# Patient Record
Sex: FEMALE | Race: WHITE | NOT HISPANIC OR LATINO | Employment: OTHER
[De-identification: names, ages, dates, MRNs, and addresses within clinical notes are randomized per-mention and may not be internally consistent; named-entity substitution may affect disease eponyms.]

---

## 2017-08-02 ENCOUNTER — IMAGING SERVICES (OUTPATIENT)
Dept: OTHER | Age: 72
End: 2017-08-02

## 2017-08-02 ENCOUNTER — HOSPITAL (OUTPATIENT)
Dept: OTHER | Age: 72
End: 2017-08-02

## 2017-08-16 ENCOUNTER — IMAGING SERVICES (OUTPATIENT)
Dept: OTHER | Age: 72
End: 2017-08-16

## 2017-08-16 ENCOUNTER — HOSPITAL (OUTPATIENT)
Dept: OTHER | Age: 72
End: 2017-08-16

## 2017-09-05 ENCOUNTER — HOSPITAL (OUTPATIENT)
Dept: OTHER | Age: 72
End: 2017-09-05
Attending: INTERNAL MEDICINE

## 2017-09-12 ENCOUNTER — HOSPITAL (OUTPATIENT)
Dept: OTHER | Age: 72
End: 2017-09-12
Attending: EMERGENCY MEDICINE

## 2017-09-12 ENCOUNTER — CHARTING TRANS (OUTPATIENT)
Dept: OTHER | Age: 72
End: 2017-09-12

## 2017-09-12 ENCOUNTER — LAB SERVICES (OUTPATIENT)
Dept: OTHER | Age: 72
End: 2017-09-12

## 2017-09-12 LAB
ALBUMIN SERPL-MCNC: 3.9 GM/DL (ref 3.6–5.1)
ALBUMIN/GLOB SERPL: 1.1 {RATIO} (ref 1–2.4)
ALP SERPL-CCNC: 56 UNIT/L (ref 45–117)
ALT SERPL-CCNC: 32 UNIT/L
ANALYZER ANC (IANC): ABNORMAL
ANION GAP SERPL CALC-SCNC: 13 MMOL/L (ref 10–20)
APPEARANCE UR: CLEAR
APPEARANCE UR: CLEAR
AST SERPL-CCNC: 47 UNIT/L
BACTERIA #/AREA URNS HPF: ABNORMAL /HPF
BACTERIA #/AREA URNS HPF: ABNORMAL /HPF
BASOPHILS # BLD: 0 THOUSAND/MCL (ref 0–0.3)
BASOPHILS NFR BLD: 0 %
BILIRUB SERPL-MCNC: 1.3 MG/DL (ref 0.2–1)
BILIRUB UR QL: NEGATIVE
BILIRUB UR QL: NEGATIVE
BUN SERPL-MCNC: 6 MG/DL (ref 6–20)
BUN/CREAT SERPL: 12 (ref 7–25)
CALCIUM SERPL-MCNC: 8.9 MG/DL (ref 8.4–10.2)
CHLORIDE: 89 MMOL/L (ref 98–107)
CO2 SERPL-SCNC: 26 MMOL/L (ref 21–32)
COLOR UR: YELLOW
COLOR UR: YELLOW
CREAT SERPL-MCNC: 0.49 MG/DL (ref 0.51–0.95)
DIFFERENTIAL METHOD BLD: ABNORMAL
EOSINOPHIL # BLD: 0.1 THOUSAND/MCL (ref 0.1–0.5)
EOSINOPHIL NFR BLD: 1 %
ERYTHROCYTE [DISTWIDTH] IN BLOOD: 12.9 % (ref 11–15)
GLOBULIN SER-MCNC: 3.7 GM/DL (ref 2–4)
GLUCOSE SERPL-MCNC: 89 MG/DL (ref 65–99)
GLUCOSE UR-MCNC: NEGATIVE MG/DL
GLUCOSE UR-MCNC: NEGATIVE MG/DL
HEMATOCRIT: 43.6 % (ref 36–46.5)
HGB BLD-MCNC: 15.3 GM/DL (ref 12–15.5)
HGB UR QL: NEGATIVE
HYALINE CASTS #/AREA URNS LPF: ABNORMAL /LPF (ref 0–5)
HYALINE CASTS #/AREA URNS LPF: ABNORMAL /LPF (ref 0–5)
KETONES UR-MCNC: 15 MG/DL
KETONES UR-MCNC: 15 MG/DL
LEUKOCYTE ESTERASE UR QL STRIP: NEGATIVE
LYMPHOCYTES # BLD: 1 THOUSAND/MCL (ref 1–4)
LYMPHOCYTES NFR BLD: 17 %
MCH RBC QN AUTO: 34.1 PG (ref 26–34)
MCHC RBC AUTO-ENTMCNC: 35.1 GM/DL (ref 32–36.5)
MCV RBC AUTO: 97.1 FL (ref 78–100)
MONOCYTES # BLD: 0.6 THOUSAND/MCL (ref 0.3–0.9)
MONOCYTES NFR BLD: 10 %
NEUTROPHILS # BLD: 4.1 THOUSAND/MCL (ref 1.8–7.7)
NEUTROPHILS NFR BLD: 72 %
NEUTS SEG NFR BLD: ABNORMAL %
NITRITE UR QL: NEGATIVE
NITRITE UR QL: NEGATIVE
PERCENT NRBC: ABNORMAL
PH UR: 6.5 UNIT (ref 5–7)
PH UR: 6.5 UNITS (ref 5–7)
PLATELET # BLD: 226 THOUSAND/MCL (ref 140–450)
POTASSIUM SERPL-SCNC: 4.2 MMOL/L (ref 3.4–5.1)
PROT SERPL-MCNC: 7.6 GM/DL (ref 6.4–8.2)
PROT UR QL: NEGATIVE MG/DL
PROT UR QL: NEGATIVE MG/DL
RBC # BLD: 4.49 MILLION/MCL (ref 4–5.2)
RBC #/AREA URNS HPF: ABNORMAL /HPF (ref 0–3)
RBC #/AREA URNS HPF: ABNORMAL /HPF (ref 0–3)
RBC-URINE: NEGATIVE
SODIUM SERPL-SCNC: 124 MMOL/L (ref 135–145)
SP GR UR: <1.005 (ref 1–1.03)
SP GR UR: <1.005 (ref 1–1.03)
SPECIMEN SOURCE: ABNORMAL
SPECIMEN SOURCE: ABNORMAL
SQUAMOUS #/AREA URNS HPF: ABNORMAL /HPF (ref 0–5)
SQUAMOUS #/AREA URNS HPF: ABNORMAL /HPF (ref 0–5)
URNS CMNT MICRO: ABNORMAL
URNS CMNT MICRO: ABNORMAL
UROBILINOGEN UR QL: 0.2 MG/DL (ref 0–1)
UROBILINOGEN UR QL: 0.2 MG/DL (ref 0–1)
WBC # BLD: 5.7 THOUSAND/MCL (ref 4.2–11)
WBC #/AREA URNS HPF: ABNORMAL /HPF (ref 0–5)
WBC #/AREA URNS HPF: ABNORMAL /HPF (ref 0–5)
WBC-URINE: NEGATIVE

## 2017-09-15 ENCOUNTER — IMAGING SERVICES (OUTPATIENT)
Dept: OTHER | Age: 72
End: 2017-09-15

## 2017-09-15 ENCOUNTER — HOSPITAL (OUTPATIENT)
Dept: OTHER | Age: 72
End: 2017-09-15

## 2017-09-19 ENCOUNTER — LAB SERVICES (OUTPATIENT)
Dept: OTHER | Age: 72
End: 2017-09-19

## 2017-09-19 ENCOUNTER — CHARTING TRANS (OUTPATIENT)
Dept: OTHER | Age: 72
End: 2017-09-19

## 2017-09-19 ASSESSMENT — PAIN SCALES - GENERAL: PAINLEVEL_OUTOF10: 0

## 2017-09-20 LAB
25(OH)D3+25(OH)D2 SERPL-MCNC: 45.3 NG/ML (ref 30–100)
BASOPHILS # BLD: 0 K/MCL (ref 0–0.3)
BASOPHILS NFR BLD: 0 %
DIFFERENTIAL METHOD BLD: ABNORMAL
EOSINOPHIL # BLD: 0.1 K/MCL (ref 0.1–0.5)
EOSINOPHIL NFR BLD: 1 %
ERYTHROCYTE [DISTWIDTH] IN BLOOD: 13.4 % (ref 11–15)
HEMATOCRIT: 47.2 % (ref 36–46.5)
HEMOGLOBIN: 16 G/DL (ref 12–15.5)
LYMPHOCYTES # BLD: 1 K/MCL (ref 1–4)
LYMPHOCYTES NFR BLD: 17 %
MEAN CORPUSCULAR HEMOGLOBIN: 34 PG (ref 26–34)
MEAN CORPUSCULAR HGB CONC: 33.9 G/DL (ref 32–36.5)
MEAN CORPUSCULAR VOLUME: 100.4 FL (ref 78–100)
MONOCYTES # BLD: 0.7 K/MCL (ref 0.3–0.9)
MONOCYTES NFR BLD: 13 %
NEUTROPHILS # BLD: 3.8 K/MCL (ref 1.8–7.7)
NEUTROPHILS NFR BLD: 69 %
PLATELET COUNT: 245 K/MCL (ref 140–450)
RED CELL COUNT: 4.7 MIL/MCL (ref 4–5.2)
WHITE BLOOD COUNT: 5.7 K/MCL (ref 4.2–11)

## 2017-09-26 ENCOUNTER — CHARTING TRANS (OUTPATIENT)
Dept: OTHER | Age: 72
End: 2017-09-26

## 2017-10-03 ENCOUNTER — HOSPITAL (OUTPATIENT)
Dept: OTHER | Age: 72
End: 2017-10-03
Attending: SURGERY

## 2017-10-03 ENCOUNTER — IMAGING SERVICES (OUTPATIENT)
Dept: OTHER | Age: 72
End: 2017-10-03

## 2017-10-04 ENCOUNTER — CHARTING TRANS (OUTPATIENT)
Dept: OTHER | Age: 72
End: 2017-10-04

## 2017-10-04 ENCOUNTER — DIAGNOSTIC TRANS (OUTPATIENT)
Dept: OTHER | Age: 72
End: 2017-10-04

## 2017-10-05 ENCOUNTER — CHARTING TRANS (OUTPATIENT)
Dept: OTHER | Age: 72
End: 2017-10-05

## 2017-10-12 ENCOUNTER — LAB SERVICES (OUTPATIENT)
Dept: OTHER | Age: 72
End: 2017-10-12

## 2017-10-13 LAB
ALBUMIN SERPL-MCNC: 3.8 G/DL (ref 3.6–5.1)
ALBUMIN/GLOB SERPL: 1.3 (ref 1–2.4)
ALP SERPL-CCNC: 49 UNITS/L (ref 45–117)
ALT SERPL-CCNC: 27 UNITS/L
ANION GAP SERPL CALC-SCNC: 13 MMOL/L (ref 10–20)
AST SERPL-CCNC: 21 UNITS/L
BILIRUB SERPL-MCNC: 0.7 MG/DL (ref 0.2–1)
BUN SERPL-MCNC: 10 MG/DL (ref 6–20)
BUN/CREAT SERPL: 20 (ref 7–25)
CALCIUM SERPL-MCNC: 9 MG/DL (ref 8.4–10.2)
CHLORIDE SERPL-SCNC: 96 MMOL/L (ref 98–107)
CO2 SERPL-SCNC: 28 MMOL/L (ref 21–32)
CREAT SERPL-MCNC: 0.5 MG/DL (ref 0.51–0.95)
GLOBULIN SER-MCNC: 2.9 G/DL (ref 2–4)
GLUCOSE SERPL-MCNC: 87 MG/DL (ref 65–99)
LENGTH OF FAST TIME PATIENT: NO HRS
POTASSIUM SERPL-SCNC: 3.6 MMOL/L (ref 3.4–5.1)
SODIUM SERPL-SCNC: 133 MMOL/L (ref 135–145)
TOTAL PROTEIN: 6.7 G/DL (ref 6.4–8.2)

## 2017-10-16 ENCOUNTER — PRIOR ORIGINAL RECORDS (OUTPATIENT)
Dept: OTHER | Age: 72
End: 2017-10-16

## 2017-10-23 ENCOUNTER — IMAGING SERVICES (OUTPATIENT)
Dept: OTHER | Age: 72
End: 2017-10-23

## 2017-10-23 ENCOUNTER — HOSPITAL (OUTPATIENT)
Dept: OTHER | Age: 72
End: 2017-10-23
Attending: SURGERY

## 2017-10-24 ENCOUNTER — CHARTING TRANS (OUTPATIENT)
Dept: OTHER | Age: 72
End: 2017-10-24

## 2017-10-24 ENCOUNTER — HOSPITAL (OUTPATIENT)
Dept: OTHER | Age: 72
End: 2017-10-24
Attending: RADIOLOGY

## 2017-10-25 ENCOUNTER — CHARTING TRANS (OUTPATIENT)
Dept: OTHER | Age: 72
End: 2017-10-25

## 2017-11-01 ENCOUNTER — HOSPITAL (OUTPATIENT)
Dept: OTHER | Age: 72
End: 2017-11-01
Attending: RADIOLOGY

## 2017-11-02 ENCOUNTER — CHARTING TRANS (OUTPATIENT)
Dept: OTHER | Age: 72
End: 2017-11-02

## 2017-11-22 ENCOUNTER — PRIOR ORIGINAL RECORDS (OUTPATIENT)
Dept: OTHER | Age: 72
End: 2017-11-22

## 2017-12-05 ENCOUNTER — CHARTING TRANS (OUTPATIENT)
Dept: OTHER | Age: 72
End: 2017-12-05

## 2017-12-05 ASSESSMENT — PAIN SCALES - GENERAL: PAINLEVEL_OUTOF10: 0

## 2018-02-28 ENCOUNTER — PRIOR ORIGINAL RECORDS (OUTPATIENT)
Dept: OTHER | Age: 73
End: 2018-02-28

## 2018-02-28 ENCOUNTER — HOSPITAL (OUTPATIENT)
Dept: OTHER | Age: 73
End: 2018-02-28
Attending: RADIOLOGY

## 2018-04-09 ENCOUNTER — HOSPITAL (OUTPATIENT)
Dept: OTHER | Age: 73
End: 2018-04-09
Attending: INTERNAL MEDICINE

## 2018-04-09 ENCOUNTER — IMAGING SERVICES (OUTPATIENT)
Dept: OTHER | Age: 73
End: 2018-04-09

## 2018-04-10 ENCOUNTER — HOSPITAL (OUTPATIENT)
Dept: OTHER | Age: 73
End: 2018-04-10
Attending: EMERGENCY MEDICINE

## 2018-04-10 ENCOUNTER — DIAGNOSTIC TRANS (OUTPATIENT)
Dept: OTHER | Age: 73
End: 2018-04-10

## 2018-04-10 LAB
ALBUMIN SERPL-MCNC: 3.3 GM/DL (ref 3.6–5.1)
ALBUMIN/GLOB SERPL: 1 {RATIO} (ref 1–2.4)
ALP SERPL-CCNC: 53 UNIT/L (ref 45–117)
ALT SERPL-CCNC: 26 UNIT/L
AMPHETAMINES UR QL SCN>500 NG/ML: NEGATIVE
ANALYZER ANC (IANC): ABNORMAL
ANION GAP SERPL CALC-SCNC: 11 MMOL/L (ref 10–20)
APAP SERPL-MCNC: <2 MCG/ML (ref 10–30)
AST SERPL-CCNC: 24 UNIT/L
BARBITURATES UR QL SCN>200 NG/ML: NEGATIVE
BASOPHILS # BLD: 0 THOUSAND/MCL (ref 0–0.3)
BASOPHILS NFR BLD: 0 %
BENZODIAZ UR QL SCN>200 NG/ML: NEGATIVE
BILIRUB SERPL-MCNC: 0.5 MG/DL (ref 0.2–1)
BUN SERPL-MCNC: 9 MG/DL (ref 6–20)
BUN/CREAT SERPL: 18 (ref 7–25)
BZE UR QL SCN>150 NG/ML: NEGATIVE
CALCIUM SERPL-MCNC: 8.7 MG/DL (ref 8.4–10.2)
CANNABINOIDS UR QL SCN>50 NG/ML: NEGATIVE
CHLORIDE: 93 MMOL/L (ref 98–107)
CO2 SERPL-SCNC: 28 MMOL/L (ref 21–32)
CREAT SERPL-MCNC: 0.51 MG/DL (ref 0.51–0.95)
DIFFERENTIAL METHOD BLD: ABNORMAL
EOSINOPHIL # BLD: 0 THOUSAND/MCL (ref 0.1–0.5)
EOSINOPHIL NFR BLD: 0 %
ERYTHROCYTE [DISTWIDTH] IN BLOOD: 12.5 % (ref 11–15)
ETHANOL SERPL-MCNC: NORMAL MG/DL
GLOBULIN SER-MCNC: 3.4 GM/DL (ref 2–4)
GLUCOSE SERPL-MCNC: 115 MG/DL (ref 65–99)
HEMATOCRIT: 43.5 % (ref 36–46.5)
HGB BLD-MCNC: 15 GM/DL (ref 12–15.5)
IMM GRANULOCYTES # BLD AUTO: 0 THOUSAND/MCL (ref 0–0.2)
IMM GRANULOCYTES NFR BLD: 0 %
LIPASE SERPL-CCNC: 73 UNIT/L (ref 73–393)
LYMPHOCYTES # BLD: 0.7 THOUSAND/MCL (ref 1–4)
LYMPHOCYTES NFR BLD: 9 %
MAGNESIUM SERPL-MCNC: 2.2 MG/DL (ref 1.7–2.4)
MCH RBC QN AUTO: 33.9 PG (ref 26–34)
MCHC RBC AUTO-ENTMCNC: 34.5 GM/DL (ref 32–36.5)
MCV RBC AUTO: 98.4 FL (ref 78–100)
MONOCYTES # BLD: 0.6 THOUSAND/MCL (ref 0.3–0.9)
MONOCYTES NFR BLD: 9 %
NEUTROPHILS # BLD: 6.2 THOUSAND/MCL (ref 1.8–7.7)
NEUTROPHILS NFR BLD: 82 %
NEUTS SEG NFR BLD: ABNORMAL %
OPIATES UR QL SCN>300 NG/ML: NEGATIVE
PCP UR QL SCN>25 NG/ML: NEGATIVE
PERCENT NRBC: 0 %
PLATELET # BLD: 251 THOUSAND/MCL (ref 140–450)
POTASSIUM SERPL-SCNC: 3.7 MMOL/L (ref 3.4–5.1)
PROT SERPL-MCNC: 6.7 GM/DL (ref 6.4–8.2)
RBC # BLD: 4.42 MILLION/MCL (ref 4–5.2)
SALICYLATES SERPL-MCNC: <2.8 MG/DL
SODIUM SERPL-SCNC: 128 MMOL/L (ref 135–145)
WBC # BLD: 7.6 THOUSAND/MCL (ref 4.2–11)

## 2018-04-12 ENCOUNTER — HOSPITAL (OUTPATIENT)
Dept: OTHER | Age: 73
End: 2018-04-12
Attending: PSYCHIATRY & NEUROLOGY

## 2018-04-12 LAB
ALBUMIN SERPL-MCNC: 3.3 GM/DL (ref 3.6–5.1)
ALBUMIN/GLOB SERPL: 1 {RATIO} (ref 1–2.4)
ALP SERPL-CCNC: 49 UNIT/L (ref 45–117)
ALT SERPL-CCNC: 27 UNIT/L
AMPHETAMINES UR QL SCN>500 NG/ML: NEGATIVE
ANALYZER ANC (IANC): ABNORMAL
ANION GAP SERPL CALC-SCNC: 10 MMOL/L (ref 10–20)
APAP SERPL-MCNC: <2 MCG/ML (ref 10–30)
APPEARANCE UR: CLEAR
AST SERPL-CCNC: 27 UNIT/L
BACTERIA #/AREA URNS HPF: ABNORMAL /HPF
BARBITURATES UR QL SCN>200 NG/ML: NEGATIVE
BASOPHILS # BLD: 0 THOUSAND/MCL (ref 0–0.3)
BASOPHILS NFR BLD: 1 %
BENZODIAZ UR QL SCN>200 NG/ML: NEGATIVE
BILIRUB SERPL-MCNC: 0.7 MG/DL (ref 0.2–1)
BILIRUB UR QL: NEGATIVE
BUN SERPL-MCNC: 11 MG/DL (ref 6–20)
BUN/CREAT SERPL: 21 (ref 7–25)
BZE UR QL SCN>150 NG/ML: NEGATIVE
CALCIUM SERPL-MCNC: 8.5 MG/DL (ref 8.4–10.2)
CANNABINOIDS UR QL SCN>50 NG/ML: NEGATIVE
CHLORIDE: 94 MMOL/L (ref 98–107)
CO2 SERPL-SCNC: 27 MMOL/L (ref 21–32)
COLOR UR: YELLOW
CREAT SERPL-MCNC: 0.52 MG/DL (ref 0.51–0.95)
DIFFERENTIAL METHOD BLD: ABNORMAL
EOSINOPHIL # BLD: 0.1 THOUSAND/MCL (ref 0.1–0.5)
EOSINOPHIL NFR BLD: 1 %
ERYTHROCYTE [DISTWIDTH] IN BLOOD: 12.6 % (ref 11–15)
ETHANOL SERPL-MCNC: NORMAL MG/DL
GLOBULIN SER-MCNC: 3.2 GM/DL (ref 2–4)
GLUCOSE SERPL-MCNC: 99 MG/DL (ref 65–99)
GLUCOSE UR-MCNC: NEGATIVE MG/DL
HEMATOCRIT: 41.6 % (ref 36–46.5)
HGB BLD-MCNC: 14.6 GM/DL (ref 12–15.5)
HGB UR QL: NEGATIVE
HYALINE CASTS #/AREA URNS LPF: ABNORMAL /LPF (ref 0–5)
IMM GRANULOCYTES # BLD AUTO: 0 THOUSAND/MCL (ref 0–0.2)
IMM GRANULOCYTES NFR BLD: 0 %
KETONES UR-MCNC: 15 MG/DL
LEUKOCYTE ESTERASE UR QL STRIP: ABNORMAL
LYMPHOCYTES # BLD: 0.9 THOUSAND/MCL (ref 1–4)
LYMPHOCYTES NFR BLD: 16 %
MCH RBC QN AUTO: 34.1 PG (ref 26–34)
MCHC RBC AUTO-ENTMCNC: 35.1 GM/DL (ref 32–36.5)
MCV RBC AUTO: 97.2 FL (ref 78–100)
MONOCYTES # BLD: 0.6 THOUSAND/MCL (ref 0.3–0.9)
MONOCYTES NFR BLD: 10 %
NEUTROPHILS # BLD: 4.1 THOUSAND/MCL (ref 1.8–7.7)
NEUTROPHILS NFR BLD: 72 %
NEUTS SEG NFR BLD: ABNORMAL %
NITRITE UR QL: NEGATIVE
OPIATES UR QL SCN>300 NG/ML: NEGATIVE
PCP UR QL SCN>25 NG/ML: NEGATIVE
PERCENT NRBC: 0 %
PH UR: 7 UNIT (ref 5–7)
PLATELET # BLD: 229 THOUSAND/MCL (ref 140–450)
POTASSIUM SERPL-SCNC: 3.8 MMOL/L (ref 3.4–5.1)
PROT SERPL-MCNC: 6.5 GM/DL (ref 6.4–8.2)
PROT UR QL: NEGATIVE MG/DL
RBC # BLD: 4.28 MILLION/MCL (ref 4–5.2)
RBC #/AREA URNS HPF: ABNORMAL /HPF (ref 0–3)
SALICYLATES SERPL-MCNC: <2.8 MG/DL
SODIUM SERPL-SCNC: 127 MMOL/L (ref 135–145)
SP GR UR: 1.01 (ref 1–1.03)
SPECIMEN SOURCE: ABNORMAL
SQUAMOUS #/AREA URNS HPF: ABNORMAL /HPF (ref 0–5)
TSH SERPL-ACNC: 4.71 MCUNIT/ML (ref 0.35–5)
URNS CMNT MICRO: ABNORMAL
UROBILINOGEN UR QL: 1 MG/DL (ref 0–1)
WBC # BLD: 5.8 THOUSAND/MCL (ref 4.2–11)
WBC #/AREA URNS HPF: ABNORMAL /HPF (ref 0–5)

## 2018-04-13 LAB
ANALYZER ANC (IANC): ABNORMAL
ANION GAP SERPL CALC-SCNC: 12 MMOL/L (ref 10–20)
BUN SERPL-MCNC: 9 MG/DL (ref 6–20)
BUN/CREAT SERPL: 17 (ref 7–25)
CALCIUM SERPL-MCNC: 8.2 MG/DL (ref 8.4–10.2)
CHLORIDE: 102 MMOL/L (ref 98–107)
CHOLEST SERPL-MCNC: 207 MG/DL
CHOLEST/HDLC SERPL: 2 {RATIO}
CO2 SERPL-SCNC: 26 MMOL/L (ref 21–32)
CREAT SERPL-MCNC: 0.52 MG/DL (ref 0.51–0.95)
ERYTHROCYTE [DISTWIDTH] IN BLOOD: 12.9 % (ref 11–15)
GLUCOSE SERPL-MCNC: 90 MG/DL (ref 65–99)
GLYCOHEMOGLOBIN: 5.4 % (ref 4.5–5.6)
HDLC SERPL-MCNC: 104 MG/DL
HEMATOCRIT: 41.6 % (ref 36–46.5)
HGB BLD-MCNC: 14.3 GM/DL (ref 12–15.5)
LDLC SERPL CALC-MCNC: 93 MG/DL
MCH RBC QN AUTO: 33.7 PG (ref 26–34)
MCHC RBC AUTO-ENTMCNC: 34.4 GM/DL (ref 32–36.5)
MCV RBC AUTO: 98.1 FL (ref 78–100)
NONHDLC SERPL-MCNC: 103 MG/DL
PERCENT NRBC: 0 %
PLATELET # BLD: 234 THOUSAND/MCL (ref 140–450)
POTASSIUM SERPL-SCNC: 3.7 MMOL/L (ref 3.4–5.1)
RBC # BLD: 4.24 MILLION/MCL (ref 4–5.2)
SODIUM SERPL-SCNC: 136 MMOL/L (ref 135–145)
TRIGLYCERIDE (TRIGP): 48 MG/DL
WBC # BLD: 4.1 THOUSAND/MCL (ref 4.2–11)

## 2018-04-14 LAB
ALBUMIN SERPL-MCNC: 2.9 GM/DL (ref 3.6–5.1)
ALBUMIN/GLOB SERPL: 0.9 {RATIO} (ref 1–2.4)
ALP SERPL-CCNC: 45 UNIT/L (ref 45–117)
ALT SERPL-CCNC: 23 UNIT/L
ANALYZER ANC (IANC): ABNORMAL
ANION GAP SERPL CALC-SCNC: 10 MMOL/L (ref 10–20)
AST SERPL-CCNC: 20 UNIT/L
BASOPHILS # BLD: 0 THOUSAND/MCL (ref 0–0.3)
BASOPHILS NFR BLD: 1 %
BILIRUB SERPL-MCNC: 0.3 MG/DL (ref 0.2–1)
BUN SERPL-MCNC: 11 MG/DL (ref 6–20)
BUN/CREAT SERPL: 17 (ref 7–25)
CALCIUM SERPL-MCNC: 8.6 MG/DL (ref 8.4–10.2)
CHLORIDE: 104 MMOL/L (ref 98–107)
CO2 SERPL-SCNC: 30 MMOL/L (ref 21–32)
CREAT SERPL-MCNC: 0.66 MG/DL (ref 0.51–0.95)
DIFFERENTIAL METHOD BLD: ABNORMAL
EOSINOPHIL # BLD: 0.1 THOUSAND/MCL (ref 0.1–0.5)
EOSINOPHIL NFR BLD: 3 %
ERYTHROCYTE [DISTWIDTH] IN BLOOD: 13 % (ref 11–15)
GLOBULIN SER-MCNC: 3.3 GM/DL (ref 2–4)
GLUCOSE SERPL-MCNC: 83 MG/DL (ref 65–99)
HEMATOCRIT: 42.2 % (ref 36–46.5)
HGB BLD-MCNC: 14.5 GM/DL (ref 12–15.5)
IMM GRANULOCYTES # BLD AUTO: 0 THOUSAND/MCL (ref 0–0.2)
IMM GRANULOCYTES NFR BLD: 1 %
LYMPHOCYTES # BLD: 1 THOUSAND/MCL (ref 1–4)
LYMPHOCYTES NFR BLD: 22 %
MAGNESIUM SERPL-MCNC: 2.3 MG/DL (ref 1.7–2.4)
MCH RBC QN AUTO: 34.2 PG (ref 26–34)
MCHC RBC AUTO-ENTMCNC: 34.4 GM/DL (ref 32–36.5)
MCV RBC AUTO: 99.5 FL (ref 78–100)
MONOCYTES # BLD: 0.7 THOUSAND/MCL (ref 0.3–0.9)
MONOCYTES NFR BLD: 14 %
NEUTROPHILS # BLD: 2.8 THOUSAND/MCL (ref 1.8–7.7)
NEUTROPHILS NFR BLD: 59 %
NEUTS SEG NFR BLD: ABNORMAL %
PERCENT NRBC: 0 %
PHOSPHATE SERPL-MCNC: 4.2 MG/DL (ref 2.4–4.7)
PLATELET # BLD: 222 THOUSAND/MCL (ref 140–450)
POTASSIUM SERPL-SCNC: 4 MMOL/L (ref 3.4–5.1)
PROT SERPL-MCNC: 6.2 GM/DL (ref 6.4–8.2)
RBC # BLD: 4.24 MILLION/MCL (ref 4–5.2)
SODIUM SERPL-SCNC: 140 MMOL/L (ref 135–145)
WBC # BLD: 4.7 THOUSAND/MCL (ref 4.2–11)

## 2018-04-23 ENCOUNTER — DIAGNOSTIC TRANS (OUTPATIENT)
Dept: OTHER | Age: 73
End: 2018-04-23

## 2018-04-23 ENCOUNTER — HOSPITAL (OUTPATIENT)
Dept: OTHER | Age: 73
End: 2018-04-23
Attending: INTERNAL MEDICINE

## 2018-04-23 LAB
ALBUMIN SERPL-MCNC: 2.9 GM/DL (ref 3.6–5.1)
ALBUMIN/GLOB SERPL: 0.8 {RATIO} (ref 1–2.4)
ALP SERPL-CCNC: 57 UNIT/L (ref 45–117)
ALT SERPL-CCNC: 23 UNIT/L
ANALYZER ANC (IANC): ABNORMAL
ANION GAP SERPL CALC-SCNC: 18 MMOL/L (ref 10–20)
AST SERPL-CCNC: 20 UNIT/L
BASE DEFICIT BLDA-SCNC: 12 MMOL/L (ref 0–2)
BASE DEFICIT BLDA-SCNC: ABNORMAL MMOL/L
BASE EXCESS BLDA CALC-SCNC: 0 MMOL/L (ref 0–3)
BASE EXCESS BLDA CALC-SCNC: ABNORMAL MMOL/L
BDY SITE: ABNORMAL
BDY SITE: ABNORMAL
BILIRUB SERPL-MCNC: 0.5 MG/DL (ref 0.2–1)
BODY TEMPERATURE: 37 DEGREES
BODY TEMPERATURE: 37 DEGREES
BUN SERPL-MCNC: 13 MG/DL (ref 6–20)
BUN/CREAT SERPL: 24 (ref 7–25)
CA-I BLD ISE-SCNC: 1.21 MMOL/L (ref 1.15–1.29)
CA-I BLD ISE-SCNC: 1.22 MMOL/L (ref 1.15–1.29)
CA-I BLDA-SCNC: 19 % (ref 15–23)
CA-I BLDA-SCNC: 22 % (ref 15–23)
CALCIUM SERPL-MCNC: 8 MG/DL (ref 8.4–10.2)
CHLORIDE: 101 MMOL/L (ref 98–107)
CO2 SERPL-SCNC: 22 MMOL/L (ref 21–32)
COHGB MFR BLD: 1.1 %
COHGB MFR BLD: 1.2 %
CONDITION: ABNORMAL
CREAT SERPL-MCNC: 0.55 MG/DL (ref 0.51–0.95)
ERYTHROCYTE [DISTWIDTH] IN BLOOD: 13 % (ref 11–15)
GLOBULIN SER-MCNC: 3.7 GM/DL (ref 2–4)
GLUCOSE BLDC GLUCOMTR-MCNC: 141 MG/DL (ref 65–99)
GLUCOSE BLDC GLUCOMTR-MCNC: 94 MG/DL (ref 65–99)
GLUCOSE SERPL-MCNC: 131 MG/DL (ref 65–99)
HCO3 BLDA-SCNC: 19 MMOL/L (ref 22–28)
HCO3 BLDA-SCNC: 26 MMOL/L (ref 22–28)
HEMATOCRIT: 42.1 % (ref 36–46.5)
HGB BLD-MCNC: 13.8 GM/DL (ref 12–15.5)
HGB BLD-MCNC: 14 GM/DL (ref 12–15.5)
HGB BLD-MCNC: 15.4 GM/DL (ref 12–15.5)
HOROWITZ INDEX BLD+IHG-RTO: ABNORMAL MM[HG]
HOROWITZ INDEX BLD+IHG-RTO: ABNORMAL MM[HG]
LACTATE BLDA-MCNC: 3.4 MMOL/L
LACTATE BLDA-MCNC: 9.7 MMOL/L
LACTATE BLDV-SCNC: 6.6 MMOL/L (ref 0–2)
MAGNESIUM SERPL-MCNC: 1.8 MG/DL (ref 1.7–2.4)
MCH RBC QN AUTO: 34.3 PG (ref 26–34)
MCHC RBC AUTO-ENTMCNC: 33.3 GM/DL (ref 32–36.5)
MCV RBC AUTO: 103.2 FL (ref 78–100)
METHGB MFR BLD: 0.7 %
METHGB MFR BLD: 0.8 %
OXYHGB MFR BLD: 96.5 % (ref 94–98)
OXYHGB MFR BLD: 96.6 % (ref 94–98)
PCO2 BLDA: 45 MM HG (ref 32–45)
PCO2 BLDA: 65 MM HG (ref 32–45)
PERCENT NRBC: 0 %
PH BLDA: 7.07 UNIT (ref 7.35–7.45)
PH BLDA: 7.37 UNIT (ref 7.35–7.45)
PHOSPHATE SERPL-MCNC: 4 MG/DL (ref 2.4–4.7)
PLATELET # BLD: 213 THOUSAND/MCL (ref 140–450)
PO2 BLDA: 205 MM HG (ref 83–108)
PO2 BLDA: 344 MM HG (ref 83–108)
POTASSIUM BLD-SCNC: 3.3 MMOL/L (ref 3.4–5.1)
POTASSIUM BLD-SCNC: 3.9 MMOL/L (ref 3.4–5.1)
POTASSIUM SERPL-SCNC: 3.9 MMOL/L (ref 3.4–5.1)
PROT SERPL-MCNC: 6.6 GM/DL (ref 6.4–8.2)
RBC # BLD: 4.08 MILLION/MCL (ref 4–5.2)
SAO2 % BLDA: 98 % (ref 95–99)
SAO2 % BLDA: 99 % (ref 95–99)
SODIUM BLD-SCNC: 134 MMOL/L (ref 135–145)
SODIUM BLD-SCNC: 136 MMOL/L (ref 135–145)
SODIUM SERPL-SCNC: 137 MMOL/L (ref 135–145)
TROPONIN I SERPL HS-MCNC: <0.02 NG/ML
WBC # BLD: 8 THOUSAND/MCL (ref 4.2–11)

## 2018-04-24 ENCOUNTER — DIAGNOSTIC TRANS (OUTPATIENT)
Dept: OTHER | Age: 73
End: 2018-04-24

## 2018-04-24 ENCOUNTER — CHARTING TRANS (OUTPATIENT)
Dept: OTHER | Age: 73
End: 2018-04-24

## 2018-04-24 LAB
ALBUMIN SERPL-MCNC: 2.6 GM/DL (ref 3.6–5.1)
ALBUMIN/GLOB SERPL: 0.7 {RATIO} (ref 1–2.4)
ALP SERPL-CCNC: 49 UNIT/L (ref 45–117)
ALT SERPL-CCNC: 20 UNIT/L
ANALYZER ANC (IANC): ABNORMAL
ANION GAP SERPL CALC-SCNC: 9 MMOL/L (ref 10–20)
AST SERPL-CCNC: 22 UNIT/L
BILIRUB SERPL-MCNC: 0.6 MG/DL (ref 0.2–1)
BUN SERPL-MCNC: 20 MG/DL (ref 6–20)
BUN/CREAT SERPL: 32 (ref 7–25)
CALCIUM SERPL-MCNC: 8.1 MG/DL (ref 8.4–10.2)
CHLORIDE: 105 MMOL/L (ref 98–107)
CO2 SERPL-SCNC: 28 MMOL/L (ref 21–32)
CREAT SERPL-MCNC: 0.63 MG/DL (ref 0.51–0.95)
ERYTHROCYTE [DISTWIDTH] IN BLOOD: 13.1 % (ref 11–15)
GLOBULIN SER-MCNC: 3.5 GM/DL (ref 2–4)
GLUCOSE SERPL-MCNC: 89 MG/DL (ref 65–99)
HEMATOCRIT: 37.2 % (ref 36–46.5)
HGB BLD-MCNC: 12.3 GM/DL (ref 12–15.5)
MCH RBC QN AUTO: 34.1 PG (ref 26–34)
MCHC RBC AUTO-ENTMCNC: 33.1 GM/DL (ref 32–36.5)
MCV RBC AUTO: 103 FL (ref 78–100)
PERCENT NRBC: 0 %
PLATELET # BLD: 205 THOUSAND/MCL (ref 140–450)
POTASSIUM SERPL-SCNC: 4.1 MMOL/L (ref 3.4–5.1)
PROT SERPL-MCNC: 6.1 GM/DL (ref 6.4–8.2)
RBC # BLD: 3.61 MILLION/MCL (ref 4–5.2)
SODIUM SERPL-SCNC: 138 MMOL/L (ref 135–145)
WBC # BLD: 5.5 THOUSAND/MCL (ref 4.2–11)

## 2018-04-26 LAB
ALBUMIN SERPL-MCNC: 2.8 GM/DL (ref 3.6–5.1)
ALBUMIN/GLOB SERPL: 0.8 {RATIO} (ref 1–2.4)
ALP SERPL-CCNC: 53 UNIT/L (ref 45–117)
ALT SERPL-CCNC: 23 UNIT/L
ANALYZER ANC (IANC): ABNORMAL
ANALYZER ANC (IANC): ABNORMAL
ANION GAP SERPL CALC-SCNC: 10 MMOL/L (ref 10–20)
AST SERPL-CCNC: 17 UNIT/L
BASOPHILS # BLD: 0 THOUSAND/MCL (ref 0–0.3)
BASOPHILS NFR BLD: 1 %
BILIRUB SERPL-MCNC: 0.3 MG/DL (ref 0.2–1)
BUN SERPL-MCNC: 24 MG/DL (ref 6–20)
BUN/CREAT SERPL: 41 (ref 7–25)
CALCIUM SERPL-MCNC: 8.8 MG/DL (ref 8.4–10.2)
CHLORIDE: 108 MMOL/L (ref 98–107)
CO2 SERPL-SCNC: 28 MMOL/L (ref 21–32)
CREAT SERPL-MCNC: 0.59 MG/DL (ref 0.51–0.95)
DIFFERENTIAL METHOD BLD: ABNORMAL
EOSINOPHIL # BLD: 0.2 THOUSAND/MCL (ref 0.1–0.5)
EOSINOPHIL NFR BLD: 3 %
ERYTHROCYTE [DISTWIDTH] IN BLOOD: 13 % (ref 11–15)
ERYTHROCYTE [DISTWIDTH] IN BLOOD: 13.1 % (ref 11–15)
GLOBULIN SER-MCNC: 3.6 GM/DL (ref 2–4)
GLUCOSE SERPL-MCNC: 100 MG/DL (ref 65–99)
HEMATOCRIT: 37 % (ref 36–46.5)
HEMATOCRIT: 39.2 % (ref 36–46.5)
HGB BLD-MCNC: 12.2 GM/DL (ref 12–15.5)
HGB BLD-MCNC: 13.1 GM/DL (ref 12–15.5)
IMM GRANULOCYTES # BLD AUTO: 0 THOUSAND/MCL (ref 0–0.2)
IMM GRANULOCYTES NFR BLD: 1 %
LYMPHOCYTES # BLD: 1.3 THOUSAND/MCL (ref 1–4)
LYMPHOCYTES NFR BLD: 24 %
MAGNESIUM SERPL-MCNC: 2 MG/DL (ref 1.7–2.4)
MCH RBC QN AUTO: 33.7 PG (ref 26–34)
MCH RBC QN AUTO: 33.9 PG (ref 26–34)
MCHC RBC AUTO-ENTMCNC: 33 GM/DL (ref 32–36.5)
MCHC RBC AUTO-ENTMCNC: 33.4 GM/DL (ref 32–36.5)
MCV RBC AUTO: 100.8 FL (ref 78–100)
MCV RBC AUTO: 102.8 FL (ref 78–100)
MONOCYTES # BLD: 0.6 THOUSAND/MCL (ref 0.3–0.9)
MONOCYTES NFR BLD: 11 %
NEUTROPHILS # BLD: 3.2 THOUSAND/MCL (ref 1.8–7.7)
NEUTROPHILS NFR BLD: 60 %
NEUTS SEG NFR BLD: ABNORMAL %
PERCENT NRBC: 0 %
PERCENT NRBC: 0 %
PHOSPHATE SERPL-MCNC: 3.9 MG/DL (ref 2.4–4.7)
PLATELET # BLD: 234 THOUSAND/MCL (ref 140–450)
PLATELET # BLD: 256 THOUSAND/MCL (ref 140–450)
POTASSIUM SERPL-SCNC: 3.9 MMOL/L (ref 3.4–5.1)
PROT SERPL-MCNC: 6.4 GM/DL (ref 6.4–8.2)
RBC # BLD: 3.6 MILLION/MCL (ref 4–5.2)
RBC # BLD: 3.89 MILLION/MCL (ref 4–5.2)
SODIUM SERPL-SCNC: 142 MMOL/L (ref 135–145)
WBC # BLD: 5.3 THOUSAND/MCL (ref 4.2–11)
WBC # BLD: 6.6 THOUSAND/MCL (ref 4.2–11)

## 2018-04-27 LAB
ANALYZER ANC (IANC): ABNORMAL
BASOPHILS # BLD: 0 THOUSAND/MCL (ref 0–0.3)
BASOPHILS NFR BLD: 1 %
DIFFERENTIAL METHOD BLD: ABNORMAL
EOSINOPHIL # BLD: 0.2 THOUSAND/MCL (ref 0.1–0.5)
EOSINOPHIL NFR BLD: 3 %
ERYTHROCYTE [DISTWIDTH] IN BLOOD: 12.9 % (ref 11–15)
HEMATOCRIT: 36.4 % (ref 36–46.5)
HGB BLD-MCNC: 12.1 GM/DL (ref 12–15.5)
IMM GRANULOCYTES # BLD AUTO: 0 THOUSAND/MCL (ref 0–0.2)
IMM GRANULOCYTES NFR BLD: 1 %
LYMPHOCYTES # BLD: 1.2 THOUSAND/MCL (ref 1–4)
LYMPHOCYTES NFR BLD: 24 %
MCH RBC QN AUTO: 33.8 PG (ref 26–34)
MCHC RBC AUTO-ENTMCNC: 33.2 GM/DL (ref 32–36.5)
MCV RBC AUTO: 101.7 FL (ref 78–100)
MONOCYTES # BLD: 0.5 THOUSAND/MCL (ref 0.3–0.9)
MONOCYTES NFR BLD: 11 %
NEUTROPHILS # BLD: 3 THOUSAND/MCL (ref 1.8–7.7)
NEUTROPHILS NFR BLD: 60 %
NEUTS SEG NFR BLD: ABNORMAL %
PERCENT NRBC: 0 %
PLATELET # BLD: 255 THOUSAND/MCL (ref 140–450)
RBC # BLD: 3.58 MILLION/MCL (ref 4–5.2)
WBC # BLD: 4.9 THOUSAND/MCL (ref 4.2–11)

## 2018-04-28 LAB
ANALYZER ANC (IANC): ABNORMAL
ANION GAP SERPL CALC-SCNC: 9 MMOL/L (ref 10–20)
BASOPHILS # BLD: 0 THOUSAND/MCL (ref 0–0.3)
BASOPHILS NFR BLD: 1 %
BUN SERPL-MCNC: 21 MG/DL (ref 6–20)
BUN/CREAT SERPL: 44 (ref 7–25)
CALCIUM SERPL-MCNC: 8.2 MG/DL (ref 8.4–10.2)
CHLORIDE: 109 MMOL/L (ref 98–107)
CO2 SERPL-SCNC: 25 MMOL/L (ref 21–32)
CREAT SERPL-MCNC: 0.48 MG/DL (ref 0.51–0.95)
DIFFERENTIAL METHOD BLD: ABNORMAL
EOSINOPHIL # BLD: 0.1 THOUSAND/MCL (ref 0.1–0.5)
EOSINOPHIL NFR BLD: 2 %
ERYTHROCYTE [DISTWIDTH] IN BLOOD: 13 % (ref 11–15)
GLUCOSE SERPL-MCNC: 139 MG/DL (ref 65–99)
HEMATOCRIT: 31.3 % (ref 36–46.5)
HGB BLD-MCNC: 10.7 GM/DL (ref 12–15.5)
IMM GRANULOCYTES # BLD AUTO: 0 THOUSAND/MCL (ref 0–0.2)
IMM GRANULOCYTES NFR BLD: 0 %
LYMPHOCYTES # BLD: 1 THOUSAND/MCL (ref 1–4)
LYMPHOCYTES NFR BLD: 13 %
MCH RBC QN AUTO: 34.7 PG (ref 26–34)
MCHC RBC AUTO-ENTMCNC: 34.2 GM/DL (ref 32–36.5)
MCV RBC AUTO: 101.6 FL (ref 78–100)
MONOCYTES # BLD: 0.7 THOUSAND/MCL (ref 0.3–0.9)
MONOCYTES NFR BLD: 10 %
NEUTROPHILS # BLD: 5.9 THOUSAND/MCL (ref 1.8–7.7)
NEUTROPHILS NFR BLD: 74 %
NEUTS SEG NFR BLD: ABNORMAL %
PERCENT NRBC: 0 %
PLATELET # BLD: 232 THOUSAND/MCL (ref 140–450)
POTASSIUM SERPL-SCNC: 3.4 MMOL/L (ref 3.4–5.1)
RBC # BLD: 3.08 MILLION/MCL (ref 4–5.2)
SODIUM SERPL-SCNC: 140 MMOL/L (ref 135–145)
WBC # BLD: 7.8 THOUSAND/MCL (ref 4.2–11)

## 2018-04-29 LAB
ANALYZER ANC (IANC): ABNORMAL
ANALYZER ANC (IANC): ABNORMAL
BASOPHILS # BLD: 0 THOUSAND/MCL (ref 0–0.3)
BASOPHILS NFR BLD: 1 %
DIFFERENTIAL METHOD BLD: ABNORMAL
EOSINOPHIL # BLD: 0.1 THOUSAND/MCL (ref 0.1–0.5)
EOSINOPHIL NFR BLD: 3 %
ERYTHROCYTE [DISTWIDTH] IN BLOOD: 12.9 % (ref 11–15)
ERYTHROCYTE [DISTWIDTH] IN BLOOD: 12.9 % (ref 11–15)
HEMATOCRIT: 27.5 % (ref 36–46.5)
HEMATOCRIT: 28 % (ref 36–46.5)
HEMATOCRIT: 32.9 % (ref 36–46.5)
HGB BLD-MCNC: 10.9 GM/DL (ref 12–15.5)
HGB BLD-MCNC: 9.2 GM/DL (ref 12–15.5)
HGB BLD-MCNC: 9.3 GM/DL (ref 12–15.5)
IMM GRANULOCYTES # BLD AUTO: 0 THOUSAND/MCL (ref 0–0.2)
IMM GRANULOCYTES NFR BLD: 0 %
LYMPHOCYTES # BLD: 1.1 THOUSAND/MCL (ref 1–4)
LYMPHOCYTES NFR BLD: 24 %
MCH RBC QN AUTO: 33.5 PG (ref 26–34)
MCH RBC QN AUTO: 34.2 PG (ref 26–34)
MCHC RBC AUTO-ENTMCNC: 33.1 GM/DL (ref 32–36.5)
MCHC RBC AUTO-ENTMCNC: 33.2 GM/DL (ref 32–36.5)
MCV RBC AUTO: 100.7 FL (ref 78–100)
MCV RBC AUTO: 103.1 FL (ref 78–100)
MONOCYTES # BLD: 0.5 THOUSAND/MCL (ref 0.3–0.9)
MONOCYTES NFR BLD: 10 %
NEUTROPHILS # BLD: 3 THOUSAND/MCL (ref 1.8–7.7)
NEUTROPHILS NFR BLD: 62 %
NEUTS SEG NFR BLD: ABNORMAL %
PERCENT NRBC: 0 %
PERCENT NRBC: 0 %
PLATELET # BLD: 239 THOUSAND/MCL (ref 140–450)
PLATELET # BLD: 244 THOUSAND/MCL (ref 140–450)
RBC # BLD: 2.78 MILLION/MCL (ref 4–5.2)
RBC # BLD: 3.19 MILLION/MCL (ref 4–5.2)
WBC # BLD: 4.8 THOUSAND/MCL (ref 4.2–11)
WBC # BLD: 7 THOUSAND/MCL (ref 4.2–11)

## 2018-04-30 LAB
ALBUMIN SERPL-MCNC: 2.4 GM/DL (ref 3.6–5.1)
ALBUMIN/GLOB SERPL: 0.8 {RATIO} (ref 1–2.4)
ALP SERPL-CCNC: 39 UNIT/L (ref 45–117)
ALT SERPL-CCNC: 21 UNIT/L
ANALYZER ANC (IANC): ABNORMAL
ANION GAP SERPL CALC-SCNC: 10 MMOL/L (ref 10–20)
AST SERPL-CCNC: 17 UNIT/L
BASOPHILS # BLD: 0 THOUSAND/MCL (ref 0–0.3)
BASOPHILS NFR BLD: 1 %
BILIRUB SERPL-MCNC: 0.3 MG/DL (ref 0.2–1)
BUN SERPL-MCNC: 16 MG/DL (ref 6–20)
BUN/CREAT SERPL: 26 (ref 7–25)
CALCIUM SERPL-MCNC: 8 MG/DL (ref 8.4–10.2)
CHLORIDE: 112 MMOL/L (ref 98–107)
CO2 SERPL-SCNC: 27 MMOL/L (ref 21–32)
CREAT SERPL-MCNC: 0.61 MG/DL (ref 0.51–0.95)
DIFFERENTIAL METHOD BLD: ABNORMAL
EOSINOPHIL # BLD: 0.1 THOUSAND/MCL (ref 0.1–0.5)
EOSINOPHIL NFR BLD: 3 %
ERYTHROCYTE [DISTWIDTH] IN BLOOD: 13.2 % (ref 11–15)
GLOBULIN SER-MCNC: 2.9 GM/DL (ref 2–4)
GLUCOSE SERPL-MCNC: 92 MG/DL (ref 65–99)
HEMATOCRIT: 25.7 % (ref 36–46.5)
HGB BLD-MCNC: 8.5 GM/DL (ref 12–15.5)
IMM GRANULOCYTES # BLD AUTO: 0 THOUSAND/MCL (ref 0–0.2)
IMM GRANULOCYTES NFR BLD: 1 %
LYMPHOCYTES # BLD: 1.1 THOUSAND/MCL (ref 1–4)
LYMPHOCYTES NFR BLD: 27 %
MAGNESIUM SERPL-MCNC: 2 MG/DL (ref 1.7–2.4)
MCH RBC QN AUTO: 34.1 PG (ref 26–34)
MCHC RBC AUTO-ENTMCNC: 33.1 GM/DL (ref 32–36.5)
MCV RBC AUTO: 103.2 FL (ref 78–100)
MONOCYTES # BLD: 0.4 THOUSAND/MCL (ref 0.3–0.9)
MONOCYTES NFR BLD: 10 %
NEUTROPHILS # BLD: 2.4 THOUSAND/MCL (ref 1.8–7.7)
NEUTROPHILS NFR BLD: 58 %
NEUTS SEG NFR BLD: ABNORMAL %
PERCENT NRBC: 0 %
PHOSPHATE SERPL-MCNC: 3.7 MG/DL (ref 2.4–4.7)
PLATELET # BLD: 239 THOUSAND/MCL (ref 140–450)
POTASSIUM SERPL-SCNC: 3.8 MMOL/L (ref 3.4–5.1)
PROT SERPL-MCNC: 5.3 GM/DL (ref 6.4–8.2)
RBC # BLD: 2.49 MILLION/MCL (ref 4–5.2)
SODIUM SERPL-SCNC: 145 MMOL/L (ref 135–145)
WBC # BLD: 4.1 THOUSAND/MCL (ref 4.2–11)

## 2018-05-01 ENCOUNTER — DIAGNOSTIC TRANS (OUTPATIENT)
Dept: OTHER | Age: 73
End: 2018-05-01

## 2018-05-01 LAB
ALBUMIN SERPL-MCNC: 2.9 GM/DL (ref 3.6–5.1)
ALBUMIN/GLOB SERPL: 0.9 {RATIO} (ref 1–2.4)
ALP SERPL-CCNC: 48 UNIT/L (ref 45–117)
ALT SERPL-CCNC: 29 UNIT/L
ANALYZER ANC (IANC): ABNORMAL
ANION GAP SERPL CALC-SCNC: 10 MMOL/L (ref 10–20)
AST SERPL-CCNC: 23 UNIT/L
BASOPHILS # BLD: 0 THOUSAND/MCL (ref 0–0.3)
BASOPHILS NFR BLD: 1 %
BILIRUB SERPL-MCNC: 0.3 MG/DL (ref 0.2–1)
BUN SERPL-MCNC: 16 MG/DL (ref 6–20)
BUN/CREAT SERPL: 31 (ref 7–25)
CALCIUM SERPL-MCNC: 8.2 MG/DL (ref 8.4–10.2)
CHLORIDE: 110 MMOL/L (ref 98–107)
CO2 SERPL-SCNC: 25 MMOL/L (ref 21–32)
CREAT SERPL-MCNC: 0.52 MG/DL (ref 0.51–0.95)
DIFFERENTIAL METHOD BLD: ABNORMAL
EOSINOPHIL # BLD: 0.1 THOUSAND/MCL (ref 0.1–0.5)
EOSINOPHIL NFR BLD: 2 %
ERYTHROCYTE [DISTWIDTH] IN BLOOD: 13.3 % (ref 11–15)
FERRITIN SERPL-MCNC: 33 NG/ML (ref 8–252)
FOLATE SERPL-MCNC: 16.2 NG/ML
GLOBULIN SER-MCNC: 3.2 GM/DL (ref 2–4)
GLUCOSE SERPL-MCNC: 103 MG/DL (ref 65–99)
HEMATOCRIT: 28.6 % (ref 36–46.5)
HGB BLD-MCNC: 9.6 GM/DL (ref 12–15.5)
IMM GRANULOCYTES # BLD AUTO: 0 THOUSAND/MCL (ref 0–0.2)
IMM GRANULOCYTES NFR BLD: 0 %
LYMPHOCYTES # BLD: 1.2 THOUSAND/MCL (ref 1–4)
LYMPHOCYTES NFR BLD: 24 %
MAGNESIUM SERPL-MCNC: 2.1 MG/DL (ref 1.7–2.4)
MCH RBC QN AUTO: 33.9 PG (ref 26–34)
MCHC RBC AUTO-ENTMCNC: 33.6 GM/DL (ref 32–36.5)
MCV RBC AUTO: 101.1 FL (ref 78–100)
MONOCYTES # BLD: 0.5 THOUSAND/MCL (ref 0.3–0.9)
MONOCYTES NFR BLD: 10 %
NEUTROPHILS # BLD: 3.1 THOUSAND/MCL (ref 1.8–7.7)
NEUTROPHILS NFR BLD: 63 %
NEUTS SEG NFR BLD: ABNORMAL %
PERCENT NRBC: 0 %
PHOSPHATE SERPL-MCNC: 3.5 MG/DL (ref 2.4–4.7)
PLATELET # BLD: 288 THOUSAND/MCL (ref 140–450)
POTASSIUM SERPL-SCNC: 3.2 MMOL/L (ref 3.4–5.1)
PROT SERPL-MCNC: 6.1 GM/DL (ref 6.4–8.2)
RBC # BLD: 2.83 MILLION/MCL (ref 4–5.2)
SODIUM SERPL-SCNC: 142 MMOL/L (ref 135–145)
VIT B12 SERPL-MCNC: 650 PG/ML (ref 211–911)
WBC # BLD: 5 THOUSAND/MCL (ref 4.2–11)

## 2018-05-02 LAB
ANALYZER ANC (IANC): ABNORMAL
ANION GAP SERPL CALC-SCNC: 8 MMOL/L (ref 10–20)
BASOPHILS # BLD: 0 THOUSAND/MCL (ref 0–0.3)
BASOPHILS NFR BLD: 1 %
BUN SERPL-MCNC: 16 MG/DL (ref 6–20)
BUN/CREAT SERPL: 26 (ref 7–25)
CALCIUM SERPL-MCNC: 8.2 MG/DL (ref 8.4–10.2)
CHLORIDE: 111 MMOL/L (ref 98–107)
CO2 SERPL-SCNC: 28 MMOL/L (ref 21–32)
CREAT SERPL-MCNC: 0.61 MG/DL (ref 0.51–0.95)
DIFFERENTIAL METHOD BLD: ABNORMAL
EOSINOPHIL # BLD: 0.1 THOUSAND/MCL (ref 0.1–0.5)
EOSINOPHIL NFR BLD: 3 %
ERYTHROCYTE [DISTWIDTH] IN BLOOD: 13.5 % (ref 11–15)
GLUCOSE SERPL-MCNC: 94 MG/DL (ref 65–99)
HEMATOCRIT: 25.6 % (ref 36–46.5)
HGB BLD-MCNC: 8.4 GM/DL (ref 12–15.5)
IMM GRANULOCYTES # BLD AUTO: 0 THOUSAND/MCL (ref 0–0.2)
IMM GRANULOCYTES NFR BLD: 1 %
LYMPHOCYTES # BLD: 1.1 THOUSAND/MCL (ref 1–4)
LYMPHOCYTES NFR BLD: 24 %
MCH RBC QN AUTO: 33.9 PG (ref 26–34)
MCHC RBC AUTO-ENTMCNC: 32.8 GM/DL (ref 32–36.5)
MCV RBC AUTO: 103.2 FL (ref 78–100)
MONOCYTES # BLD: 0.5 THOUSAND/MCL (ref 0.3–0.9)
MONOCYTES NFR BLD: 10 %
NEUTROPHILS # BLD: 2.7 THOUSAND/MCL (ref 1.8–7.7)
NEUTROPHILS NFR BLD: 61 %
NEUTS SEG NFR BLD: ABNORMAL %
PERCENT NRBC: 0 %
PLATELET # BLD: 276 THOUSAND/MCL (ref 140–450)
POTASSIUM SERPL-SCNC: 3.7 MMOL/L (ref 3.4–5.1)
RBC # BLD: 2.48 MILLION/MCL (ref 4–5.2)
SODIUM SERPL-SCNC: 143 MMOL/L (ref 135–145)
WBC # BLD: 4.4 THOUSAND/MCL (ref 4.2–11)

## 2018-09-04 ENCOUNTER — IMAGING SERVICES (OUTPATIENT)
Dept: OTHER | Age: 73
End: 2018-09-04

## 2018-09-04 ENCOUNTER — HOSPITAL (OUTPATIENT)
Dept: OTHER | Age: 73
End: 2018-09-04

## 2018-10-17 ENCOUNTER — HOSPITAL (OUTPATIENT)
Dept: OTHER | Age: 73
End: 2018-10-17
Attending: SPECIALIST

## 2018-10-18 ENCOUNTER — CHARTING TRANS (OUTPATIENT)
Dept: OTHER | Age: 73
End: 2018-10-18

## 2018-11-02 VITALS
HEART RATE: 73 BPM | HEIGHT: 62 IN | BODY MASS INDEX: 23.55 KG/M2 | DIASTOLIC BLOOD PRESSURE: 71 MMHG | SYSTOLIC BLOOD PRESSURE: 117 MMHG | WEIGHT: 128 LBS

## 2018-11-02 VITALS
SYSTOLIC BLOOD PRESSURE: 127 MMHG | DIASTOLIC BLOOD PRESSURE: 75 MMHG | HEART RATE: 71 BPM | WEIGHT: 128 LBS | HEIGHT: 63 IN | BODY MASS INDEX: 22.68 KG/M2

## 2018-11-02 VITALS
BODY MASS INDEX: 23.19 KG/M2 | WEIGHT: 126 LBS | HEART RATE: 73 BPM | SYSTOLIC BLOOD PRESSURE: 113 MMHG | DIASTOLIC BLOOD PRESSURE: 73 MMHG | HEIGHT: 62 IN

## 2018-11-02 VITALS
WEIGHT: 129 LBS | SYSTOLIC BLOOD PRESSURE: 150 MMHG | BODY MASS INDEX: 23.74 KG/M2 | DIASTOLIC BLOOD PRESSURE: 73 MMHG | HEIGHT: 62 IN | HEART RATE: 74 BPM

## 2018-11-03 VITALS
DIASTOLIC BLOOD PRESSURE: 74 MMHG | SYSTOLIC BLOOD PRESSURE: 106 MMHG | WEIGHT: 128 LBS | TEMPERATURE: 98.6 F | HEIGHT: 62 IN | BODY MASS INDEX: 23.55 KG/M2 | HEART RATE: 70 BPM

## 2018-11-05 ENCOUNTER — IMAGING SERVICES (OUTPATIENT)
Dept: OTHER | Age: 73
End: 2018-11-05

## 2018-11-05 ENCOUNTER — HOSPITAL (OUTPATIENT)
Dept: OTHER | Age: 73
End: 2018-11-05
Attending: SURGERY

## 2018-12-04 ENCOUNTER — HOSPITAL (OUTPATIENT)
Dept: OTHER | Age: 73
End: 2018-12-04
Attending: SPECIALIST

## 2018-12-04 LAB
BUN SERPL-MCNC: 11 MG/DL (ref 6–20)
BUN SERPL-MCNC: 11 MG/DL (ref 6–20)
BUN/CREAT SERPL: 16 (ref 7–25)
BUN/CREAT SERPL: 16 (ref 7–25)
CREAT SERPL-MCNC: 0.69 MG/DL (ref 0.51–0.95)
CREAT SERPL-MCNC: 0.69 MG/DL (ref 0.51–0.95)

## 2018-12-11 ENCOUNTER — HOSPITAL (OUTPATIENT)
Dept: OTHER | Age: 73
End: 2018-12-11
Attending: SPECIALIST

## 2019-04-29 ENCOUNTER — HOSPITAL (OUTPATIENT)
Dept: OTHER | Age: 74
End: 2019-04-29
Attending: INTERNAL MEDICINE

## 2019-04-30 DIAGNOSIS — Z85.3 PERSONAL HISTORY OF BREAST CANCER: Primary | ICD-10-CM

## 2019-07-12 PROBLEM — Z85.3 PERSONAL HISTORY OF BREAST CANCER: Status: ACTIVE | Noted: 2019-07-12

## 2019-07-23 ENCOUNTER — OFFICE VISIT (OUTPATIENT)
Dept: SURGERY | Age: 74
End: 2019-07-23

## 2019-07-23 VITALS
DIASTOLIC BLOOD PRESSURE: 65 MMHG | SYSTOLIC BLOOD PRESSURE: 124 MMHG | HEIGHT: 62 IN | WEIGHT: 128 LBS | BODY MASS INDEX: 23.55 KG/M2 | HEART RATE: 66 BPM

## 2019-07-23 DIAGNOSIS — Z85.3 PERSONAL HISTORY OF BREAST CANCER: Primary | ICD-10-CM

## 2019-07-23 PROCEDURE — 99213 OFFICE O/P EST LOW 20 MIN: CPT | Performed by: SURGERY

## 2019-07-23 RX ORDER — MULTIVITAMIN
1 TABLET ORAL DAILY
Status: ON HOLD | COMMUNITY
Start: 2022-11-01 | End: 2023-04-04 | Stop reason: HOSPADM

## 2019-07-23 RX ORDER — CHOLECALCIFEROL (VITAMIN D3) 50 MCG
TABLET ORAL
COMMUNITY
End: 2022-02-24 | Stop reason: CLARIF

## 2019-07-23 RX ORDER — ERGOCALCIFEROL (VITAMIN D2) 10 MCG
400 TABLET ORAL
COMMUNITY
End: 2022-02-24 | Stop reason: CLARIF

## 2019-07-23 RX ORDER — ALPRAZOLAM 0.25 MG/1
TABLET ORAL
Refills: 3 | Status: ON HOLD | COMMUNITY
Start: 2019-06-25 | End: 2020-11-18 | Stop reason: HOSPADM

## 2019-11-20 ENCOUNTER — HOSPITAL (OUTPATIENT)
Dept: OTHER | Age: 74
End: 2019-11-20

## 2019-11-20 ENCOUNTER — DIAGNOSTIC TRANS (OUTPATIENT)
Dept: OTHER | Age: 74
End: 2019-11-20

## 2019-11-20 LAB
ALBUMIN SERPL-MCNC: 3.1 G/DL (ref 3.6–5.1)
ALBUMIN/GLOB SERPL: 0.9 {RATIO} (ref 1–2.4)
ALP SERPL-CCNC: 58 UNITS/L (ref 45–117)
ALT SERPL-CCNC: 26 UNITS/L
AMORPH SED URNS QL MICRO: NORMAL
ANALYZER ANC (IANC): ABNORMAL
ANION GAP SERPL CALC-SCNC: 11 MMOL/L (ref 10–20)
APPEARANCE UR: CLEAR
AST SERPL-CCNC: 18 UNITS/L
BACTERIA #/AREA URNS HPF: NORMAL /HPF
BASOPHILS # BLD: 0.1 K/MCL (ref 0–0.3)
BASOPHILS NFR BLD: 1 %
BILIRUB SERPL-MCNC: 0.4 MG/DL (ref 0.2–1)
BILIRUB UR QL: NEGATIVE
BUN SERPL-MCNC: 19 MG/DL (ref 6–20)
BUN/CREAT SERPL: 33 (ref 7–25)
CALCIUM SERPL-MCNC: 8.4 MG/DL (ref 8.4–10.2)
CAOX CRY URNS QL MICRO: NORMAL
CHLORIDE SERPL-SCNC: 110 MMOL/L (ref 98–107)
CO2 SERPL-SCNC: 26 MMOL/L (ref 21–32)
COLOR UR: YELLOW
CREAT SERPL-MCNC: 0.58 MG/DL (ref 0.51–0.95)
DIFFERENTIAL METHOD BLD: ABNORMAL
EOSINOPHIL # BLD: 0.2 K/MCL (ref 0.1–0.5)
EOSINOPHIL NFR BLD: 4 %
EPITH CASTS #/AREA URNS LPF: NORMAL /[LPF]
ERYTHROCYTE [DISTWIDTH] IN BLOOD: 13.7 % (ref 11–15)
FATTY CASTS #/AREA URNS LPF: NORMAL /[LPF]
GLOBULIN SER-MCNC: 3.6 G/DL (ref 2–4)
GLUCOSE SERPL-MCNC: 92 MG/DL (ref 65–99)
GLUCOSE UR-MCNC: NEGATIVE MG/DL
GRAN CASTS #/AREA URNS LPF: NORMAL /[LPF]
HCT VFR BLD CALC: 47.1 % (ref 36–46.5)
HGB BLD-MCNC: 15.5 G/DL (ref 12–15.5)
HGB UR QL: NEGATIVE
HYALINE CASTS #/AREA URNS LPF: NORMAL /LPF (ref 0–5)
IMM GRANULOCYTES # BLD AUTO: 0 K/MCL (ref 0–0.2)
IMM GRANULOCYTES NFR BLD: 1 %
KETONES UR-MCNC: NEGATIVE MG/DL
LEUKOCYTE ESTERASE UR QL STRIP: NEGATIVE
LIPASE SERPL-CCNC: 84 UNITS/L (ref 73–393)
LYMPHOCYTES # BLD: 1.3 K/MCL (ref 1–4)
LYMPHOCYTES NFR BLD: 29 %
MAGNESIUM SERPL-MCNC: 2.2 MG/DL (ref 1.7–2.4)
MCH RBC QN AUTO: 33.1 PG (ref 26–34)
MCHC RBC AUTO-ENTMCNC: 32.9 G/DL (ref 32–36.5)
MCV RBC AUTO: 100.6 FL (ref 78–100)
MIXED CELL CASTS #/AREA URNS LPF: NORMAL /[LPF]
MONOCYTES # BLD: 0.5 K/MCL (ref 0.3–0.9)
MONOCYTES NFR BLD: 11 %
MUCOUS THREADS URNS QL MICRO: PRESENT
NEUTROPHILS # BLD: 2.5 K/MCL (ref 1.8–7.7)
NEUTROPHILS NFR BLD: 54 %
NEUTS SEG NFR BLD: ABNORMAL %
NITRITE UR QL: NEGATIVE
NRBC (NRBCRE): 0 /100 WBC
NT-PROBNP SERPL-MCNC: 120 PG/ML
PH UR: 7 UNITS (ref 5–7)
PLATELET # BLD: 226 K/MCL (ref 140–450)
POTASSIUM SERPL-SCNC: 3.7 MMOL/L (ref 3.4–5.1)
PROT SERPL-MCNC: 6.7 G/DL (ref 6.4–8.2)
PROT UR QL: NEGATIVE MG/DL
RBC # BLD: 4.68 MIL/MCL (ref 4–5.2)
RBC #/AREA URNS HPF: NORMAL /HPF (ref 0–2)
RBC CASTS #/AREA URNS LPF: NORMAL /[LPF]
RENAL EPI CELLS #/AREA URNS HPF: NORMAL /[HPF]
SODIUM SERPL-SCNC: 143 MMOL/L (ref 135–145)
SP GR UR: 1.01 (ref 1–1.03)
SPECIMEN SOURCE: NORMAL
SPERM URNS QL MICRO: NORMAL
SQUAMOUS #/AREA URNS HPF: NORMAL /HPF (ref 0–5)
T VAGINALIS URNS QL MICRO: NORMAL
TRI-PHOS CRY URNS QL MICRO: NORMAL
TROPONIN I SERPL HS-MCNC: <0.02 NG/ML
TSH SERPL-ACNC: 4.53 MCUNITS/ML (ref 0.35–5)
URATE CRY URNS QL MICRO: NORMAL
UROBILINOGEN UR QL: 0.2 MG/DL (ref 0–1)
WAXY CASTS #/AREA URNS LPF: NORMAL /[LPF]
WBC # BLD: 4.6 K/MCL (ref 4.2–11)
WBC #/AREA URNS HPF: NORMAL /HPF (ref 0–5)
WBC CASTS #/AREA URNS LPF: NORMAL /[LPF]
YEAST HYPHAE URNS QL MICRO: NORMAL
YEAST URNS QL MICRO: NORMAL

## 2019-11-20 PROCEDURE — 93010 ELECTROCARDIOGRAM REPORT: CPT | Performed by: INTERNAL MEDICINE

## 2019-11-20 PROCEDURE — 99285 EMERGENCY DEPT VISIT HI MDM: CPT | Performed by: EMERGENCY MEDICINE

## 2020-02-09 VITALS
WEIGHT: 125.22 LBS | BODY MASS INDEX: 23.04 KG/M2 | DIASTOLIC BLOOD PRESSURE: 72 MMHG | HEART RATE: 72 BPM | SYSTOLIC BLOOD PRESSURE: 122 MMHG | RESPIRATION RATE: 20 BRPM | HEIGHT: 62 IN

## 2020-02-09 VITALS
WEIGHT: 124.34 LBS | HEIGHT: 62 IN | HEART RATE: 63 BPM | BODY MASS INDEX: 22.88 KG/M2 | SYSTOLIC BLOOD PRESSURE: 122 MMHG | RESPIRATION RATE: 18 BRPM | DIASTOLIC BLOOD PRESSURE: 69 MMHG

## 2020-10-27 ENCOUNTER — HOSPITAL ENCOUNTER (EMERGENCY)
Age: 75
Discharge: ANOTHER HOSPITAL | End: 2020-10-28
Attending: EMERGENCY MEDICINE

## 2020-10-27 DIAGNOSIS — R44.0 AUDITORY HALLUCINATIONS: Primary | ICD-10-CM

## 2020-10-27 PROCEDURE — 99285 EMERGENCY DEPT VISIT HI MDM: CPT

## 2020-10-27 PROCEDURE — C9803 HOPD COVID-19 SPEC COLLECT: HCPCS

## 2020-10-27 PROCEDURE — 99285 EMERGENCY DEPT VISIT HI MDM: CPT | Performed by: EMERGENCY MEDICINE

## 2020-10-27 PROCEDURE — 93005 ELECTROCARDIOGRAM TRACING: CPT | Performed by: STUDENT IN AN ORGANIZED HEALTH CARE EDUCATION/TRAINING PROGRAM

## 2020-10-27 PROCEDURE — 36415 COLL VENOUS BLD VENIPUNCTURE: CPT

## 2020-10-27 ASSESSMENT — ENCOUNTER SYMPTOMS
ABDOMINAL PAIN: 0
SPEECH DIFFICULTY: 0
NAUSEA: 0
EYE DISCHARGE: 0
EYE PAIN: 0
APPETITE CHANGE: 0
VOMITING: 0
SEIZURES: 0
FATIGUE: 0
SHORTNESS OF BREATH: 0
ACTIVITY CHANGE: 0
DIZZINESS: 0
NUMBNESS: 0
BRUISES/BLEEDS EASILY: 0
SORE THROAT: 0
FEVER: 0
LIGHT-HEADEDNESS: 0
HALLUCINATIONS: 1
BACK PAIN: 0
COUGH: 0
WEAKNESS: 0
ALTERED MENTAL STATUS: 1
ABDOMINAL DISTENTION: 0
DIARRHEA: 0
DIAPHORESIS: 0
BLOOD IN STOOL: 0
RHINORRHEA: 0
WOUND: 0
WHEEZING: 0
CHEST TIGHTNESS: 0
CHILLS: 0
CONSTIPATION: 0
HEADACHES: 0

## 2020-10-27 ASSESSMENT — VISUAL ACUITY: OU: 1

## 2020-10-28 ENCOUNTER — APPOINTMENT (OUTPATIENT)
Dept: CT IMAGING | Age: 75
End: 2020-10-28
Attending: EMERGENCY MEDICINE

## 2020-10-28 ENCOUNTER — HOSPITAL ENCOUNTER (INPATIENT)
Age: 75
LOS: 22 days | Discharge: HOME | DRG: 885 | End: 2020-11-19
Attending: PSYCHIATRY & NEUROLOGY | Admitting: PSYCHIATRY & NEUROLOGY

## 2020-10-28 VITALS
DIASTOLIC BLOOD PRESSURE: 60 MMHG | HEART RATE: 81 BPM | OXYGEN SATURATION: 97 % | RESPIRATION RATE: 16 BRPM | SYSTOLIC BLOOD PRESSURE: 101 MMHG

## 2020-10-28 DIAGNOSIS — Z85.3 PERSONAL HISTORY OF BREAST CANCER: ICD-10-CM

## 2020-10-28 DIAGNOSIS — F32.1 CURRENT MODERATE EPISODE OF MAJOR DEPRESSIVE DISORDER, UNSPECIFIED WHETHER RECURRENT (CMD): ICD-10-CM

## 2020-10-28 DIAGNOSIS — F25.1 SCHIZOAFFECTIVE DISORDER, DEPRESSIVE TYPE (CMD): Primary | ICD-10-CM

## 2020-10-28 LAB
ALBUMIN SERPL-MCNC: 3.2 G/DL (ref 3.6–5.1)
ALBUMIN/GLOB SERPL: 0.8 {RATIO} (ref 1–2.4)
ALP SERPL-CCNC: 67 UNITS/L (ref 45–117)
ALT SERPL-CCNC: 21 UNITS/L
AMPHETAMINES UR QL SCN>500 NG/ML: NEGATIVE
ANION GAP SERPL CALC-SCNC: 10 MMOL/L (ref 10–20)
APPEARANCE UR: ABNORMAL
AST SERPL-CCNC: 24 UNITS/L
BACTERIA #/AREA URNS HPF: ABNORMAL /HPF
BARBITURATES UR QL SCN>200 NG/ML: NEGATIVE
BASOPHILS # BLD: 0 K/MCL (ref 0–0.3)
BASOPHILS NFR BLD: 1 %
BENZODIAZ UR QL SCN>200 NG/ML: NEGATIVE
BILIRUB SERPL-MCNC: 0.5 MG/DL (ref 0.2–1)
BILIRUB UR QL STRIP: NEGATIVE
BUN SERPL-MCNC: 28 MG/DL (ref 6–20)
BUN/CREAT SERPL: 37 (ref 7–25)
BZE UR QL SCN>150 NG/ML: NEGATIVE
CALCIUM SERPL-MCNC: 8.4 MG/DL (ref 8.4–10.2)
CANNABINOIDS UR QL SCN>50 NG/ML: NEGATIVE
CHLORIDE SERPL-SCNC: 105 MMOL/L (ref 98–107)
CO2 SERPL-SCNC: 28 MMOL/L (ref 21–32)
COLOR UR: YELLOW
CREAT SERPL-MCNC: 0.75 MG/DL (ref 0.51–0.95)
DIFFERENTIAL METHOD BLD: ABNORMAL
EOSINOPHIL # BLD: 0.1 K/MCL (ref 0.1–0.5)
EOSINOPHIL NFR BLD: 2 %
ERYTHROCYTE [DISTWIDTH] IN BLOOD: 20.2 % (ref 11–15)
ETHANOL SERPL-MCNC: NORMAL MG/DL
GLOBULIN SER-MCNC: 3.9 G/DL (ref 2–4)
GLUCOSE SERPL-MCNC: 117 MG/DL (ref 65–99)
GLUCOSE UR STRIP-MCNC: NEGATIVE MG/DL
HCT VFR BLD CALC: 40.8 % (ref 36–46.5)
HGB BLD-MCNC: 12.7 G/DL (ref 12–15.5)
HGB UR QL STRIP: NEGATIVE
HYALINE CASTS #/AREA URNS LPF: ABNORMAL /LPF (ref 0–5)
IMM GRANULOCYTES # BLD AUTO: 0 K/MCL (ref 0–0.2)
IMM GRANULOCYTES NFR BLD: 0 %
KETONES UR STRIP-MCNC: 20 MG/DL
LEUKOCYTE ESTERASE UR QL STRIP: ABNORMAL
LYMPHOCYTES # BLD: 1.1 K/MCL (ref 1–4)
LYMPHOCYTES NFR BLD: 16 %
MCH RBC QN AUTO: 27.2 PG (ref 26–34)
MCHC RBC AUTO-ENTMCNC: 31.1 G/DL (ref 32–36.5)
MCV RBC AUTO: 87.4 FL (ref 78–100)
MONOCYTES # BLD: 0.5 K/MCL (ref 0.3–0.9)
MONOCYTES NFR BLD: 7 %
MUCOUS THREADS URNS QL MICRO: PRESENT
NEUTROPHILS # BLD: 4.8 K/MCL (ref 1.8–7.7)
NEUTROPHILS NFR BLD: 74 %
NITRITE UR QL STRIP: NEGATIVE
NRBC BLD MANUAL-RTO: 0 /100 WBC
OPIATES UR QL SCN>300 NG/ML: NEGATIVE
PCP UR QL SCN>25 NG/ML: NEGATIVE
PH UR STRIP: 5 UNITS (ref 5–7)
PLATELET # BLD: 294 K/MCL (ref 140–450)
POTASSIUM SERPL-SCNC: 3.5 MMOL/L (ref 3.4–5.1)
PROT SERPL-MCNC: 7.1 G/DL (ref 6.4–8.2)
PROT UR STRIP-MCNC: 30 MG/DL
RBC # BLD: 4.67 MIL/MCL (ref 4–5.2)
RBC #/AREA URNS HPF: ABNORMAL /HPF (ref 0–2)
SARS-COV-2 RNA RESP QL NAA+PROBE: NOT DETECTED
SERVICE CMNT-IMP: NORMAL
SODIUM SERPL-SCNC: 139 MMOL/L (ref 135–145)
SP GR UR STRIP: 1.03 (ref 1–1.03)
SPECIMEN SOURCE: ABNORMAL
SPECIMEN SOURCE: NORMAL
SQUAMOUS #/AREA URNS HPF: ABNORMAL /HPF (ref 0–5)
TSH SERPL-ACNC: 4.13 MCUNITS/ML (ref 0.35–5)
UROBILINOGEN UR STRIP-MCNC: 2 MG/DL (ref 0–1)
WBC # BLD: 6.5 K/MCL (ref 4.2–11)
WBC #/AREA URNS HPF: ABNORMAL /HPF (ref 0–5)

## 2020-10-28 PROCEDURE — 99285 EMERGENCY DEPT VISIT HI MDM: CPT | Performed by: EMERGENCY MEDICINE

## 2020-10-28 PROCEDURE — U0003 INFECTIOUS AGENT DETECTION BY NUCLEIC ACID (DNA OR RNA); SEVERE ACUTE RESPIRATORY SYNDROME CORONAVIRUS 2 (SARS-COV-2) (CORONAVIRUS DISEASE [COVID-19]), AMPLIFIED PROBE TECHNIQUE, MAKING USE OF HIGH THROUGHPUT TECHNOLOGIES AS DESCRIBED BY CMS-2020-01-R: HCPCS

## 2020-10-28 PROCEDURE — 90839 PSYTX CRISIS INITIAL 60 MIN: CPT

## 2020-10-28 PROCEDURE — 10002803 HB RX 637: Performed by: PSYCHIATRY & NEUROLOGY

## 2020-10-28 PROCEDURE — 70450 CT HEAD/BRAIN W/O DYE: CPT

## 2020-10-28 PROCEDURE — 80320 DRUG SCREEN QUANTALCOHOLS: CPT

## 2020-10-28 PROCEDURE — 84443 ASSAY THYROID STIM HORMONE: CPT

## 2020-10-28 PROCEDURE — 80307 DRUG TEST PRSMV CHEM ANLYZR: CPT

## 2020-10-28 PROCEDURE — 87086 URINE CULTURE/COLONY COUNT: CPT

## 2020-10-28 PROCEDURE — 80053 COMPREHEN METABOLIC PANEL: CPT

## 2020-10-28 PROCEDURE — 85025 COMPLETE CBC W/AUTO DIFF WBC: CPT

## 2020-10-28 PROCEDURE — 13003244 HB ROOM CHARGE INTENSIVE PSYCH

## 2020-10-28 PROCEDURE — 81001 URINALYSIS AUTO W/SCOPE: CPT

## 2020-10-28 PROCEDURE — 99223 1ST HOSP IP/OBS HIGH 75: CPT | Performed by: INTERNAL MEDICINE

## 2020-10-28 RX ORDER — ACETAMINOPHEN 325 MG/1
650 TABLET ORAL EVERY 4 HOURS PRN
Status: DISCONTINUED | OUTPATIENT
Start: 2020-10-28 | End: 2020-11-19 | Stop reason: HOSPADM

## 2020-10-28 RX ORDER — MAGNESIUM HYDROXIDE/ALUMINUM HYDROXICE/SIMETHICONE 120; 1200; 1200 MG/30ML; MG/30ML; MG/30ML
20 SUSPENSION ORAL EVERY 8 HOURS PRN
Status: DISCONTINUED | OUTPATIENT
Start: 2020-10-28 | End: 2020-11-19 | Stop reason: HOSPADM

## 2020-10-28 RX ORDER — HYDROXYZINE HYDROCHLORIDE 10 MG/1
10 TABLET, FILM COATED ORAL 3 TIMES DAILY PRN
Status: DISCONTINUED | OUTPATIENT
Start: 2020-10-28 | End: 2020-11-19 | Stop reason: HOSPADM

## 2020-10-28 RX ORDER — LORAZEPAM 2 MG/ML
0.5 INJECTION INTRAMUSCULAR EVERY 6 HOURS PRN
Status: DISCONTINUED | OUTPATIENT
Start: 2020-10-28 | End: 2020-11-19 | Stop reason: HOSPADM

## 2020-10-28 RX ORDER — HALOPERIDOL 5 MG/1
5 TABLET ORAL EVERY 6 HOURS PRN
Status: DISCONTINUED | OUTPATIENT
Start: 2020-10-28 | End: 2020-11-05

## 2020-10-28 RX ORDER — AMOXICILLIN 250 MG
2 CAPSULE ORAL 2 TIMES DAILY PRN
Status: DISCONTINUED | OUTPATIENT
Start: 2020-10-28 | End: 2020-11-19 | Stop reason: HOSPADM

## 2020-10-28 RX ORDER — LORAZEPAM 0.5 MG/1
0.5 TABLET ORAL EVERY 6 HOURS PRN
Status: DISCONTINUED | OUTPATIENT
Start: 2020-10-28 | End: 2020-11-19 | Stop reason: HOSPADM

## 2020-10-28 RX ORDER — BENZTROPINE MESYLATE 0.5 MG/1
0.5 TABLET ORAL EVERY 8 HOURS PRN
Status: DISCONTINUED | OUTPATIENT
Start: 2020-10-28 | End: 2020-11-19 | Stop reason: HOSPADM

## 2020-10-28 RX ORDER — HALOPERIDOL 5 MG/ML
5 INJECTION INTRAMUSCULAR EVERY 6 HOURS PRN
Status: DISCONTINUED | OUTPATIENT
Start: 2020-10-28 | End: 2020-11-05

## 2020-10-28 RX ORDER — BENZTROPINE MESYLATE 1 MG/ML
0.5 INJECTION INTRAMUSCULAR; INTRAVENOUS EVERY 8 HOURS PRN
Status: DISCONTINUED | OUTPATIENT
Start: 2020-10-28 | End: 2020-11-19 | Stop reason: HOSPADM

## 2020-10-28 RX ORDER — TRAZODONE HYDROCHLORIDE 50 MG/1
25 TABLET ORAL NIGHTLY PRN
Status: DISCONTINUED | OUTPATIENT
Start: 2020-10-28 | End: 2020-11-06

## 2020-10-28 RX ADMIN — LORAZEPAM 0.5 MG: 0.5 TABLET ORAL at 23:05

## 2020-10-28 SDOH — HEALTH STABILITY: MENTAL HEALTH: HOW OFTEN DO YOU HAVE A DRINK CONTAINING ALCOHOL?: NEVER

## 2020-10-28 ASSESSMENT — LIFESTYLE VARIABLES
HOW OFTEN DO YOU HAVE A DRINK CONTAINING ALCOHOL: NEVER
ALCOHOL_USE: DENIES
AMPHETAMINES/STIMULANTS USE: DENIES
ADL NEEDS ASSIST: NO
VOLATILE SOLVENTS/INHALANTS USE: DENIES
HAVE YOU EVER BEEN VERBALLY, EMOTIONALLY, PHYSICALLY OR SEXUALLY ABUSED, OR ABUSED VIA SOCIAL MEDIA?: NO
E-CIGARETTE_USE: NO E-CIGARETTES USE IN THE LAST 30 DAYS
HOW OFTEN DO YOU HAVE 6 OR MORE DRINKS ON ONE OCCASION: NEVER
CAFFEINE: DENIES
COCAINE USE: DENIES
ALCOHOL_USE_STATUS: NO OR LOW RISK WITH VALIDATED TOOL
HOW OFTEN DO YOU HAVE A DRINK CONTAINING ALCOHOL: NEVER
SHORT OF BREATH OR FATIGUE WITH ADLS: NO
ADL BEFORE ADMISSION: INDEPENDENT
ALCOHOL_USE_STATUS: NO OR LOW RISK WITH VALIDATED TOOL
TOBACCO_USE_STATUS_IN_THE_LAST_30_DAYS: NO TOBACCO USED IN THE LAST 30 DAYS
E-CIGARETTE_USE: NO E-CIGARETTES USE IN THE LAST 30 DAYS
RECENT DECLINE IN ADLS: YES, DECLINE IN BATHING/DRESSING/FEEDING, COLLABORATE WITH PROVIDER (T)
HAVE YOU EVER BEEN EXPOSED TO OTHER VERY DISTURBING, TRAUMATIC OR ANXIETY PROVOKING EVENTS IN YOUR LIFETIME?: NO
HOW OFTEN DO YOU HAVE A DRINK CONTAINING ALCOHOL: NEVER
ALCOHOL_USE_STATUS: NO OR LOW RISK WITH VALIDATED TOOL
HOW MANY CIGARETTES HAVE YOU SMOKED PER DAY ON AVERAGE?: DENIES
OPIATES USE: DENIES

## 2020-10-28 ASSESSMENT — ACTIVITIES OF DAILY LIVING (ADL)
ADL_SCORE: 12
CHRONIC_PAIN_PRESENT: NO

## 2020-10-28 ASSESSMENT — COGNITIVE AND FUNCTIONAL STATUS - GENERAL
MOOD: DEPRESSED
MOTOR_BEHAVIOR-AGITATION_CALCULATED: AGITATION
BEHAVIOR: APPROPRIATE TO SITUATION
PERCEPTUAL_MISINTERPRETATIONS_HALLUCINATIONS: AUDITORY
ARE YOU DEAF OR DO YOU HAVE SERIOUS DIFFICULTY  HEARING: NO
ORIENTATION: ORIENTED (PERSON/PLACE/TIME)
SPEECH: CLEAR/UNDERSTANDABLE
ARE YOU BLIND OR DO YOU HAVE SERIOUS DIFFICULTY SEEING, EVEN WHEN WEARING GLASSES: NO
MEMORY: INTACT

## 2020-10-28 ASSESSMENT — PAIN SCALES - GENERAL: PAINLEVEL_OUTOF10: 0

## 2020-10-29 LAB
BACTERIA UR CULT: NORMAL
REPORT STATUS (RPT): NORMAL
SPECIMEN SOURCE: NORMAL

## 2020-10-29 PROCEDURE — 10002801 HB RX 250 W/O HCPCS: Performed by: INTERNAL MEDICINE

## 2020-10-29 PROCEDURE — 10002803 HB RX 637: Performed by: PSYCHIATRY & NEUROLOGY

## 2020-10-29 PROCEDURE — 99223 1ST HOSP IP/OBS HIGH 75: CPT | Performed by: PSYCHIATRY & NEUROLOGY

## 2020-10-29 PROCEDURE — 13003244 HB ROOM CHARGE INTENSIVE PSYCH

## 2020-10-29 RX ORDER — HALOPERIDOL 1 MG/1
2 TABLET ORAL 3 TIMES DAILY
Status: DISCONTINUED | OUTPATIENT
Start: 2020-10-29 | End: 2020-10-30

## 2020-10-29 RX ADMIN — LORAZEPAM 0.5 MG: 0.5 TABLET ORAL at 20:36

## 2020-10-29 RX ADMIN — TRAZODONE HYDROCHLORIDE 25 MG: 50 TABLET ORAL at 20:36

## 2020-10-29 RX ADMIN — HALOPERIDOL 2 MG: 1 TABLET ORAL at 15:43

## 2020-10-29 RX ADMIN — Medication 400 UNITS: at 08:30

## 2020-10-29 RX ADMIN — HALOPERIDOL 2 MG: 1 TABLET ORAL at 19:13

## 2020-10-29 ASSESSMENT — PAIN SCALES - GENERAL
PAINLEVEL_OUTOF10: 0
PAINLEVEL_OUTOF10: 0

## 2020-10-30 LAB
CHOLEST SERPL-MCNC: 207 MG/DL
CHOLEST/HDLC SERPL: 2.5 {RATIO}
HDLC SERPL-MCNC: 82 MG/DL
LDLC SERPL CALC-MCNC: 108 MG/DL
NONHDLC SERPL-MCNC: 125 MG/DL
TRIGL SERPL-MCNC: 85 MG/DL

## 2020-10-30 PROCEDURE — 80061 LIPID PANEL: CPT

## 2020-10-30 PROCEDURE — 13003244 HB ROOM CHARGE INTENSIVE PSYCH

## 2020-10-30 PROCEDURE — 36415 COLL VENOUS BLD VENIPUNCTURE: CPT

## 2020-10-30 PROCEDURE — 10002803 HB RX 637: Performed by: PSYCHIATRY & NEUROLOGY

## 2020-10-30 PROCEDURE — 10002800 HB RX 250 W HCPCS: Performed by: PSYCHIATRY & NEUROLOGY

## 2020-10-30 PROCEDURE — 99232 SBSQ HOSP IP/OBS MODERATE 35: CPT | Performed by: PSYCHIATRY & NEUROLOGY

## 2020-10-30 PROCEDURE — 83036 HEMOGLOBIN GLYCOSYLATED A1C: CPT

## 2020-10-30 PROCEDURE — 10002801 HB RX 250 W/O HCPCS: Performed by: INTERNAL MEDICINE

## 2020-10-30 RX ORDER — HALOPERIDOL 1 MG/1
2 TABLET ORAL 4 TIMES DAILY
Status: DISCONTINUED | OUTPATIENT
Start: 2020-10-30 | End: 2020-11-03

## 2020-10-30 RX ADMIN — HALOPERIDOL LACTATE 5 MG: 5 INJECTION, SOLUTION INTRAMUSCULAR at 15:35

## 2020-10-30 RX ADMIN — LORAZEPAM 0.5 MG: 2 INJECTION INTRAMUSCULAR; INTRAVENOUS at 03:20

## 2020-10-30 RX ADMIN — Medication 400 UNITS: at 20:57

## 2020-10-30 RX ADMIN — HALOPERIDOL 2 MG: 1 TABLET ORAL at 20:57

## 2020-10-30 RX ADMIN — Medication 400 UNITS: at 09:13

## 2020-10-30 RX ADMIN — HALOPERIDOL 2 MG: 1 TABLET ORAL at 09:13

## 2020-10-30 RX ADMIN — BENZTROPINE MESYLATE 0.5 MG: 1 INJECTION INTRAMUSCULAR; INTRAVENOUS at 03:20

## 2020-10-30 RX ADMIN — HALOPERIDOL LACTATE 5 MG: 5 INJECTION, SOLUTION INTRAMUSCULAR at 03:20

## 2020-10-30 RX ADMIN — LORAZEPAM 0.5 MG: 2 INJECTION INTRAMUSCULAR; INTRAVENOUS at 15:35

## 2020-10-30 ASSESSMENT — PAIN SCALES - GENERAL
PAINLEVEL_OUTOF10: 0

## 2020-10-31 LAB — HBA1C MFR BLD: 5.4 % (ref 4.5–5.6)

## 2020-10-31 PROCEDURE — 99233 SBSQ HOSP IP/OBS HIGH 50: CPT | Performed by: PSYCHIATRY & NEUROLOGY

## 2020-10-31 PROCEDURE — 13003244 HB ROOM CHARGE INTENSIVE PSYCH

## 2020-10-31 PROCEDURE — 10002801 HB RX 250 W/O HCPCS: Performed by: INTERNAL MEDICINE

## 2020-10-31 PROCEDURE — 10002803 HB RX 637: Performed by: PSYCHIATRY & NEUROLOGY

## 2020-10-31 RX ADMIN — HALOPERIDOL 2 MG: 1 TABLET ORAL at 08:45

## 2020-10-31 RX ADMIN — Medication 400 UNITS: at 21:16

## 2020-10-31 RX ADMIN — LORAZEPAM 0.5 MG: 0.5 TABLET ORAL at 16:10

## 2020-10-31 RX ADMIN — HALOPERIDOL 2 MG: 1 TABLET ORAL at 13:19

## 2020-10-31 RX ADMIN — Medication 400 UNITS: at 08:43

## 2020-10-31 RX ADMIN — HALOPERIDOL 2 MG: 1 TABLET ORAL at 16:11

## 2020-10-31 RX ADMIN — HALOPERIDOL 2 MG: 1 TABLET ORAL at 21:16

## 2020-10-31 RX ADMIN — TRAZODONE HYDROCHLORIDE 25 MG: 50 TABLET ORAL at 21:17

## 2020-10-31 ASSESSMENT — PAIN SCALES - GENERAL
PAINLEVEL_OUTOF10: 0

## 2020-11-01 PROCEDURE — 13003244 HB ROOM CHARGE INTENSIVE PSYCH

## 2020-11-01 PROCEDURE — 99233 SBSQ HOSP IP/OBS HIGH 50: CPT | Performed by: PSYCHIATRY & NEUROLOGY

## 2020-11-01 PROCEDURE — 10002803 HB RX 637: Performed by: PSYCHIATRY & NEUROLOGY

## 2020-11-01 PROCEDURE — 10002801 HB RX 250 W/O HCPCS: Performed by: INTERNAL MEDICINE

## 2020-11-01 RX ADMIN — HALOPERIDOL 2 MG: 1 TABLET ORAL at 17:42

## 2020-11-01 RX ADMIN — Medication 400 UNITS: at 20:40

## 2020-11-01 RX ADMIN — HALOPERIDOL 2 MG: 1 TABLET ORAL at 08:14

## 2020-11-01 RX ADMIN — Medication 400 UNITS: at 08:13

## 2020-11-01 RX ADMIN — HALOPERIDOL 2 MG: 1 TABLET ORAL at 20:40

## 2020-11-01 ASSESSMENT — PAIN SCALES - GENERAL
PAINLEVEL_OUTOF10: 0

## 2020-11-02 PROCEDURE — 10002801 HB RX 250 W/O HCPCS: Performed by: INTERNAL MEDICINE

## 2020-11-02 PROCEDURE — 13003244 HB ROOM CHARGE INTENSIVE PSYCH

## 2020-11-02 PROCEDURE — 99232 SBSQ HOSP IP/OBS MODERATE 35: CPT | Performed by: PSYCHIATRY & NEUROLOGY

## 2020-11-02 PROCEDURE — 10002803 HB RX 637: Performed by: PSYCHIATRY & NEUROLOGY

## 2020-11-02 RX ADMIN — HALOPERIDOL 2 MG: 1 TABLET ORAL at 17:24

## 2020-11-02 RX ADMIN — TRAZODONE HYDROCHLORIDE 25 MG: 50 TABLET ORAL at 20:48

## 2020-11-02 RX ADMIN — HALOPERIDOL 2 MG: 1 TABLET ORAL at 08:50

## 2020-11-02 RX ADMIN — HALOPERIDOL 2 MG: 1 TABLET ORAL at 20:48

## 2020-11-02 RX ADMIN — Medication 400 UNITS: at 08:49

## 2020-11-02 ASSESSMENT — PAIN SCALES - GENERAL: PAINLEVEL_OUTOF10: 0

## 2020-11-03 PROCEDURE — 13003244 HB ROOM CHARGE INTENSIVE PSYCH

## 2020-11-03 PROCEDURE — 10002803 HB RX 637: Performed by: PSYCHIATRY & NEUROLOGY

## 2020-11-03 PROCEDURE — 99233 SBSQ HOSP IP/OBS HIGH 50: CPT | Performed by: PSYCHIATRY & NEUROLOGY

## 2020-11-03 PROCEDURE — 10002801 HB RX 250 W/O HCPCS: Performed by: INTERNAL MEDICINE

## 2020-11-03 RX ORDER — HALOPERIDOL 5 MG/1
5 TABLET ORAL NIGHTLY
Status: DISCONTINUED | OUTPATIENT
Start: 2020-11-04 | End: 2020-11-07

## 2020-11-03 RX ORDER — FLUOXETINE 10 MG/1
10 CAPSULE ORAL DAILY
Status: DISCONTINUED | OUTPATIENT
Start: 2020-11-03 | End: 2020-11-05

## 2020-11-03 RX ADMIN — Medication 400 UNITS: at 21:18

## 2020-11-03 RX ADMIN — HALOPERIDOL 2 MG: 1 TABLET ORAL at 09:18

## 2020-11-03 RX ADMIN — FLUOXETINE 10 MG: 10 CAPSULE ORAL at 14:15

## 2020-11-03 RX ADMIN — TRAZODONE HYDROCHLORIDE 25 MG: 50 TABLET ORAL at 21:18

## 2020-11-03 ASSESSMENT — PAIN SCALES - GENERAL
PAINLEVEL_OUTOF10: 0
PAINLEVEL_OUTOF10: 0

## 2020-11-04 PROCEDURE — 99233 SBSQ HOSP IP/OBS HIGH 50: CPT | Performed by: PSYCHIATRY & NEUROLOGY

## 2020-11-04 PROCEDURE — 10002801 HB RX 250 W/O HCPCS: Performed by: INTERNAL MEDICINE

## 2020-11-04 PROCEDURE — 10002803 HB RX 637: Performed by: PSYCHIATRY & NEUROLOGY

## 2020-11-04 PROCEDURE — 10004577 HB ROOM CHARGE PSYCH

## 2020-11-04 RX ADMIN — Medication 400 UNITS: at 08:20

## 2020-11-04 RX ADMIN — TRAZODONE HYDROCHLORIDE 25 MG: 50 TABLET ORAL at 21:17

## 2020-11-04 RX ADMIN — FLUOXETINE 10 MG: 10 CAPSULE ORAL at 08:20

## 2020-11-04 RX ADMIN — Medication 400 UNITS: at 21:17

## 2020-11-04 ASSESSMENT — PAIN SCALES - GENERAL
PAINLEVEL_OUTOF10: 0
PAINLEVEL_OUTOF10: 0

## 2020-11-05 PROCEDURE — 10004577 HB ROOM CHARGE PSYCH

## 2020-11-05 PROCEDURE — 99232 SBSQ HOSP IP/OBS MODERATE 35: CPT | Performed by: PSYCHIATRY & NEUROLOGY

## 2020-11-05 PROCEDURE — 10002803 HB RX 637: Performed by: PSYCHIATRY & NEUROLOGY

## 2020-11-05 PROCEDURE — 10002801 HB RX 250 W/O HCPCS: Performed by: INTERNAL MEDICINE

## 2020-11-05 RX ORDER — HALOPERIDOL 5 MG/ML
5 INJECTION INTRAMUSCULAR EVERY 6 HOURS PRN
Status: DISCONTINUED | OUTPATIENT
Start: 2020-11-05 | End: 2020-11-19 | Stop reason: HOSPADM

## 2020-11-05 RX ORDER — HALOPERIDOL 1 MG/1
2 TABLET ORAL EVERY 6 HOURS PRN
Status: DISCONTINUED | OUTPATIENT
Start: 2020-11-05 | End: 2020-11-19 | Stop reason: HOSPADM

## 2020-11-05 RX ORDER — VENLAFAXINE HYDROCHLORIDE 37.5 MG/1
37.5 CAPSULE, EXTENDED RELEASE ORAL NIGHTLY
Status: DISCONTINUED | OUTPATIENT
Start: 2020-11-05 | End: 2020-11-08

## 2020-11-05 RX ORDER — VENLAFAXINE HYDROCHLORIDE 37.5 MG/1
37.5 CAPSULE, EXTENDED RELEASE ORAL
Status: DISCONTINUED | OUTPATIENT
Start: 2020-11-05 | End: 2020-11-05

## 2020-11-05 RX ADMIN — HALOPERIDOL 2 MG: 5 TABLET ORAL at 13:52

## 2020-11-05 RX ADMIN — HALOPERIDOL 5 MG: 5 TABLET ORAL at 21:41

## 2020-11-05 RX ADMIN — VENLAFAXINE HYDROCHLORIDE 37.5 MG: 37.5 CAPSULE, EXTENDED RELEASE ORAL at 21:41

## 2020-11-05 RX ADMIN — TRAZODONE HYDROCHLORIDE 25 MG: 50 TABLET ORAL at 21:41

## 2020-11-05 RX ADMIN — Medication 400 UNITS: at 09:40

## 2020-11-05 RX ADMIN — Medication 400 UNITS: at 21:41

## 2020-11-05 ASSESSMENT — PAIN SCALES - GENERAL
PAINLEVEL_OUTOF10: 0

## 2020-11-06 PROCEDURE — 99232 SBSQ HOSP IP/OBS MODERATE 35: CPT | Performed by: PSYCHIATRY & NEUROLOGY

## 2020-11-06 PROCEDURE — 10002803 HB RX 637: Performed by: PSYCHIATRY & NEUROLOGY

## 2020-11-06 PROCEDURE — 10004577 HB ROOM CHARGE PSYCH

## 2020-11-06 PROCEDURE — 10002801 HB RX 250 W/O HCPCS: Performed by: INTERNAL MEDICINE

## 2020-11-06 RX ORDER — TRAZODONE HYDROCHLORIDE 50 MG/1
50 TABLET ORAL NIGHTLY
Status: DISCONTINUED | OUTPATIENT
Start: 2020-11-06 | End: 2020-11-07

## 2020-11-06 RX ADMIN — Medication 400 UNITS: at 21:16

## 2020-11-06 RX ADMIN — HYDROXYZINE HYDROCHLORIDE 10 MG: 10 TABLET ORAL at 21:17

## 2020-11-06 RX ADMIN — Medication 400 UNITS: at 08:56

## 2020-11-06 RX ADMIN — HYDROXYZINE HYDROCHLORIDE 10 MG: 10 TABLET ORAL at 04:09

## 2020-11-06 RX ADMIN — TRAZODONE HYDROCHLORIDE 50 MG: 50 TABLET ORAL at 21:17

## 2020-11-06 RX ADMIN — HALOPERIDOL 5 MG: 5 TABLET ORAL at 21:18

## 2020-11-06 RX ADMIN — VENLAFAXINE HYDROCHLORIDE 37.5 MG: 37.5 CAPSULE, EXTENDED RELEASE ORAL at 21:17

## 2020-11-06 RX ADMIN — LORAZEPAM 0.5 MG: 0.5 TABLET ORAL at 21:18

## 2020-11-06 ASSESSMENT — PAIN SCALES - WONG BAKER: WONGBAKER_NUMERICALRESPONSE: 0

## 2020-11-06 ASSESSMENT — PAIN SCALES - GENERAL
PAINLEVEL_OUTOF10: 0
PAINLEVEL_OUTOF10: 0

## 2020-11-07 PROCEDURE — 99232 SBSQ HOSP IP/OBS MODERATE 35: CPT | Performed by: PSYCHIATRY & NEUROLOGY

## 2020-11-07 PROCEDURE — 10004577 HB ROOM CHARGE PSYCH

## 2020-11-07 PROCEDURE — 10002803 HB RX 637: Performed by: PSYCHIATRY & NEUROLOGY

## 2020-11-07 PROCEDURE — 10002801 HB RX 250 W/O HCPCS: Performed by: INTERNAL MEDICINE

## 2020-11-07 RX ORDER — ARIPIPRAZOLE 1 MG/ML
5 SOLUTION ORAL AT BEDTIME
Status: DISCONTINUED | OUTPATIENT
Start: 2020-11-07 | End: 2020-11-07

## 2020-11-07 RX ORDER — ARIPIPRAZOLE 5 MG/1
5 TABLET ORAL NIGHTLY
Status: DISCONTINUED | OUTPATIENT
Start: 2020-11-07 | End: 2020-11-10

## 2020-11-07 RX ORDER — TRAZODONE HYDROCHLORIDE 50 MG/1
25 TABLET ORAL NIGHTLY
Status: DISCONTINUED | OUTPATIENT
Start: 2020-11-07 | End: 2020-11-14

## 2020-11-07 RX ADMIN — TRAZODONE HYDROCHLORIDE 25 MG: 50 TABLET ORAL at 21:07

## 2020-11-07 RX ADMIN — VENLAFAXINE HYDROCHLORIDE 37.5 MG: 37.5 CAPSULE, EXTENDED RELEASE ORAL at 21:07

## 2020-11-07 RX ADMIN — Medication 400 UNITS: at 21:08

## 2020-11-07 RX ADMIN — ARIPIPRAZOLE 5 MG: 5 TABLET ORAL at 21:08

## 2020-11-07 ASSESSMENT — PAIN SCALES - GENERAL
PAINLEVEL_OUTOF10: 0

## 2020-11-07 ASSESSMENT — PAIN SCALES - WONG BAKER: WONGBAKER_NUMERICALRESPONSE: 0

## 2020-11-08 PROCEDURE — 10002803 HB RX 637: Performed by: PSYCHIATRY & NEUROLOGY

## 2020-11-08 PROCEDURE — 10002801 HB RX 250 W/O HCPCS: Performed by: PSYCHIATRY & NEUROLOGY

## 2020-11-08 PROCEDURE — 10004577 HB ROOM CHARGE PSYCH

## 2020-11-08 PROCEDURE — 99233 SBSQ HOSP IP/OBS HIGH 50: CPT | Performed by: PSYCHIATRY & NEUROLOGY

## 2020-11-08 PROCEDURE — 10002801 HB RX 250 W/O HCPCS: Performed by: INTERNAL MEDICINE

## 2020-11-08 RX ORDER — VENLAFAXINE HYDROCHLORIDE 75 MG/1
75 CAPSULE, EXTENDED RELEASE ORAL NIGHTLY
Status: DISCONTINUED | OUTPATIENT
Start: 2020-11-08 | End: 2020-11-11

## 2020-11-08 RX ADMIN — TRAZODONE HYDROCHLORIDE 25 MG: 50 TABLET ORAL at 21:23

## 2020-11-08 RX ADMIN — VENLAFAXINE HYDROCHLORIDE 75 MG: 75 CAPSULE, EXTENDED RELEASE ORAL at 21:22

## 2020-11-08 RX ADMIN — ARIPIPRAZOLE 5 MG: 5 TABLET ORAL at 21:22

## 2020-11-08 RX ADMIN — Medication 400 UNITS: at 21:24

## 2020-11-08 ASSESSMENT — PAIN SCALES - GENERAL
PAINLEVEL_OUTOF10: 0
PAINLEVEL_OUTOF10: 0

## 2020-11-09 PROCEDURE — 10002801 HB RX 250 W/O HCPCS: Performed by: INTERNAL MEDICINE

## 2020-11-09 PROCEDURE — 10004577 HB ROOM CHARGE PSYCH

## 2020-11-09 PROCEDURE — 99232 SBSQ HOSP IP/OBS MODERATE 35: CPT | Performed by: PSYCHIATRY & NEUROLOGY

## 2020-11-09 PROCEDURE — 10002803 HB RX 637: Performed by: PSYCHIATRY & NEUROLOGY

## 2020-11-09 PROCEDURE — 10002801 HB RX 250 W/O HCPCS: Performed by: PSYCHIATRY & NEUROLOGY

## 2020-11-09 RX ADMIN — VENLAFAXINE HYDROCHLORIDE 75 MG: 75 CAPSULE, EXTENDED RELEASE ORAL at 21:54

## 2020-11-09 RX ADMIN — Medication 400 UNITS: at 09:16

## 2020-11-09 RX ADMIN — ARIPIPRAZOLE 5 MG: 5 TABLET ORAL at 21:54

## 2020-11-09 RX ADMIN — Medication 400 UNITS: at 21:53

## 2020-11-09 RX ADMIN — TRAZODONE HYDROCHLORIDE 25 MG: 50 TABLET ORAL at 21:54

## 2020-11-09 ASSESSMENT — PAIN SCALES - GENERAL
PAINLEVEL_OUTOF10: 0

## 2020-11-09 ASSESSMENT — PAIN SCALES - WONG BAKER: WONGBAKER_NUMERICALRESPONSE: 0

## 2020-11-10 PROBLEM — I95.9 HYPOTENSION: Status: ACTIVE | Noted: 2020-11-10

## 2020-11-10 LAB
ALBUMIN SERPL-MCNC: 3.1 G/DL (ref 3.6–5.1)
ALBUMIN/GLOB SERPL: 0.8 {RATIO} (ref 1–2.4)
ALP SERPL-CCNC: 60 UNITS/L (ref 45–117)
ALT SERPL-CCNC: 24 UNITS/L
ANION GAP SERPL CALC-SCNC: 9 MMOL/L (ref 10–20)
AST SERPL-CCNC: 20 UNITS/L
BILIRUB SERPL-MCNC: 0.3 MG/DL (ref 0.2–1)
BUN SERPL-MCNC: 19 MG/DL (ref 6–20)
BUN/CREAT SERPL: 27 (ref 7–25)
CALCIUM SERPL-MCNC: 8.9 MG/DL (ref 8.4–10.2)
CHLORIDE SERPL-SCNC: 104 MMOL/L (ref 98–107)
CO2 SERPL-SCNC: 27 MMOL/L (ref 21–32)
CREAT SERPL-MCNC: 0.71 MG/DL (ref 0.51–0.95)
GLOBULIN SER-MCNC: 3.8 G/DL (ref 2–4)
GLUCOSE SERPL-MCNC: 73 MG/DL (ref 65–99)
POTASSIUM SERPL-SCNC: 3.9 MMOL/L (ref 3.4–5.1)
PROT SERPL-MCNC: 6.9 G/DL (ref 6.4–8.2)
SODIUM SERPL-SCNC: 136 MMOL/L (ref 135–145)

## 2020-11-10 PROCEDURE — 99232 SBSQ HOSP IP/OBS MODERATE 35: CPT | Performed by: PSYCHIATRY & NEUROLOGY

## 2020-11-10 PROCEDURE — 10002803 HB RX 637: Performed by: PSYCHIATRY & NEUROLOGY

## 2020-11-10 PROCEDURE — 36415 COLL VENOUS BLD VENIPUNCTURE: CPT

## 2020-11-10 PROCEDURE — 99232 SBSQ HOSP IP/OBS MODERATE 35: CPT | Performed by: INTERNAL MEDICINE

## 2020-11-10 PROCEDURE — 80053 COMPREHEN METABOLIC PANEL: CPT

## 2020-11-10 PROCEDURE — 10002801 HB RX 250 W/O HCPCS: Performed by: PSYCHIATRY & NEUROLOGY

## 2020-11-10 PROCEDURE — 10004577 HB ROOM CHARGE PSYCH

## 2020-11-10 PROCEDURE — 10002801 HB RX 250 W/O HCPCS: Performed by: INTERNAL MEDICINE

## 2020-11-10 RX ORDER — ARIPIPRAZOLE 10 MG/1
10 TABLET ORAL NIGHTLY
Status: DISCONTINUED | OUTPATIENT
Start: 2020-11-10 | End: 2020-11-16

## 2020-11-10 RX ORDER — MIDODRINE HYDROCHLORIDE 5 MG/1
5 TABLET ORAL
Status: DISCONTINUED | OUTPATIENT
Start: 2020-11-10 | End: 2020-11-19 | Stop reason: HOSPADM

## 2020-11-10 RX ADMIN — VENLAFAXINE HYDROCHLORIDE 75 MG: 75 CAPSULE, EXTENDED RELEASE ORAL at 22:54

## 2020-11-10 RX ADMIN — TRAZODONE HYDROCHLORIDE 25 MG: 50 TABLET ORAL at 22:53

## 2020-11-10 RX ADMIN — ARIPIPRAZOLE 10 MG: 10 TABLET ORAL at 22:54

## 2020-11-10 RX ADMIN — Medication 400 UNITS: at 22:53

## 2020-11-10 RX ADMIN — Medication 400 UNITS: at 08:49

## 2020-11-10 ASSESSMENT — ENCOUNTER SYMPTOMS: LIGHT-HEADEDNESS: 1

## 2020-11-10 ASSESSMENT — PAIN SCALES - WONG BAKER: WONGBAKER_NUMERICALRESPONSE: 0

## 2020-11-10 ASSESSMENT — PAIN SCALES - GENERAL
PAINLEVEL_OUTOF10: 0
PAINLEVEL_OUTOF10: 0

## 2020-11-11 ENCOUNTER — TELEPHONE (OUTPATIENT)
Dept: BEHAVIORAL HEALTH | Age: 75
End: 2020-11-11

## 2020-11-11 LAB — CORTIS SERPL-MCNC: 11.4 MCG/DL (ref 3.4–22.5)

## 2020-11-11 PROCEDURE — 10002801 HB RX 250 W/O HCPCS: Performed by: PSYCHIATRY & NEUROLOGY

## 2020-11-11 PROCEDURE — 99233 SBSQ HOSP IP/OBS HIGH 50: CPT | Performed by: PSYCHIATRY & NEUROLOGY

## 2020-11-11 PROCEDURE — 10002803 HB RX 637: Performed by: PSYCHIATRY & NEUROLOGY

## 2020-11-11 PROCEDURE — 82533 TOTAL CORTISOL: CPT

## 2020-11-11 PROCEDURE — 10004577 HB ROOM CHARGE PSYCH

## 2020-11-11 PROCEDURE — 10002801 HB RX 250 W/O HCPCS: Performed by: INTERNAL MEDICINE

## 2020-11-11 PROCEDURE — 36415 COLL VENOUS BLD VENIPUNCTURE: CPT

## 2020-11-11 PROCEDURE — 10002803 HB RX 637: Performed by: INTERNAL MEDICINE

## 2020-11-11 RX ADMIN — Medication 400 UNITS: at 09:46

## 2020-11-11 RX ADMIN — VENLAFAXINE HYDROCHLORIDE 112.5 MG: 75 CAPSULE, EXTENDED RELEASE ORAL at 22:21

## 2020-11-11 RX ADMIN — MIDODRINE HYDROCHLORIDE 5 MG: 5 TABLET ORAL at 17:50

## 2020-11-11 RX ADMIN — TRAZODONE HYDROCHLORIDE 25 MG: 50 TABLET ORAL at 22:23

## 2020-11-11 RX ADMIN — ARIPIPRAZOLE 10 MG: 10 TABLET ORAL at 22:22

## 2020-11-11 RX ADMIN — Medication 400 UNITS: at 22:21

## 2020-11-11 RX ADMIN — MIDODRINE HYDROCHLORIDE 5 MG: 5 TABLET ORAL at 09:48

## 2020-11-11 ASSESSMENT — PAIN SCALES - GENERAL
PAINLEVEL_OUTOF10: 0

## 2020-11-12 ENCOUNTER — APPOINTMENT (OUTPATIENT)
Dept: CARDIOLOGY | Age: 75
DRG: 885 | End: 2020-11-12
Attending: INTERNAL MEDICINE

## 2020-11-12 PROCEDURE — 99232 SBSQ HOSP IP/OBS MODERATE 35: CPT | Performed by: INTERNAL MEDICINE

## 2020-11-12 PROCEDURE — 10004577 HB ROOM CHARGE PSYCH

## 2020-11-12 PROCEDURE — 93306 TTE W/DOPPLER COMPLETE: CPT | Performed by: INTERNAL MEDICINE

## 2020-11-12 PROCEDURE — 10002803 HB RX 637: Performed by: PSYCHIATRY & NEUROLOGY

## 2020-11-12 PROCEDURE — 93306 TTE W/DOPPLER COMPLETE: CPT

## 2020-11-12 PROCEDURE — 10002803 HB RX 637: Performed by: INTERNAL MEDICINE

## 2020-11-12 PROCEDURE — 10002801 HB RX 250 W/O HCPCS: Performed by: PSYCHIATRY & NEUROLOGY

## 2020-11-12 PROCEDURE — 10002801 HB RX 250 W/O HCPCS: Performed by: INTERNAL MEDICINE

## 2020-11-12 RX ADMIN — VENLAFAXINE HYDROCHLORIDE 112.5 MG: 75 CAPSULE, EXTENDED RELEASE ORAL at 20:54

## 2020-11-12 RX ADMIN — MIDODRINE HYDROCHLORIDE 5 MG: 5 TABLET ORAL at 12:30

## 2020-11-12 RX ADMIN — TRAZODONE HYDROCHLORIDE 25 MG: 50 TABLET ORAL at 20:54

## 2020-11-12 RX ADMIN — MIDODRINE HYDROCHLORIDE 5 MG: 5 TABLET ORAL at 09:08

## 2020-11-12 RX ADMIN — ARIPIPRAZOLE 10 MG: 10 TABLET ORAL at 20:54

## 2020-11-12 RX ADMIN — Medication 400 UNITS: at 09:08

## 2020-11-12 RX ADMIN — Medication 400 UNITS: at 20:54

## 2020-11-12 RX ADMIN — MIDODRINE HYDROCHLORIDE 5 MG: 5 TABLET ORAL at 17:42

## 2020-11-12 ASSESSMENT — PAIN SCALES - GENERAL
PAINLEVEL_OUTOF10: 0

## 2020-11-12 ASSESSMENT — ENCOUNTER SYMPTOMS: LIGHT-HEADEDNESS: 1

## 2020-11-13 PROCEDURE — 10004577 HB ROOM CHARGE PSYCH

## 2020-11-13 PROCEDURE — 99233 SBSQ HOSP IP/OBS HIGH 50: CPT | Performed by: PSYCHIATRY & NEUROLOGY

## 2020-11-13 PROCEDURE — 10002803 HB RX 637: Performed by: PSYCHIATRY & NEUROLOGY

## 2020-11-13 PROCEDURE — 10002801 HB RX 250 W/O HCPCS: Performed by: INTERNAL MEDICINE

## 2020-11-13 PROCEDURE — 10002801 HB RX 250 W/O HCPCS: Performed by: PSYCHIATRY & NEUROLOGY

## 2020-11-13 PROCEDURE — 10002803 HB RX 637: Performed by: INTERNAL MEDICINE

## 2020-11-13 RX ADMIN — VENLAFAXINE HYDROCHLORIDE 112.5 MG: 75 CAPSULE, EXTENDED RELEASE ORAL at 21:27

## 2020-11-13 RX ADMIN — TRAZODONE HYDROCHLORIDE 25 MG: 50 TABLET ORAL at 21:28

## 2020-11-13 RX ADMIN — MIDODRINE HYDROCHLORIDE 5 MG: 5 TABLET ORAL at 07:08

## 2020-11-13 RX ADMIN — MIDODRINE HYDROCHLORIDE 5 MG: 5 TABLET ORAL at 18:52

## 2020-11-13 RX ADMIN — Medication 400 UNITS: at 21:27

## 2020-11-13 RX ADMIN — Medication 400 UNITS: at 09:46

## 2020-11-13 RX ADMIN — ARIPIPRAZOLE 10 MG: 10 TABLET ORAL at 21:27

## 2020-11-13 ASSESSMENT — PAIN SCALES - GENERAL
PAINLEVEL_OUTOF10: 0

## 2020-11-14 PROCEDURE — 10004577 HB ROOM CHARGE PSYCH

## 2020-11-14 PROCEDURE — 99232 SBSQ HOSP IP/OBS MODERATE 35: CPT | Performed by: INTERNAL MEDICINE

## 2020-11-14 PROCEDURE — 10002803 HB RX 637: Performed by: PSYCHIATRY & NEUROLOGY

## 2020-11-14 PROCEDURE — 10002801 HB RX 250 W/O HCPCS: Performed by: PSYCHIATRY & NEUROLOGY

## 2020-11-14 PROCEDURE — 10002803 HB RX 637: Performed by: INTERNAL MEDICINE

## 2020-11-14 PROCEDURE — 99232 SBSQ HOSP IP/OBS MODERATE 35: CPT | Performed by: PSYCHIATRY & NEUROLOGY

## 2020-11-14 PROCEDURE — 10002801 HB RX 250 W/O HCPCS: Performed by: INTERNAL MEDICINE

## 2020-11-14 RX ORDER — TRAZODONE HYDROCHLORIDE 50 MG/1
50 TABLET ORAL NIGHTLY
Status: DISCONTINUED | OUTPATIENT
Start: 2020-11-14 | End: 2020-11-19 | Stop reason: HOSPADM

## 2020-11-14 RX ORDER — FLUDROCORTISONE ACETATE 0.1 MG/1
0.1 TABLET ORAL DAILY
Status: DISCONTINUED | OUTPATIENT
Start: 2020-11-14 | End: 2020-11-19 | Stop reason: HOSPADM

## 2020-11-14 RX ADMIN — MIDODRINE HYDROCHLORIDE 5 MG: 5 TABLET ORAL at 08:52

## 2020-11-14 RX ADMIN — Medication 400 UNITS: at 08:52

## 2020-11-14 RX ADMIN — MIDODRINE HYDROCHLORIDE 5 MG: 5 TABLET ORAL at 17:20

## 2020-11-14 RX ADMIN — TRAZODONE HYDROCHLORIDE 50 MG: 50 TABLET ORAL at 21:05

## 2020-11-14 RX ADMIN — Medication 400 UNITS: at 21:05

## 2020-11-14 RX ADMIN — ARIPIPRAZOLE 10 MG: 10 TABLET ORAL at 21:04

## 2020-11-14 RX ADMIN — MIDODRINE HYDROCHLORIDE 5 MG: 5 TABLET ORAL at 11:34

## 2020-11-14 RX ADMIN — VENLAFAXINE HYDROCHLORIDE 112.5 MG: 75 CAPSULE, EXTENDED RELEASE ORAL at 21:04

## 2020-11-14 ASSESSMENT — PAIN SCALES - GENERAL
PAINLEVEL_OUTOF10: 0

## 2020-11-14 ASSESSMENT — ENCOUNTER SYMPTOMS: LIGHT-HEADEDNESS: 1

## 2020-11-15 PROCEDURE — 10002803 HB RX 637: Performed by: PSYCHIATRY & NEUROLOGY

## 2020-11-15 PROCEDURE — 10004577 HB ROOM CHARGE PSYCH

## 2020-11-15 PROCEDURE — 99232 SBSQ HOSP IP/OBS MODERATE 35: CPT | Performed by: PSYCHIATRY & NEUROLOGY

## 2020-11-15 PROCEDURE — 10002801 HB RX 250 W/O HCPCS: Performed by: INTERNAL MEDICINE

## 2020-11-15 PROCEDURE — 10002801 HB RX 250 W/O HCPCS: Performed by: PSYCHIATRY & NEUROLOGY

## 2020-11-15 PROCEDURE — 10002803 HB RX 637: Performed by: INTERNAL MEDICINE

## 2020-11-15 RX ADMIN — VENLAFAXINE HYDROCHLORIDE 112.5 MG: 75 CAPSULE, EXTENDED RELEASE ORAL at 21:04

## 2020-11-15 RX ADMIN — MIDODRINE HYDROCHLORIDE 5 MG: 5 TABLET ORAL at 07:34

## 2020-11-15 RX ADMIN — Medication 400 UNITS: at 21:04

## 2020-11-15 RX ADMIN — TRAZODONE HYDROCHLORIDE 50 MG: 50 TABLET ORAL at 21:04

## 2020-11-15 RX ADMIN — ARIPIPRAZOLE 10 MG: 10 TABLET ORAL at 21:04

## 2020-11-15 RX ADMIN — MIDODRINE HYDROCHLORIDE 5 MG: 5 TABLET ORAL at 12:15

## 2020-11-15 RX ADMIN — Medication 400 UNITS: at 09:52

## 2020-11-15 ASSESSMENT — PAIN SCALES - GENERAL
PAINLEVEL_OUTOF10: 0
PAINLEVEL_OUTOF10: 0

## 2020-11-16 PROCEDURE — 10002801 HB RX 250 W/O HCPCS: Performed by: INTERNAL MEDICINE

## 2020-11-16 PROCEDURE — 10002803 HB RX 637: Performed by: PSYCHIATRY & NEUROLOGY

## 2020-11-16 PROCEDURE — 99233 SBSQ HOSP IP/OBS HIGH 50: CPT | Performed by: PSYCHIATRY & NEUROLOGY

## 2020-11-16 PROCEDURE — 10004577 HB ROOM CHARGE PSYCH

## 2020-11-16 PROCEDURE — 10002803 HB RX 637: Performed by: INTERNAL MEDICINE

## 2020-11-16 RX ORDER — VENLAFAXINE HYDROCHLORIDE 150 MG/1
150 CAPSULE, EXTENDED RELEASE ORAL NIGHTLY
Status: DISCONTINUED | OUTPATIENT
Start: 2020-11-16 | End: 2020-11-19 | Stop reason: HOSPADM

## 2020-11-16 RX ADMIN — FLUDROCORTISONE ACETATE 0.1 MG: 0.1 TABLET ORAL at 16:33

## 2020-11-16 RX ADMIN — VENLAFAXINE HYDROCHLORIDE 150 MG: 150 CAPSULE, EXTENDED RELEASE ORAL at 21:06

## 2020-11-16 RX ADMIN — MIDODRINE HYDROCHLORIDE 5 MG: 5 TABLET ORAL at 12:26

## 2020-11-16 RX ADMIN — MIDODRINE HYDROCHLORIDE 5 MG: 5 TABLET ORAL at 16:30

## 2020-11-16 RX ADMIN — MIDODRINE HYDROCHLORIDE 5 MG: 5 TABLET ORAL at 07:27

## 2020-11-16 RX ADMIN — TRAZODONE HYDROCHLORIDE 50 MG: 50 TABLET ORAL at 21:06

## 2020-11-16 RX ADMIN — Medication 400 UNITS: at 09:54

## 2020-11-16 RX ADMIN — Medication 400 UNITS: at 21:06

## 2020-11-16 ASSESSMENT — PAIN SCALES - GENERAL: PAINLEVEL_OUTOF10: 0

## 2020-11-17 LAB
ALBUMIN SERPL-MCNC: 2.7 G/DL (ref 3.6–5.1)
ALBUMIN/GLOB SERPL: 0.8 {RATIO} (ref 1–2.4)
ALP SERPL-CCNC: 55 UNITS/L (ref 45–117)
ALT SERPL-CCNC: 24 UNITS/L
ANION GAP SERPL CALC-SCNC: 7 MMOL/L (ref 10–20)
AST SERPL-CCNC: 19 UNITS/L
BILIRUB SERPL-MCNC: 0.3 MG/DL (ref 0.2–1)
BUN SERPL-MCNC: 15 MG/DL (ref 6–20)
BUN/CREAT SERPL: 23 (ref 7–25)
CALCIUM SERPL-MCNC: 8.4 MG/DL (ref 8.4–10.2)
CHLORIDE SERPL-SCNC: 108 MMOL/L (ref 98–107)
CO2 SERPL-SCNC: 30 MMOL/L (ref 21–32)
CREAT SERPL-MCNC: 0.65 MG/DL (ref 0.51–0.95)
GLOBULIN SER-MCNC: 3.6 G/DL (ref 2–4)
GLUCOSE SERPL-MCNC: 74 MG/DL (ref 65–99)
POTASSIUM SERPL-SCNC: 4.2 MMOL/L (ref 3.4–5.1)
PROT SERPL-MCNC: 6.3 G/DL (ref 6.4–8.2)
SODIUM SERPL-SCNC: 141 MMOL/L (ref 135–145)

## 2020-11-17 PROCEDURE — 80053 COMPREHEN METABOLIC PANEL: CPT

## 2020-11-17 PROCEDURE — 10002803 HB RX 637: Performed by: INTERNAL MEDICINE

## 2020-11-17 PROCEDURE — 36415 COLL VENOUS BLD VENIPUNCTURE: CPT

## 2020-11-17 PROCEDURE — 10002803 HB RX 637: Performed by: PSYCHIATRY & NEUROLOGY

## 2020-11-17 PROCEDURE — 10002801 HB RX 250 W/O HCPCS: Performed by: INTERNAL MEDICINE

## 2020-11-17 PROCEDURE — 10004577 HB ROOM CHARGE PSYCH

## 2020-11-17 PROCEDURE — 99232 SBSQ HOSP IP/OBS MODERATE 35: CPT | Performed by: PSYCHIATRY & NEUROLOGY

## 2020-11-17 RX ORDER — FLUDROCORTISONE ACETATE 0.1 MG/1
0.1 TABLET ORAL DAILY
Qty: 30 TABLET | Refills: 0 | Status: SHIPPED | OUTPATIENT
Start: 2020-11-18 | End: 2020-11-19

## 2020-11-17 RX ORDER — MIDODRINE HYDROCHLORIDE 5 MG/1
5 TABLET ORAL
Qty: 90 TABLET | Refills: 0 | Status: SHIPPED | OUTPATIENT
Start: 2020-11-18 | End: 2020-11-21

## 2020-11-17 RX ADMIN — Medication 400 UNITS: at 08:56

## 2020-11-17 RX ADMIN — FLUDROCORTISONE ACETATE 0.1 MG: 0.1 TABLET ORAL at 08:56

## 2020-11-17 RX ADMIN — Medication 400 UNITS: at 20:15

## 2020-11-17 RX ADMIN — TRAZODONE HYDROCHLORIDE 50 MG: 50 TABLET ORAL at 20:15

## 2020-11-17 RX ADMIN — MIDODRINE HYDROCHLORIDE 5 MG: 5 TABLET ORAL at 12:10

## 2020-11-17 RX ADMIN — MIDODRINE HYDROCHLORIDE 5 MG: 5 TABLET ORAL at 06:28

## 2020-11-17 RX ADMIN — VENLAFAXINE HYDROCHLORIDE 150 MG: 150 CAPSULE, EXTENDED RELEASE ORAL at 20:15

## 2020-11-17 RX ADMIN — MIDODRINE HYDROCHLORIDE 5 MG: 5 TABLET ORAL at 16:22

## 2020-11-17 ASSESSMENT — PAIN SCALES - GENERAL
PAINLEVEL_OUTOF10: 0
PAINLEVEL_OUTOF10: 0

## 2020-11-17 ASSESSMENT — PAIN SCALES - WONG BAKER: WONGBAKER_NUMERICALRESPONSE: 0

## 2020-11-18 ENCOUNTER — TELEPHONE (OUTPATIENT)
Dept: BEHAVIORAL HEALTH | Age: 75
End: 2020-11-18

## 2020-11-18 PROCEDURE — 99233 SBSQ HOSP IP/OBS HIGH 50: CPT | Performed by: PSYCHIATRY & NEUROLOGY

## 2020-11-18 PROCEDURE — 10002801 HB RX 250 W/O HCPCS: Performed by: INTERNAL MEDICINE

## 2020-11-18 PROCEDURE — 10002803 HB RX 637: Performed by: INTERNAL MEDICINE

## 2020-11-18 PROCEDURE — 10004577 HB ROOM CHARGE PSYCH

## 2020-11-18 PROCEDURE — 10002803 HB RX 637: Performed by: PSYCHIATRY & NEUROLOGY

## 2020-11-18 RX ORDER — VENLAFAXINE HYDROCHLORIDE 150 MG/1
150 CAPSULE, EXTENDED RELEASE ORAL NIGHTLY
Qty: 30 CAPSULE | Refills: 1 | Status: SHIPPED | OUTPATIENT
Start: 2020-11-18 | End: 2021-01-18 | Stop reason: SDUPTHER

## 2020-11-18 RX ORDER — TRAZODONE HYDROCHLORIDE 50 MG/1
50 TABLET ORAL NIGHTLY
Qty: 30 TABLET | Refills: 1 | Status: SHIPPED | OUTPATIENT
Start: 2020-11-18 | End: 2021-06-15

## 2020-11-18 RX ADMIN — Medication 400 UNITS: at 20:50

## 2020-11-18 RX ADMIN — FLUDROCORTISONE ACETATE 0.1 MG: 0.1 TABLET ORAL at 08:09

## 2020-11-18 RX ADMIN — MIDODRINE HYDROCHLORIDE 5 MG: 5 TABLET ORAL at 12:39

## 2020-11-18 RX ADMIN — MIDODRINE HYDROCHLORIDE 5 MG: 5 TABLET ORAL at 16:46

## 2020-11-18 RX ADMIN — TRAZODONE HYDROCHLORIDE 50 MG: 50 TABLET ORAL at 20:50

## 2020-11-18 RX ADMIN — VENLAFAXINE HYDROCHLORIDE 150 MG: 150 CAPSULE, EXTENDED RELEASE ORAL at 20:50

## 2020-11-18 RX ADMIN — MIDODRINE HYDROCHLORIDE 5 MG: 5 TABLET ORAL at 08:08

## 2020-11-18 RX ADMIN — Medication 400 UNITS: at 08:07

## 2020-11-18 ASSESSMENT — PAIN SCALES - GENERAL
PAINLEVEL_OUTOF10: 0

## 2020-11-18 ASSESSMENT — PAIN SCALES - WONG BAKER: WONGBAKER_NUMERICALRESPONSE: 0

## 2020-11-19 VITALS
BODY MASS INDEX: 23.18 KG/M2 | WEIGHT: 122.8 LBS | RESPIRATION RATE: 16 BRPM | DIASTOLIC BLOOD PRESSURE: 64 MMHG | SYSTOLIC BLOOD PRESSURE: 94 MMHG | HEIGHT: 61 IN | HEART RATE: 70 BPM | OXYGEN SATURATION: 92 % | TEMPERATURE: 97.3 F

## 2020-11-19 PROCEDURE — 99238 HOSP IP/OBS DSCHRG MGMT 30/<: CPT | Performed by: PSYCHIATRY & NEUROLOGY

## 2020-11-19 PROCEDURE — 10002803 HB RX 637: Performed by: INTERNAL MEDICINE

## 2020-11-19 PROCEDURE — 10002801 HB RX 250 W/O HCPCS: Performed by: INTERNAL MEDICINE

## 2020-11-19 RX ORDER — FLUDROCORTISONE ACETATE 0.1 MG/1
0.1 TABLET ORAL DAILY
Qty: 30 TABLET | Refills: 1 | Status: SHIPPED | OUTPATIENT
Start: 2020-11-19 | End: 2020-11-21 | Stop reason: ALTCHOICE

## 2020-11-19 RX ADMIN — MIDODRINE HYDROCHLORIDE 5 MG: 5 TABLET ORAL at 11:13

## 2020-11-19 RX ADMIN — FLUDROCORTISONE ACETATE 0.1 MG: 0.1 TABLET ORAL at 09:23

## 2020-11-19 RX ADMIN — Medication 400 UNITS: at 09:23

## 2020-11-19 RX ADMIN — MIDODRINE HYDROCHLORIDE 5 MG: 5 TABLET ORAL at 07:00

## 2020-11-19 ASSESSMENT — PAIN SCALES - GENERAL
PAINLEVEL_OUTOF10: 0
PAINLEVEL_OUTOF10: 0

## 2020-11-21 RX ORDER — FLUDROCORTISONE ACETATE 0.1 MG/1
0.1 TABLET ORAL DAILY
Qty: 90 TABLET | Refills: 3 | Status: SHIPPED | OUTPATIENT
Start: 2020-11-21 | End: 2021-07-21 | Stop reason: DRUGHIGH

## 2020-11-21 RX ORDER — MIDODRINE HYDROCHLORIDE 5 MG/1
5 TABLET ORAL 3 TIMES DAILY
Qty: 270 TABLET | Refills: 3 | Status: SHIPPED | OUTPATIENT
Start: 2020-11-21 | End: 2023-01-19

## 2020-12-08 ENCOUNTER — HOSPITAL ENCOUNTER (OUTPATIENT)
Dept: GENERAL RADIOLOGY | Age: 75
Discharge: HOME | End: 2020-12-08
Attending: INTERNAL MEDICINE

## 2020-12-08 DIAGNOSIS — M54.41 LUMBAGO WITH SCIATICA, RIGHT SIDE: ICD-10-CM

## 2020-12-08 DIAGNOSIS — M75.91 SHOULDER LESION, UNSPECIFIED, RIGHT SHOULDER: ICD-10-CM

## 2020-12-08 DIAGNOSIS — M54.42 LUMBAGO WITH SCIATICA, LEFT SIDE: ICD-10-CM

## 2020-12-08 PROCEDURE — 72100 X-RAY EXAM L-S SPINE 2/3 VWS: CPT

## 2020-12-08 PROCEDURE — 73030 X-RAY EXAM OF SHOULDER: CPT

## 2020-12-08 PROCEDURE — 73010 X-RAY EXAM OF SHOULDER BLADE: CPT

## 2020-12-15 ENCOUNTER — TELEPHONE (OUTPATIENT)
Dept: BEHAVIORAL HEALTH | Age: 75
End: 2020-12-15

## 2020-12-15 DIAGNOSIS — F25.1 SCHIZOAFFECTIVE DISORDER, DEPRESSIVE TYPE (CMD): ICD-10-CM

## 2020-12-16 ENCOUNTER — TELEPHONE (OUTPATIENT)
Dept: SCHEDULING | Age: 75
End: 2020-12-16

## 2020-12-17 ENCOUNTER — BEHAVIORAL HEALTH (OUTPATIENT)
Dept: BEHAVIORAL HEALTH | Age: 75
End: 2020-12-17

## 2020-12-17 DIAGNOSIS — F25.1 SCHIZOAFFECTIVE DISORDER, DEPRESSIVE TYPE (CMD): Primary | ICD-10-CM

## 2020-12-17 PROCEDURE — 99213 OFFICE O/P EST LOW 20 MIN: CPT | Performed by: NURSE PRACTITIONER

## 2020-12-17 PROCEDURE — 96372 THER/PROPH/DIAG INJ SC/IM: CPT | Performed by: NURSE PRACTITIONER

## 2020-12-19 ENCOUNTER — HOSPITAL ENCOUNTER (OUTPATIENT)
Dept: LAB | Age: 75
Discharge: HOME | End: 2020-12-19
Attending: INTERNAL MEDICINE

## 2020-12-19 DIAGNOSIS — E78.5 HYPERLIPEMIA: ICD-10-CM

## 2020-12-19 DIAGNOSIS — I10 HYPERTENSION, ESSENTIAL: ICD-10-CM

## 2020-12-19 DIAGNOSIS — M75.92 LESION OF LEFT SHOULDER: ICD-10-CM

## 2020-12-19 DIAGNOSIS — F41.9 ANXIETY HYPERVENTILATION: ICD-10-CM

## 2020-12-19 DIAGNOSIS — F45.8 ANXIETY HYPERVENTILATION: ICD-10-CM

## 2020-12-19 DIAGNOSIS — Z00.00 ROUTINE GENERAL MEDICAL EXAMINATION AT A HEALTH CARE FACILITY: ICD-10-CM

## 2020-12-19 DIAGNOSIS — L30.9 ACUTE DERMATITIS: ICD-10-CM

## 2020-12-19 DIAGNOSIS — L30.9 ACUTE DERMATITIS: Primary | ICD-10-CM

## 2020-12-19 LAB
ALBUMIN SERPL-MCNC: 3.8 G/DL (ref 3.6–5.1)
ALBUMIN/GLOB SERPL: 1.2 {RATIO} (ref 1–2.4)
ALP SERPL-CCNC: 113 UNITS/L (ref 45–117)
ALT SERPL-CCNC: 22 UNITS/L
ANION GAP SERPL CALC-SCNC: 8 MMOL/L (ref 10–20)
APPEARANCE UR: CLEAR
AST SERPL-CCNC: 17 UNITS/L
BACTERIA #/AREA URNS HPF: ABNORMAL /HPF
BASOPHILS # BLD: 0 K/MCL (ref 0–0.3)
BASOPHILS NFR BLD: 1 %
BILIRUB SERPL-MCNC: 0.5 MG/DL (ref 0.2–1)
BILIRUB UR QL STRIP: NEGATIVE
BUN SERPL-MCNC: 12 MG/DL (ref 6–20)
BUN/CREAT SERPL: 20 (ref 7–25)
CALCIUM SERPL-MCNC: 9 MG/DL (ref 8.4–10.2)
CHLORIDE SERPL-SCNC: 108 MMOL/L (ref 98–107)
CHOLEST SERPL-MCNC: 286 MG/DL
CHOLEST/HDLC SERPL: 2.4 {RATIO}
CO2 SERPL-SCNC: 29 MMOL/L (ref 21–32)
COLOR UR: YELLOW
CREAT SERPL-MCNC: 0.6 MG/DL (ref 0.51–0.95)
DEPRECATED RDW RBC: 53 FL (ref 39–50)
EOSINOPHIL # BLD: 0.1 K/MCL (ref 0–0.5)
EOSINOPHIL NFR BLD: 1 %
ERYTHROCYTE [DISTWIDTH] IN BLOOD: 15.9 % (ref 11–15)
FASTING DURATION TIME PATIENT: 12 HOURS
FASTING DURATION TIME PATIENT: 12 HOURS
GFR SERPLBLD BASED ON 1.73 SQ M-ARVRAT: >90 ML/MIN/1.73M2
GLOBULIN SER-MCNC: 3.3 G/DL (ref 2–4)
GLUCOSE SERPL-MCNC: 91 MG/DL (ref 65–99)
GLUCOSE UR STRIP-MCNC: NEGATIVE MG/DL
HCT VFR BLD CALC: 41.2 % (ref 36–46.5)
HDLC SERPL-MCNC: 119 MG/DL
HGB BLD-MCNC: 12.8 G/DL (ref 12–15.5)
HGB UR QL STRIP: NEGATIVE
HYALINE CASTS #/AREA URNS LPF: ABNORMAL /LPF
IMM GRANULOCYTES # BLD AUTO: 0 K/MCL (ref 0–0.2)
IMM GRANULOCYTES # BLD: 0 %
IRON SATN MFR SERPL: 9 % (ref 15–45)
IRON SERPL-MCNC: 38 MCG/DL (ref 50–170)
KETONES UR STRIP-MCNC: ABNORMAL MG/DL
LDLC SERPL CALC-MCNC: 151 MG/DL
LEUKOCYTE ESTERASE UR QL STRIP: NEGATIVE
LYMPHOCYTES # BLD: 1.3 K/MCL (ref 1–4)
LYMPHOCYTES NFR BLD: 24 %
MCH RBC QN AUTO: 28 PG (ref 26–34)
MCHC RBC AUTO-ENTMCNC: 31.1 G/DL (ref 32–36.5)
MCV RBC AUTO: 90.2 FL (ref 78–100)
MONOCYTES # BLD: 0.6 K/MCL (ref 0.3–0.9)
MONOCYTES NFR BLD: 11 %
MUCOUS THREADS URNS QL MICRO: PRESENT
NEUTROPHILS # BLD: 3.3 K/MCL (ref 1.8–7.7)
NEUTROPHILS NFR BLD: 63 %
NITRITE UR QL STRIP: NEGATIVE
NONHDLC SERPL-MCNC: 167 MG/DL
NRBC BLD MANUAL-RTO: 0 /100 WBC
PH UR STRIP: 7 [PH] (ref 5–7)
PLATELET # BLD AUTO: 325 K/MCL (ref 140–450)
POTASSIUM SERPL-SCNC: 4.2 MMOL/L (ref 3.4–5.1)
PROT SERPL-MCNC: 7.1 G/DL (ref 6.4–8.2)
PROT UR STRIP-MCNC: NEGATIVE MG/DL
RBC # BLD: 4.57 MIL/MCL (ref 4–5.2)
RBC #/AREA URNS HPF: ABNORMAL /HPF
SODIUM SERPL-SCNC: 141 MMOL/L (ref 135–145)
SP GR UR STRIP: 1.01 (ref 1–1.03)
SQUAMOUS #/AREA URNS HPF: ABNORMAL /HPF
TIBC SERPL-MCNC: 434 MCG/DL (ref 250–450)
TRIGL SERPL-MCNC: 82 MG/DL
TSH SERPL-ACNC: 4.81 MCUNITS/ML (ref 0.35–5)
UROBILINOGEN UR STRIP-MCNC: 2 MG/DL
WBC # BLD: 5.3 K/MCL (ref 4.2–11)
WBC #/AREA URNS HPF: ABNORMAL /HPF

## 2020-12-19 PROCEDURE — 85025 COMPLETE CBC W/AUTO DIFF WBC: CPT | Performed by: INTERNAL MEDICINE

## 2020-12-19 PROCEDURE — 83550 IRON BINDING TEST: CPT | Performed by: INTERNAL MEDICINE

## 2020-12-19 PROCEDURE — 84443 ASSAY THYROID STIM HORMONE: CPT | Performed by: INTERNAL MEDICINE

## 2020-12-19 PROCEDURE — 80061 LIPID PANEL: CPT | Performed by: INTERNAL MEDICINE

## 2020-12-19 PROCEDURE — 81001 URINALYSIS AUTO W/SCOPE: CPT | Performed by: INTERNAL MEDICINE

## 2020-12-19 PROCEDURE — 80053 COMPREHEN METABOLIC PANEL: CPT | Performed by: INTERNAL MEDICINE

## 2020-12-19 PROCEDURE — 36415 COLL VENOUS BLD VENIPUNCTURE: CPT | Performed by: INTERNAL MEDICINE

## 2021-01-07 ENCOUNTER — TELEPHONE (OUTPATIENT)
Dept: SCHEDULING | Age: 76
End: 2021-01-07

## 2021-01-11 ENCOUNTER — APPOINTMENT (OUTPATIENT)
Dept: BEHAVIORAL HEALTH | Age: 76
End: 2021-01-11

## 2021-01-11 ENCOUNTER — TELEPHONE (OUTPATIENT)
Dept: BEHAVIORAL HEALTH | Age: 76
End: 2021-01-11

## 2021-01-12 RX ORDER — RISPERIDONE 2 MG/1
2 TABLET ORAL NIGHTLY
Qty: 30 TABLET | Refills: 1 | Status: SHIPPED | OUTPATIENT
Start: 2021-01-12 | End: 2021-06-29 | Stop reason: SDUPTHER

## 2021-01-12 RX ORDER — RISPERIDONE 2 MG/1
2 TABLET ORAL 2 TIMES DAILY
Qty: 30 TABLET | Refills: 1 | Status: SHIPPED | OUTPATIENT
Start: 2021-01-12 | End: 2021-01-12 | Stop reason: SDUPTHER

## 2021-01-13 ENCOUNTER — TELEPHONE (OUTPATIENT)
Dept: BEHAVIORAL HEALTH | Age: 76
End: 2021-01-13

## 2021-01-14 ENCOUNTER — APPOINTMENT (OUTPATIENT)
Dept: BEHAVIORAL HEALTH | Age: 76
End: 2021-01-14

## 2021-01-18 ENCOUNTER — TELEPHONE (OUTPATIENT)
Dept: BEHAVIORAL HEALTH | Age: 76
End: 2021-01-18

## 2021-01-18 DIAGNOSIS — F25.1 SCHIZOAFFECTIVE DISORDER, DEPRESSIVE TYPE (CMD): ICD-10-CM

## 2021-01-18 RX ORDER — VENLAFAXINE HYDROCHLORIDE 150 MG/1
150 CAPSULE, EXTENDED RELEASE ORAL NIGHTLY
Qty: 30 CAPSULE | Refills: 1 | Status: SHIPPED | OUTPATIENT
Start: 2021-01-18 | End: 2021-04-06

## 2021-01-28 ENCOUNTER — OFFICE VISIT (OUTPATIENT)
Dept: BEHAVIORAL HEALTH | Age: 76
End: 2021-01-28

## 2021-01-28 ENCOUNTER — LAB SERVICES (OUTPATIENT)
Dept: LAB | Age: 76
End: 2021-01-28

## 2021-01-28 DIAGNOSIS — Z01.812 ENCOUNTER FOR PREPROCEDURE SCREENING LABORATORY TESTING FOR COVID-19: Primary | ICD-10-CM

## 2021-01-28 DIAGNOSIS — F32.1 CURRENT MODERATE EPISODE OF MAJOR DEPRESSIVE DISORDER, UNSPECIFIED WHETHER RECURRENT (CMD): ICD-10-CM

## 2021-01-28 DIAGNOSIS — Z11.52 ENCOUNTER FOR PREPROCEDURE SCREENING LABORATORY TESTING FOR COVID-19: Primary | ICD-10-CM

## 2021-01-28 DIAGNOSIS — F25.1 SCHIZOAFFECTIVE DISORDER, DEPRESSIVE TYPE (CMD): Primary | ICD-10-CM

## 2021-01-28 DIAGNOSIS — Z85.3 PERSONAL HISTORY OF BREAST CANCER: ICD-10-CM

## 2021-01-28 LAB
SARS-COV-2 RNA RESP QL NAA+PROBE: NOT DETECTED
SERVICE CMNT-IMP: NORMAL
SERVICE CMNT-IMP: NORMAL

## 2021-01-28 PROCEDURE — U0003 INFECTIOUS AGENT DETECTION BY NUCLEIC ACID (DNA OR RNA); SEVERE ACUTE RESPIRATORY SYNDROME CORONAVIRUS 2 (SARS-COV-2) (CORONAVIRUS DISEASE [COVID-19]), AMPLIFIED PROBE TECHNIQUE, MAKING USE OF HIGH THROUGHPUT TECHNOLOGIES AS DESCRIBED BY CMS-2020-01-R: HCPCS | Performed by: CLINICAL MEDICAL LABORATORY

## 2021-01-28 PROCEDURE — U0005 INFEC AGEN DETEC AMPLI PROBE: HCPCS | Performed by: CLINICAL MEDICAL LABORATORY

## 2021-01-28 PROCEDURE — 96372 THER/PROPH/DIAG INJ SC/IM: CPT | Performed by: PSYCHIATRY & NEUROLOGY

## 2021-01-28 PROCEDURE — 99213 OFFICE O/P EST LOW 20 MIN: CPT | Performed by: PSYCHIATRY & NEUROLOGY

## 2021-01-28 RX ORDER — PALIPERIDONE PALMITATE 78 MG/.5ML
78 INJECTION INTRAMUSCULAR
COMMUNITY
End: 2022-02-24 | Stop reason: SDUPTHER

## 2021-01-28 SDOH — HEALTH STABILITY: MENTAL HEALTH: HOW OFTEN DO YOU HAVE A DRINK CONTAINING ALCOHOL?: NEVER

## 2021-01-29 ENCOUNTER — ANESTHESIA EVENT (OUTPATIENT)
Dept: ADMINISTRATIVE | Age: 76
End: 2021-01-29

## 2021-01-30 ENCOUNTER — ANESTHESIA (OUTPATIENT)
Dept: ADMINISTRATIVE | Age: 76
End: 2021-01-30

## 2021-01-30 ENCOUNTER — HOSPITAL ENCOUNTER (OUTPATIENT)
Dept: ADMINISTRATIVE | Age: 76
Discharge: HOME | End: 2021-01-30
Attending: SPECIALIST

## 2021-01-30 VITALS
OXYGEN SATURATION: 99 % | WEIGHT: 135 LBS | SYSTOLIC BLOOD PRESSURE: 123 MMHG | HEIGHT: 62 IN | HEART RATE: 75 BPM | BODY MASS INDEX: 24.84 KG/M2 | DIASTOLIC BLOOD PRESSURE: 80 MMHG | RESPIRATION RATE: 16 BRPM | TEMPERATURE: 98.1 F

## 2021-01-30 DIAGNOSIS — D50.9 IRON DEFICIENCY ANEMIA, UNSPECIFIED IRON DEFICIENCY ANEMIA TYPE: ICD-10-CM

## 2021-01-30 DIAGNOSIS — K92.1 MELENA: ICD-10-CM

## 2021-01-30 DIAGNOSIS — K59.00 CONSTIPATION, UNSPECIFIED CONSTIPATION TYPE: ICD-10-CM

## 2021-01-30 PROCEDURE — 13000028 HB GI MAJOR COMPLEX CASE S/U + 1ST 15 MIN

## 2021-01-30 PROCEDURE — 13000008 HB ANESTHESIA MAC OUTSIDE OR

## 2021-01-30 PROCEDURE — 10002800 HB RX 250 W HCPCS: Performed by: STUDENT IN AN ORGANIZED HEALTH CARE EDUCATION/TRAINING PROGRAM

## 2021-01-30 PROCEDURE — 13000029 HB GI MAJOR COMPLEX CASE EA ADD MINUTE

## 2021-01-30 PROCEDURE — 13000001 HB PHASE II RECOVERY EA 30 MINUTES

## 2021-01-30 PROCEDURE — 87077 CULTURE AEROBIC IDENTIFY: CPT | Performed by: SPECIALIST

## 2021-01-30 PROCEDURE — 88305 TISSUE EXAM BY PATHOLOGIST: CPT | Performed by: SPECIALIST

## 2021-01-30 RX ORDER — PROPOFOL 10 MG/ML
INJECTION, EMULSION INTRAVENOUS PRN
Status: DISCONTINUED | OUTPATIENT
Start: 2021-01-30 | End: 2021-01-30

## 2021-01-30 RX ADMIN — PROPOFOL 75 MCG/KG/MIN: 10 INJECTION, EMULSION INTRAVENOUS at 09:39

## 2021-01-30 RX ADMIN — PROPOFOL 50 MG: 10 INJECTION, EMULSION INTRAVENOUS at 09:39

## 2021-01-30 SDOH — HEALTH STABILITY: MENTAL HEALTH: HOW OFTEN DO YOU HAVE A DRINK CONTAINING ALCOHOL?: NEVER

## 2021-01-30 ASSESSMENT — PAIN SCALES - GENERAL
PAINLEVEL_OUTOF10: 0

## 2021-02-01 LAB — UREASE TISS QL: NEGATIVE

## 2021-02-02 LAB
ASR DISCLAIMER: NORMAL
CASE RPRT: NORMAL
CLINICAL INFO: NORMAL
PATH REPORT.FINAL DX SPEC: NORMAL
PATH REPORT.GROSS SPEC: NORMAL

## 2021-02-05 ENCOUNTER — IMMUNIZATION (OUTPATIENT)
Dept: LAB | Age: 76
End: 2021-02-05

## 2021-02-05 DIAGNOSIS — Z23 NEED FOR VACCINATION: Primary | ICD-10-CM

## 2021-02-05 PROCEDURE — 0001A COVID 19 PFIZER-BIONTECH: CPT

## 2021-02-05 PROCEDURE — 91300 COVID 19 PFIZER-BIONTECH: CPT

## 2021-02-08 ENCOUNTER — TELEPHONE (OUTPATIENT)
Dept: SURGERY | Age: 76
End: 2021-02-08

## 2021-02-09 DIAGNOSIS — Z85.3 PERSONAL HISTORY OF BREAST CANCER: Primary | ICD-10-CM

## 2021-02-22 ENCOUNTER — TELEPHONE (OUTPATIENT)
Dept: SCHEDULING | Age: 76
End: 2021-02-22

## 2021-02-25 ENCOUNTER — IMAGING SERVICES (OUTPATIENT)
Dept: GENERAL RADIOLOGY | Age: 76
End: 2021-02-25
Attending: NURSE PRACTITIONER

## 2021-02-25 DIAGNOSIS — Z18.10 RETAINED METAL FRAGMENTS, UNSPECIFIED: ICD-10-CM

## 2021-02-25 PROCEDURE — 74018 RADEX ABDOMEN 1 VIEW: CPT | Performed by: STUDENT IN AN ORGANIZED HEALTH CARE EDUCATION/TRAINING PROGRAM

## 2021-02-26 ENCOUNTER — IMMUNIZATION (OUTPATIENT)
Dept: LAB | Age: 76
End: 2021-02-26

## 2021-02-26 DIAGNOSIS — Z23 NEED FOR VACCINATION: Primary | ICD-10-CM

## 2021-02-26 PROCEDURE — 0002A COVID 19 PFIZER-BIONTECH: CPT

## 2021-02-26 PROCEDURE — 91300 COVID 19 PFIZER-BIONTECH: CPT

## 2021-03-04 ENCOUNTER — OFFICE VISIT (OUTPATIENT)
Dept: BEHAVIORAL HEALTH | Age: 76
End: 2021-03-04

## 2021-03-04 DIAGNOSIS — F32.1 CURRENT MODERATE EPISODE OF MAJOR DEPRESSIVE DISORDER, UNSPECIFIED WHETHER RECURRENT (CMD): ICD-10-CM

## 2021-03-04 DIAGNOSIS — F25.1 SCHIZOAFFECTIVE DISORDER, DEPRESSIVE TYPE (CMD): Primary | ICD-10-CM

## 2021-03-04 PROCEDURE — 96372 THER/PROPH/DIAG INJ SC/IM: CPT | Performed by: PSYCHIATRY & NEUROLOGY

## 2021-03-04 PROCEDURE — 99212 OFFICE O/P EST SF 10 MIN: CPT | Performed by: PSYCHIATRY & NEUROLOGY

## 2021-03-09 ENCOUNTER — HOSPITAL ENCOUNTER (OUTPATIENT)
Dept: MAMMOGRAPHY | Age: 76
Discharge: HOME | End: 2021-03-09
Attending: INTERNAL MEDICINE

## 2021-03-09 ENCOUNTER — HOSPITAL ENCOUNTER (OUTPATIENT)
Dept: MAMMOGRAPHY | Age: 76
Discharge: HOME | End: 2021-03-09
Attending: SURGERY

## 2021-03-09 DIAGNOSIS — Z85.3 PERSONAL HISTORY OF BREAST CANCER: ICD-10-CM

## 2021-03-09 PROCEDURE — G0279 TOMOSYNTHESIS, MAMMO: HCPCS

## 2021-03-27 ENCOUNTER — HOSPITAL ENCOUNTER (OUTPATIENT)
Dept: MAMMOGRAPHY | Age: 76
Discharge: HOME | End: 2021-03-27
Attending: INTERNAL MEDICINE

## 2021-03-27 DIAGNOSIS — M81.0 OSTEOPOROSIS: ICD-10-CM

## 2021-03-27 PROCEDURE — 77080 DXA BONE DENSITY AXIAL: CPT

## 2021-04-06 DIAGNOSIS — F25.1 SCHIZOAFFECTIVE DISORDER, DEPRESSIVE TYPE (CMD): ICD-10-CM

## 2021-04-06 RX ORDER — VENLAFAXINE HYDROCHLORIDE 150 MG/1
CAPSULE, EXTENDED RELEASE ORAL
Qty: 30 CAPSULE | Refills: 1 | Status: SHIPPED | OUTPATIENT
Start: 2021-04-06 | End: 2021-05-20 | Stop reason: SDUPTHER

## 2021-04-15 ENCOUNTER — OFFICE VISIT (OUTPATIENT)
Dept: BEHAVIORAL HEALTH | Age: 76
End: 2021-04-15

## 2021-04-15 DIAGNOSIS — F32.1 CURRENT MODERATE EPISODE OF MAJOR DEPRESSIVE DISORDER, UNSPECIFIED WHETHER RECURRENT (CMD): ICD-10-CM

## 2021-04-15 DIAGNOSIS — Z85.3 PERSONAL HISTORY OF BREAST CANCER: ICD-10-CM

## 2021-04-15 DIAGNOSIS — F25.1 SCHIZOAFFECTIVE DISORDER, DEPRESSIVE TYPE (CMD): Primary | ICD-10-CM

## 2021-04-15 PROCEDURE — 96372 THER/PROPH/DIAG INJ SC/IM: CPT | Performed by: PSYCHIATRY & NEUROLOGY

## 2021-04-15 PROCEDURE — 99213 OFFICE O/P EST LOW 20 MIN: CPT | Performed by: PSYCHIATRY & NEUROLOGY

## 2021-05-11 ENCOUNTER — TELEPHONE (OUTPATIENT)
Dept: SCHEDULING | Age: 76
End: 2021-05-11

## 2021-05-20 ENCOUNTER — BEHAVIORAL HEALTH (OUTPATIENT)
Dept: BEHAVIORAL HEALTH | Age: 76
End: 2021-05-20

## 2021-05-20 DIAGNOSIS — F32.1 CURRENT MODERATE EPISODE OF MAJOR DEPRESSIVE DISORDER, UNSPECIFIED WHETHER RECURRENT (CMD): Primary | ICD-10-CM

## 2021-05-20 DIAGNOSIS — Z85.3 PERSONAL HISTORY OF BREAST CANCER: ICD-10-CM

## 2021-05-20 DIAGNOSIS — F25.1 SCHIZOAFFECTIVE DISORDER, DEPRESSIVE TYPE (CMD): ICD-10-CM

## 2021-05-20 PROCEDURE — 99212 OFFICE O/P EST SF 10 MIN: CPT | Performed by: PSYCHIATRY & NEUROLOGY

## 2021-05-20 PROCEDURE — 96372 THER/PROPH/DIAG INJ SC/IM: CPT | Performed by: PSYCHIATRY & NEUROLOGY

## 2021-05-20 RX ORDER — VENLAFAXINE HYDROCHLORIDE 75 MG/1
150 CAPSULE, EXTENDED RELEASE ORAL NIGHTLY
Qty: 30 CAPSULE | Refills: 0 | Status: SHIPPED | OUTPATIENT
Start: 2021-05-20 | End: 2021-05-27 | Stop reason: SDUPTHER

## 2021-05-27 ENCOUNTER — TELEPHONE (OUTPATIENT)
Dept: BEHAVIORAL HEALTH | Age: 76
End: 2021-05-27

## 2021-05-27 DIAGNOSIS — F25.1 SCHIZOAFFECTIVE DISORDER, DEPRESSIVE TYPE (CMD): ICD-10-CM

## 2021-05-27 RX ORDER — VENLAFAXINE HYDROCHLORIDE 75 MG/1
150 CAPSULE, EXTENDED RELEASE ORAL NIGHTLY
Qty: 30 CAPSULE | Refills: 3 | Status: SHIPPED | OUTPATIENT
Start: 2021-05-27 | End: 2021-05-28 | Stop reason: SDUPTHER

## 2021-05-28 RX ORDER — VENLAFAXINE HYDROCHLORIDE 75 MG/1
75 CAPSULE, EXTENDED RELEASE ORAL NIGHTLY
Qty: 30 CAPSULE | Refills: 3 | Status: SHIPPED | OUTPATIENT
Start: 2021-05-28 | End: 2021-10-08

## 2021-06-15 DIAGNOSIS — F25.1 SCHIZOAFFECTIVE DISORDER, DEPRESSIVE TYPE (CMD): ICD-10-CM

## 2021-06-15 RX ORDER — TRAZODONE HYDROCHLORIDE 50 MG/1
50 TABLET ORAL NIGHTLY
Qty: 30 TABLET | Refills: 1 | Status: SHIPPED | OUTPATIENT
Start: 2021-06-15 | End: 2021-08-03

## 2021-06-22 ENCOUNTER — WALK IN (OUTPATIENT)
Dept: URGENT CARE | Age: 76
End: 2021-06-22

## 2021-06-22 VITALS
DIASTOLIC BLOOD PRESSURE: 62 MMHG | SYSTOLIC BLOOD PRESSURE: 94 MMHG | TEMPERATURE: 98.2 F | WEIGHT: 130 LBS | RESPIRATION RATE: 16 BRPM | HEART RATE: 95 BPM | OXYGEN SATURATION: 96 % | BODY MASS INDEX: 23.78 KG/M2

## 2021-06-22 DIAGNOSIS — H01.002 BLEPHARITIS OF RIGHT LOWER EYELID, UNSPECIFIED TYPE: Primary | ICD-10-CM

## 2021-06-22 PROCEDURE — 99213 OFFICE O/P EST LOW 20 MIN: CPT | Performed by: NURSE PRACTITIONER

## 2021-06-22 RX ORDER — ERYTHROMYCIN 5 MG/G
0.5 OINTMENT OPHTHALMIC 3 TIMES DAILY
Qty: 3.5 G | Refills: 0 | Status: SHIPPED | OUTPATIENT
Start: 2021-06-22 | End: 2021-06-29

## 2021-06-24 ENCOUNTER — APPOINTMENT (OUTPATIENT)
Dept: BEHAVIORAL HEALTH | Age: 76
End: 2021-06-24

## 2021-06-24 ASSESSMENT — ENCOUNTER SYMPTOMS
NEUROLOGICAL NEGATIVE: 1
PSYCHIATRIC NEGATIVE: 1
EYE ITCHING: 1
GASTROINTESTINAL NEGATIVE: 1
RESPIRATORY NEGATIVE: 1
PHOTOPHOBIA: 0
EYE REDNESS: 0
CONSTITUTIONAL NEGATIVE: 1
EYE PAIN: 0
EYE DISCHARGE: 0

## 2021-06-29 ENCOUNTER — TELEPHONE (OUTPATIENT)
Dept: BEHAVIORAL HEALTH | Age: 76
End: 2021-06-29

## 2021-06-29 RX ORDER — RISPERIDONE 2 MG/1
2 TABLET ORAL NIGHTLY
Qty: 30 TABLET | Refills: 1 | Status: SHIPPED | OUTPATIENT
Start: 2021-06-29 | End: 2021-07-21 | Stop reason: DRUGHIGH

## 2021-07-19 ENCOUNTER — TELEPHONE (OUTPATIENT)
Dept: SCHEDULING | Age: 76
End: 2021-07-19

## 2021-07-21 ENCOUNTER — OFFICE VISIT (OUTPATIENT)
Dept: ENDOCRINOLOGY | Age: 76
End: 2021-07-21

## 2021-07-21 ENCOUNTER — LAB SERVICES (OUTPATIENT)
Dept: LAB | Age: 76
End: 2021-07-21

## 2021-07-21 VITALS — SYSTOLIC BLOOD PRESSURE: 98 MMHG | DIASTOLIC BLOOD PRESSURE: 58 MMHG | HEART RATE: 92 BPM

## 2021-07-21 DIAGNOSIS — M80.00XD AGE-RELATED OSTEOPOROSIS WITH CURRENT PATHOLOGICAL FRACTURE WITH ROUTINE HEALING: ICD-10-CM

## 2021-07-21 DIAGNOSIS — M80.00XD AGE-RELATED OSTEOPOROSIS WITH CURRENT PATHOLOGICAL FRACTURE WITH ROUTINE HEALING: Primary | ICD-10-CM

## 2021-07-21 PROBLEM — M81.0 AGE-RELATED OSTEOPOROSIS WITHOUT CURRENT PATHOLOGICAL FRACTURE: Status: ACTIVE | Noted: 2021-07-21

## 2021-07-21 LAB
ALBUMIN SERPL-MCNC: 3.4 G/DL (ref 3.6–5.1)
ANION GAP SERPL CALC-SCNC: 13 MMOL/L (ref 10–20)
BUN SERPL-MCNC: 19 MG/DL (ref 6–20)
BUN/CREAT SERPL: 17 (ref 7–25)
CALCIUM SERPL-MCNC: 8.5 MG/DL (ref 8.4–10.2)
CHLORIDE SERPL-SCNC: 104 MMOL/L (ref 98–107)
CO2 SERPL-SCNC: 25 MMOL/L (ref 21–32)
CREAT SERPL-MCNC: 1.12 MG/DL (ref 0.51–0.95)
FASTING DURATION TIME PATIENT: 2 HOURS
GFR SERPLBLD BASED ON 1.73 SQ M-ARVRAT: 49 ML/MIN/1.73M2
GLUCOSE SERPL-MCNC: 105 MG/DL (ref 65–99)
PHOSPHATE SERPL-MCNC: 4.5 MG/DL (ref 2.4–4.7)
POTASSIUM SERPL-SCNC: 4.1 MMOL/L (ref 3.4–5.1)
SODIUM SERPL-SCNC: 138 MMOL/L (ref 135–145)

## 2021-07-21 PROCEDURE — 99204 OFFICE O/P NEW MOD 45 MIN: CPT | Performed by: INTERNAL MEDICINE

## 2021-07-21 PROCEDURE — 80069 RENAL FUNCTION PANEL: CPT | Performed by: CLINICAL MEDICAL LABORATORY

## 2021-07-21 PROCEDURE — 36415 COLL VENOUS BLD VENIPUNCTURE: CPT | Performed by: CLINICAL MEDICAL LABORATORY

## 2021-07-21 PROCEDURE — 84155 ASSAY OF PROTEIN SERUM: CPT | Performed by: CLINICAL MEDICAL LABORATORY

## 2021-07-21 PROCEDURE — 83516 IMMUNOASSAY NONANTIBODY: CPT | Performed by: CLINICAL MEDICAL LABORATORY

## 2021-07-21 PROCEDURE — 84165 PROTEIN E-PHORESIS SERUM: CPT | Performed by: CLINICAL MEDICAL LABORATORY

## 2021-07-21 PROCEDURE — 82784 ASSAY IGA/IGD/IGG/IGM EACH: CPT | Performed by: CLINICAL MEDICAL LABORATORY

## 2021-07-21 PROCEDURE — 84166 PROTEIN E-PHORESIS/URINE/CSF: CPT | Performed by: CLINICAL MEDICAL LABORATORY

## 2021-07-21 PROCEDURE — 84156 ASSAY OF PROTEIN URINE: CPT | Performed by: CLINICAL MEDICAL LABORATORY

## 2021-07-21 RX ORDER — ALENDRONATE SODIUM 35 MG/1
TABLET ORAL
COMMUNITY
Start: 2021-07-03 | End: 2021-07-21 | Stop reason: SDUPTHER

## 2021-07-21 RX ORDER — ALENDRONATE SODIUM 70 MG/1
70 TABLET ORAL
Qty: 4 TABLET | Refills: 3 | Status: SHIPPED | OUTPATIENT
Start: 2021-07-21 | End: 2022-11-07

## 2021-07-21 ASSESSMENT — PATIENT HEALTH QUESTIONNAIRE - PHQ9
SUM OF ALL RESPONSES TO PHQ9 QUESTIONS 1 AND 2: 0
CLINICAL INTERPRETATION OF PHQ9 SCORE: NO FURTHER SCREENING NEEDED
1. LITTLE INTEREST OR PLEASURE IN DOING THINGS: NOT AT ALL
SUM OF ALL RESPONSES TO PHQ9 QUESTIONS 1 AND 2: 0
2. FEELING DOWN, DEPRESSED OR HOPELESS: NOT AT ALL
CLINICAL INTERPRETATION OF PHQ2 SCORE: NO FURTHER SCREENING NEEDED

## 2021-07-22 ENCOUNTER — TELEPHONE (OUTPATIENT)
Dept: SCHEDULING | Age: 76
End: 2021-07-22

## 2021-07-22 DIAGNOSIS — M80.00XD AGE-RELATED OSTEOPOROSIS WITH CURRENT PATHOLOGICAL FRACTURE WITH ROUTINE HEALING: Primary | ICD-10-CM

## 2021-07-22 LAB
ALBUMIN SERPL ELPH-MCNC: 3.7 G/DL (ref 3.5–4.9)
ALPHA 1: 0.4 G/DL (ref 0.2–0.4)
ALPHA2 GLOB SERPL ELPH-MCNC: 0.9 G/DL (ref 0.5–0.9)
B-GLOBULIN SERPL ELPH-MCNC: 1.1 G/DL (ref 0.7–1.2)
GAMMA GLOB SERPL ELPH-MCNC: 1 G/DL (ref 0.7–1.7)
GLOBULIN SER-MCNC: 3.4 G/DL (ref 2.1–4.2)
PATH INTERP SPEC-IMP: NORMAL
PROT SERPL-MCNC: 7.1 G/DL (ref 6.4–8.2)

## 2021-07-23 LAB
IGA SERPL-MCNC: 386 MG/DL (ref 82–453)
PATH INTERP SPEC-IMP: ABNORMAL
PROT UR-MCNC: 66 MG/DL
TTG IGA SER IA-ACNC: 9 UNITS

## 2021-07-26 ENCOUNTER — LAB SERVICES (OUTPATIENT)
Dept: LAB | Age: 76
End: 2021-07-26

## 2021-07-26 ENCOUNTER — TELEPHONE (OUTPATIENT)
Dept: SCHEDULING | Age: 76
End: 2021-07-26

## 2021-07-26 DIAGNOSIS — M80.00XD AGE-RELATED OSTEOPOROSIS WITH CURRENT PATHOLOGICAL FRACTURE WITH ROUTINE HEALING: ICD-10-CM

## 2021-07-26 PROCEDURE — 80048 BASIC METABOLIC PNL TOTAL CA: CPT | Performed by: CLINICAL MEDICAL LABORATORY

## 2021-07-26 PROCEDURE — 36415 COLL VENOUS BLD VENIPUNCTURE: CPT | Performed by: CLINICAL MEDICAL LABORATORY

## 2021-07-27 LAB
ANION GAP SERPL CALC-SCNC: 14 MMOL/L (ref 10–20)
BUN SERPL-MCNC: 18 MG/DL (ref 6–20)
BUN/CREAT SERPL: 17 (ref 7–25)
CALCIUM SERPL-MCNC: 9 MG/DL (ref 8.4–10.2)
CHLORIDE SERPL-SCNC: 104 MMOL/L (ref 98–107)
CO2 SERPL-SCNC: 23 MMOL/L (ref 21–32)
CREAT SERPL-MCNC: 1.05 MG/DL (ref 0.51–0.95)
FASTING DURATION TIME PATIENT: ABNORMAL H
GFR SERPLBLD BASED ON 1.73 SQ M-ARVRAT: 52 ML/MIN/1.73M2
GLUCOSE SERPL-MCNC: 133 MG/DL (ref 65–99)
POTASSIUM SERPL-SCNC: 4 MMOL/L (ref 3.4–5.1)
SODIUM SERPL-SCNC: 137 MMOL/L (ref 135–145)

## 2021-07-28 DIAGNOSIS — I95.0 IDIOPATHIC HYPOTENSION: Primary | ICD-10-CM

## 2021-07-30 DIAGNOSIS — F25.1 SCHIZOAFFECTIVE DISORDER, DEPRESSIVE TYPE (CMD): ICD-10-CM

## 2021-08-03 RX ORDER — TRAZODONE HYDROCHLORIDE 50 MG/1
TABLET ORAL
Qty: 30 TABLET | Refills: 1 | Status: SHIPPED | OUTPATIENT
Start: 2021-08-03 | End: 2021-10-08

## 2021-09-07 ENCOUNTER — LAB REQUISITION (OUTPATIENT)
Dept: LAB | Age: 76
End: 2021-09-07

## 2021-09-07 ENCOUNTER — LAB SERVICES (OUTPATIENT)
Dept: LAB | Age: 76
End: 2021-09-07

## 2021-09-07 DIAGNOSIS — M81.0 AGE-RELATED OSTEOPOROSIS WITHOUT CURRENT PATHOLOGICAL FRACTURE: ICD-10-CM

## 2021-09-07 DIAGNOSIS — M75.92 SHOULDER LESION, UNSPECIFIED, LEFT SHOULDER: ICD-10-CM

## 2021-09-07 DIAGNOSIS — E56.9 VITAMIN DEFICIENCY, UNSPECIFIED: ICD-10-CM

## 2021-09-07 DIAGNOSIS — Z00.00 ENCOUNTER FOR GENERAL ADULT MEDICAL EXAMINATION WITHOUT ABNORMAL FINDINGS: ICD-10-CM

## 2021-09-07 DIAGNOSIS — I10 ESSENTIAL (PRIMARY) HYPERTENSION: ICD-10-CM

## 2021-09-07 DIAGNOSIS — R56.9 UNSPECIFIED CONVULSIONS (CMD): ICD-10-CM

## 2021-09-07 DIAGNOSIS — K57.81 DIVERTICULITIS OF INTESTINE, PART UNSPECIFIED, WITH PERFORATION AND ABSCESS WITH BLEEDING: ICD-10-CM

## 2021-09-07 DIAGNOSIS — M54.42 LUMBAGO WITH SCIATICA, LEFT SIDE: ICD-10-CM

## 2021-09-07 DIAGNOSIS — M54.40 LUMBAGO WITH SCIATICA, UNSPECIFIED SIDE: ICD-10-CM

## 2021-09-07 DIAGNOSIS — E78.5 HYPERLIPIDEMIA, UNSPECIFIED: ICD-10-CM

## 2021-09-07 DIAGNOSIS — E61.1 IRON DEFICIENCY: ICD-10-CM

## 2021-09-07 DIAGNOSIS — M75.91 SHOULDER LESION, UNSPECIFIED, RIGHT SHOULDER: ICD-10-CM

## 2021-09-07 DIAGNOSIS — D50.9 IRON DEFICIENCY ANEMIA, UNSPECIFIED: ICD-10-CM

## 2021-09-07 DIAGNOSIS — M54.41 LUMBAGO WITH SCIATICA, RIGHT SIDE: ICD-10-CM

## 2021-09-07 DIAGNOSIS — E55.9 VITAMIN D DEFICIENCY, UNSPECIFIED: ICD-10-CM

## 2021-09-07 DIAGNOSIS — F41.9 ANXIETY DISORDER, UNSPECIFIED: ICD-10-CM

## 2021-09-07 DIAGNOSIS — D50.0 IRON DEFICIENCY ANEMIA SECONDARY TO BLOOD LOSS (CHRONIC): ICD-10-CM

## 2021-09-07 DIAGNOSIS — F33.0 MAJOR DEPRESSIVE DISORDER, RECURRENT, MILD (CMD): ICD-10-CM

## 2021-09-07 DIAGNOSIS — D64.9 ANEMIA, UNSPECIFIED: ICD-10-CM

## 2021-09-07 DIAGNOSIS — L30.9 DERMATITIS, UNSPECIFIED: ICD-10-CM

## 2021-09-07 PROCEDURE — 86480 TB TEST CELL IMMUN MEASURE: CPT | Performed by: CLINICAL MEDICAL LABORATORY

## 2021-09-09 LAB
GAMMA INTERFERON BACKGROUND BLD IA-ACNC: 0.02 IU/ML
M TB IFN-G BLD-IMP: NEGATIVE
M TB IFN-G CD4+ BCKGRND COR BLD-ACNC: 0 IU/ML
M TB IFN-G CD4+CD8+ BCKGRND COR BLD-ACNC: 0 IU/ML
MITOGEN IGNF BCKGRD COR BLD-ACNC: 6.38 IU/ML

## 2021-10-08 DIAGNOSIS — F25.1 SCHIZOAFFECTIVE DISORDER, DEPRESSIVE TYPE (CMD): ICD-10-CM

## 2021-10-08 RX ORDER — TRAZODONE HYDROCHLORIDE 50 MG/1
TABLET ORAL
Qty: 30 TABLET | Refills: 1 | Status: SHIPPED | OUTPATIENT
Start: 2021-10-08 | End: 2022-11-07

## 2021-10-08 RX ORDER — VENLAFAXINE HYDROCHLORIDE 75 MG/1
CAPSULE, EXTENDED RELEASE ORAL
Qty: 30 CAPSULE | Refills: 3 | Status: SHIPPED | OUTPATIENT
Start: 2021-10-08 | End: 2022-02-24 | Stop reason: SDUPTHER

## 2022-02-17 ENCOUNTER — TELEPHONE (OUTPATIENT)
Dept: BEHAVIORAL HEALTH | Age: 77
End: 2022-02-17

## 2022-02-24 ENCOUNTER — BEHAVIORAL HEALTH (OUTPATIENT)
Dept: BEHAVIORAL HEALTH | Age: 77
End: 2022-02-24

## 2022-02-24 DIAGNOSIS — F32.1 CURRENT MODERATE EPISODE OF MAJOR DEPRESSIVE DISORDER, UNSPECIFIED WHETHER RECURRENT (CMD): Primary | ICD-10-CM

## 2022-02-24 DIAGNOSIS — F25.1 SCHIZOAFFECTIVE DISORDER, DEPRESSIVE TYPE (CMD): ICD-10-CM

## 2022-02-24 DIAGNOSIS — Z85.3 PERSONAL HISTORY OF BREAST CANCER: ICD-10-CM

## 2022-02-24 PROCEDURE — 99213 OFFICE O/P EST LOW 20 MIN: CPT | Performed by: PSYCHIATRY & NEUROLOGY

## 2022-02-24 RX ORDER — VENLAFAXINE HYDROCHLORIDE 75 MG/1
75 CAPSULE, EXTENDED RELEASE ORAL NIGHTLY
Qty: 90 CAPSULE | Refills: 1 | Status: SHIPPED | OUTPATIENT
Start: 2022-02-24 | End: 2022-03-17 | Stop reason: SDUPTHER

## 2022-02-27 PROBLEM — K57.31 HEMORRHAGE OF LARGE INTESTINE DUE TO DIVERTICULAR DISEASE: Status: RESOLVED | Noted: 2022-02-27 | Resolved: 2022-02-27

## 2022-02-27 PROBLEM — G31.9 BRAIN ATROPHY: Status: ACTIVE | Noted: 2022-02-27

## 2022-02-27 PROBLEM — K57.31 HEMORRHAGE OF LARGE INTESTINE DUE TO DIVERTICULAR DISEASE: Status: ACTIVE | Noted: 2022-02-27

## 2022-02-27 PROBLEM — I95.9 HYPOTENSION: Status: ACTIVE | Noted: 2020-11-10

## 2022-02-27 PROBLEM — I73.9 PVD (PERIPHERAL VASCULAR DISEASE): Status: ACTIVE | Noted: 2022-02-27

## 2022-02-27 PROBLEM — I73.9 PVD (PERIPHERAL VASCULAR DISEASE) (HCC): Status: ACTIVE | Noted: 2022-02-27

## 2022-02-27 PROBLEM — G31.9 BRAIN ATROPHY (HCC): Status: ACTIVE | Noted: 2022-02-27

## 2022-02-27 PROBLEM — M80.00XD AGE-RELATED OSTEOPOROSIS WITH CURRENT PATHOLOGICAL FRACTURE WITH ROUTINE HEALING: Status: ACTIVE | Noted: 2021-07-21

## 2022-03-13 ENCOUNTER — HOSPITAL ENCOUNTER (EMERGENCY)
Age: 77
Discharge: HOME | End: 2022-03-13
Attending: EMERGENCY MEDICINE

## 2022-03-13 VITALS
DIASTOLIC BLOOD PRESSURE: 91 MMHG | HEART RATE: 89 BPM | BODY MASS INDEX: 25.04 KG/M2 | RESPIRATION RATE: 16 BRPM | OXYGEN SATURATION: 98 % | WEIGHT: 136.91 LBS | SYSTOLIC BLOOD PRESSURE: 133 MMHG | TEMPERATURE: 96.4 F

## 2022-03-13 DIAGNOSIS — I95.1 ORTHOSTATIC HYPOTENSION: ICD-10-CM

## 2022-03-13 DIAGNOSIS — L50.9 HIVES: Primary | ICD-10-CM

## 2022-03-13 LAB
ALBUMIN SERPL-MCNC: 3 G/DL (ref 3.6–5.1)
ALBUMIN/GLOB SERPL: 0.9 {RATIO} (ref 1–2.4)
ALP SERPL-CCNC: 43 UNITS/L (ref 45–117)
ALT SERPL-CCNC: 20 UNITS/L
ANION GAP SERPL CALC-SCNC: 9 MMOL/L (ref 10–20)
AST SERPL-CCNC: 24 UNITS/L
BASOPHILS # BLD: 0 K/MCL (ref 0–0.3)
BASOPHILS NFR BLD: 1 %
BILIRUB SERPL-MCNC: 0.4 MG/DL (ref 0.2–1)
BUN SERPL-MCNC: 29 MG/DL (ref 6–20)
BUN/CREAT SERPL: 41 (ref 7–25)
CALCIUM SERPL-MCNC: 8.1 MG/DL (ref 8.4–10.2)
CHLORIDE SERPL-SCNC: 111 MMOL/L (ref 98–107)
CO2 SERPL-SCNC: 25 MMOL/L (ref 21–32)
CREAT SERPL-MCNC: 0.7 MG/DL (ref 0.51–0.95)
DEPRECATED RDW RBC: 60.5 FL (ref 39–50)
EOSINOPHIL # BLD: 0.2 K/MCL (ref 0–0.5)
EOSINOPHIL NFR BLD: 4 %
ERYTHROCYTE [DISTWIDTH] IN BLOOD: 17.4 % (ref 11–15)
FASTING DURATION TIME PATIENT: ABNORMAL H
GFR SERPLBLD BASED ON 1.73 SQ M-ARVRAT: 85 ML/MIN
GLOBULIN SER-MCNC: 3.5 G/DL (ref 2–4)
GLUCOSE SERPL-MCNC: 113 MG/DL (ref 70–99)
HCT VFR BLD CALC: 34.6 % (ref 36–46.5)
HGB BLD-MCNC: 11.2 G/DL (ref 12–15.5)
IMM GRANULOCYTES # BLD AUTO: 0 K/MCL (ref 0–0.2)
IMM GRANULOCYTES # BLD: 0 %
LYMPHOCYTES # BLD: 1.6 K/MCL (ref 1–4)
LYMPHOCYTES NFR BLD: 26 %
MAGNESIUM SERPL-MCNC: 2.3 MG/DL (ref 1.7–2.4)
MCH RBC QN AUTO: 30.5 PG (ref 26–34)
MCHC RBC AUTO-ENTMCNC: 32.4 G/DL (ref 32–36.5)
MCV RBC AUTO: 94.3 FL (ref 78–100)
MONOCYTES # BLD: 0.6 K/MCL (ref 0.3–0.9)
MONOCYTES NFR BLD: 10 %
NEUTROPHILS # BLD: 3.6 K/MCL (ref 1.8–7.7)
NEUTROPHILS NFR BLD: 59 %
NRBC BLD MANUAL-RTO: 0 /100 WBC
PLATELET # BLD AUTO: 307 K/MCL (ref 140–450)
POTASSIUM SERPL-SCNC: 3.7 MMOL/L (ref 3.4–5.1)
PROT SERPL-MCNC: 6.5 G/DL (ref 6.4–8.2)
RAINBOW EXTRA TUBES HOLD SPECIMEN: NORMAL
RBC # BLD: 3.67 MIL/MCL (ref 4–5.2)
SODIUM SERPL-SCNC: 141 MMOL/L (ref 135–145)
WBC # BLD: 6 K/MCL (ref 4.2–11)

## 2022-03-13 PROCEDURE — 99284 EMERGENCY DEPT VISIT MOD MDM: CPT

## 2022-03-13 PROCEDURE — 10002800 HB RX 250 W HCPCS: Performed by: EMERGENCY MEDICINE

## 2022-03-13 PROCEDURE — 10002807 HB RX 258: Performed by: EMERGENCY MEDICINE

## 2022-03-13 PROCEDURE — 96374 THER/PROPH/DIAG INJ IV PUSH: CPT

## 2022-03-13 PROCEDURE — 80053 COMPREHEN METABOLIC PANEL: CPT | Performed by: EMERGENCY MEDICINE

## 2022-03-13 PROCEDURE — 85025 COMPLETE CBC W/AUTO DIFF WBC: CPT | Performed by: EMERGENCY MEDICINE

## 2022-03-13 PROCEDURE — 83735 ASSAY OF MAGNESIUM: CPT | Performed by: EMERGENCY MEDICINE

## 2022-03-13 PROCEDURE — 96361 HYDRATE IV INFUSION ADD-ON: CPT

## 2022-03-13 PROCEDURE — 10002803 HB RX 637: Performed by: EMERGENCY MEDICINE

## 2022-03-13 RX ORDER — MIDODRINE HYDROCHLORIDE 5 MG/1
5 TABLET ORAL ONCE
Status: COMPLETED | OUTPATIENT
Start: 2022-03-13 | End: 2022-03-13

## 2022-03-13 RX ORDER — DIPHENHYDRAMINE HYDROCHLORIDE 50 MG/ML
25 INJECTION INTRAMUSCULAR; INTRAVENOUS ONCE
Status: COMPLETED | OUTPATIENT
Start: 2022-03-13 | End: 2022-03-13

## 2022-03-13 RX ORDER — METHYLPREDNISOLONE 4 MG/1
4 TABLET ORAL SEE ADMIN INSTRUCTIONS
Qty: 21 TABLET | Refills: 0 | Status: SHIPPED | OUTPATIENT
Start: 2022-03-13 | End: 2022-11-07

## 2022-03-13 RX ADMIN — SODIUM CHLORIDE 1000 ML: 9 INJECTION, SOLUTION INTRAVENOUS at 11:00

## 2022-03-13 RX ADMIN — DIPHENHYDRAMINE HYDROCHLORIDE 25 MG: 50 INJECTION, SOLUTION INTRAMUSCULAR; INTRAVENOUS at 10:38

## 2022-03-13 RX ADMIN — MIDODRINE HYDROCHLORIDE 5 MG: 5 TABLET ORAL at 12:43

## 2022-03-17 ENCOUNTER — BEHAVIORAL HEALTH (OUTPATIENT)
Dept: BEHAVIORAL HEALTH | Age: 77
End: 2022-03-17

## 2022-03-17 DIAGNOSIS — F25.1 SCHIZOAFFECTIVE DISORDER, DEPRESSIVE TYPE (CMD): ICD-10-CM

## 2022-03-17 PROCEDURE — 99213 OFFICE O/P EST LOW 20 MIN: CPT | Performed by: PSYCHIATRY & NEUROLOGY

## 2022-03-17 RX ORDER — VENLAFAXINE HYDROCHLORIDE 150 MG/1
150 CAPSULE, EXTENDED RELEASE ORAL NIGHTLY
Qty: 90 CAPSULE | Refills: 0 | Status: ON HOLD | OUTPATIENT
Start: 2022-03-17 | End: 2023-04-04 | Stop reason: HOSPADM

## 2022-03-29 NOTE — PAT NURSING NOTE
Contacted pt for PAT for procedure on 4/5. Per pt, she wanted to reschedule to May. Advised pt I do not have the ability to reschedule procedure, but to contact the office when ready. Pt is left on schedule for the time being for [facility] to update. Anamaria Padilla in scheduling and left VM with pt's desire to reschedule.

## 2022-03-30 RX ORDER — MIDAZOLAM HYDROCHLORIDE 1 MG/ML
1 INJECTION INTRAMUSCULAR; INTRAVENOUS EVERY 5 MIN PRN
OUTPATIENT
Start: 2022-03-30

## 2022-04-04 ENCOUNTER — LAB ENCOUNTER (OUTPATIENT)
Dept: LAB | Facility: HOSPITAL | Age: 77
End: 2022-04-04
Attending: INTERNAL MEDICINE
Payer: MEDICARE

## 2022-04-04 DIAGNOSIS — Z01.818 PRE-OP TESTING: ICD-10-CM

## 2022-04-04 LAB — SARS-COV-2 RNA RESP QL NAA+PROBE: NOT DETECTED

## 2022-04-04 NOTE — OPERATIVE REPORT
PATIENT NAME: Rebeka Cisneros  MRN: [id number]  DATE OF OPERATION: 4/5/2022  REFERRING PHYSICIAN: Dr. Ernestina Lee  Medications:  Versed 3 mg IVP              Fentanyl 75 mcg IVP  PREOPERATIVE DIAGNOSIS    Melena   anemia  POSTOPERATIVE DIAGNOSIS:  1. 2 - 3 cm hiatal hernia. 2. Multiple 2 - 4 mm fundic gland polyps throughout the stomach, biopsied on the last EGD 1/2021.  3. Normal duodenum. Biopsies were done to assess for celiac disease. PROCEDURE PERFORMED:                Esophagogastroduodenoscopy with biopsies   Endoscopist:  Leif Troy MD     PROCEDURE AND FINDINGS: The patient was placed into the left lateral decubitus after informed consent was obtained. All questions were answered. An updated history and physical were performed and an ASA score of 3 was assigned. Informed consent was obtained prior to the procedure, with review of possible complications including bleeding, infection, and missed cancer. IV sedation was administered. The Sepior video gastroscope was introduced into the mouth and passed into the esophogus after administration of topical oral anesthesia and IV sedation. The entire examined esophagus was remarkable for normal other than a 2 - 3 cm hiatal hernia. The entire examined stomach,  including retroflexion view was remarkable for multiple 2 - 4 mm fundic gland polyps throughout the stomach, biopsied on the last EGD 1/2021. The examined duodenum to the third portion was normal.  Biopsies were done to assess for celiac disease. The gastroscope was removed from the patient. The procedure was completed. The patient tolerated the procedure well. Total moderate sedation time was 13 minutes. A trained sedation nurse was present to assist in monitoring the patient during the entire length of moderate sedation time. RECOMMENDATIONS   1. The patient will be provided with a written summary of today's endoscopic findings  2.  The patient will be notified with biopsy results in 2 - 4 working days. 3. Ok to stop omeprazole today. 4. Ferrous sulfate 325 mg tabs, 1 - 2 per day. 5. Avoid aspirin and aspirin like medications (nsaids). Use only acetaminophen medications for pain control. 6. Repeat complete blood count in 1 - 2 months. 7. Folow up with Dr. Edita Zazueta in 4 - 6 weeks.    Jazzy Serrato MD, Gastroenterologist  Cc: Dr. Chula Trimble

## 2022-04-05 ENCOUNTER — HOSPITAL ENCOUNTER (OUTPATIENT)
Facility: HOSPITAL | Age: 77
Setting detail: HOSPITAL OUTPATIENT SURGERY
Discharge: HOME | End: 2022-04-05
Attending: INTERNAL MEDICINE | Admitting: INTERNAL MEDICINE
Payer: MEDICARE

## 2022-04-05 VITALS
SYSTOLIC BLOOD PRESSURE: 119 MMHG | WEIGHT: 135 LBS | DIASTOLIC BLOOD PRESSURE: 43 MMHG | HEART RATE: 83 BPM | BODY MASS INDEX: 23.92 KG/M2 | RESPIRATION RATE: 11 BRPM | OXYGEN SATURATION: 95 % | HEIGHT: 63 IN

## 2022-04-05 DIAGNOSIS — K92.2 ACUTE GASTROINTESTINAL BLEEDING: ICD-10-CM

## 2022-04-05 DIAGNOSIS — D62 ACUTE POSTHEMORRHAGIC ANEMIA: ICD-10-CM

## 2022-04-05 DIAGNOSIS — Z01.818 PRE-OP TESTING: Primary | ICD-10-CM

## 2022-04-05 DIAGNOSIS — K92.1 MELENA: ICD-10-CM

## 2022-04-05 PROCEDURE — 88312 SPECIAL STAINS GROUP 1: CPT | Performed by: INTERNAL MEDICINE

## 2022-04-05 PROCEDURE — 99152 MOD SED SAME PHYS/QHP 5/>YRS: CPT | Performed by: INTERNAL MEDICINE

## 2022-04-05 PROCEDURE — 88305 TISSUE EXAM BY PATHOLOGIST: CPT | Performed by: INTERNAL MEDICINE

## 2022-04-05 PROCEDURE — 36415 COLL VENOUS BLD VENIPUNCTURE: CPT | Performed by: INTERNAL MEDICINE

## 2022-04-05 PROCEDURE — 0DB98ZX EXCISION OF DUODENUM, VIA NATURAL OR ARTIFICIAL OPENING ENDOSCOPIC, DIAGNOSTIC: ICD-10-PCS | Performed by: INTERNAL MEDICINE

## 2022-04-05 RX ORDER — MIDAZOLAM HYDROCHLORIDE 1 MG/ML
INJECTION INTRAMUSCULAR; INTRAVENOUS
Status: DISCONTINUED | OUTPATIENT
Start: 2022-04-05 | End: 2022-04-05

## 2022-04-05 RX ORDER — SODIUM CHLORIDE, SODIUM LACTATE, POTASSIUM CHLORIDE, CALCIUM CHLORIDE 600; 310; 30; 20 MG/100ML; MG/100ML; MG/100ML; MG/100ML
INJECTION, SOLUTION INTRAVENOUS CONTINUOUS
Status: DISCONTINUED | OUTPATIENT
Start: 2022-04-05 | End: 2022-04-05

## 2022-04-05 RX ORDER — SODIUM CHLORIDE 0.9 % (FLUSH) 0.9 %
10 SYRINGE (ML) INJECTION AS NEEDED
Status: DISCONTINUED | OUTPATIENT
Start: 2022-04-05 | End: 2022-04-05

## 2022-04-05 NOTE — BRIEF OP NOTE
Pre-Operative Diagnosis: Acute posthemorrhagic anemia, Melena, Acute gastrointestinal bleeding     Post-Operative Diagnosis: fundic gland polyp; hiatal hernia      Procedure Performed:   ESOPHAGOGASTRODUODENOSCOPY (EGD)    Surgeon(s) and Role:     * Jeanne Camargo MD - Primary    Assistant(s):        Surgical Findings: see above     Specimen: duodenum     Estimated Blood Loss: No data recorded    Dictation Number:  none    Dc Matthews MD  4/5/2022  8:40 AM

## 2022-04-05 NOTE — PROGRESS NOTES
The nasal swab test for the the COVID-19 virus was negative. You may proceed with your planned procedure.     Sunil Maldonado MD MD, 04/05/22, 7:23 AM

## 2022-04-06 NOTE — PROGRESS NOTES
Here are the biopsy/pathology findings from your recent EGD (Upper  Endoscopy): The duodenum biopsies were normal.    If you need any further assistance, please feel free to call [phone]. Thank you for letting us care for you .     Sincerely ,     Kapil Berger, [facility]  [phone]

## 2022-04-14 ENCOUNTER — APPOINTMENT (OUTPATIENT)
Dept: BEHAVIORAL HEALTH | Age: 77
End: 2022-04-14

## 2022-05-05 ENCOUNTER — HOSPITAL ENCOUNTER (EMERGENCY)
Facility: HOSPITAL | Age: 77
Discharge: HOME | End: 2022-05-05
Attending: EMERGENCY MEDICINE
Payer: MEDICARE

## 2022-05-05 ENCOUNTER — APPOINTMENT (OUTPATIENT)
Dept: GENERAL RADIOLOGY | Facility: HOSPITAL | Age: 77
End: 2022-05-05
Payer: MEDICARE

## 2022-05-05 VITALS
RESPIRATION RATE: 25 BRPM | SYSTOLIC BLOOD PRESSURE: 148 MMHG | DIASTOLIC BLOOD PRESSURE: 81 MMHG | HEART RATE: 88 BPM | OXYGEN SATURATION: 95 %

## 2022-05-05 DIAGNOSIS — E86.0 DEHYDRATION: Primary | ICD-10-CM

## 2022-05-05 LAB
ALBUMIN SERPL-MCNC: 3 G/DL (ref 3.4–5)
ALBUMIN/GLOB SERPL: 0.9 {RATIO} (ref 1–2)
ALP LIVER SERPL-CCNC: 41 U/L
ALT SERPL-CCNC: 22 U/L
ANION GAP SERPL CALC-SCNC: 6 MMOL/L (ref 0–18)
AST SERPL-CCNC: 27 U/L (ref 15–37)
ATRIAL RATE: 93 BPM
BASOPHILS # BLD AUTO: 0.05 X10(3) UL (ref 0–0.2)
BASOPHILS NFR BLD AUTO: 0.6 %
BILIRUB SERPL-MCNC: 0.3 MG/DL (ref 0.1–2)
BUN BLD-MCNC: 31 MG/DL (ref 7–18)
CALCIUM BLD-MCNC: 8.3 MG/DL (ref 8.5–10.1)
CHLORIDE SERPL-SCNC: 107 MMOL/L (ref 98–112)
CO2 SERPL-SCNC: 24 MMOL/L (ref 21–32)
CREAT BLD-MCNC: 0.66 MG/DL
EOSINOPHIL # BLD AUTO: 0.06 X10(3) UL (ref 0–0.7)
EOSINOPHIL NFR BLD AUTO: 0.7 %
ERYTHROCYTE [DISTWIDTH] IN BLOOD BY AUTOMATED COUNT: 17.9 %
GLOBULIN PLAS-MCNC: 3.5 G/DL (ref 2.8–4.4)
GLUCOSE BLD-MCNC: 128 MG/DL (ref 70–99)
HCT VFR BLD AUTO: 38.3 %
HGB BLD-MCNC: 12.3 G/DL
IMM GRANULOCYTES # BLD AUTO: 0.04 X10(3) UL (ref 0–1)
IMM GRANULOCYTES NFR BLD: 0.5 %
LYMPHOCYTES # BLD AUTO: 1.03 X10(3) UL (ref 1–4)
LYMPHOCYTES NFR BLD AUTO: 11.8 %
MCH RBC QN AUTO: 31.5 PG (ref 26–34)
MCHC RBC AUTO-ENTMCNC: 32.1 G/DL (ref 31–37)
MCV RBC AUTO: 98.2 FL
MONOCYTES # BLD AUTO: 0.72 X10(3) UL (ref 0.1–1)
MONOCYTES NFR BLD AUTO: 8.3 %
NEUTROPHILS # BLD AUTO: 6.82 X10 (3) UL (ref 1.5–7.7)
NEUTROPHILS # BLD AUTO: 6.82 X10(3) UL (ref 1.5–7.7)
NEUTROPHILS NFR BLD AUTO: 78.1 %
OSMOLALITY SERPL CALC.SUM OF ELEC: 292 MOSM/KG (ref 275–295)
P AXIS: 56 DEGREES
P-R INTERVAL: 176 MS
PLATELET # BLD AUTO: 229 10(3)UL (ref 150–450)
POTASSIUM SERPL-SCNC: 4.1 MMOL/L (ref 3.5–5.1)
PROT SERPL-MCNC: 6.5 G/DL (ref 6.4–8.2)
Q-T INTERVAL: 390 MS
QRS DURATION: 76 MS
QTC CALCULATION (BEZET): 484 MS
R AXIS: 18 DEGREES
RBC # BLD AUTO: 3.9 X10(6)UL
SODIUM SERPL-SCNC: 137 MMOL/L (ref 136–145)
T AXIS: 55 DEGREES
TROPONIN I HIGH SENSITIVITY: 6 NG/L
VENTRICULAR RATE: 93 BPM
WBC # BLD AUTO: 8.7 X10(3) UL (ref 4–11)

## 2022-05-05 PROCEDURE — 71045 X-RAY EXAM CHEST 1 VIEW: CPT | Performed by: EMERGENCY MEDICINE

## 2022-05-05 PROCEDURE — 84484 ASSAY OF TROPONIN QUANT: CPT | Performed by: EMERGENCY MEDICINE

## 2022-05-05 PROCEDURE — 80053 COMPREHEN METABOLIC PANEL: CPT

## 2022-05-05 PROCEDURE — 80053 COMPREHEN METABOLIC PANEL: CPT | Performed by: EMERGENCY MEDICINE

## 2022-05-05 PROCEDURE — 93005 ELECTROCARDIOGRAM TRACING: CPT

## 2022-05-05 PROCEDURE — 99284 EMERGENCY DEPT VISIT MOD MDM: CPT

## 2022-05-05 PROCEDURE — 99285 EMERGENCY DEPT VISIT HI MDM: CPT

## 2022-05-05 PROCEDURE — 96374 THER/PROPH/DIAG INJ IV PUSH: CPT

## 2022-05-05 PROCEDURE — 85025 COMPLETE CBC W/AUTO DIFF WBC: CPT | Performed by: EMERGENCY MEDICINE

## 2022-05-05 PROCEDURE — 84484 ASSAY OF TROPONIN QUANT: CPT

## 2022-05-05 PROCEDURE — 93010 ELECTROCARDIOGRAM REPORT: CPT

## 2022-05-05 PROCEDURE — 85025 COMPLETE CBC W/AUTO DIFF WBC: CPT

## 2022-05-05 PROCEDURE — 96360 HYDRATION IV INFUSION INIT: CPT

## 2022-05-05 PROCEDURE — 96361 HYDRATE IV INFUSION ADD-ON: CPT

## 2022-05-05 NOTE — ED INITIAL ASSESSMENT (HPI)
Pt arrives to ED via ambulance with c/o dizziness. Nurses at facility noticed hypotension. Pt states she has also been having diarrhea for a couple days. Pt states she feels a little nauseous. Denies vomiting.

## 2022-05-06 ENCOUNTER — APPOINTMENT (OUTPATIENT)
Dept: CT IMAGING | Facility: HOSPITAL | Age: 77
End: 2022-05-06
Attending: EMERGENCY MEDICINE
Payer: MEDICARE

## 2022-05-06 ENCOUNTER — APPOINTMENT (OUTPATIENT)
Dept: GENERAL RADIOLOGY | Facility: HOSPITAL | Age: 77
End: 2022-05-06
Attending: EMERGENCY MEDICINE
Payer: MEDICARE

## 2022-05-06 ENCOUNTER — HOSPITAL ENCOUNTER (INPATIENT)
Facility: HOSPITAL | Age: 77
LOS: 11 days | Discharge: NURSING, REHAB OR LONG-TERM CARE | End: 2022-05-17
Attending: EMERGENCY MEDICINE | Admitting: HOSPITALIST
Payer: MEDICARE

## 2022-05-06 ENCOUNTER — HOSPITAL ENCOUNTER (INPATIENT)
Facility: HOSPITAL | Age: 77
LOS: 11 days | Discharge: NURSING, REHAB OR LONG-TERM CARE | End: 2022-05-17
Attending: EMERGENCY MEDICINE
Payer: MEDICARE

## 2022-05-06 DIAGNOSIS — K92.2 UPPER GI BLEED: Primary | ICD-10-CM

## 2022-05-06 DIAGNOSIS — R41.82 ALTERED MENTAL STATUS, UNSPECIFIED ALTERED MENTAL STATUS TYPE: ICD-10-CM

## 2022-05-06 LAB
ALBUMIN SERPL-MCNC: 3.5 G/DL (ref 3.4–5)
ALBUMIN/GLOB SERPL: 0.9 {RATIO} (ref 1–2)
ALP LIVER SERPL-CCNC: 51 U/L
ALT SERPL-CCNC: 26 U/L
AMMONIA PLAS-MCNC: 42 UMOL/L (ref 11–32)
ANION GAP SERPL CALC-SCNC: 10 MMOL/L (ref 0–18)
ANTIBODY SCREEN: NEGATIVE
APTT PPP: 25.1 SECONDS (ref 23.3–35.6)
AST SERPL-CCNC: 48 U/L (ref 15–37)
ATRIAL RATE: 99 BPM
BASOPHILS # BLD AUTO: 0.03 X10(3) UL (ref 0–0.2)
BASOPHILS NFR BLD AUTO: 0.2 %
BILIRUB SERPL-MCNC: 0.4 MG/DL (ref 0.1–2)
BILIRUB UR QL STRIP.AUTO: NEGATIVE
BUN BLD-MCNC: 38 MG/DL (ref 7–18)
CALCIUM BLD-MCNC: 8.4 MG/DL (ref 8.5–10.1)
CHLORIDE SERPL-SCNC: 106 MMOL/L (ref 98–112)
CLARITY UR REFRACT.AUTO: CLEAR
CO2 SERPL-SCNC: 22 MMOL/L (ref 21–32)
COLOR UR AUTO: YELLOW
CREAT BLD-MCNC: 0.94 MG/DL
EOSINOPHIL # BLD AUTO: 0.01 X10(3) UL (ref 0–0.7)
EOSINOPHIL NFR BLD AUTO: 0.1 %
ERYTHROCYTE [DISTWIDTH] IN BLOOD BY AUTOMATED COUNT: 17.7 %
GLOBULIN PLAS-MCNC: 3.8 G/DL (ref 2.8–4.4)
GLUCOSE BLD-MCNC: 141 MG/DL (ref 70–99)
GLUCOSE BLD-MCNC: 155 MG/DL (ref 70–99)
GLUCOSE UR STRIP.AUTO-MCNC: NEGATIVE MG/DL
HCT VFR BLD AUTO: 38.7 %
HGB BLD-MCNC: 12.6 G/DL
IMM GRANULOCYTES # BLD AUTO: 0.08 X10(3) UL (ref 0–1)
IMM GRANULOCYTES NFR BLD: 0.6 %
INR BLD: 1.09 (ref 0.8–1.2)
LEUKOCYTE ESTERASE UR QL STRIP.AUTO: NEGATIVE
LYMPHOCYTES # BLD AUTO: 0.56 X10(3) UL (ref 1–4)
LYMPHOCYTES NFR BLD AUTO: 4.2 %
MCH RBC QN AUTO: 31.7 PG (ref 26–34)
MCHC RBC AUTO-ENTMCNC: 32.6 G/DL (ref 31–37)
MCV RBC AUTO: 97.5 FL
MONOCYTES # BLD AUTO: 0.95 X10(3) UL (ref 0.1–1)
MONOCYTES NFR BLD AUTO: 7.2 %
NEUTROPHILS # BLD AUTO: 11.58 X10 (3) UL (ref 1.5–7.7)
NEUTROPHILS # BLD AUTO: 11.58 X10(3) UL (ref 1.5–7.7)
NEUTROPHILS NFR BLD AUTO: 87.7 %
NITRITE UR QL STRIP.AUTO: NEGATIVE
OSMOLALITY SERPL CALC.SUM OF ELEC: 298 MOSM/KG (ref 275–295)
P AXIS: 41 DEGREES
P-R INTERVAL: 176 MS
PH UR STRIP.AUTO: 5 [PH] (ref 5–8)
PLATELET # BLD AUTO: 260 10(3)UL (ref 150–450)
POTASSIUM SERPL-SCNC: 3.7 MMOL/L (ref 3.5–5.1)
PROT SERPL-MCNC: 7.3 G/DL (ref 6.4–8.2)
PROT UR STRIP.AUTO-MCNC: NEGATIVE MG/DL
PROTHROMBIN TIME: 14.2 SECONDS (ref 11.6–14.8)
Q-T INTERVAL: 362 MS
QRS DURATION: 76 MS
QTC CALCULATION (BEZET): 464 MS
R AXIS: -14 DEGREES
RBC # BLD AUTO: 3.97 X10(6)UL
RBC UR QL AUTO: NEGATIVE
RH BLOOD TYPE: POSITIVE
SARS-COV-2 RNA RESP QL NAA+PROBE: NOT DETECTED
SODIUM SERPL-SCNC: 138 MMOL/L (ref 136–145)
SP GR UR STRIP.AUTO: 1.02 (ref 1–1.03)
T AXIS: 79 DEGREES
UROBILINOGEN UR STRIP.AUTO-MCNC: <2 MG/DL
VENTRICULAR RATE: 99 BPM
VIT B12 SERPL-MCNC: 494 PG/ML (ref 193–986)
WBC # BLD AUTO: 13.2 X10(3) UL (ref 4–11)

## 2022-05-06 PROCEDURE — 70450 CT HEAD/BRAIN W/O DYE: CPT | Performed by: EMERGENCY MEDICINE

## 2022-05-06 PROCEDURE — 72125 CT NECK SPINE W/O DYE: CPT | Performed by: EMERGENCY MEDICINE

## 2022-05-06 PROCEDURE — 71045 X-RAY EXAM CHEST 1 VIEW: CPT | Performed by: EMERGENCY MEDICINE

## 2022-05-06 PROCEDURE — 99223 1ST HOSP IP/OBS HIGH 75: CPT | Performed by: OTHER

## 2022-05-06 PROCEDURE — 0DJ08ZZ INSPECTION OF UPPER INTESTINAL TRACT, VIA NATURAL OR ARTIFICIAL OPENING ENDOSCOPIC: ICD-10-PCS | Performed by: INTERNAL MEDICINE

## 2022-05-06 RX ORDER — PANTOPRAZOLE SODIUM 40 MG/1
40 TABLET, DELAYED RELEASE ORAL
Status: DISCONTINUED | OUTPATIENT
Start: 2022-05-07 | End: 2022-05-17

## 2022-05-06 RX ORDER — HEPARIN SODIUM 5000 [USP'U]/ML
5000 INJECTION, SOLUTION INTRAVENOUS; SUBCUTANEOUS EVERY 8 HOURS SCHEDULED
Status: DISCONTINUED | OUTPATIENT
Start: 2022-05-06 | End: 2022-05-06

## 2022-05-06 RX ORDER — ACETAMINOPHEN 325 MG/1
650 TABLET ORAL EVERY 6 HOURS PRN
Status: DISCONTINUED | OUTPATIENT
Start: 2022-05-06 | End: 2022-05-17

## 2022-05-06 RX ORDER — MIDAZOLAM HYDROCHLORIDE 1 MG/ML
1 INJECTION INTRAMUSCULAR; INTRAVENOUS EVERY 5 MIN PRN
Status: DISCONTINUED | OUTPATIENT
Start: 2022-05-06 | End: 2022-05-06 | Stop reason: HOSPADM

## 2022-05-06 RX ORDER — SODIUM CHLORIDE 9 MG/ML
INJECTION, SOLUTION INTRAVENOUS CONTINUOUS
Status: DISCONTINUED | OUTPATIENT
Start: 2022-05-06 | End: 2022-05-10

## 2022-05-06 RX ORDER — MIDODRINE HYDROCHLORIDE 5 MG/1
5 TABLET ORAL 3 TIMES DAILY
Status: DISCONTINUED | OUTPATIENT
Start: 2022-05-06 | End: 2022-05-09

## 2022-05-06 RX ORDER — VENLAFAXINE HYDROCHLORIDE 75 MG/1
150 CAPSULE, EXTENDED RELEASE ORAL
Status: DISCONTINUED | OUTPATIENT
Start: 2022-05-07 | End: 2022-05-17

## 2022-05-06 RX ORDER — MIDAZOLAM HYDROCHLORIDE 1 MG/ML
INJECTION INTRAMUSCULAR; INTRAVENOUS
Status: DISCONTINUED | OUTPATIENT
Start: 2022-05-06 | End: 2022-05-06 | Stop reason: HOSPADM

## 2022-05-06 NOTE — ED QUICK NOTES
Normal patient mental status A&Ox3 ambulatory. Last seen yesterday evening 730 pm at her baseline. Found this AM 0730    Independent living. No blood thinners.

## 2022-05-06 NOTE — ED QUICK NOTES
Spoke with a family member over the phone. Asked on patient condition at this time, gave update on patient and pending admission. Aware other family members currently at bedside.

## 2022-05-06 NOTE — OPERATIVE REPORT
[facility] 201 Patient Status:  Inpatient    3/3/1945 MRN [id number]   Location [address] [address] Attending Velvet Dave DO   Hosp Day # 0 PCP Lou Jesus MD         PATIENT NAME: Jessica Gaston  DATE OF OPERATION: 2022    PREOPERATIVE DIAGNOSIS:  1. Coffee ground emesis  2. Melena reported  POSTOPERATIVE DIAGNOSIS:  1. Gastritis no active bleeding. Likely cause for coffee ground emesis  2. Benign gastric polyps. PROCEDURE PERFORMED: Upper endoscopy with conscious sedation  SURGEON: Edmond Camargo MD   MEDICATIONS: Versed 1 mg IV in divided doses under the supervision of Dr. Edita Camargo. PROCEDURE AND FINDINGS: The patient was placed into the left lateral decubitus position after informed consent was obtained from sister POA. All questions were answered. An ASA score was assigned, Mallampati score 2. IV sedation was administered. The Olympus video gastroscope was introduced into the mouth and passed to the third portion of the duodenum. The entire examined esophagus was normal.  There were several gastric polyps in the body. There was antral gastritis. The remainder of the entire examined stomach,  including retroflexion view, was normal.  The entire examined duodenum was normal.   The gastroscope was removed from the patient. The procedure was completed. There were no implants placed nor significant blood loss. The patient tolerated the procedure well. There were no immediate post procedure complications. Total moderate sedation time was 12 minutes. A trained sedation nurse was present to assist in monitoring the patient during the entire length of the moderate sedation time. Regular diet  Continue PPI therapy. No further eval    Lj Camargo MD

## 2022-05-06 NOTE — PROGRESS NOTES
Pt stuporous, disoriented x4. Only answering yes to respond to her name. Does not follow commands. On tele, ST. Room air. Afebrile. Incontinent x2, had smear BM upon arrival from ED. Wearing a brief. Did not attempt to feed pt due to mental status. Pt does not seem to be in pain, appears comfortable. IV sites saline locked. Skin intact, red scrape noted on left hip/left side of back from fall. Updated multiple family members on plan of care-   Donnell Murphy, sister, ([phone])  Lucrecia Daigle, Brother, ([phone])  Meron Camarillo, sister, ([phone]),   Gisel Saldivar, sister, ([phone]). -Made family aware we will only have one person of contact for future reference. According to Chace Hero is the POA of the patient. According to Sherri Hagan is the POA of the patient. At this time, patient does not have advanced directives on file. Social work consult placed. Neurology saw patient after getting back from EGD, recommended MRI and EEG tomorrow, awaiting orders. Pt not medicated with midodrine due to high BP. Sister Stephen Grove made this RN aware that the patient has been sleeping a lot for the last month. Has not noticed any change in orientation however patient is always lethargic. In addition to this, about a month ago patient began to have consistently low BP, saw cardiologist and has been taking midodrine. Used to be on Fludrocortisone. Savanna Shelton (sister) will be back tomorrow around 10-11am to assist in completing MRI paperwork/safety checklist. Safety precautions in place.

## 2022-05-06 NOTE — ED INITIAL ASSESSMENT (HPI)
Patient in from assisted living home. Discharged this morning from here for hypotension. Unwitnessed fall this AM found in between bed and wall. Patient per EMS AMS unknown baseline mental status. Patient only responding minimally. Coffee ground emesis found in room by EMS.

## 2022-05-06 NOTE — ED QUICK NOTES
Orders for admission, patient is aware of plan and ready to go upstairs.  Any questions[facility]    Patient Covid vaccination status: Fully vaccinated     COVID Test Ordered in ED: Rapid SARS-CoV-2 by PCR    COVID Suspicion at Admission: Low clinical suspicion for COVID Negative     Running Infusions:  None    Mental Status/LOC at time of transport: Disoriented x3 responds to name     Other pertinent information: on RA  CIWA score: N/A   NIH score:  N/A

## 2022-05-06 NOTE — ED QUICK NOTES
Brother and Sister at bedside. Provided them with update now on patient and care thusfar. Questions answered.

## 2022-05-07 ENCOUNTER — APPOINTMENT (OUTPATIENT)
Dept: MRI IMAGING | Facility: HOSPITAL | Age: 77
End: 2022-05-07
Attending: Other
Payer: MEDICARE

## 2022-05-07 ENCOUNTER — NURSE ONLY (OUTPATIENT)
Dept: ELECTROPHYSIOLOGY | Facility: HOSPITAL | Age: 77
End: 2022-05-07
Attending: Other
Payer: MEDICARE

## 2022-05-07 ENCOUNTER — NURSE ONLY (OUTPATIENT)
Dept: ELECTROPHYSIOLOGY | Facility: HOSPITAL | Age: 77
DRG: 377 | End: 2022-05-07
Attending: Other
Payer: MEDICARE

## 2022-05-07 LAB
ALBUMIN SERPL-MCNC: 2.8 G/DL (ref 3.4–5)
ALBUMIN/GLOB SERPL: 0.9 {RATIO} (ref 1–2)
ALP LIVER SERPL-CCNC: 47 U/L
ALT SERPL-CCNC: 39 U/L
ANION GAP SERPL CALC-SCNC: 6 MMOL/L (ref 0–18)
AST SERPL-CCNC: 110 U/L (ref 15–37)
BASOPHILS # BLD AUTO: 0.04 X10(3) UL (ref 0–0.2)
BASOPHILS NFR BLD AUTO: 0.6 %
BILIRUB SERPL-MCNC: 0.7 MG/DL (ref 0.1–2)
BUN BLD-MCNC: 17 MG/DL (ref 7–18)
CALCIUM BLD-MCNC: 7.5 MG/DL (ref 8.5–10.1)
CHLORIDE SERPL-SCNC: 112 MMOL/L (ref 98–112)
CO2 SERPL-SCNC: 24 MMOL/L (ref 21–32)
CREAT BLD-MCNC: 0.45 MG/DL
EOSINOPHIL # BLD AUTO: 0.06 X10(3) UL (ref 0–0.7)
EOSINOPHIL NFR BLD AUTO: 0.8 %
ERYTHROCYTE [DISTWIDTH] IN BLOOD BY AUTOMATED COUNT: 17.7 %
GLOBULIN PLAS-MCNC: 3.1 G/DL (ref 2.8–4.4)
GLUCOSE BLD-MCNC: 105 MG/DL (ref 70–99)
HCT VFR BLD AUTO: 33.3 %
HGB BLD-MCNC: 10.5 G/DL
IMM GRANULOCYTES # BLD AUTO: 0.03 X10(3) UL (ref 0–1)
IMM GRANULOCYTES NFR BLD: 0.4 %
LYMPHOCYTES # BLD AUTO: 1.05 X10(3) UL (ref 1–4)
LYMPHOCYTES NFR BLD AUTO: 14.8 %
MAGNESIUM SERPL-MCNC: 2 MG/DL (ref 1.6–2.6)
MCH RBC QN AUTO: 31 PG (ref 26–34)
MCHC RBC AUTO-ENTMCNC: 31.5 G/DL (ref 31–37)
MCV RBC AUTO: 98.2 FL
MONOCYTES # BLD AUTO: 0.82 X10(3) UL (ref 0.1–1)
MONOCYTES NFR BLD AUTO: 11.6 %
NEUTROPHILS # BLD AUTO: 5.08 X10 (3) UL (ref 1.5–7.7)
NEUTROPHILS # BLD AUTO: 5.08 X10(3) UL (ref 1.5–7.7)
NEUTROPHILS NFR BLD AUTO: 71.8 %
OSMOLALITY SERPL CALC.SUM OF ELEC: 296 MOSM/KG (ref 275–295)
PLATELET # BLD AUTO: 218 10(3)UL (ref 150–450)
POTASSIUM SERPL-SCNC: 3.1 MMOL/L (ref 3.5–5.1)
POTASSIUM SERPL-SCNC: 3.7 MMOL/L (ref 3.5–5.1)
PROT SERPL-MCNC: 5.9 G/DL (ref 6.4–8.2)
RBC # BLD AUTO: 3.39 X10(6)UL
SODIUM SERPL-SCNC: 142 MMOL/L (ref 136–145)
WBC # BLD AUTO: 7.1 X10(3) UL (ref 4–11)

## 2022-05-07 PROCEDURE — 95816 EEG AWAKE AND DROWSY: CPT | Performed by: OTHER

## 2022-05-07 PROCEDURE — 99232 SBSQ HOSP IP/OBS MODERATE 35: CPT | Performed by: OTHER

## 2022-05-07 PROCEDURE — 70551 MRI BRAIN STEM W/O DYE: CPT | Performed by: OTHER

## 2022-05-07 RX ORDER — SODIUM CHLORIDE 9 MG/ML
INJECTION, SOLUTION INTRAVENOUS CONTINUOUS
Status: DISCONTINUED | OUTPATIENT
Start: 2022-05-07 | End: 2022-05-14

## 2022-05-07 RX ORDER — POTASSIUM CHLORIDE 1.5 G/1.77G
40 POWDER, FOR SOLUTION ORAL EVERY 4 HOURS
Status: COMPLETED | OUTPATIENT
Start: 2022-05-07 | End: 2022-05-07

## 2022-05-07 NOTE — PLAN OF CARE
Pt received alert to name, does not follow commands. Vitals stable, afebrile, pt HR in upper 120-150's when attempting to get out of bed, once settled down to 90s -100's, bladder scanned pt >900, straight cathed pt seems comfortable, no signs of distress. Safety precautions maintained, will continue to monitor.

## 2022-05-07 NOTE — CM/SW NOTE
SW received order for advance directives. SW reviewed chart, and POA/advance directives found in Epic. SW updated RN and provided form to her. SW to follow for assessment, and discharge planning.

## 2022-05-08 ENCOUNTER — APPOINTMENT (OUTPATIENT)
Dept: CT IMAGING | Facility: HOSPITAL | Age: 77
End: 2022-05-08
Attending: Other
Payer: MEDICARE

## 2022-05-08 LAB
CK SERPL-CCNC: 1880 U/L
T PALLIDUM AB SER QL IA: NONREACTIVE
T4 FREE SERPL-MCNC: 0.8 NG/DL (ref 0.8–1.7)
TSI SER-ACNC: 3.76 MIU/ML (ref 0.36–3.74)

## 2022-05-08 PROCEDURE — 70496 CT ANGIOGRAPHY HEAD: CPT | Performed by: OTHER

## 2022-05-08 PROCEDURE — 70498 CT ANGIOGRAPHY NECK: CPT | Performed by: OTHER

## 2022-05-08 PROCEDURE — 99232 SBSQ HOSP IP/OBS MODERATE 35: CPT | Performed by: OTHER

## 2022-05-08 RX ORDER — GABAPENTIN 300 MG/1
300 CAPSULE ORAL 2 TIMES DAILY
Status: DISCONTINUED | OUTPATIENT
Start: 2022-05-08 | End: 2022-05-15

## 2022-05-08 RX ORDER — IOHEXOL 350 MG/ML
65 INJECTION, SOLUTION INTRAVENOUS
Status: COMPLETED | OUTPATIENT
Start: 2022-05-08 | End: 2022-05-08

## 2022-05-08 NOTE — PROGRESS NOTES
Patient is more alert and talkative today, although still drowsy. Slept most of the day. Swallow eval done, did well. Diet changed to regular. Gabapentin added for pain. Worked with PT, recommend [facility]. Psych consult for depression. CTA brain ordered by neuro, done this evening. Sister at bedside updated on POC. All needs met. Fall precautions in place. Call light within reach. Family is interested in switching patient to the assisted living side of [facility] at discharge. Her 2 sisters and brother will be back tomorrow to get updates and discuss dc planning.

## 2022-05-08 NOTE — PROGRESS NOTES
A&O times 2. Drowsy and confused. Complaints generalized pain d/t a recent fall, tylenol given with some relief. Patient positive for orthostatic hypotension, MD notified. 0.9 NS started. Orthostatic BP to be done qshift. K+ replaced per protocol. EEG and MRI completed  Vitals stable. Fall precautions in place. Family at bedside, updated on POC.

## 2022-05-08 NOTE — CM/SW NOTE
SW reviewed chart for discharge needs. Per chart review, pt resides at [facility]. PT is now recommending [facility]. SW sent [facility] referrals in [facility]. PASRR completed/uploaded also. Unit SW/CM to follow up with pt/family regarding discharge plans and [facility] choice.  &  to remain available and supportive for discharge planning needs.     TEOFILO Villalba, [facility]   / Discharge Planner  [id number]

## 2022-05-09 ENCOUNTER — APPOINTMENT (OUTPATIENT)
Dept: ULTRASOUND IMAGING | Facility: HOSPITAL | Age: 77
End: 2022-05-09
Attending: INTERNAL MEDICINE
Payer: MEDICARE

## 2022-05-09 LAB
ALBUMIN SERPL-MCNC: 2.3 G/DL (ref 3.4–5)
ALP LIVER SERPL-CCNC: 48 U/L
ALT SERPL-CCNC: 33 U/L
AMMONIA PLAS-MCNC: 26 UMOL/L (ref 11–32)
ANION GAP SERPL CALC-SCNC: 4 MMOL/L (ref 0–18)
AST SERPL-CCNC: 39 U/L (ref 15–37)
BASOPHILS # BLD AUTO: 0.05 X10(3) UL (ref 0–0.2)
BASOPHILS NFR BLD AUTO: 0.5 %
BILIRUB DIRECT SERPL-MCNC: <0.1 MG/DL (ref 0–0.2)
BILIRUB SERPL-MCNC: 0.2 MG/DL (ref 0.1–2)
BUN BLD-MCNC: 12 MG/DL (ref 7–18)
CALCIUM BLD-MCNC: 7.8 MG/DL (ref 8.5–10.1)
CHLORIDE SERPL-SCNC: 113 MMOL/L (ref 98–112)
CK SERPL-CCNC: 404 U/L
CK SERPL-CCNC: 515 U/L
CO2 SERPL-SCNC: 23 MMOL/L (ref 21–32)
CREAT BLD-MCNC: 0.42 MG/DL
EOSINOPHIL # BLD AUTO: 0.24 X10(3) UL (ref 0–0.7)
EOSINOPHIL NFR BLD AUTO: 2.4 %
ERYTHROCYTE [DISTWIDTH] IN BLOOD BY AUTOMATED COUNT: 17.3 %
ETHANOL SERPL-MCNC: <3 MG/DL (ref ?–3)
GLUCOSE BLD-MCNC: 89 MG/DL (ref 70–99)
HCT VFR BLD AUTO: 30.7 %
HGB BLD-MCNC: 9.7 G/DL
IMM GRANULOCYTES # BLD AUTO: 0.05 X10(3) UL (ref 0–1)
IMM GRANULOCYTES NFR BLD: 0.5 %
LYMPHOCYTES # BLD AUTO: 1.55 X10(3) UL (ref 1–4)
LYMPHOCYTES NFR BLD AUTO: 15.3 %
MAGNESIUM SERPL-MCNC: 2.1 MG/DL (ref 1.6–2.6)
MCH RBC QN AUTO: 31.7 PG (ref 26–34)
MCHC RBC AUTO-ENTMCNC: 31.6 G/DL (ref 31–37)
MCV RBC AUTO: 100.3 FL
MONOCYTES # BLD AUTO: 0.78 X10(3) UL (ref 0.1–1)
MONOCYTES NFR BLD AUTO: 7.7 %
NEUTROPHILS # BLD AUTO: 7.48 X10 (3) UL (ref 1.5–7.7)
NEUTROPHILS # BLD AUTO: 7.48 X10(3) UL (ref 1.5–7.7)
NEUTROPHILS NFR BLD AUTO: 73.6 %
OSMOLALITY SERPL CALC.SUM OF ELEC: 289 MOSM/KG (ref 275–295)
PLATELET # BLD AUTO: 214 10(3)UL (ref 150–450)
POTASSIUM SERPL-SCNC: 3.3 MMOL/L (ref 3.5–5.1)
POTASSIUM SERPL-SCNC: 4.3 MMOL/L (ref 3.5–5.1)
PROT SERPL-MCNC: 5.5 G/DL (ref 6.4–8.2)
RBC # BLD AUTO: 3.06 X10(6)UL
SODIUM SERPL-SCNC: 140 MMOL/L (ref 136–145)
WBC # BLD AUTO: 10.2 X10(3) UL (ref 4–11)

## 2022-05-09 PROCEDURE — 90792 PSYCH DIAG EVAL W/MED SRVCS: CPT | Performed by: OTHER

## 2022-05-09 PROCEDURE — 93971 EXTREMITY STUDY: CPT | Performed by: INTERNAL MEDICINE

## 2022-05-09 PROCEDURE — 99231 SBSQ HOSP IP/OBS SF/LOW 25: CPT | Performed by: NURSE PRACTITIONER

## 2022-05-09 RX ORDER — POTASSIUM CHLORIDE 1.5 G/1.77G
40 POWDER, FOR SOLUTION ORAL EVERY 4 HOURS
Status: COMPLETED | OUTPATIENT
Start: 2022-05-09 | End: 2022-05-09

## 2022-05-09 RX ORDER — MULTIPLE VITAMINS W/ MINERALS TAB 9MG-400MCG
1 TAB ORAL DAILY
Status: DISCONTINUED | OUTPATIENT
Start: 2022-05-09 | End: 2022-05-17

## 2022-05-09 NOTE — PLAN OF CARE
Results for LUE ultrasound discussed with Dr. Sarah Kumar- continue current management, warm compress PRN for pain.

## 2022-05-09 NOTE — CM/SW NOTE
SW spoke with patient and family at bedside to discuss [facility] placement for patient. SW provided them with the choice list of accepting facilities. Patient and family reported that they would like patient to go to [facility].. SW reserved bed on [place] and instructed Johnminh Mixonpeg to reach out to patient's family. Patient will not need insurance authorization due to having Medicare and PASRR is completed. SW will continue to remain available for any additional DC needs or concerns.      Ann Aranda MSW, LSW  Discharge Planner   [phone]

## 2022-05-09 NOTE — PROGRESS NOTES
PSYCH CONSULT    Date of Admission: 5/6/22  Date of Service: 5/9/22  Reason for Consultation: Eval altered mental status, eval for depression     Impression:  Primary Psychiatric Diagnosis:  Major depressive disorder, recurrent, moderate. The lightheadedness and physicial limitations from orthostatic hypotension (ie can't go out anymore) have worsened her depressive symptoms including low energy and mood and motivation. Hx of paranoid ideation (people trying to hurt her), controlled with antipsychotics for over 1 yr. The hypersomnolence x 2 months is not from the [place] because she has been on it for over 1 yr. Recent delirium/encephalopathy due to unclear etiology. Only mild rhabdo and mildly elevated ammonia at 42 on 5/6 and mild hypokalemia 3.1 on 5/7. Brain MRI negative for acute stroke and EEG neg for seizures. Delirium improving. Rule Out Diagnoses:  Mild cognitive impairment. Personality Traits:  Deferred     Pertinent Medical Diagnoses:  GI bleed/gastritis per EGD. Mildly elevated ammonia- resolved. B12 normal. Free T4 low end of normal at 0.8. Recommendations:  1) Continue Effexor XL 150mg daily to help with her depression. If her motivation remains decreased, can consider increasing Effexor to 225mg daily. 2) Continue monthly Invega injections to control her paranoid ideation. Discussed option of using Abilify instead in the future since it can help with depression as well. 3) Will ask psych liaison to place referrals for individual psychotherapists in the DC instructions. She has a psychotherapist through [employer] only and wants to see someone in person. 4) She will follow up with her outpatient psychiatrist regarding potential psych med adjustments. She does not want to change her meds at this time. Discussed plan above with patient and 2 of her sisters    Full note to follow. Dr. Manny Caputo.

## 2022-05-10 ENCOUNTER — APPOINTMENT (OUTPATIENT)
Dept: CV DIAGNOSTICS | Facility: HOSPITAL | Age: 77
End: 2022-05-10
Attending: INTERNAL MEDICINE
Payer: MEDICARE

## 2022-05-10 LAB
BASOPHILS # BLD AUTO: 0.04 X10(3) UL (ref 0–0.2)
BASOPHILS NFR BLD AUTO: 0.6 %
EOSINOPHIL # BLD AUTO: 0.22 X10(3) UL (ref 0–0.7)
EOSINOPHIL NFR BLD AUTO: 3 %
ERYTHROCYTE [DISTWIDTH] IN BLOOD BY AUTOMATED COUNT: 17.6 %
HCT VFR BLD AUTO: 30.6 %
HGB BLD-MCNC: 9.7 G/DL
IMM GRANULOCYTES # BLD AUTO: 0.05 X10(3) UL (ref 0–1)
IMM GRANULOCYTES NFR BLD: 0.7 %
IRON SATN MFR SERPL: 17 %
IRON SERPL-MCNC: 42 UG/DL
LYMPHOCYTES # BLD AUTO: 1.16 X10(3) UL (ref 1–4)
LYMPHOCYTES NFR BLD AUTO: 16 %
MCH RBC QN AUTO: 31.6 PG (ref 26–34)
MCHC RBC AUTO-ENTMCNC: 31.7 G/DL (ref 31–37)
MCV RBC AUTO: 99.7 FL
MONOCYTES # BLD AUTO: 0.56 X10(3) UL (ref 0.1–1)
MONOCYTES NFR BLD AUTO: 7.7 %
NEUTROPHILS # BLD AUTO: 5.22 X10 (3) UL (ref 1.5–7.7)
NEUTROPHILS # BLD AUTO: 5.22 X10(3) UL (ref 1.5–7.7)
NEUTROPHILS NFR BLD AUTO: 72 %
PLATELET # BLD AUTO: 231 10(3)UL (ref 150–450)
RBC # BLD AUTO: 3.07 X10(6)UL
TIBC SERPL-MCNC: 249 UG/DL (ref 240–450)
TRANSFERRIN SERPL-MCNC: 167 MG/DL (ref 200–360)
WBC # BLD AUTO: 7.3 X10(3) UL (ref 4–11)

## 2022-05-10 PROCEDURE — 99232 SBSQ HOSP IP/OBS MODERATE 35: CPT | Performed by: OTHER

## 2022-05-10 PROCEDURE — 93306 TTE W/DOPPLER COMPLETE: CPT | Performed by: INTERNAL MEDICINE

## 2022-05-10 PROCEDURE — 99232 SBSQ HOSP IP/OBS MODERATE 35: CPT | Performed by: NURSE PRACTITIONER

## 2022-05-10 PROCEDURE — 99233 SBSQ HOSP IP/OBS HIGH 50: CPT | Performed by: OTHER

## 2022-05-10 RX ORDER — MIDODRINE HYDROCHLORIDE 5 MG/1
5 TABLET ORAL 3 TIMES DAILY
Status: DISCONTINUED | OUTPATIENT
Start: 2022-05-10 | End: 2022-05-12

## 2022-05-10 NOTE — PHYSICAL THERAPY NOTE
PHYSICAL THERAPY TREATMENT NOTE - INPATIENT    Room Number: 246/763-T     Session: 1     Number of Visits to Meet Established Goals: 5    Presenting Problem: s/p fall, anemia, AMS  Co-Morbidities : melanoma, breast CA, depression  ASSESSMENT     Pt continues to present with decreased strength and impaired balance, however is progressing well toward functional goals. Pt progressed gait training distance since previous session and demos increased ayana, shuffling pattern, and poor integration of RW. Pt participated in standing LE thera-ex and demos fair+ static standing balance and fair- dynamic standing balance. Pt participated in sit>stand transfer training and demos increased level of independence and good eccentric control without use of UEs. Pt would benefit from continued skilled PT services to increased pt's functional independence. DISCHARGE RECOMMENDATIONS  PT Discharge Recommendations: Sub-acute rehabilitation     PLAN  PT Treatment Plan: Bed mobility; Endurance; Energy conservation;Patient education;Gait training;Strengthening;Transfer training;Balance training  Rehab Potential : Good  Frequency (Obs): 3-5x/week    CURRENT GOALS     Goal #1 Patient is able to demonstrate supine - sit EOB @ level: supervision      Goal #2 Patient is able to demonstrate transfers EOB to/from [facility] at assistance level: supervision      Goal #3 Patient is able to ambulate 100 feet with assist device: walker - rolling at assistance level: supervision      Goal #4     Goal #5     Goal #6     Goal Comments: Goals established on 5/8/2022      5/10/2022 all goals ongoing      SUBJECTIVE  Pt reported generalized pain and supposed to be receiving tylenol soon. OBJECTIVE  Precautions: Bed/chair alarm; Other (Comment) (orthostasis)    WEIGHT BEARING RESTRICTION  Weight Bearing Restriction: None                PAIN ASSESSMENT   Rating: Unable to rate  Location: generalized  Management Techniques: Activity promotion; Body mechanics;Repositioning    BALANCE                                                                                                                       Static Sitting: Fair +  Dynamic Sitting: Fair +           Static Standing: Fair +  Dynamic Standing: Fair -    ACTIVITY TOLERANCE                         O2 WALK         AM-PAC '6-Clicks' INPATIENT SHORT FORM - BASIC MOBILITY  How much difficulty does the patient currently have. .. Patient Difficulty: Turning over in bed (including adjusting bedclothes, sheets and blankets)?: A Little   Patient Difficulty: Sitting down on and standing up from a chair with arms (e.g., wheelchair, bedside commode, etc.): A Little   Patient Difficulty: Moving from lying on back to sitting on the side of the bed?: A Little   How much help from another person does the patient currently need. .. Help from Another: Moving to and from a bed to a chair (including a wheelchair)?: A Little   Help from Another: Need to walk in hospital room?: A Little   Help from Another: Climbing 3-5 steps with a railing?: A Little       AM-PAC Score:  Raw Score: 18   Approx Degree of Impairment: 46.58%   Standardized Score (AM-PAC Scale): 43.63   CMS Modifier (G-Code): CK    FUNCTIONAL ABILITY STATUS  Gait Assessment   Functional Mobility/Gait Assessment  Gait Assistance: Contact guard assist;Supervision  Distance (ft): 175  Assistive Device: Rolling walker  Pattern: Shuffle    Skilled Therapy Provided  Pt received in bathroom with RN, agreeable to session. Pt participated in gait training within room [facility] requiring closed supervision, no LOB noted.  -Pt participated in gait training with RW requiring supervision, pt demo increased ayana after 100ft requiring CGA secondary to decreased balance and safety awareness with RW.  Pt required vc for integration of RW and assistance with direction of RW to avoid obstacles throughout gait training.  -Pt participated in standing LE thera-ex requiring close supervision, verbal, and visual cues and support of bed rail secondary to decreased balance. Bed Mobility:  Rolling right: nt   Rolling left: nt    Supine<>Sit: nt   Sit<>Supine: nt     Transfer Mobility:  Sit<>Stand: supervision   Stand<>Sit: supervision   Gait: CGA-supervision    Therapist's Comments:   /74 at end of session in bedside chair. THERAPEUTIC EXERCISES  Lower Extremity Hip AB/AD  Knee bends  Hip Ext. Upper Extremity N/A     Position Standing     Repetitions   10   Sets   1     Patient End of Session: Up in chair;Needs met;Call light within reach;RN aware of session/findings; All patient questions and concerns addressed; Restraints

## 2022-05-10 NOTE — BH PROGRESS NOTE
Referrals placed in the discharge summary for the patient to follow up with a psychotherapist per Dr. Valery Barillas.

## 2022-05-10 NOTE — CM/SW NOTE
SW sent updated clinical information to [address] [place] [address] via [email or web address]. Barriers for DC:  -Medical Clearance    SW will continue to remain available for any additional DC needs or concerns.      Maya Mathews MSW, LSW  Discharge Planner   [phone]

## 2022-05-11 RX ORDER — SENNOSIDES 8.6 MG
8.6 TABLET ORAL 2 TIMES DAILY PRN
Status: DISCONTINUED | OUTPATIENT
Start: 2022-05-11 | End: 2022-05-17

## 2022-05-11 NOTE — PROGRESS NOTES
Pt A&Ox4, forgetful. Reports generalized aches, tylenol administered. Walked around halls three times with standby assist. IVF maintained. All scheduled meds administered per [facility]. Fall precautions in place. Rounded on hourly, WCTM.

## 2022-05-11 NOTE — CM/SW NOTE
MAURICE spoke with Dr. Marko Covington regarding patient DC. MAURICE reached out to [address] [place] [address] to verify they would be able to accept patient today. [address] [place] [address] confirmed that they do have a bed today. MAURICE paged Dr. Marko Covington to notify. MAURICE will continue to follow for medical clearance. MAURICE will continue to remain available for any additional DC needs or concerns.      Kassi Coleman MSW, LSW  Discharge Planner   [phone]

## 2022-05-11 NOTE — PHYSICAL THERAPY NOTE
PHYSICAL THERAPY TREATMENT NOTE - INPATIENT    Room Number: 643/558-L     Session: 2     Number of Visits to Meet Established Goals: 5    Presenting Problem: s/p fall, anemia, AMS  Co-Morbidities : melanoma, breast CA, depression  ASSESSMENT     Pt continues to present with decreased strength and impaired balance. Pt mobility limited during session secondary to orthostatic hypotension upon supine>seated at EOB transition. Pt participated in bed mobility training and demos increased strength since previous session requiring decreased level of assistance. Pt participated in thera-ex and required continuous vc for control and speed. Pt participated in seated balance training and demos fair+ seated static balance and fair dynamic seated balance. Pt would benefit from continued skilled PT services toward PLOF when medically appropriate. DISCHARGE RECOMMENDATIONS  PT Discharge Recommendations: Sub-acute rehabilitation     PLAN  PT Treatment Plan: Bed mobility; Endurance; Energy conservation;Patient education;Gait training;Strengthening;Transfer training;Balance training  Rehab Potential : Good  Frequency (Obs): 3-5x/week    CURRENT GOALS     Goal #1 Patient is able to demonstrate supine - sit EOB @ level: supervision      Goal #2 Patient is able to demonstrate transfers EOB to/from [facility] at assistance level: supervision      Goal #3 Patient is able to ambulate 100 feet with assist device: walker - rolling at assistance level: supervision      Goal #4     Goal #5     Goal #6     Goal Comments: Goals established on 5/8/2022 5/11/2022 all goals ongoing      SUBJECTIVE  Pt reported desire to walk. OBJECTIVE  Precautions: Bed/chair alarm; Other (Comment) (orthostasis)    WEIGHT BEARING RESTRICTION  Weight Bearing Restriction: None                PAIN ASSESSMENT   Rating: Unable to rate  Location: knees  Management Techniques: Activity promotion; Body mechanics;Repositioning    BALANCE Static Sitting: Fair +  Dynamic Sitting: Fair           Static Standing: Not tested  Dynamic Standing: Not tested    ACTIVITY TOLERANCE                         O2 WALK         AM-PAC '6-Clicks' INPATIENT SHORT FORM - BASIC MOBILITY  How much difficulty does the patient currently have. .. Patient Difficulty: Turning over in bed (including adjusting bedclothes, sheets and blankets)?: A Little   Patient Difficulty: Sitting down on and standing up from a chair with arms (e.g., wheelchair, bedside commode, etc.): A Little   Patient Difficulty: Moving from lying on back to sitting on the side of the bed?: A Little   How much help from another person does the patient currently need. .. Help from Another: Moving to and from a bed to a chair (including a wheelchair)?: A Little   Help from Another: Need to walk in hospital room?: A Little   Help from Another: Climbing 3-5 steps with a railing?: A Lot       AM-PAC Score:  Raw Score: 17   Approx Degree of Impairment: 50.57%   Standardized Score (AM-PAC Scale): 42.13   CMS Modifier (G-Code): CK    FUNCTIONAL ABILITY STATUS  Gait Assessment   Functional Mobility/Gait Assessment  Gait Assistance: Not tested  Distance (ft): 0  Assistive Device: Rolling walker  Pattern: Shuffle    Skilled Therapy Provided  Pt received supine in bed with HOB elevated and sister present.  -Pt participated in bed mobility training supine>seated at EOB and seated at EOB > supine with HOB slightly elevated. Pt required verbal and tactile cues for body mechanics and sequencing. Pt independent with scooting while seated at EOB requiring close supervision.  -Pt BP monitored 77/54 upon sitting at EOB, pt denies symptoms.  -Pt participated in seated thera-ex requiring intermittent verbal, visual and tactile cues for body mechanics and control with good return demo, however pt reverts required follow up cueing.  S/p BP monitored 50/33, Pt reported no symptoms. Determined unsafe to progress session, RN made aware, pt returned supine with CGA. Bed Mobility:  Rolling right: CGA   Rolling left: nt    Supine<>Sit: CGA HOB slightly elevated   Sit<>Supine: CGA HOB slightly elevated    Transfer Mobility:  Sit<>Stand: nt   Stand<>Sit: nt   Gait: nt    Therapist's Comments:   BP:  146/94 at beginning of session  77/54 s/p supine>seated at EOB transfer  50/33 s/p seated thera-ex, RN notified  98/56 end of session supine in bed  RN present and aware of findings. THERAPEUTIC EXERCISES  Lower Extremity Alternating marching  Ankle pumps  LAQ     Upper Extremity Elbow flex/ext     Position Sitting     Repetitions   10   Sets   1     Patient End of Session: In bed;Needs met;Call light within reach;RN aware of session/findings; All patient questions and concerns addressed; Alarm set; Family present

## 2022-05-12 RX ORDER — FLUDROCORTISONE ACETATE 0.1 MG/1
0.1 TABLET ORAL DAILY
Status: DISCONTINUED | OUTPATIENT
Start: 2022-05-12 | End: 2022-05-17

## 2022-05-12 NOTE — PROGRESS NOTES
At 8555, pt working with PT and lying flat /80. Sat at edge of bed, began to dangle legs and do exercises while sitting. BP checked and was 50/33. Pt instructed to lie down, rechecked and was 77/54. Rechecked and was 98/56, then 114/58. Pt closely monitored. Asymptomatic throughout entire event. MD notified. Pt maintained bedrest for rest of morning. During afternoon, pt requesting to walk around. Orthostats rechecked and positive. Stated she feels slightly lightheaded. Pt remained bedbound for remaining duration of shift. Endorsed to night shift RN.

## 2022-05-12 NOTE — PHYSICAL THERAPY NOTE
PHYSICAL THERAPY TREATMENT NOTE - INPATIENT    Room Number: 373/634-H     Session: 3    Number of Visits to Meet Established Goals: 5    Presenting Problem: s/p fall, anemia, AMS  Co-Morbidities : melanoma, breast CA, depression  ASSESSMENT     Pt continues to present with decreased strength and impaired balance. Pt mobility limited during session secondary to orthostatic hypotension upon sit>stand transfer from EOB. Pt participated in sit>stand transfer training and demos increased strength. Pt participated in standing balance training and demos fair- static standing balance and poor+ dynamic standing balance. Unsafe to progress to gait training this session secondary to be monitored 68/43 upon standing. Pt would benefit from continued skilled PT services to progress functional mobility when medically appropriate. DISCHARGE RECOMMENDATIONS  PT Discharge Recommendations: Sub-acute rehabilitation     PLAN  PT Treatment Plan: Bed mobility; Endurance; Energy conservation;Patient education;Gait training;Strengthening;Transfer training;Balance training  Rehab Potential : Good  Frequency (Obs): 3-5x/week    CURRENT GOALS     Goal #1 Patient is able to demonstrate supine - sit EOB @ level: supervision      Goal #2 Patient is able to demonstrate transfers EOB to/from [facility] at assistance level: supervision      Goal #3 Patient is able to ambulate 100 feet with assist device: walker - rolling at assistance level: supervision      Goal #4     Goal #5     Goal #6     Goal Comments: Goals established on 2022 all goals ongoing      SUBJECTIVE  Pt reported feeling constipated. OBJECTIVE  Precautions: Bed/chair alarm; Other (Comment) (orthostasis)    WEIGHT BEARING RESTRICTION  Weight Bearing Restriction: None                PAIN ASSESSMENT   Ratin  Location: knees  Management Techniques: Activity promotion; Body mechanics;Repositioning    BALANCE Static Sitting: Fair +  Dynamic Sitting: Fair           Static Standing: Fair -  Dynamic Standing: Poor +    ACTIVITY TOLERANCE                         O2 WALK         AM-PAC '6-Clicks' INPATIENT SHORT FORM - BASIC MOBILITY  How much difficulty does the patient currently have. .. Patient Difficulty: Turning over in bed (including adjusting bedclothes, sheets and blankets)?: None   Patient Difficulty: Sitting down on and standing up from a chair with arms (e.g., wheelchair, bedside commode, etc.): A Little   Patient Difficulty: Moving from lying on back to sitting on the side of the bed?: A Little   How much help from another person does the patient currently need. .. Help from Another: Moving to and from a bed to a chair (including a wheelchair)?: A Little   Help from Another: Need to walk in hospital room?: A Little   Help from Another: Climbing 3-5 steps with a railing?: A Little       AM-PAC Score:  Raw Score: 19   Approx Degree of Impairment: 41.77%   Standardized Score (AM-PAC Scale): 45.44   CMS Modifier (G-Code): CK    FUNCTIONAL ABILITY STATUS  Gait Assessment   Functional Mobility/Gait Assessment  Gait Assistance: Not tested  Distance (ft): 0  Assistive Device: Rolling walker  Pattern: Shuffle    Skilled Therapy Provided  Pt received supine in bed with HOB elevated and sister present.  -Pt participated in supine LE thera-ex requiring verbal and visual cues and intermittent cues for proper body mechanics.  -Pt participated in bed mobility training supine>seated at EOB and seated at EOB > supine with HOB slightly elevated. Pt required verbal and tactile cues for body mechanics and sequencing.  Pt independent with scooting while seated at EOB requiring close supervision.  -Pt reported feeling lightheaded upon sitting at EOB BP monitored 100/56 upon sitting at EOB, pt educated on pursed lip breathing and gaze stabilization, then pt reported symptoms resolving.  -Pt participated in seated thera-ex requiring intermittent verbal, visual and tactile cues. -Pt participated in sit>satnd transfer training from bed at lowest position requiring supervision and vc for hand placement.  -Pt reported feeling lightheaded upon standing, BP monitored 68/43, symptoms not resolving, pt return to seated at EOB with CGA and vc for body mechanics. Pt took seated break symptoms resolving slowly, BP monitored 86/55. Determined unsafe to progress session, RN made aware, pt returned supine with CGA. Pt reported symptoms resolving by end of session /59  Bed Mobility:  Rolling right: CGA   Rolling left: nt    Supine<>Sit: CGA HOB slightly elevated   Sit<>Supine: CGA HOB slightly elevated    Transfer Mobility:  Sit<>Stand: supervision   Stand<>Sit: CGA  Gait: nt    Therapist's Comments:   BP:  109/51 at beginning of session  100/56 s/p supine>seated at EOB transfer  68/43 standing  86/55 s/p sit>stand transfer and seated break  109/59 end of session supine in bed  RN aware above of findings. THERAPEUTIC EXERCISES  Lower Extremity Alternating marching  Ankle pumps  LAQ   SLR  Hip AB/AD  Heel slides         Position Sitting and supine     Repetitions   10   Sets   1     Patient End of Session: In bed;Needs met;Call light within reach;RN aware of session/findings; Alarm set; All patient questions and concerns addressed

## 2022-05-12 NOTE — PLAN OF CARE
Pt received A&Ox4, forgetful at times. Afebrile. VSS. Orthostatics (+). Pt c/o dizziness when standing. Requesting to work w/ PT. BP dropping to 68/43 when standing. Back to 109/59 when supine. Cards aware. Meds modified per MD orders. Pt c/o abd pain, declining pain meds. PRN senna administered per MAR. Passing gas, bowel sounds active. IVF infusing @ 50ml/hr. Fall precautions in place. Call light in reach.

## 2022-05-13 NOTE — PHYSICAL THERAPY NOTE
PHYSICAL THERAPY TREATMENT NOTE - INPATIENT    Room Number: 617/406-P     Session: 3    Number of Visits to Meet Established Goals: 5    Presenting Problem: s/p fall, anemia, AMS  Co-Morbidities : melanoma, breast CA, depression  ASSESSMENT     Pt continues to present with decreased endurance, impaired balance,and decreased strength. Pt mobility limited during session secondary to orthostatic hypotension, however BP did not decrease as much as last session when standing. Pt participated in gait training with chair follow secondary to decreased BP. Pt demos flexed posture and decreased heel-toe pattern with gait training. Pt participated in seated LE thera-ex and demos increased strength since previous session with vc for proper body mechanics and muscle activation. Gait training distance limited secondary to fatigue. Pt would benefit from continued skilled PT services to progress pt's endurance toward PLOF. DISCHARGE RECOMMENDATIONS  PT Discharge Recommendations: Sub-acute rehabilitation     PLAN  PT Treatment Plan: Bed mobility; Endurance; Energy conservation;Patient education;Gait training;Strengthening;Transfer training;Balance training  Rehab Potential : Good  Frequency (Obs): 3-5x/week    CURRENT GOALS     Goal #1 Patient is able to demonstrate supine - sit EOB @ level: supervision      Goal #2 Patient is able to demonstrate transfers EOB to/from [facility] at assistance level: supervision      Goal #3 Patient is able to ambulate 100 feet with assist device: walker - rolling at assistance level: supervision      Goal #4     Goal #5     Goal #6     Goal Comments: Goals established on 2022 all goals ongoing      SUBJECTIVE  Pt reported no pain and no dizziness this morning with transfer to bedside chair. OBJECTIVE  Precautions: Bed/chair alarm; Other (Comment) (orthostasis)    WEIGHT BEARING RESTRICTION  Weight Bearing Restriction: None                PAIN ASSESSMENT   Ratin  Location: knees  Management Techniques: Activity promotion; Body mechanics;Repositioning    BALANCE                                                                                                                       Static Sitting: Fair +  Dynamic Sitting: Fair           Static Standing: Fair -  Dynamic Standing: Poor +    ACTIVITY TOLERANCE                         O2 WALK         AM-PAC '6-Clicks' INPATIENT SHORT FORM - BASIC MOBILITY  How much difficulty does the patient currently have. .. Patient Difficulty: Turning over in bed (including adjusting bedclothes, sheets and blankets)?: A Little   Patient Difficulty: Sitting down on and standing up from a chair with arms (e.g., wheelchair, bedside commode, etc.): A Little   Patient Difficulty: Moving from lying on back to sitting on the side of the bed?: A Little   How much help from another person does the patient currently need. .. Help from Another: Moving to and from a bed to a chair (including a wheelchair)?: A Little   Help from Another: Need to walk in hospital room?: A Little   Help from Another: Climbing 3-5 steps with a railing?: A Lot       AM-PAC Score:  Raw Score: 17   Approx Degree of Impairment: 50.57%   Standardized Score (AM-PAC Scale): 42.13   CMS Modifier (G-Code): CK    FUNCTIONAL ABILITY STATUS  Gait Assessment   Functional Mobility/Gait Assessment  Gait Assistance: Contact guard assist  Distance (ft): 40x2 (with chair follow and seated break)  Assistive Device: Rolling walker  Pattern: Shuffle    Skilled Therapy Provided  Pt received supine in bedside chair and sister present.  -Pt participated in sit>stand transfer training from bedside chair with RW 2x required supervision and continuous vc for hand placement.  -Pt participated in gait training with chair follow and RW, pt required CGA, vc for integration of RW, and a seated rest break after 40 ft, pt denied symptoms of dizziness or nausea.   -Pt participated in seated thera-ex requiring intermittent verbal, visual and tactile cues for body mechanics emphasis on performing full ROM. Bed Mobility:  Rolling right: nt   Rolling left: nt    Supine<>Sit: nt  Sit<>Supine: nt    Transfer Mobility:  Sit<>Stand: supervision  Stand<>Sit: supervision  Gait: CGA    Therapist's Comments:   BP:  117/54 at beginning of session, in bedside chair  79/47 standing  110/61 end of session supine in bedside chair  RN aware of findings. THERAPEUTIC EXERCISES  Lower Extremity Hip AB/AD  SLR  Heel slides     Upper Extremity N/A     Position Sitting     Repetitions   10   Sets   1     Patient End of Session: Up in chair;Needs met;Call light within reach;RN aware of session/findings; All patient questions and concerns addressed; Alarm set; Family present

## 2022-05-14 NOTE — PLAN OF CARE
Pt received alert and oriented x 4, no complaints of pain. Vitals stable, no dizziness per pt. Up to the BR with stby voiding without difficulty,no bm tonight. IVF infusing, safety precautions maintained, will monitor.

## 2022-05-14 NOTE — PROGRESS NOTES
Received pt alert and orientated x4. VSS. No sob on RA. Afebrile. No c/o pain. Up and walking in the halls. C/o of lightheadedness. Up and voiding in the bathroom. Tolerating diet. All meds given per [facility]. Fall precautions in place. Call light within reach.

## 2022-05-15 LAB — CORTIS SERPL-MCNC: 10.5 UG/DL

## 2022-05-15 RX ORDER — MIDODRINE HYDROCHLORIDE 5 MG/1
7.5 TABLET ORAL 3 TIMES DAILY
Qty: 270 TABLET | Refills: 3 | Status: ON HOLD | OUTPATIENT
Start: 2022-05-15

## 2022-05-15 RX ORDER — MELATONIN
325
Status: DISCONTINUED | OUTPATIENT
Start: 2022-05-15 | End: 2022-05-15

## 2022-05-15 RX ORDER — FLUDROCORTISONE ACETATE 0.1 MG/1
0.1 TABLET ORAL DAILY
Qty: 30 TABLET | Refills: 0 | Status: ON HOLD | OUTPATIENT
Start: 2022-05-16

## 2022-05-15 NOTE — CM/SW NOTE
Discharge cancelled by MD d/t + orthostatic BP. [facility] notified.      Thao Stevens RN, BSN   [phone]

## 2022-05-15 NOTE — PLAN OF CARE
Pt received alert and oriented x4, vitals stable , afebrile, denies pain and sob. C/o mild dizziness when up to the BR. Voiding no bm tonight. meds given per [facility]. Safety precautions maintained, will monitor.

## 2022-05-15 NOTE — CM/SW NOTE
Patient will dc to [facility] today at 1600 via [facility]. RN, Please call report to # [phone]. Kasey Nguyen and Benedict Ford notified of transfer today via phone.      Thao Stevens RN, BSN   [phone]

## 2022-05-15 NOTE — PROGRESS NOTES
Received pt alert and orientated x4. VSS. No sob on RA. No c/o pain. Afebrile. Positive orthostatics. MD aware. Pt up and sitting in the chair. Eleuterio hose on. Midodrine given per MAR. All other meds given per [facility]. Plan to dc to \[facility]"" tomorrow. Fall precautions in place. Call light within reach.

## 2022-05-16 LAB
ANION GAP SERPL CALC-SCNC: 5 MMOL/L (ref 0–18)
BUN BLD-MCNC: 12 MG/DL (ref 7–18)
CALCIUM BLD-MCNC: 8.3 MG/DL (ref 8.5–10.1)
CHLORIDE SERPL-SCNC: 108 MMOL/L (ref 98–112)
CO2 SERPL-SCNC: 27 MMOL/L (ref 21–32)
CORTIS SERPL-MCNC: 24.8 UG/DL
CORTIS SERPL-MCNC: 31.4 UG/DL
CREAT BLD-MCNC: 0.56 MG/DL
GLUCOSE BLD-MCNC: 109 MG/DL (ref 70–99)
OSMOLALITY SERPL CALC.SUM OF ELEC: 290 MOSM/KG (ref 275–295)
POTASSIUM SERPL-SCNC: 3.2 MMOL/L (ref 3.5–5.1)
SODIUM SERPL-SCNC: 140 MMOL/L (ref 136–145)

## 2022-05-16 PROCEDURE — 99232 SBSQ HOSP IP/OBS MODERATE 35: CPT | Performed by: OTHER

## 2022-05-16 RX ORDER — COSYNTROPIN 0.25 MG/ML
0.25 INJECTION, POWDER, FOR SOLUTION INTRAMUSCULAR; INTRAVENOUS ONCE
Status: COMPLETED | OUTPATIENT
Start: 2022-05-16 | End: 2022-05-16

## 2022-05-16 RX ORDER — ARIPIPRAZOLE 2 MG/1
2 TABLET ORAL NIGHTLY
Status: DISCONTINUED | OUTPATIENT
Start: 2022-05-16 | End: 2022-05-17

## 2022-05-16 RX ORDER — POTASSIUM CHLORIDE 20 MEQ/1
40 TABLET, EXTENDED RELEASE ORAL EVERY 4 HOURS
Status: COMPLETED | OUTPATIENT
Start: 2022-05-16 | End: 2022-05-16

## 2022-05-16 RX ORDER — MECLIZINE HCL 12.5 MG/1
12.5 TABLET ORAL 2 TIMES DAILY PRN
Status: DISCONTINUED | OUTPATIENT
Start: 2022-05-16 | End: 2022-05-17

## 2022-05-16 NOTE — PHYSICAL THERAPY NOTE
PHYSICAL THERAPY TREATMENT NOTE - INPATIENT    Room Number: 405/610-J     Session: 4    Number of Visits to Meet Established Goals: 5    Presenting Problem: s/p fall, anemia, AMS  Co-Morbidities : melanoma, breast CA, depression  ASSESSMENT     Pt continues to present with decreased strength , decreased endurance, and impaired balance. Pt participated in bed mobility training requiring increased level of assistance since previous session secondary to c/o dizziness and decreased strength. Pt participated in sit>stand transfer training and demos decreased tolerance for static standing, unable to maintain to monitor BP secondary to c/o increased dizziness. Pt mobility limited during session secondary to orthostatic hypotension and dizziness upon standing. Pt would benefit from continued skilled PT services to progress pt's strength for increased level of functional independence. DISCHARGE RECOMMENDATIONS  PT Discharge Recommendations: Sub-acute rehabilitation     PLAN  PT Treatment Plan: Bed mobility; Endurance; Energy conservation;Patient education;Gait training;Strengthening;Transfer training;Balance training  Rehab Potential : Good  Frequency (Obs): 3-5x/week    CURRENT GOALS     Goal #1 Patient is able to demonstrate supine - sit EOB @ level: supervision      Goal #2 Patient is able to demonstrate transfers EOB to/from [facility] at assistance level: supervision      Goal #3 Patient is able to ambulate 100 feet with assist device: walker - rolling at assistance level: supervision      Goal #4     Goal #5     Goal #6     Goal Comments: Goals established on 5/8/2022 5/11/2022 all goals ongoing      SUBJECTIVE  Pt reported feeling dizzy, however agreed to session. OBJECTIVE  Precautions: Bed/chair alarm; Other (Comment) (orthostasis)    WEIGHT BEARING RESTRICTION  Weight Bearing Restriction: None                PAIN ASSESSMENT   Rating: Unable to rate  Location: generilized  Management Techniques:  Activity promotion; Body mechanics;Repositioning    BALANCE                                                                                                                       Static Sitting: Fair +  Dynamic Sitting: Fair           Static Standing: Fair -  Dynamic Standing: Poor +    ACTIVITY TOLERANCE                         O2 WALK         AM-PAC '6-Clicks' INPATIENT SHORT FORM - BASIC MOBILITY  How much difficulty does the patient currently have. .. Patient Difficulty: Turning over in bed (including adjusting bedclothes, sheets and blankets)?: A Little   Patient Difficulty: Sitting down on and standing up from a chair with arms (e.g., wheelchair, bedside commode, etc.): A Little   Patient Difficulty: Moving from lying on back to sitting on the side of the bed?: A Little   How much help from another person does the patient currently need. .. Help from Another: Moving to and from a bed to a chair (including a wheelchair)?: A Little   Help from Another: Need to walk in hospital room?: A Little   Help from Another: Climbing 3-5 steps with a railing?: A Lot       AM-PAC Score:  Raw Score: 17   Approx Degree of Impairment: 50.57%   Standardized Score (AM-PAC Scale): 42.13   CMS Modifier (G-Code): CK    FUNCTIONAL ABILITY STATUS  Gait Assessment   Functional Mobility/Gait Assessment  Gait Assistance: Not tested  Distance (ft): 0  Assistive Device: Rolling walker  Pattern: Shuffle    Skilled Therapy Provided  -Pt participated in bed mobility training supine>seated at EOB with HOB slightly elevated. Pt required verbal and tactile cues for body mechanics and sequencing to begin movement then required min assist for trunk support.  Pt independent with scooting while seated at EOB requiring close supervision and increased time secondary to fatigue and c/o dizziness.  -Pt reported c/o dizziness upon sitting at EOB BP monitored 89/52, vc for pursed lip breathing and gaze stabilization, then pt reported symptoms resolving with increased time.  -Pt participated in sit>satnd transfer training from bed at lowest position requiring CGA and vc for hand placement. Pt reported increased dizziness upon standing and unable to maintain static standing to monitor BP, pt requesting to return to bed secondary to increased c/o dizziness and fatigue.  -Pt independent with lateral scooting toward HOB, returned supine with CGA. Pt required max assist for repositioning, bed in chair position. Pt reported symptoms resolving by end of session /64. Bed Mobility:  Rolling right: CGA   Rolling left: nt    Supine<>Sit: min assist HOB slightly elevated   Sit<>Supine: CGA HOB slightly elevated    Transfer Mobility:  Sit<>Stand: CGA   Stand<>Sit: CGA  Gait: nt    Therapist's Comments:   BP:  112/73 at beginning of session  89/52 s/p supine>seated at EOB transfer  124/64 end of session supine in bed  RN aware of session findings. THERAPEUTIC EXERCISES  Lower Extremity Ankle pumps  SLR  Hip AB/AD           Position Supine     Repetitions   10   Sets   1     Patient End of Session: In bed;Needs met;Call light within reach;RN aware of session/findings; All patient questions and concerns addressed; Alarm set      Joe Byrd, co-signed by [employer] PTA

## 2022-05-16 NOTE — CM/SW NOTE
SW sent updated clinical information to [address] [place] [address] for possible patient DC. MAURICE will continue to follow for medical clearance. SW will continue to remain available for any additional DC needs or concerns.      Kassi RED, LSW  Discharge Planner   [phone]

## 2022-05-17 VITALS
DIASTOLIC BLOOD PRESSURE: 50 MMHG | WEIGHT: 141.13 LBS | BODY MASS INDEX: 26.65 KG/M2 | TEMPERATURE: 98 F | HEART RATE: 68 BPM | RESPIRATION RATE: 20 BRPM | OXYGEN SATURATION: 96 % | SYSTOLIC BLOOD PRESSURE: 129 MMHG | HEIGHT: 61 IN

## 2022-05-17 LAB
BASOPHILS # BLD AUTO: 0.08 X10(3) UL (ref 0–0.2)
BASOPHILS NFR BLD AUTO: 1.1 %
EOSINOPHIL # BLD AUTO: 0.2 X10(3) UL (ref 0–0.7)
EOSINOPHIL NFR BLD AUTO: 2.7 %
ERYTHROCYTE [DISTWIDTH] IN BLOOD BY AUTOMATED COUNT: 16.6 %
HCT VFR BLD AUTO: 30.4 %
HGB BLD-MCNC: 9.6 G/DL
IMM GRANULOCYTES # BLD AUTO: 0.06 X10(3) UL (ref 0–1)
IMM GRANULOCYTES NFR BLD: 0.8 %
LYMPHOCYTES # BLD AUTO: 1.24 X10(3) UL (ref 1–4)
LYMPHOCYTES NFR BLD AUTO: 16.6 %
MCH RBC QN AUTO: 30.9 PG (ref 26–34)
MCHC RBC AUTO-ENTMCNC: 31.6 G/DL (ref 31–37)
MCV RBC AUTO: 97.7 FL
MONOCYTES # BLD AUTO: 0.73 X10(3) UL (ref 0.1–1)
MONOCYTES NFR BLD AUTO: 9.8 %
NEUTROPHILS # BLD AUTO: 5.17 X10 (3) UL (ref 1.5–7.7)
NEUTROPHILS # BLD AUTO: 5.17 X10(3) UL (ref 1.5–7.7)
NEUTROPHILS NFR BLD AUTO: 69 %
PLATELET # BLD AUTO: 360 10(3)UL (ref 150–450)
POTASSIUM SERPL-SCNC: 4.1 MMOL/L (ref 3.5–5.1)
RBC # BLD AUTO: 3.11 X10(6)UL
WBC # BLD AUTO: 7.5 X10(3) UL (ref 4–11)

## 2022-05-17 PROCEDURE — 99232 SBSQ HOSP IP/OBS MODERATE 35: CPT | Performed by: OTHER

## 2022-05-17 RX ORDER — ARIPIPRAZOLE 2 MG/1
2 TABLET ORAL DAILY
Status: DISCONTINUED | OUTPATIENT
Start: 2022-05-17 | End: 2022-05-17

## 2022-05-17 RX ORDER — ARIPIPRAZOLE 2 MG/1
2 TABLET ORAL DAILY
Qty: 30 TABLET | Refills: 0 | Status: ON HOLD | OUTPATIENT
Start: 2022-05-17

## 2022-05-17 NOTE — PLAN OF CARE
A/o x4. Room air. Orthostatic blood pressure positive but no hypotension. Denies pain. Denies vertigo. Denies dizziness. Smear of black stool in the diaper. No bright red blood per rectum noted. Abilify given at bedtime. Ambulates to the bathroom with a walker, standby assist. Fall precaution maintained. Problem: NEUROLOGICAL - ADULT  Goal: Achieves stable or improved neurological status  Description: INTERVENTIONS  - Assess for and report changes in neurological status  - Initiate measures to prevent increased intracranial pressure  - Maintain blood pressure and fluid volume within ordered parameters to optimize cerebral perfusion and minimize risk of hemorrhage  - Monitor temperature, glucose, and sodium.  Initiate appropriate interventions as ordered  Outcome: Progressing

## 2022-05-17 NOTE — CM/SW NOTE
05/17/22 1348   Discharge disposition   Expected discharge disposition [address] Provider [facility]   Discharge transportation [address] notified by patient's nurse that patient is cleared for DC to [facility]. SW notified [facility] via [facility]. Pt to transport via [id number] patient/family notified transport is not covered by insurance. Pt/family are agreeable to the charges. [facility] transportation arranged for 4pm. PCS form completed. Nurse notified. *Update   Patient's sister will be transporting according to RN. [facility] transport cancelled. [email or web address]  Phone: [phone]    SW will continue to remain available for any additional DC needs or concerns.      Kassi Coleman MSW, LSW  Discharge Planner   [phone]

## 2022-05-17 NOTE — PROGRESS NOTES
NURSING DISCHARGE NOTE    Discharged Rehab facility via [facility]. Accompanied by Family member  Belongings Taken by patient/family. Patient A&Ox4, report given to Nacho Livingston RN at [place]. Pat's. AVS, home medications, and follow up appts gone over w/patient & patient's sister. PIV taken out, tolerated well.

## 2022-05-27 ENCOUNTER — APPOINTMENT (OUTPATIENT)
Dept: CT IMAGING | Facility: HOSPITAL | Age: 77
End: 2022-05-27
Attending: EMERGENCY MEDICINE
Payer: MEDICARE

## 2022-05-27 ENCOUNTER — HOSPITAL ENCOUNTER (INPATIENT)
Facility: HOSPITAL | Age: 77
LOS: 10 days | Discharge: NURSING, REHAB OR LONG-TERM CARE | End: 2022-06-06
Attending: EMERGENCY MEDICINE | Admitting: INTERNAL MEDICINE
Payer: MEDICARE

## 2022-05-27 DIAGNOSIS — K92.2 GASTROINTESTINAL HEMORRHAGE, UNSPECIFIED GASTROINTESTINAL HEMORRHAGE TYPE: Primary | ICD-10-CM

## 2022-05-27 DIAGNOSIS — R10.9 ABDOMINAL PAIN: ICD-10-CM

## 2022-05-27 PROBLEM — R79.89 AZOTEMIA: Status: ACTIVE | Noted: 2022-05-27

## 2022-05-27 PROBLEM — D64.9 ANEMIA: Status: ACTIVE | Noted: 2022-05-27

## 2022-05-27 PROBLEM — R73.9 HYPERGLYCEMIA: Status: ACTIVE | Noted: 2022-05-27

## 2022-05-27 LAB
ALBUMIN SERPL-MCNC: 3 G/DL (ref 3.4–5)
ALBUMIN/GLOB SERPL: 0.7 {RATIO} (ref 1–2)
ALP LIVER SERPL-CCNC: 56 U/L
ALT SERPL-CCNC: 32 U/L
ANION GAP SERPL CALC-SCNC: 7 MMOL/L (ref 0–18)
ANTIBODY SCREEN: NEGATIVE
AST SERPL-CCNC: 29 U/L (ref 15–37)
ATRIAL RATE: 136 BPM
BASOPHILS # BLD AUTO: 0.07 X10(3) UL (ref 0–0.2)
BASOPHILS NFR BLD AUTO: 0.8 %
BILIRUB SERPL-MCNC: 0.3 MG/DL (ref 0.1–2)
BUN BLD-MCNC: 17 MG/DL (ref 7–18)
CALCIUM BLD-MCNC: 8 MG/DL (ref 8.5–10.1)
CHLORIDE SERPL-SCNC: 111 MMOL/L (ref 98–112)
CO2 SERPL-SCNC: 22 MMOL/L (ref 21–32)
CREAT BLD-MCNC: 0.54 MG/DL
EOSINOPHIL # BLD AUTO: 0.15 X10(3) UL (ref 0–0.7)
EOSINOPHIL NFR BLD AUTO: 1.7 %
ERYTHROCYTE [DISTWIDTH] IN BLOOD BY AUTOMATED COUNT: 16.2 %
GLOBULIN PLAS-MCNC: 4.2 G/DL (ref 2.8–4.4)
GLUCOSE BLD-MCNC: 141 MG/DL (ref 70–99)
HCT VFR BLD AUTO: 33.3 %
HGB BLD-MCNC: 10.4 G/DL
IMM GRANULOCYTES # BLD AUTO: 0.06 X10(3) UL (ref 0–1)
IMM GRANULOCYTES NFR BLD: 0.7 %
LYMPHOCYTES # BLD AUTO: 1.87 X10(3) UL (ref 1–4)
LYMPHOCYTES NFR BLD AUTO: 21.2 %
MCH RBC QN AUTO: 30.7 PG (ref 26–34)
MCHC RBC AUTO-ENTMCNC: 31.2 G/DL (ref 31–37)
MCV RBC AUTO: 98.2 FL
MONOCYTES # BLD AUTO: 0.85 X10(3) UL (ref 0.1–1)
MONOCYTES NFR BLD AUTO: 9.6 %
NEUTROPHILS # BLD AUTO: 5.82 X10 (3) UL (ref 1.5–7.7)
NEUTROPHILS # BLD AUTO: 5.82 X10(3) UL (ref 1.5–7.7)
NEUTROPHILS NFR BLD AUTO: 66 %
OSMOLALITY SERPL CALC.SUM OF ELEC: 294 MOSM/KG (ref 275–295)
P-R INTERVAL: 120 MS
PLATELET # BLD AUTO: 543 10(3)UL (ref 150–450)
POTASSIUM SERPL-SCNC: 3.7 MMOL/L (ref 3.5–5.1)
PROT SERPL-MCNC: 7.2 G/DL (ref 6.4–8.2)
Q-T INTERVAL: 312 MS
QRS DURATION: 72 MS
QTC CALCULATION (BEZET): 469 MS
R AXIS: 20 DEGREES
RBC # BLD AUTO: 3.39 X10(6)UL
RH BLOOD TYPE: POSITIVE
SARS-COV-2 RNA RESP QL NAA+PROBE: NOT DETECTED
SODIUM SERPL-SCNC: 140 MMOL/L (ref 136–145)
T AXIS: 68 DEGREES
VENTRICULAR RATE: 136 BPM
WBC # BLD AUTO: 8.8 X10(3) UL (ref 4–11)

## 2022-05-27 PROCEDURE — 70450 CT HEAD/BRAIN W/O DYE: CPT | Performed by: EMERGENCY MEDICINE

## 2022-05-27 RX ORDER — SODIUM CHLORIDE 9 MG/ML
INJECTION, SOLUTION INTRAVENOUS CONTINUOUS
Status: DISCONTINUED | OUTPATIENT
Start: 2022-05-27 | End: 2022-06-04

## 2022-05-27 RX ORDER — ACETAMINOPHEN 325 MG/1
650 TABLET ORAL EVERY 6 HOURS PRN
Status: DISCONTINUED | OUTPATIENT
Start: 2022-05-27 | End: 2022-06-06

## 2022-05-27 RX ORDER — METOCLOPRAMIDE HYDROCHLORIDE 5 MG/ML
10 INJECTION INTRAMUSCULAR; INTRAVENOUS EVERY 8 HOURS PRN
Status: DISCONTINUED | OUTPATIENT
Start: 2022-05-27 | End: 2022-06-06

## 2022-05-27 RX ORDER — FAMOTIDINE 20 MG/1
20 TABLET, FILM COATED ORAL DAILY
COMMUNITY

## 2022-05-27 RX ORDER — POLYETHYLENE GLYCOL 3350 17 G/17G
17 POWDER, FOR SOLUTION ORAL DAILY PRN
COMMUNITY

## 2022-05-27 RX ORDER — ONDANSETRON 2 MG/ML
4 INJECTION INTRAMUSCULAR; INTRAVENOUS EVERY 6 HOURS PRN
Status: DISCONTINUED | OUTPATIENT
Start: 2022-05-27 | End: 2022-06-06

## 2022-05-27 NOTE — ED QUICK NOTES
Orders for admission, patient is aware of plan and ready to go upstairs. Any questions, please call ED RN Flores Calhoun at extension #[phone].      Patient Covid vaccination status: Fully vaccinated     COVID Test Ordered in ED: Rapid SARS-CoV-2 by PCR    COVID Suspicion at Admission: Low clinical suspicion for COVID    Running Infusions:  None    Mental Status/LOC at time of transport: A&Ox1    Other pertinent information:   CIWA score: N/A   NIH score:  N/A      Report given to [facility]

## 2022-05-27 NOTE — ED INITIAL ASSESSMENT (HPI)
Referral from local NH for GI bleed & aletered mental status. Per report, pt was found with dark & fressh blood in diaper, confused more than her norm. Hx of GI bleed.

## 2022-05-27 NOTE — PROGRESS NOTES
Received from ER. Unresponsive to any stimuli.  aware. O2 sat 96% room air. Tele NSR with hr-90s. Bp-142/82. Notified Sister Dax Loya for admission information. Routine admission care rendered. Fall precaution . NPO for now. Will initiate care plan.

## 2022-05-28 LAB
AMMONIA PLAS-MCNC: 107 UMOL/L (ref 11–32)
AMMONIA PLAS-MCNC: 90 UMOL/L (ref 11–32)
ANION GAP SERPL CALC-SCNC: 9 MMOL/L (ref 0–18)
BASOPHILS # BLD AUTO: 0.05 X10(3) UL (ref 0–0.2)
BASOPHILS NFR BLD AUTO: 0.5 %
BILIRUB UR QL STRIP.AUTO: NEGATIVE
BUN BLD-MCNC: 20 MG/DL (ref 7–18)
CALCIUM BLD-MCNC: 7.3 MG/DL (ref 8.5–10.1)
CHLORIDE SERPL-SCNC: 115 MMOL/L (ref 98–112)
CO2 SERPL-SCNC: 19 MMOL/L (ref 21–32)
COLOR UR AUTO: YELLOW
CREAT BLD-MCNC: 0.43 MG/DL
EOSINOPHIL # BLD AUTO: 0.06 X10(3) UL (ref 0–0.7)
EOSINOPHIL NFR BLD AUTO: 0.7 %
ERYTHROCYTE [DISTWIDTH] IN BLOOD BY AUTOMATED COUNT: 16.3 %
GLUCOSE BLD-MCNC: 117 MG/DL (ref 70–99)
GLUCOSE UR STRIP.AUTO-MCNC: NEGATIVE MG/DL
HCT VFR BLD AUTO: 27.9 %
HGB BLD-MCNC: 7.9 G/DL
HGB BLD-MCNC: 8.6 G/DL
IMM GRANULOCYTES # BLD AUTO: 0.04 X10(3) UL (ref 0–1)
IMM GRANULOCYTES NFR BLD: 0.4 %
KETONES UR STRIP.AUTO-MCNC: 80 MG/DL
LACTATE SERPL-SCNC: 1 MMOL/L (ref 0.4–2)
LYMPHOCYTES # BLD AUTO: 0.96 X10(3) UL (ref 1–4)
LYMPHOCYTES NFR BLD AUTO: 10.5 %
MAGNESIUM SERPL-MCNC: 2 MG/DL (ref 1.6–2.6)
MCH RBC QN AUTO: 29.7 PG (ref 26–34)
MCHC RBC AUTO-ENTMCNC: 30.8 G/DL (ref 31–37)
MCV RBC AUTO: 96.2 FL
MONOCYTES # BLD AUTO: 0.78 X10(3) UL (ref 0.1–1)
MONOCYTES NFR BLD AUTO: 8.6 %
NEUTROPHILS # BLD AUTO: 7.23 X10 (3) UL (ref 1.5–7.7)
NEUTROPHILS # BLD AUTO: 7.23 X10(3) UL (ref 1.5–7.7)
NEUTROPHILS NFR BLD AUTO: 79.3 %
NITRITE UR QL STRIP.AUTO: POSITIVE
OSMOLALITY SERPL CALC.SUM OF ELEC: 300 MOSM/KG (ref 275–295)
PH UR STRIP.AUTO: 6 [PH] (ref 5–8)
PLATELET # BLD AUTO: 462 10(3)UL (ref 150–450)
POTASSIUM SERPL-SCNC: 3.5 MMOL/L (ref 3.5–5.1)
PROCALCITONIN SERPL-MCNC: <0.05 NG/ML (ref ?–0.16)
PROT UR STRIP.AUTO-MCNC: NEGATIVE MG/DL
RBC # BLD AUTO: 2.9 X10(6)UL
RBC UR QL AUTO: NEGATIVE
SODIUM SERPL-SCNC: 143 MMOL/L (ref 136–145)
SP GR UR STRIP.AUTO: 1.02 (ref 1–1.03)
UROBILINOGEN UR STRIP.AUTO-MCNC: 2 MG/DL
WBC # BLD AUTO: 9.1 X10(3) UL (ref 4–11)

## 2022-05-28 RX ORDER — ACETAMINOPHEN 650 MG/1
650 SUPPOSITORY RECTAL EVERY 6 HOURS PRN
Status: DISCONTINUED | OUTPATIENT
Start: 2022-05-28 | End: 2022-06-06

## 2022-05-28 RX ORDER — HYDRALAZINE HYDROCHLORIDE 20 MG/ML
10 INJECTION INTRAMUSCULAR; INTRAVENOUS EVERY 6 HOURS PRN
Status: DISCONTINUED | OUTPATIENT
Start: 2022-05-28 | End: 2022-06-06

## 2022-05-28 NOTE — PLAN OF CARE
Patient is in bed. She moaned and grimaced during pericare and repositioning. Problem: CARDIOVASCULAR - ADULT  Goal: Maintains optimal cardiac output and hemodynamic stability  Description: INTERVENTIONS:  - Monitor vital signs, rhythm, and trends  - Monitor for bleeding, hypotension and signs of decreased cardiac output  - Evaluate effectiveness of vasoactive medications to optimize hemodynamic stability  - Monitor arterial and/or venous puncture sites for bleeding and/or hematoma  - Assess quality of pulses, skin color and temperature  - Assess for signs of decreased coronary artery perfusion - ex. Angina  - Evaluate fluid balance, assess for edema, trend weights  Outcome: Progressing  Goal: Absence of cardiac arrhythmias or at baseline  Description: INTERVENTIONS:  - Continuous cardiac monitoring, monitor vital signs, obtain 12 lead EKG if indicated  - Evaluate effectiveness of antiarrhythmic and heart rate control medications as ordered  - Initiate emergency measures for life threatening arrhythmias  - Monitor electrolytes and administer replacement therapy as ordered  Outcome: Progressing     Problem: NEUROLOGICAL - ADULT  Goal: Achieves stable or improved neurological status  Description: INTERVENTIONS  - Assess for and report changes in neurological status  - Initiate measures to prevent increased intracranial pressure  - Maintain blood pressure and fluid volume within ordered parameters to optimize cerebral perfusion and minimize risk of hemorrhage  - Monitor temperature, glucose, and sodium.  Initiate appropriate interventions as ordered  Outcome: Progressing     Problem: Impaired Functional Mobility  Goal: Achieve highest/safest level of mobility/gait  Description: Interventions:  - Assess patient's functional ability and stability  - Promote increasing activity/tolerance for mobility and gait  - Educate and engage patient/family in tolerated activity level and precautions    Outcome: Progressing Problem: Impaired Cognition  Goal: Patient will exhibit improved attention, thought processing and/or memory  Description: Interventions:  Outcome: Progressing

## 2022-05-29 LAB
AMMONIA PLAS-MCNC: 16 UMOL/L (ref 11–32)
ANION GAP SERPL CALC-SCNC: 7 MMOL/L (ref 0–18)
BASOPHILS # BLD AUTO: 0.05 X10(3) UL (ref 0–0.2)
BASOPHILS NFR BLD AUTO: 0.7 %
BUN BLD-MCNC: 12 MG/DL (ref 7–18)
CALCIUM BLD-MCNC: 7 MG/DL (ref 8.5–10.1)
CHLORIDE SERPL-SCNC: 118 MMOL/L (ref 98–112)
CO2 SERPL-SCNC: 19 MMOL/L (ref 21–32)
CREAT BLD-MCNC: 0.32 MG/DL
EOSINOPHIL # BLD AUTO: 0.21 X10(3) UL (ref 0–0.7)
EOSINOPHIL NFR BLD AUTO: 2.9 %
ERYTHROCYTE [DISTWIDTH] IN BLOOD BY AUTOMATED COUNT: 16.1 %
GLUCOSE BLD-MCNC: 101 MG/DL (ref 70–99)
HCT VFR BLD AUTO: 25.1 %
HGB BLD-MCNC: 7.8 G/DL
IMM GRANULOCYTES # BLD AUTO: 0.04 X10(3) UL (ref 0–1)
IMM GRANULOCYTES NFR BLD: 0.6 %
LYMPHOCYTES # BLD AUTO: 1.13 X10(3) UL (ref 1–4)
LYMPHOCYTES NFR BLD AUTO: 15.6 %
MAGNESIUM SERPL-MCNC: 2 MG/DL (ref 1.6–2.6)
MCH RBC QN AUTO: 30.1 PG (ref 26–34)
MCHC RBC AUTO-ENTMCNC: 31.1 G/DL (ref 31–37)
MCV RBC AUTO: 96.9 FL
MONOCYTES # BLD AUTO: 0.74 X10(3) UL (ref 0.1–1)
MONOCYTES NFR BLD AUTO: 10.2 %
NEUTROPHILS # BLD AUTO: 5.08 X10 (3) UL (ref 1.5–7.7)
NEUTROPHILS # BLD AUTO: 5.08 X10(3) UL (ref 1.5–7.7)
NEUTROPHILS NFR BLD AUTO: 70 %
OSMOLALITY SERPL CALC.SUM OF ELEC: 298 MOSM/KG (ref 275–295)
PLATELET # BLD AUTO: 354 10(3)UL (ref 150–450)
POTASSIUM SERPL-SCNC: 2.9 MMOL/L (ref 3.5–5.1)
RBC # BLD AUTO: 2.59 X10(6)UL
SODIUM SERPL-SCNC: 144 MMOL/L (ref 136–145)
WBC # BLD AUTO: 7.3 X10(3) UL (ref 4–11)

## 2022-05-29 PROCEDURE — 99221 1ST HOSP IP/OBS SF/LOW 40: CPT | Performed by: HOSPITALIST

## 2022-05-29 RX ORDER — POTASSIUM CHLORIDE 14.9 MG/ML
20 INJECTION INTRAVENOUS ONCE
Status: COMPLETED | OUTPATIENT
Start: 2022-05-29 | End: 2022-05-29

## 2022-05-29 RX ORDER — ACETAMINOPHEN 10 MG/ML
1000 INJECTION, SOLUTION INTRAVENOUS EVERY 6 HOURS PRN
Status: DISCONTINUED | OUTPATIENT
Start: 2022-05-29 | End: 2022-06-06

## 2022-05-29 RX ORDER — KETOROLAC TROMETHAMINE 15 MG/ML
15 INJECTION, SOLUTION INTRAMUSCULAR; INTRAVENOUS ONCE
Status: COMPLETED | OUTPATIENT
Start: 2022-05-29 | End: 2022-05-29

## 2022-05-29 NOTE — PLAN OF CARE
Assumed care at [address] [place] [address]. Pt only responsive to painful stimuli at this time. RA - SPO2 above 92%. Sinus/ ST on tele. Afebrile overnight. 0.9% gtts @ 100mL/hr per order. IV abx q 24hrs. Ammonia levels elevated - hospitalist and GI notified, new orders placed for lactulose retention enema. Will recheck ammonia level after administering 2 doses - per GI. Safety precautions in place. Will continue to monitor. ** received a critical value notification for pt's K(2.9). Endorsed to [employer], she will notify MD.         Problem: CARDIOVASCULAR - ADULT  Goal: Maintains optimal cardiac output and hemodynamic stability  Description: INTERVENTIONS:  - Monitor vital signs, rhythm, and trends  - Monitor for bleeding, hypotension and signs of decreased cardiac output  - Evaluate effectiveness of vasoactive medications to optimize hemodynamic stability  - Monitor arterial and/or venous puncture sites for bleeding and/or hematoma  - Assess quality of pulses, skin color and temperature  - Assess for signs of decreased coronary artery perfusion - ex.  Angina  - Evaluate fluid balance, assess for edema, trend weights  Outcome: Progressing  Goal: Absence of cardiac arrhythmias or at baseline  Description: INTERVENTIONS:  - Continuous cardiac monitoring, monitor vital signs, obtain 12 lead EKG if indicated  - Evaluate effectiveness of antiarrhythmic and heart rate control medications as ordered  - Initiate emergency measures for life threatening arrhythmias  - Monitor electrolytes and administer replacement therapy as ordered  Outcome: Progressing     Problem: NEUROLOGICAL - ADULT  Goal: Achieves stable or improved neurological status  Description: INTERVENTIONS  - Assess for and report changes in neurological status  - Initiate measures to prevent increased intracranial pressure  - Maintain blood pressure and fluid volume within ordered parameters to optimize cerebral perfusion and minimize risk of hemorrhage  - Monitor temperature, glucose, and sodium.  Initiate appropriate interventions as ordered  Outcome: Progressing     Problem: Impaired Functional Mobility  Goal: Achieve highest/safest level of mobility/gait  Description: Interventions:  - Assess patient's functional ability and stability  - Promote increasing activity/tolerance for mobility and gait  - Educate and engage patient/family in tolerated activity level and precautions    Outcome: Progressing     Problem: Impaired Cognition  Goal: Patient will exhibit improved attention, thought processing and/or memory  Description: Interventions:    Outcome: Progressing

## 2022-05-30 LAB
BASOPHILS # BLD AUTO: 0.07 X10(3) UL (ref 0–0.2)
BASOPHILS NFR BLD AUTO: 1 %
EOSINOPHIL # BLD AUTO: 0.38 X10(3) UL (ref 0–0.7)
EOSINOPHIL NFR BLD AUTO: 5.3 %
ERYTHROCYTE [DISTWIDTH] IN BLOOD BY AUTOMATED COUNT: 16.2 %
HCT VFR BLD AUTO: 26 %
HGB BLD-MCNC: 7.5 G/DL
IMM GRANULOCYTES # BLD AUTO: 0.05 X10(3) UL (ref 0–1)
IMM GRANULOCYTES NFR BLD: 0.7 %
LYMPHOCYTES # BLD AUTO: 1.25 X10(3) UL (ref 1–4)
LYMPHOCYTES NFR BLD AUTO: 17.5 %
MCH RBC QN AUTO: 30.4 PG (ref 26–34)
MCHC RBC AUTO-ENTMCNC: 28.8 G/DL (ref 31–37)
MCV RBC AUTO: 105.3 FL
MONOCYTES # BLD AUTO: 0.74 X10(3) UL (ref 0.1–1)
MONOCYTES NFR BLD AUTO: 10.3 %
NEUTROPHILS # BLD AUTO: 4.66 X10 (3) UL (ref 1.5–7.7)
NEUTROPHILS # BLD AUTO: 4.66 X10(3) UL (ref 1.5–7.7)
NEUTROPHILS NFR BLD AUTO: 65.2 %
PLATELET # BLD AUTO: 359 10(3)UL (ref 150–450)
POTASSIUM SERPL-SCNC: 3.1 MMOL/L (ref 3.5–5.1)
RBC # BLD AUTO: 2.47 X10(6)UL
TSI SER-ACNC: 2.26 MIU/ML (ref 0.36–3.74)
VIT B12 SERPL-MCNC: 426 PG/ML (ref 193–986)
WBC # BLD AUTO: 7.2 X10(3) UL (ref 4–11)

## 2022-05-30 NOTE — PROGRESS NOTES
Received pt at [place] [address]. Alert, oriented to person, place, time. Disoriented to situation and forgetful. Asking frequently for food (NPO at this time) but starting to remember more with repetition and reminders. NSR on tele. Adequately saturated on room air. Lung sounds clear bilat. Pt able to ambulate with standby and a walker today with physical therapy. Sitting up in chair at this time, although she says she feels weak and does not feel she can stay in the chair long. Fall precautions in place. Chair alarm on. Family bedside, updated on plan of care. 1730 - plan for egd/colonoscopy in AM.  Consent signed, placed in chart. Pt drinking Golytely.

## 2022-05-30 NOTE — CONSULTS
I evaluated the patient this afternoon. Formal documentation to follow. I suspect hepatic encephalopathy given elevated ammonia and non-focal examination. Will add a couple of labs.

## 2022-05-30 NOTE — PLAN OF CARE
Assumed care of patient t 0730. Patient not alert. Very lethargic, would not respond to her name or open eyes. IVF maintained per order. NPO. Lactulose enema X2 per order followed by large red/black stools. Labs completed per order. Neurology consult to bedside. Patient became more awake and alert as shift progressed. By shifts end patient speaking/confused with eyes open. Plan of care updated with multiple family members. All questions answered. Problem: CARDIOVASCULAR - ADULT  Goal: Maintains optimal cardiac output and hemodynamic stability  Description: INTERVENTIONS:  - Monitor vital signs, rhythm, and trends  - Monitor for bleeding, hypotension and signs of decreased cardiac output  - Evaluate effectiveness of vasoactive medications to optimize hemodynamic stability  - Monitor arterial and/or venous puncture sites for bleeding and/or hematoma  - Assess quality of pulses, skin color and temperature  - Assess for signs of decreased coronary artery perfusion - ex. Angina  - Evaluate fluid balance, assess for edema, trend weights  Outcome: Progressing  Goal: Absence of cardiac arrhythmias or at baseline  Description: INTERVENTIONS:  - Continuous cardiac monitoring, monitor vital signs, obtain 12 lead EKG if indicated  - Evaluate effectiveness of antiarrhythmic and heart rate control medications as ordered  - Initiate emergency measures for life threatening arrhythmias  - Monitor electrolytes and administer replacement therapy as ordered  Outcome: Progressing     Problem: NEUROLOGICAL - ADULT  Goal: Achieves stable or improved neurological status  Description: INTERVENTIONS  - Assess for and report changes in neurological status  - Initiate measures to prevent increased intracranial pressure  - Maintain blood pressure and fluid volume within ordered parameters to optimize cerebral perfusion and minimize risk of hemorrhage  - Monitor temperature, glucose, and sodium.  Initiate appropriate interventions as ordered  Outcome: Progressing     Problem: Impaired Functional Mobility  Goal: Achieve highest/safest level of mobility/gait  Description: Interventions:  - Assess patient's functional ability and stability  - Promote increasing activity/tolerance for mobility and gait  - Educate and engage patient/family in tolerated activity level and precautions    Outcome: Progressing     Problem: Impaired Cognition  Goal: Patient will exhibit improved attention, thought processing and/or memory  Description: Interventions:    Outcome: Progressing

## 2022-05-30 NOTE — PLAN OF CARE
Problem: Patient/Family Goals  Goal: Patient/Family Long Term Goal  Description: Patient's Long Term Goal:   Interventions:    - See additional Care Plan goals for specific interventions  Outcome: Progressing  Goal: Patient/Family Short Term Goal  Description: Patient's Short Term Goal:     Interventions:     - See additional Care Plan goals for specific interventions  Outcome: Progressing     Problem: CARDIOVASCULAR - ADULT  Goal: Maintains optimal cardiac output and hemodynamic stability  Description: INTERVENTIONS:  - Monitor vital signs, rhythm, and trends  - Monitor for bleeding, hypotension and signs of decreased cardiac output  - Evaluate effectiveness of vasoactive medications to optimize hemodynamic stability  - Monitor arterial and/or venous puncture sites for bleeding and/or hematoma  - Assess quality of pulses, skin color and temperature  - Assess for signs of decreased coronary artery perfusion - ex. Angina  - Evaluate fluid balance, assess for edema, trend weights  Outcome: Progressing  Goal: Absence of cardiac arrhythmias or at baseline  Description: INTERVENTIONS:  - Continuous cardiac monitoring, monitor vital signs, obtain 12 lead EKG if indicated  - Evaluate effectiveness of antiarrhythmic and heart rate control medications as ordered  - Initiate emergency measures for life threatening arrhythmias  - Monitor electrolytes and administer replacement therapy as ordered  Outcome: Progressing     Problem: NEUROLOGICAL - ADULT  Goal: Achieves stable or improved neurological status  Description: INTERVENTIONS  - Assess for and report changes in neurological status  - Initiate measures to prevent increased intracranial pressure  - Maintain blood pressure and fluid volume within ordered parameters to optimize cerebral perfusion and minimize risk of hemorrhage  - Monitor temperature, glucose, and sodium.  Initiate appropriate interventions as ordered  Outcome: Progressing     Problem: Impaired Functional Mobility  Goal: Achieve highest/safest level of mobility/gait  Description: Interventions:  - Assess patient's functional ability and stability  - Promote increasing activity/tolerance for mobility and gait  - Educate and engage patient/family in tolerated activity level and precautions    Outcome: Progressing     Problem: Impaired Cognition  Goal: Patient will exhibit improved attention, thought processing and/or memory  Description: Interventions:    Outcome: Progressing     Problem: COPING  Goal: Pt/Family able to verbalize concerns and demonstrate effective coping strategies  Description: INTERVENTIONS:  - Assist patient/family to identify coping skills, available support systems and cultural and spiritual values  - Provide emotional support, including active listening and acknowledgement of concerns of patient and caregivers  - Reduce environmental stimuli, as able  - Instruct patient/family in relaxation techniques, as appropriate  - Assess for spiritual and psychosocial needs and initiate Spiritual Care or Behavioral Health consult as needed  Outcome: Progressing     Problem: CONFUSION  Goal: Confusion, delirium, dementia or psychosis is improved or at baseline  Description: INTERVENTIONS:  - Assess for possible contributors to thought disturbance, including medications, impaired vision or hearing, underlying metabolic abnormalities, dehydration, psychiatric diagnoses, and notify attending MD/LIP  - [place] high risk fall precautions, as indicated  - Provide frequent short contacts to provide reality reorientation, refocusing and direction  - Decrease environmental stimuli, including noise as appropriate  - Monitor and intervene to maintain adequate nutrition, hydration, elimination, sleep and activity  - Consider the need for a sitter if unable to leave patient unattended  - [address] consult as indicated  - Consult Pharmacy as needed  Outcome: Progressing

## 2022-05-31 ENCOUNTER — APPOINTMENT (OUTPATIENT)
Dept: ULTRASOUND IMAGING | Facility: HOSPITAL | Age: 77
End: 2022-05-31
Attending: INTERNAL MEDICINE
Payer: MEDICARE

## 2022-05-31 ENCOUNTER — ANESTHESIA (OUTPATIENT)
Dept: ENDOSCOPY | Facility: HOSPITAL | Age: 77
End: 2022-05-31
Payer: MEDICARE

## 2022-05-31 ENCOUNTER — ANESTHESIA EVENT (OUTPATIENT)
Dept: ENDOSCOPY | Facility: HOSPITAL | Age: 77
End: 2022-05-31
Payer: MEDICARE

## 2022-05-31 LAB
ALBUMIN SERPL-MCNC: 2.3 G/DL (ref 3.4–5)
ALBUMIN/GLOB SERPL: 0.8 {RATIO} (ref 1–2)
ALP LIVER SERPL-CCNC: 43 U/L
ALT SERPL-CCNC: 25 U/L
ANION GAP SERPL CALC-SCNC: 6 MMOL/L (ref 0–18)
ANTIBODY SCREEN: NEGATIVE
AST SERPL-CCNC: 28 U/L (ref 15–37)
BILIRUB SERPL-MCNC: 0.2 MG/DL (ref 0.1–2)
BUN BLD-MCNC: 7 MG/DL (ref 7–18)
CALCIUM BLD-MCNC: 7.5 MG/DL (ref 8.5–10.1)
CHLORIDE SERPL-SCNC: 116 MMOL/L (ref 98–112)
CO2 SERPL-SCNC: 21 MMOL/L (ref 21–32)
CREAT BLD-MCNC: 0.34 MG/DL
ERYTHROCYTE [DISTWIDTH] IN BLOOD BY AUTOMATED COUNT: 16.1 %
GLOBULIN PLAS-MCNC: 2.9 G/DL (ref 2.8–4.4)
GLUCOSE BLD-MCNC: 72 MG/DL (ref 70–99)
HCT VFR BLD AUTO: 21.5 %
HGB BLD-MCNC: 6.7 G/DL
HGB BLD-MCNC: 9.4 G/DL
MAGNESIUM SERPL-MCNC: 2.3 MG/DL (ref 1.6–2.6)
MCH RBC QN AUTO: 30 PG (ref 26–34)
MCHC RBC AUTO-ENTMCNC: 31.2 G/DL (ref 31–37)
MCV RBC AUTO: 96.4 FL
OSMOLALITY SERPL CALC.SUM OF ELEC: 293 MOSM/KG (ref 275–295)
PLATELET # BLD AUTO: 343 10(3)UL (ref 150–450)
POTASSIUM SERPL-SCNC: 3.5 MMOL/L (ref 3.5–5.1)
PROT SERPL-MCNC: 5.2 G/DL (ref 6.4–8.2)
RBC # BLD AUTO: 2.23 X10(6)UL
RH BLOOD TYPE: POSITIVE
SARS-COV-2 RNA RESP QL NAA+PROBE: NOT DETECTED
SODIUM SERPL-SCNC: 143 MMOL/L (ref 136–145)
WBC # BLD AUTO: 6.3 X10(3) UL (ref 4–11)

## 2022-05-31 PROCEDURE — 0DJD8ZZ INSPECTION OF LOWER INTESTINAL TRACT, VIA NATURAL OR ARTIFICIAL OPENING ENDOSCOPIC: ICD-10-PCS | Performed by: INTERNAL MEDICINE

## 2022-05-31 PROCEDURE — 0DB98ZX EXCISION OF DUODENUM, VIA NATURAL OR ARTIFICIAL OPENING ENDOSCOPIC, DIAGNOSTIC: ICD-10-PCS | Performed by: INTERNAL MEDICINE

## 2022-05-31 PROCEDURE — 76700 US EXAM ABDOM COMPLETE: CPT | Performed by: INTERNAL MEDICINE

## 2022-05-31 PROCEDURE — 0DB78ZX EXCISION OF STOMACH, PYLORUS, VIA NATURAL OR ARTIFICIAL OPENING ENDOSCOPIC, DIAGNOSTIC: ICD-10-PCS | Performed by: INTERNAL MEDICINE

## 2022-05-31 PROCEDURE — 30233N1 TRANSFUSION OF NONAUTOLOGOUS RED BLOOD CELLS INTO PERIPHERAL VEIN, PERCUTANEOUS APPROACH: ICD-10-PCS | Performed by: INTERNAL MEDICINE

## 2022-05-31 RX ORDER — SODIUM CHLORIDE 9 MG/ML
INJECTION, SOLUTION INTRAVENOUS ONCE
Status: COMPLETED | OUTPATIENT
Start: 2022-05-31 | End: 2022-05-31

## 2022-05-31 RX ORDER — DIPHENHYDRAMINE HYDROCHLORIDE 50 MG/ML
25 INJECTION INTRAMUSCULAR; INTRAVENOUS EVERY 4 HOURS PRN
Status: DISCONTINUED | OUTPATIENT
Start: 2022-05-31 | End: 2022-06-06

## 2022-05-31 RX ORDER — CEPHALEXIN 500 MG/1
500 CAPSULE ORAL EVERY 6 HOURS SCHEDULED
Status: DISCONTINUED | OUTPATIENT
Start: 2022-05-31 | End: 2022-06-03

## 2022-05-31 RX ORDER — DIPHENHYDRAMINE HYDROCHLORIDE 50 MG/ML
25 INJECTION INTRAMUSCULAR; INTRAVENOUS ONCE
Status: COMPLETED | OUTPATIENT
Start: 2022-05-31 | End: 2022-05-31

## 2022-05-31 NOTE — PLAN OF CARE
Assumed are at [address] [place] [address]. Pt is alert and oriented to person, place and time, forgetful at times, poor short term memory. Maintaining adequate saturations on RA, no cough at this time. Sinus rhythm on tele, no edema noted. VS stable. Pt denies any shortness of breath, chest pain, or n/v at this time. PT recommendation at discharge--  AZIZA. C/O of  \"weakness\" -- requiring a rolling walker to safely  Ambulate - pt did not require any assist device prior to admission while at [address] -- will need to speak with SW regarding pt's new need for RW at discharge. UA (+) for E. Coli -- IV Rocephin q24hrs to treat UTI. Discontinued lactulose d/t ammonia levels WNL and pt returning to baseline mental status. According to GI's note, they would like to preform an EGD/colonoscopy, in attempts to identify a cause for the recent drop in Hbg over the past 3 days (10.4)- (7.8). However, there does not appear to be any orders for an  EGD/colonscopy placed at this time. -- An order to obtain consent for procedure and to start bowel prep \"Golytely\" are placed and have been  completed. Will contact GI to clarify order. Pt is NPO at midnight - pending abdominal ultrasound that is scheduled tomorrow late morning/early afternoon . Pt is tolerating prep fairly well -- VS remain stable, but pt c/o of increased weakness, and intermittent dizziness. Pt has been updated on plan of care,  several times, and all questions and concerns have been address at this time. Safety precautions in place, including video monitoring. Bed remains in the lowest position with alarm on. Call light and personal items within reach, pt verbalizes and demonstrates uses of call light. Pt's door remains open for frequent rounding. Will continue to monitor. PLAN:   - Neuro s/o-- will trend ammonia levels, if no improvement in next 24-48 hours reach back out to them. - Abdominal ultrasound ordered to assess Liver  - IV Rocephin for E.  Coli UTI  - Clear liquid diet/NPO at [address]  - EGD/colonscpoy in the AM - need to confirm order/plan -- no active order placed at this time.   - PT Recommendation-- AZIZA  - IVF @ 100mL/hr    **5:15: Paged Dr. Chey Nava via phone services page to give update on patients progress with prep. **6:20: Received callback from  Dr. Chey Nava,  New orders for Tap Water Enemas placed, d/w with MD that RN's change shifts at 7am and confirmed that new order for enema could be carried out later this morning. **6:30am: Called for Critical Hgb level of (6.7). Dr. Radha Levin notified, new order for 1 unit RBC transfusion. Also notified Dr. Chey Nava via phone service page, waiting for response from Dr. Chey Nava. Endorsed to [employer] -- Blood consent needs to be obtained-- TEO Patino agreed to obtain consent and start process for blood transfusion. Problem: CARDIOVASCULAR - ADULT  Goal: Maintains optimal cardiac output and hemodynamic stability  Description: INTERVENTIONS:  - Monitor vital signs, rhythm, and trends  - Monitor for bleeding, hypotension and signs of decreased cardiac output  - Evaluate effectiveness of vasoactive medications to optimize hemodynamic stability  - Monitor arterial and/or venous puncture sites for bleeding and/or hematoma  - Assess quality of pulses, skin color and temperature  - Assess for signs of decreased coronary artery perfusion - ex.  Angina  - Evaluate fluid balance, assess for edema, trend weights  Outcome: Progressing  Goal: Absence of cardiac arrhythmias or at baseline  Description: INTERVENTIONS:  - Continuous cardiac monitoring, monitor vital signs, obtain 12 lead EKG if indicated  - Evaluate effectiveness of antiarrhythmic and heart rate control medications as ordered  - Initiate emergency measures for life threatening arrhythmias  - Monitor electrolytes and administer replacement therapy as ordered  Outcome: Progressing     Problem: NEUROLOGICAL - ADULT  Goal: Achieves stable or improved neurological status  Description: INTERVENTIONS  - Assess for and report changes in neurological status  - Initiate measures to prevent increased intracranial pressure  - Maintain blood pressure and fluid volume within ordered parameters to optimize cerebral perfusion and minimize risk of hemorrhage  - Monitor temperature, glucose, and sodium.  Initiate appropriate interventions as ordered  Outcome: Progressing     Problem: Impaired Functional Mobility  Goal: Achieve highest/safest level of mobility/gait  Description: Interventions:  - Assess patient's functional ability and stability  - Promote increasing activity/tolerance for mobility and gait  - Educate and engage patient/family in tolerated activity level and precautions    Outcome: Progressing     Problem: Impaired Cognition  Goal: Patient will exhibit improved attention, thought processing and/or memory  Description: Interventions:    Outcome: Progressing     Problem: COPING  Goal: Pt/Family able to verbalize concerns and demonstrate effective coping strategies  Description: INTERVENTIONS:  - Assist patient/family to identify coping skills, available support systems and cultural and spiritual values  - Provide emotional support, including active listening and acknowledgement of concerns of patient and caregivers  - Reduce environmental stimuli, as able  - Instruct patient/family in relaxation techniques, as appropriate  - Assess for spiritual and psychosocial needs and initiate Spiritual Care or Behavioral Health consult as needed  Outcome: Progressing     Problem: CONFUSION  Goal: Confusion, delirium, dementia or psychosis is improved or at baseline  Description: INTERVENTIONS:  - Assess for possible contributors to thought disturbance, including medications, impaired vision or hearing, underlying metabolic abnormalities, dehydration, psychiatric diagnoses, and notify attending MD/LIP  - [place] high risk fall precautions, as indicated  - Provide frequent short contacts to provide reality reorientation, refocusing and direction  - Decrease environmental stimuli, including noise as appropriate  - Monitor and intervene to maintain adequate nutrition, hydration, elimination, sleep and activity  - Consider the need for a sitter if unable to leave patient unattended  - [address] consult as indicated  - Consult Pharmacy as needed  Outcome: Progressing

## 2022-05-31 NOTE — ANESTHESIA POSTPROCEDURE EVALUATION
[facility]:  [facility]   Age/Gender 78year old female MRN [id number]   Location [address] [place] [address] Attending Donnie Hardy DO   Hosp Day # 4 PCP Charles Wallace MD       Anesthesia Post-op Note    ESOPHAGOGASTRODUODENOSCOPY (EGD) with biopsies AND COLONOSCOPY    Procedure Summary     Date: 05/31/22 Room / Location: [facility] ENDOSCOPY 02 / [facility] ENDOSCOPY    Anesthesia Start: 3924 Anesthesia Stop: 0849    Procedures:       ESOPHAGOGASTRODUODENOSCOPY (EGD) with biopsies AND COLONOSCOPY (N/A )      COLONOSCOPY (N/A ) Diagnosis:       Abdominal pain      (EGD: gastric polyp  COLON: hemorrhoids, diverticulosis)    Surgeons: Corinne Dallas, MD Anesthesiologist: Abhinav Fortune MD    Anesthesia Type: MAC ASA Status: 3          Anesthesia Type: MAC    Vitals Value Taken Time   BP 95/41 05/31/22 1626   Temp na 05/31/22 1626   Pulse 85 05/31/22 1626   Resp 12 05/31/22 1626   SpO2 100 05/31/22 1626       Patient Location: Endoscopy    Anesthesia Type: MAC    Airway Patency: patent    Postop Pain Control: adequate    Mental Status: preanesthetic baseline    Nausea/Vomiting: none    Cardiopulmonary/Hydration status: stable euvolemic    Complications: no apparent anesthesia related complications    Postop vital signs: stable    Dental Exam: Unchanged from Preop    Patient to be discharged home when criteria met.

## 2022-05-31 NOTE — CM/SW NOTE
Patient from [facility]. Patient agreeable to return. Updates sent via [employer]. Plan for endoscopy today.

## 2022-05-31 NOTE — PLAN OF CARE
Assume care of patient at 0730. Alert and oriented. Forgetful and a bit impulsive (trying to get out of bed despite being asked not to). Fall precautions. Vital signs stable. Sinus rhythm /tach on telemetry. Denies pain. NPO at beginning of shift for US ABD. Transfused 1 unit PRBCs. Patient began itching and developed generalized splotchy redness. Hospital ist notified. Orders rec'd. Transfusion stopped. Medication administered. Itchiness resolved. Redness remains. Transfusion resumed. Diet resumed per order. Hematology consulted. Will see tomorrow. Family updated via phone. Up X1 with walker to bedside commode. Will cont to monitor. Problem: CARDIOVASCULAR - ADULT  Goal: Maintains optimal cardiac output and hemodynamic stability  Description: INTERVENTIONS:  - Monitor vital signs, rhythm, and trends  - Monitor for bleeding, hypotension and signs of decreased cardiac output  - Evaluate effectiveness of vasoactive medications to optimize hemodynamic stability  - Monitor arterial and/or venous puncture sites for bleeding and/or hematoma  - Assess quality of pulses, skin color and temperature  - Assess for signs of decreased coronary artery perfusion - ex.  Angina  - Evaluate fluid balance, assess for edema, trend weights  Outcome: Progressing  Goal: Absence of cardiac arrhythmias or at baseline  Description: INTERVENTIONS:  - Continuous cardiac monitoring, monitor vital signs, obtain 12 lead EKG if indicated  - Evaluate effectiveness of antiarrhythmic and heart rate control medications as ordered  - Initiate emergency measures for life threatening arrhythmias  - Monitor electrolytes and administer replacement therapy as ordered  Outcome: Progressing     Problem: NEUROLOGICAL - ADULT  Goal: Achieves stable or improved neurological status  Description: INTERVENTIONS  - Assess for and report changes in neurological status  - Initiate measures to prevent increased intracranial pressure  - Maintain blood pressure and fluid volume within ordered parameters to optimize cerebral perfusion and minimize risk of hemorrhage  - Monitor temperature, glucose, and sodium.  Initiate appropriate interventions as ordered  Outcome: Progressing     Problem: Impaired Functional Mobility  Goal: Achieve highest/safest level of mobility/gait  Description: Interventions:  - Assess patient's functional ability and stability  - Promote increasing activity/tolerance for mobility and gait  - Educate and engage patient/family in tolerated activity level and precautions    Outcome: Progressing     Problem: Impaired Cognition  Goal: Patient will exhibit improved attention, thought processing and/or memory  Description: Interventions:    Outcome: Progressing     Problem: COPING  Goal: Pt/Family able to verbalize concerns and demonstrate effective coping strategies  Description: INTERVENTIONS:  - Assist patient/family to identify coping skills, available support systems and cultural and spiritual values  - Provide emotional support, including active listening and acknowledgement of concerns of patient and caregivers  - Reduce environmental stimuli, as able  - Instruct patient/family in relaxation techniques, as appropriate  - Assess for spiritual and psychosocial needs and initiate Spiritual Care or Behavioral Health consult as needed  Outcome: Progressing     Problem: CONFUSION  Goal: Confusion, delirium, dementia or psychosis is improved or at baseline  Description: INTERVENTIONS:  - Assess for possible contributors to thought disturbance, including medications, impaired vision or hearing, underlying metabolic abnormalities, dehydration, psychiatric diagnoses, and notify attending MD/LIP  - [place] high risk fall precautions, as indicated  - Provide frequent short contacts to provide reality reorientation, refocusing and direction  - Decrease environmental stimuli, including noise as appropriate  - Monitor and intervene to maintain adequate nutrition, hydration, elimination, sleep and activity  - Consider the need for a sitter if unable to leave patient unattended  - [address] consult as indicated  - Consult Pharmacy as needed  Outcome: Progressing

## 2022-06-01 LAB
ALBUMIN SERPL-MCNC: 2.2 G/DL (ref 3.4–5)
ALBUMIN/GLOB SERPL: 0.8 {RATIO} (ref 1–2)
ALP LIVER SERPL-CCNC: 51 U/L
ALT SERPL-CCNC: 23 U/L
ANION GAP SERPL CALC-SCNC: 7 MMOL/L (ref 0–18)
AST SERPL-CCNC: 30 U/L (ref 15–37)
BASOPHILS # BLD AUTO: 0.04 X10(3) UL (ref 0–0.2)
BASOPHILS NFR BLD AUTO: 0.7 %
BILIRUB SERPL-MCNC: 0.3 MG/DL (ref 0.1–2)
BLOOD TYPE BARCODE: 6200
BLOOD TYPE BARCODE: 6200
BUN BLD-MCNC: 4 MG/DL (ref 7–18)
CALCIUM BLD-MCNC: 7.6 MG/DL (ref 8.5–10.1)
CHLORIDE SERPL-SCNC: 113 MMOL/L (ref 98–112)
CO2 SERPL-SCNC: 23 MMOL/L (ref 21–32)
CREAT BLD-MCNC: 0.37 MG/DL
EOSINOPHIL # BLD AUTO: 0.22 X10(3) UL (ref 0–0.7)
EOSINOPHIL NFR BLD AUTO: 3.7 %
ERYTHROCYTE [DISTWIDTH] IN BLOOD BY AUTOMATED COUNT: 16.8 %
GLOBULIN PLAS-MCNC: 2.9 G/DL (ref 2.8–4.4)
GLUCOSE BLD-MCNC: 108 MG/DL (ref 70–99)
HCT VFR BLD AUTO: 26.6 %
HGB BLD-MCNC: 8.5 G/DL
IMM GRANULOCYTES # BLD AUTO: 0.03 X10(3) UL (ref 0–1)
IMM GRANULOCYTES NFR BLD: 0.5 %
LYMPHOCYTES # BLD AUTO: 1.15 X10(3) UL (ref 1–4)
LYMPHOCYTES NFR BLD AUTO: 19.3 %
MCH RBC QN AUTO: 29.9 PG (ref 26–34)
MCHC RBC AUTO-ENTMCNC: 32 G/DL (ref 31–37)
MCV RBC AUTO: 93.7 FL
MONOCYTES # BLD AUTO: 0.53 X10(3) UL (ref 0.1–1)
MONOCYTES NFR BLD AUTO: 8.9 %
NEUTROPHILS # BLD AUTO: 3.98 X10 (3) UL (ref 1.5–7.7)
NEUTROPHILS # BLD AUTO: 3.98 X10(3) UL (ref 1.5–7.7)
NEUTROPHILS NFR BLD AUTO: 66.9 %
OSMOLALITY SERPL CALC.SUM OF ELEC: 293 MOSM/KG (ref 275–295)
PLATELET # BLD AUTO: 326 10(3)UL (ref 150–450)
POTASSIUM SERPL-SCNC: 3.3 MMOL/L (ref 3.5–5.1)
PROT SERPL-MCNC: 5.1 G/DL (ref 6.4–8.2)
RBC # BLD AUTO: 2.84 X10(6)UL
SODIUM SERPL-SCNC: 143 MMOL/L (ref 136–145)
WBC # BLD AUTO: 6 X10(3) UL (ref 4–11)

## 2022-06-01 RX ORDER — FLUDROCORTISONE ACETATE 0.1 MG/1
0.1 TABLET ORAL DAILY
Status: DISCONTINUED | OUTPATIENT
Start: 2022-06-01 | End: 2022-06-06

## 2022-06-01 RX ORDER — POTASSIUM CHLORIDE 20 MEQ/1
40 TABLET, EXTENDED RELEASE ORAL ONCE
Status: COMPLETED | OUTPATIENT
Start: 2022-06-01 | End: 2022-06-01

## 2022-06-01 NOTE — PLAN OF CARE
Patient alert and oriented x2 with period confution,on room air, sinus on cardiac monitor, denies any cp and chest discomfort , npo maintained  For capsule study , plan of care updated and made comfortable , no acute distress noted , will monitor closely      Problem: Patient/Family Goals  Goal: Patient/Family Long Term Goal  Description: Patient's Long Term Goal: discharge to home    Interventions: follow orders,labs, monitor rhythm rate, v/s , consult, monitor blood count . - - See additional Care Plan goals for specific interventions  Outcome: Progressing  Goal: Patient/Family Short Term Goal    Description: Patient's Short Term Goal: safety ,   Interventions:   - fall precautions, ,safety alert , pain management , reality orientations  - See additional Care Plan goals for specific interventions  Outcome: Progressing     Problem: CARDIOVASCULAR - ADULT  Goal: Maintains optimal cardiac output and hemodynamic stability  Description: INTERVENTIONS:  - Monitor vital signs, rhythm, and trends  - Monitor for bleeding, hypotension and signs of decreased cardiac output  - Evaluate effectiveness of vasoactive medications to optimize hemodynamic stability  - Monitor arterial and/or venous puncture sites for bleeding and/or hematoma  - Assess quality of pulses, skin color and temperature  - Assess for signs of decreased coronary artery perfusion - ex.  Angina  - Evaluate fluid balance, assess for edema, trend weights  Outcome: Progressing  Goal: Absence of cardiac arrhythmias or at baseline  Description: INTERVENTIONS:  - Continuous cardiac monitoring, monitor vital signs, obtain 12 lead EKG if indicated  - Evaluate effectiveness of antiarrhythmic and heart rate control medications as ordered  - Initiate emergency measures for life threatening arrhythmias  - Monitor electrolytes and administer replacement therapy as ordered  Outcome: Progressing     Problem: NEUROLOGICAL - ADULT  Goal: Achieves stable or improved neurological status  Description: INTERVENTIONS  - Assess for and report changes in neurological status  - Initiate measures to prevent increased intracranial pressure  - Maintain blood pressure and fluid volume within ordered parameters to optimize cerebral perfusion and minimize risk of hemorrhage  - Monitor temperature, glucose, and sodium.  Initiate appropriate interventions as ordered  Outcome: Progressing     Problem: Impaired Functional Mobility  Goal: Achieve highest/safest level of mobility/gait  Description: Interventions:  - Assess patient's functional ability and stability  - Promote increasing activity/tolerance for mobility and gait  - Educate and engage patient/family in tolerated activity level and precautions    Outcome: Progressing     Problem: CONFUSION  Goal: Confusion, delirium, dementia or psychosis is improved or at baseline  Description: INTERVENTIONS:  - Assess for possible contributors to thought disturbance, including medications, impaired vision or hearing, underlying metabolic abnormalities, dehydration, psychiatric diagnoses, and notify attending MD/LIP  - [place] high risk fall precautions, as indicated  - Provide frequent short contacts to provide reality reorientation, refocusing and direction  - Decrease environmental stimuli, including noise as appropriate  - Monitor and intervene to maintain adequate nutrition, hydration, elimination, sleep and activity  - Consider the need for a sitter if unable to leave patient unattended  - [address] consult as indicated  - Consult Pharmacy as needed  Outcome: Progressing     Problem: COPING  Goal: Pt/Family able to verbalize concerns and demonstrate effective coping strategies  Description: INTERVENTIONS:  - Assist patient/family to identify coping skills, available support systems and cultural and spiritual values  - Provide emotional support, including active listening and acknowledgement of concerns of patient and caregivers  - Reduce environmental stimuli, as able  - Instruct patient/family in relaxation techniques, as appropriate  - Assess for spiritual and psychosocial needs and initiate Spiritual Care or Behavioral Health consult as needed  Outcome: Progressing

## 2022-06-01 NOTE — PLAN OF CARE
Received pt at 0730. A/O x3 forgetful and drowsy at times. Tele Rhythm normal sinus. On room air, O2 sats in 90s. breath sounds Clear. Bed alarm on and wheels locked. No CO of chest pain or shortness of breath. Last BM was 5/31. Pt is voiding with external female cath. Pt ambulates 1 assist to commode. Hx orthostatic hypotension and midodrine. Skin dry and intact. Reviewed plan of care and patient and sister are able to verbalize understanding. Dr. Joseline Castillo consulted for med review of abilify and effexor. Dr. Rito cordoba, pt would like to proceed with capsule endoscopy. Problem: CARDIOVASCULAR - ADULT  Goal: Maintains optimal cardiac output and hemodynamic stability  Description: INTERVENTIONS:  - Monitor vital signs, rhythm, and trends  - Monitor for bleeding, hypotension and signs of decreased cardiac output  - Evaluate effectiveness of vasoactive medications to optimize hemodynamic stability  - Monitor arterial and/or venous puncture sites for bleeding and/or hematoma  - Assess quality of pulses, skin color and temperature  - Assess for signs of decreased coronary artery perfusion - ex.  Angina  - Evaluate fluid balance, assess for edema, trend weights  Outcome: Progressing  Goal: Absence of cardiac arrhythmias or at baseline  Description: INTERVENTIONS:  - Continuous cardiac monitoring, monitor vital signs, obtain 12 lead EKG if indicated  - Evaluate effectiveness of antiarrhythmic and heart rate control medications as ordered  - Initiate emergency measures for life threatening arrhythmias  - Monitor electrolytes and administer replacement therapy as ordered  Outcome: Progressing     Problem: NEUROLOGICAL - ADULT  Goal: Achieves stable or improved neurological status  Description: INTERVENTIONS  - Assess for and report changes in neurological status  - Initiate measures to prevent increased intracranial pressure  - Maintain blood pressure and fluid volume within ordered parameters to optimize cerebral perfusion and minimize risk of hemorrhage  - Monitor temperature, glucose, and sodium.  Initiate appropriate interventions as ordered  Outcome: Progressing     Problem: Impaired Functional Mobility  Goal: Achieve highest/safest level of mobility/gait  Description: Interventions:  - Assess patient's functional ability and stability  - Promote increasing activity/tolerance for mobility and gait  - Educate and engage patient/family in tolerated activity level and precautions  Outcome: Progressing     Problem: Impaired Cognition  Goal: Patient will exhibit improved attention, thought processing and/or memory  Description: Interventions:  Outcome: Progressing     Problem: COPING  Goal: Pt/Family able to verbalize concerns and demonstrate effective coping strategies  Description: INTERVENTIONS:  - Assist patient/family to identify coping skills, available support systems and cultural and spiritual values  - Provide emotional support, including active listening and acknowledgement of concerns of patient and caregivers  - Reduce environmental stimuli, as able  - Instruct patient/family in relaxation techniques, as appropriate  - Assess for spiritual and psychosocial needs and initiate Spiritual Care or Behavioral Health consult as needed  Outcome: Progressing     Problem: CONFUSION  Goal: Confusion, delirium, dementia or psychosis is improved or at baseline  Description: INTERVENTIONS:  - Assess for possible contributors to thought disturbance, including medications, impaired vision or hearing, underlying metabolic abnormalities, dehydration, psychiatric diagnoses, and notify attending MD/LIP  - [place] high risk fall precautions, as indicated  - Provide frequent short contacts to provide reality reorientation, refocusing and direction  - Decrease environmental stimuli, including noise as appropriate  - Monitor and intervene to maintain adequate nutrition, hydration, elimination, sleep and activity  - Consider the need for a sitter if unable to leave patient unattended  - [address] consult as indicated  - Consult Pharmacy as needed  Outcome: Progressing

## 2022-06-02 LAB
ERYTHROCYTE [DISTWIDTH] IN BLOOD BY AUTOMATED COUNT: 16.4 %
HCT VFR BLD AUTO: 31.1 %
HGB BLD-MCNC: 9.7 G/DL
MCH RBC QN AUTO: 30 PG (ref 26–34)
MCHC RBC AUTO-ENTMCNC: 31.2 G/DL (ref 31–37)
MCV RBC AUTO: 96.3 FL
PLATELET # BLD AUTO: 327 10(3)UL (ref 150–450)
POTASSIUM SERPL-SCNC: 4 MMOL/L (ref 3.5–5.1)
RBC # BLD AUTO: 3.23 X10(6)UL
WBC # BLD AUTO: 6.1 X10(3) UL (ref 4–11)

## 2022-06-02 PROCEDURE — 90792 PSYCH DIAG EVAL W/MED SRVCS: CPT | Performed by: OTHER

## 2022-06-02 RX ORDER — PANTOPRAZOLE SODIUM 40 MG/1
40 TABLET, DELAYED RELEASE ORAL 2 TIMES DAILY
Status: DISCONTINUED | OUTPATIENT
Start: 2022-06-02 | End: 2022-06-06

## 2022-06-02 RX ORDER — VENLAFAXINE HYDROCHLORIDE 75 MG/1
150 CAPSULE, EXTENDED RELEASE ORAL
Status: DISCONTINUED | OUTPATIENT
Start: 2022-06-02 | End: 2022-06-06

## 2022-06-02 NOTE — CM/SW NOTE
CM informed by RN that patient is requesting [facility] for discharge. CM sent new referral via [facility] system to [address] [place] [address] and cancelled referral previously sent to [address] [place] [address]. CM/SW to follow up to finalize discharge plan.      Estefany Pena RN Case Manager [id number]

## 2022-06-02 NOTE — PLAN OF CARE
Received patient at 0730. Alert and Oriented x3, is forgetful at times. More alert today. Tele Rhythm NSR. O2 saturation 96%  On room air. Breath sounds clear. Bed is locked and in low position. Call light and personal items within reach. No C/O chest pain or shortness of breath. Pt voiding with no issue. Ambulating with assist and walker, tolerating better today. Denies dizziness when up today. Florinef and midodrine have been held due to BP above 100. Dr Elvia Lantigua to see pt today regarding abilify and effexor dosing. Capsule endoscopy in progress today. Daughter at bedside. Reviewed plan of care and patient and daughter verbalize understanding. Problem: Patient/Family Goals  Goal: Patient/Family Long Term Goal  Description: Patient's Long Term Goal: back to [facility]    Interventions:  - abx, IV protonix, EGD/colonoscopy, labs, increase activity, tele monitoring    - See additional Care Plan goals for specific interventions  Outcome: Progressing  Goal: Patient/Family Short Term Goal  Description: Patient's Short Term Goal: more alert    Interventions:   - abx, IV protonix, EGD/colonoscopy, labs, increase activity, tele monitoring    - See additional Care Plan goals for specific interventions  Outcome: Progressing     Problem: CARDIOVASCULAR - ADULT  Goal: Maintains optimal cardiac output and hemodynamic stability  Description: INTERVENTIONS:  - Monitor vital signs, rhythm, and trends  - Monitor for bleeding, hypotension and signs of decreased cardiac output  - Evaluate effectiveness of vasoactive medications to optimize hemodynamic stability  - Monitor arterial and/or venous puncture sites for bleeding and/or hematoma  - Assess quality of pulses, skin color and temperature  - Assess for signs of decreased coronary artery perfusion - ex.  Angina  - Evaluate fluid balance, assess for edema, trend weights  Outcome: Progressing  Goal: Absence of cardiac arrhythmias or at baseline  Description: INTERVENTIONS:  - Continuous cardiac monitoring, monitor vital signs, obtain 12 lead EKG if indicated  - Evaluate effectiveness of antiarrhythmic and heart rate control medications as ordered  - Initiate emergency measures for life threatening arrhythmias  - Monitor electrolytes and administer replacement therapy as ordered  Outcome: Progressing     Problem: NEUROLOGICAL - ADULT  Goal: Achieves stable or improved neurological status  Description: INTERVENTIONS  - Assess for and report changes in neurological status  - Initiate measures to prevent increased intracranial pressure  - Maintain blood pressure and fluid volume within ordered parameters to optimize cerebral perfusion and minimize risk of hemorrhage  - Monitor temperature, glucose, and sodium.  Initiate appropriate interventions as ordered  Outcome: Progressing     Problem: Impaired Functional Mobility  Goal: Achieve highest/safest level of mobility/gait  Description: Interventions:  - Assess patient's functional ability and stability  - Promote increasing activity/tolerance for mobility and gait  - Educate and engage patient/family in tolerated activity level and precautions  Outcome: Progressing     Problem: Impaired Cognition  Goal: Patient will exhibit improved attention, thought processing and/or memory  Description: Interventions:  Outcome: Progressing     Problem: COPING  Goal: Pt/Family able to verbalize concerns and demonstrate effective coping strategies  Description: INTERVENTIONS:  - Assist patient/family to identify coping skills, available support systems and cultural and spiritual values  - Provide emotional support, including active listening and acknowledgement of concerns of patient and caregivers  - Reduce environmental stimuli, as able  - Instruct patient/family in relaxation techniques, as appropriate  - Assess for spiritual and psychosocial needs and initiate Spiritual Care or Behavioral Health consult as needed  Outcome: Progressing     Problem: CONFUSION  Goal: Confusion, delirium, dementia or psychosis is improved or at baseline  Description: INTERVENTIONS:  - Assess for possible contributors to thought disturbance, including medications, impaired vision or hearing, underlying metabolic abnormalities, dehydration, psychiatric diagnoses, and notify attending MD/LIP  - [place] high risk fall precautions, as indicated  - Provide frequent short contacts to provide reality reorientation, refocusing and direction  - Decrease environmental stimuli, including noise as appropriate  - Monitor and intervene to maintain adequate nutrition, hydration, elimination, sleep and activity  - Consider the need for a sitter if unable to leave patient unattended  - [address] consult as indicated  - Consult Pharmacy as needed  Outcome: Progressing

## 2022-06-02 NOTE — OR NURSING
Patient tolerated swallowing of Pill Cam. Instructions given to patient and to [facility].

## 2022-06-02 NOTE — PROGRESS NOTES
06/02/22 0600 06/02/22 0615 06/02/22 0620   Vital Signs   /63 97/68 121/58   MAP (mmHg) 79 72 73   BP Location Left arm Left arm Left arm   BP Method Automatic Automatic Automatic   Patient Position Lying Sitting Standing

## 2022-06-02 NOTE — PROGRESS NOTES
Brief GI Note    Capsule endoscopy today. Will upload and read tomorrow.     Rosie Tavarez MD  [facility]  [phone]

## 2022-06-03 ENCOUNTER — APPOINTMENT (OUTPATIENT)
Dept: NUCLEAR MEDICINE | Facility: HOSPITAL | Age: 77
End: 2022-06-03
Attending: INTERNAL MEDICINE
Payer: MEDICARE

## 2022-06-03 LAB
ERYTHROCYTE [DISTWIDTH] IN BLOOD BY AUTOMATED COUNT: 16 %
HCT VFR BLD AUTO: 26.3 %
HGB BLD-MCNC: 8.2 G/DL
MCH RBC QN AUTO: 29.8 PG (ref 26–34)
MCHC RBC AUTO-ENTMCNC: 31.2 G/DL (ref 31–37)
MCV RBC AUTO: 95.6 FL
PLATELET # BLD AUTO: 313 10(3)UL (ref 150–450)
RBC # BLD AUTO: 2.75 X10(6)UL
WBC # BLD AUTO: 5 X10(3) UL (ref 4–11)

## 2022-06-03 PROCEDURE — 78278 ACUTE GI BLOOD LOSS IMAGING: CPT | Performed by: INTERNAL MEDICINE

## 2022-06-03 PROCEDURE — 0DJ07ZZ INSPECTION OF UPPER INTESTINAL TRACT, VIA NATURAL OR ARTIFICIAL OPENING: ICD-10-PCS | Performed by: INTERNAL MEDICINE

## 2022-06-03 PROCEDURE — 78830 RP LOCLZJ TUM SPECT W/CT 1: CPT | Performed by: INTERNAL MEDICINE

## 2022-06-03 NOTE — PLAN OF CARE
Problem: Patient/Family Goals  Goal: Patient/Family Long Term Goal  Description: Patient's Long Term Goal: back to [place]    Interventions:  - abx, IV protonix, EGD/colonoscopy, labs, increase activity, tele monitoring    - See additional Care Plan goals for specific interventions  Outcome: Progressing  Goal: Patient/Family Short Term Goal  Description: Patient's Short Term Goal: more alert    Interventions:   - abx, IV protonix, EGD/colonoscopy, labs, increase activity, tele monitoring    - See additional Care Plan goals for specific interventions  Outcome: Progressing     Problem: CARDIOVASCULAR - ADULT  Goal: Maintains optimal cardiac output and hemodynamic stability  Description: INTERVENTIONS:  - Monitor vital signs, rhythm, and trends  - Monitor for bleeding, hypotension and signs of decreased cardiac output  - Evaluate effectiveness of vasoactive medications to optimize hemodynamic stability  - Monitor arterial and/or venous puncture sites for bleeding and/or hematoma  - Assess quality of pulses, skin color and temperature  - Assess for signs of decreased coronary artery perfusion - ex.  Angina  - Evaluate fluid balance, assess for edema, trend weights  Outcome: Progressing  Goal: Absence of cardiac arrhythmias or at baseline  Description: INTERVENTIONS:  - Continuous cardiac monitoring, monitor vital signs, obtain 12 lead EKG if indicated  - Evaluate effectiveness of antiarrhythmic and heart rate control medications as ordered  - Initiate emergency measures for life threatening arrhythmias  - Monitor electrolytes and administer replacement therapy as ordered  Outcome: Progressing     Problem: NEUROLOGICAL - ADULT  Goal: Achieves stable or improved neurological status  Description: INTERVENTIONS  - Assess for and report changes in neurological status  - Initiate measures to prevent increased intracranial pressure  - Maintain blood pressure and fluid volume within ordered parameters to optimize cerebral perfusion and minimize risk of hemorrhage  - Monitor temperature, glucose, and sodium.  Initiate appropriate interventions as ordered  Outcome: Progressing     Problem: Impaired Functional Mobility  Goal: Achieve highest/safest level of mobility/gait  Description: Interventions:  - Assess patient's functional ability and stability  - Promote increasing activity/tolerance for mobility and gait  - Educate and engage patient/family in tolerated activity level and precautions  Outcome: Progressing     Problem: COPING  Goal: Pt/Family able to verbalize concerns and demonstrate effective coping strategies  Description: INTERVENTIONS:  - Assist patient/family to identify coping skills, available support systems and cultural and spiritual values  - Provide emotional support, including active listening and acknowledgement of concerns of patient and caregivers  - Reduce environmental stimuli, as able  - Instruct patient/family in relaxation techniques, as appropriate  - Assess for spiritual and psychosocial needs and initiate Spiritual Care or Behavioral Health consult as needed  Outcome: Progressing     Problem: CONFUSION  Goal: Confusion, delirium, dementia or psychosis is improved or at baseline  Description: INTERVENTIONS:  - Assess for possible contributors to thought disturbance, including medications, impaired vision or hearing, underlying metabolic abnormalities, dehydration, psychiatric diagnoses, and notify attending MD/LIP  - [place] high risk fall precautions, as indicated  - Provide frequent short contacts to provide reality reorientation, refocusing and direction  - Decrease environmental stimuli, including noise as appropriate  - Monitor and intervene to maintain adequate nutrition, hydration, elimination, sleep and activity  - Consider the need for a sitter if unable to leave patient unattended  - [address] consult as indicated  - Consult Pharmacy as needed  Outcome: Progressing     Patient alert and oriented x3 ,on room air, denies any cp/ chest discomfort, sinus on cardiac monitor , benadryl helped itchy and discomfort, scds applied, orthostatic bp positive , midodrine administered , plan of care updated, patient more alert and oriented and cooperative, no acute distress noted, will monitor closely

## 2022-06-03 NOTE — OPERATIVE REPORT
Capsule Endoscopy    Indication  Anemia, GI Bleed    First gastric image 00:00:06  First duodenal image 00:28:21  First cecal image 07:29:39  At 07:33:12 in cecum/proximal ascending colon there appears to be bright red blood. Distal to this stool is non-bloody, greenish brown. Impression  Bleed proximal ascending colon, likely recurrent diverticular bleed.      Recommendations  Recommend repeat colonoscopy but patient does not want to pursue  Tagged rbc scan, if positive will discuss with interventional radiology and if negative monitor hgb    René Corrales MD  [address] [place] [address]

## 2022-06-03 NOTE — CM/SW NOTE
Awaiting response from [facility] for AZIZA. Sent message to [facility] susan Thomas and extended AIDIN deadline. SW will continue to follow. Addendum  4:15pm  Spoke with Chela from [address] [place] [address], they are able to accept patient for short-term rehab. Reserved in [place]. SW will remain available.        Roslyn Murphy [facility]  Discharge Planner  [phone]

## 2022-06-03 NOTE — PROGRESS NOTES
Received pt at 0740. Alert and oriented but forgetful. NSR on tele. Adequately saturated on room air. No complaints of pain or dizziness. Eleuterio hose on BLE. Incontinent of bowel and bladder, voiding, No bowel movement today. Pt eating low residue diet. Plan for imaging this afternoon per GI. Patient and family updated on plan of care.

## 2022-06-04 LAB
ERYTHROCYTE [DISTWIDTH] IN BLOOD BY AUTOMATED COUNT: 15.8 %
HCT VFR BLD AUTO: 30.2 %
HGB BLD-MCNC: 9.3 G/DL
MCH RBC QN AUTO: 29.5 PG (ref 26–34)
MCHC RBC AUTO-ENTMCNC: 30.8 G/DL (ref 31–37)
MCV RBC AUTO: 95.9 FL
PLATELET # BLD AUTO: 257 10(3)UL (ref 150–450)
RBC # BLD AUTO: 3.15 X10(6)UL
WBC # BLD AUTO: 5.1 X10(3) UL (ref 4–11)

## 2022-06-04 RX ORDER — BISACODYL 10 MG
10 SUPPOSITORY, RECTAL RECTAL
Status: DISCONTINUED | OUTPATIENT
Start: 2022-06-04 | End: 2022-06-06

## 2022-06-04 RX ORDER — DOCUSATE SODIUM 100 MG/1
100 CAPSULE, LIQUID FILLED ORAL 2 TIMES DAILY
Status: DISCONTINUED | OUTPATIENT
Start: 2022-06-04 | End: 2022-06-06

## 2022-06-04 RX ORDER — POLYETHYLENE GLYCOL 3350 17 G/17G
17 POWDER, FOR SOLUTION ORAL DAILY PRN
Status: DISCONTINUED | OUTPATIENT
Start: 2022-06-04 | End: 2022-06-06

## 2022-06-04 RX ORDER — DOCUSATE SODIUM 100 MG/1
100 CAPSULE, LIQUID FILLED ORAL 2 TIMES DAILY
Status: DISCONTINUED | OUTPATIENT
Start: 2022-06-04 | End: 2022-06-04

## 2022-06-04 NOTE — PLAN OF CARE
Assumed care of pt at [address] a/o x3 and aware of situation at the time. Apparently this waxes and wanes. Monitor shows sinus rhythm. She requested her TEDS be removed for bed and she was told they will be reapplied in am.  She was repositioned and changed before bed and no bm noted. She stated she hasnt had one for a couple days. Clear yellow urine noted in cannister from [address]. Ext IV flushes well without redness or swelling noted. CBC for am to monitor hgb. Tagged RBC test to be completed. Safety precautions in place and needs attended to. Problem: Patient/Family Goals  Goal: Patient/Family Long Term Goal  Description: Patient's Long Term Goal: dc safely to [facility]    Interventions:  - continue meds OP as ordered  - improve strength  - See additional Care Plan goals for specific interventions  Outcome: Progressing  Goal: Patient/Family Short Term Goal  Description: Patient's Short Term Goal: continue to progress mentation    Interventions:  - monitor tele/vitals/labs  - complete tagged RBC study  - meds as ordered  - TEDS   - See additional Care Plan goals for specific interventions  Outcome: Progressing     Problem: CARDIOVASCULAR - ADULT  Goal: Maintains optimal cardiac output and hemodynamic stability  Description: INTERVENTIONS:  - Monitor vital signs, rhythm, and trends  - Monitor for bleeding, hypotension and signs of decreased cardiac output  - Evaluate effectiveness of vasoactive medications to optimize hemodynamic stability  - Monitor arterial and/or venous puncture sites for bleeding and/or hematoma  - Assess quality of pulses, skin color and temperature  - Assess for signs of decreased coronary artery perfusion - ex.  Angina  - Evaluate fluid balance, assess for edema, trend weights  Outcome: Progressing  Goal: Absence of cardiac arrhythmias or at baseline  Description: INTERVENTIONS:  - Continuous cardiac monitoring, monitor vital signs, obtain 12 lead EKG if indicated  - Evaluate effectiveness of antiarrhythmic and heart rate control medications as ordered  - Initiate emergency measures for life threatening arrhythmias  - Monitor electrolytes and administer replacement therapy as ordered  Outcome: Progressing     Problem: Impaired Functional Mobility  Goal: Achieve highest/safest level of mobility/gait  Description: Interventions:  - Assess patient's functional ability and stability  - Promote increasing activity/tolerance for mobility and gait  - Educate and engage patient/family in tolerated activity level and precautions  Outcome: Progressing     Problem: CONFUSION  Goal: Confusion, delirium, dementia or psychosis is improved or at baseline  Description: INTERVENTIONS:  - Assess for possible contributors to thought disturbance, including medications, impaired vision or hearing, underlying metabolic abnormalities, dehydration, psychiatric diagnoses, and notify attending MD/LIP  - [place] high risk fall precautions, as indicated  - Provide frequent short contacts to provide reality reorientation, refocusing and direction  - Decrease environmental stimuli, including noise as appropriate  - Monitor and intervene to maintain adequate nutrition, hydration, elimination, sleep and activity  - Consider the need for a sitter if unable to leave patient unattended  - [address] consult as indicated  - Consult Pharmacy as needed  Outcome: Progressing

## 2022-06-04 NOTE — PLAN OF CARE
Pt declines complaints of pain, malaise, or cardiac symptoms. A&Ox4, lungs clear with equal expansion, on rm air. Pt in NSR, on monitor. Abdomen soft and non-tender with active bowel sounds in all 4 quadrants, incontinent of B&B. No bowel movement in few days, hospitalist paged for PRN mediations. Gave Miralax and now scheduled colace. Monitoring hgb. Patient updated with plan of care. Stable to dc from GI and Hospitalist perspective. Hgb 9.3 today and no active bleed per GI. Pt family refusing. GI ok to monitor hemoglobin overnight. Discharge planning for tomorrow. Problem: Patient/Family Goals  Goal: Patient/Family Long Term Goal  Description: Patient's Long Term Goal: \"dc to [facility]\"    Interventions:  -Negative tagged RBC   -Monitor hgb  - See additional Care Plan goals for specific interventions  Outcome: Progressing  Goal: Patient/Family Short Term Goal  Description: Patient's Short Term Goal: \"increase activity\"    Interventions:   -Ambulate as tolerated  -PT  - See additional Care Plan goals for specific interventions  Outcome: Progressing     Problem: CARDIOVASCULAR - ADULT  Goal: Maintains optimal cardiac output and hemodynamic stability  Description: INTERVENTIONS:  - Monitor vital signs, rhythm, and trends  - Monitor for bleeding, hypotension and signs of decreased cardiac output  - Evaluate effectiveness of vasoactive medications to optimize hemodynamic stability  - Monitor arterial and/or venous puncture sites for bleeding and/or hematoma  - Assess quality of pulses, skin color and temperature  - Assess for signs of decreased coronary artery perfusion - ex.  Angina  - Evaluate fluid balance, assess for edema, trend weights  Outcome: Progressing  Goal: Absence of cardiac arrhythmias or at baseline  Description: INTERVENTIONS:  - Continuous cardiac monitoring, monitor vital signs, obtain 12 lead EKG if indicated  - Evaluate effectiveness of antiarrhythmic and heart rate control medications as ordered  - Initiate emergency measures for life threatening arrhythmias  - Monitor electrolytes and administer replacement therapy as ordered  Outcome: Progressing     Problem: NEUROLOGICAL - ADULT  Goal: Achieves stable or improved neurological status  Description: INTERVENTIONS  - Assess for and report changes in neurological status  - Initiate measures to prevent increased intracranial pressure  - Maintain blood pressure and fluid volume within ordered parameters to optimize cerebral perfusion and minimize risk of hemorrhage  - Monitor temperature, glucose, and sodium.  Initiate appropriate interventions as ordered  Outcome: Progressing     Problem: Impaired Functional Mobility  Goal: Achieve highest/safest level of mobility/gait  Description: Interventions:  - Assess patient's functional ability and stability  - Promote increasing activity/tolerance for mobility and gait  - Educate and engage patient/family in tolerated activity level and precautions    Outcome: Progressing     Problem: Impaired Cognition  Goal: Patient will exhibit improved attention, thought processing and/or memory  Description: Interventions:  Outcome: Progressing     Problem: COPING  Goal: Pt/Family able to verbalize concerns and demonstrate effective coping strategies  Description: INTERVENTIONS:  - Assist patient/family to identify coping skills, available support systems and cultural and spiritual values  - Provide emotional support, including active listening and acknowledgement of concerns of patient and caregivers  - Reduce environmental stimuli, as able  - Instruct patient/family in relaxation techniques, as appropriate  - Assess for spiritual and psychosocial needs and initiate Spiritual Care or Behavioral Health consult as needed  Outcome: Progressing     Problem: CONFUSION  Goal: Confusion, delirium, dementia or psychosis is improved or at baseline  Description: INTERVENTIONS:  - Assess for possible contributors to thought disturbance, including medications, impaired vision or hearing, underlying metabolic abnormalities, dehydration, psychiatric diagnoses, and notify attending MD/LIP  - [place] high risk fall precautions, as indicated  - Provide frequent short contacts to provide reality reorientation, refocusing and direction  - Decrease environmental stimuli, including noise as appropriate  - Monitor and intervene to maintain adequate nutrition, hydration, elimination, sleep and activity  - Consider the need for a sitter if unable to leave patient unattended  - [address] consult as indicated  - Consult Pharmacy as needed  Outcome: Progressing

## 2022-06-04 NOTE — PROGRESS NOTES
06/04/22 1150 06/04/22 1152 06/04/22 1154   Vital Signs   /66 (!) 86/47 (!) 73/40   MAP (mmHg) 75 56 47   BP Location Left arm Left arm Left arm   BP Method Automatic Automatic Automatic   Patient Position Lying Sitting Standing     Orthostatic BP

## 2022-06-05 LAB
BASOPHILS # BLD AUTO: 0.05 X10(3) UL (ref 0–0.2)
BASOPHILS NFR BLD AUTO: 0.9 %
EOSINOPHIL # BLD AUTO: 0.31 X10(3) UL (ref 0–0.7)
EOSINOPHIL NFR BLD AUTO: 5.8 %
ERYTHROCYTE [DISTWIDTH] IN BLOOD BY AUTOMATED COUNT: 15.4 %
HCT VFR BLD AUTO: 28.4 %
HGB BLD-MCNC: 8.8 G/DL
IMM GRANULOCYTES # BLD AUTO: 0.06 X10(3) UL (ref 0–1)
IMM GRANULOCYTES NFR BLD: 1.1 %
LYMPHOCYTES # BLD AUTO: 1.35 X10(3) UL (ref 1–4)
LYMPHOCYTES NFR BLD AUTO: 25.5 %
MCH RBC QN AUTO: 29.3 PG (ref 26–34)
MCHC RBC AUTO-ENTMCNC: 31 G/DL (ref 31–37)
MCV RBC AUTO: 94.7 FL
MONOCYTES # BLD AUTO: 0.74 X10(3) UL (ref 0.1–1)
MONOCYTES NFR BLD AUTO: 14 %
NEUTROPHILS # BLD AUTO: 2.79 X10 (3) UL (ref 1.5–7.7)
NEUTROPHILS # BLD AUTO: 2.79 X10(3) UL (ref 1.5–7.7)
NEUTROPHILS NFR BLD AUTO: 52.7 %
PLATELET # BLD AUTO: 321 10(3)UL (ref 150–450)
RBC # BLD AUTO: 3 X10(6)UL
WBC # BLD AUTO: 5.3 X10(3) UL (ref 4–11)

## 2022-06-05 RX ORDER — PSEUDOEPHEDRINE HCL 30 MG
100 TABLET ORAL 2 TIMES DAILY PRN
Qty: 20 CAPSULE | Refills: 0 | Status: SHIPPED | OUTPATIENT
Start: 2022-06-05

## 2022-06-05 NOTE — PROGRESS NOTES
06/05/22 0520 06/05/22 0523 06/05/22 0526   Vital Signs   /62 107/56 (!) 60/48   MAP (mmHg) 136 76 51   BP Location Left arm Left arm Left arm   BP Method Automatic Automatic Automatic   Patient Position Lying Sitting Standing     C/o dizziness upon standing

## 2022-06-05 NOTE — PLAN OF CARE
Pt declines complaints of pain, malaise, or cardiac symptoms. A&Ox4, lungs clear with equal expansion, on rm air. Pt in NSR, on monitor. Abdomen soft and non-tender with active bowel sounds in all 4 quadrants, incontinent of B&B. Patient updated with plan of care. Pt ok for discharge by all consults. Hospitalist to leave dc decision up to family. Family wanting to monitor one more night. Plan for [facility] to [facility] tomorrow. Problem: Patient/Family Goals  Goal: Patient/Family Long Term Goal  Description: Patient's Long Term Goal: \"go to [facility]\"    Interventions:  -Negative tagged RBC   -Monitor hgb  - See additional Care Plan goals for specific interventions  Outcome: Progressing  Goal: Patient/Family Short Term Goal  Description: Patient's Short Term Goal: \"increase activity\"    Interventions:   -Ambulate as tolerated  -PT  - See additional Care Plan goals for specific interventions  Outcome: Progressing     Problem: CARDIOVASCULAR - ADULT  Goal: Maintains optimal cardiac output and hemodynamic stability  Description: INTERVENTIONS:  - Monitor vital signs, rhythm, and trends  - Monitor for bleeding, hypotension and signs of decreased cardiac output  - Evaluate effectiveness of vasoactive medications to optimize hemodynamic stability  - Monitor arterial and/or venous puncture sites for bleeding and/or hematoma  - Assess quality of pulses, skin color and temperature  - Assess for signs of decreased coronary artery perfusion - ex.  Angina  - Evaluate fluid balance, assess for edema, trend weights  Outcome: Progressing  Goal: Absence of cardiac arrhythmias or at baseline  Description: INTERVENTIONS:  - Continuous cardiac monitoring, monitor vital signs, obtain 12 lead EKG if indicated  - Evaluate effectiveness of antiarrhythmic and heart rate control medications as ordered  - Initiate emergency measures for life threatening arrhythmias  - Monitor electrolytes and administer replacement therapy as ordered  Outcome: Progressing     Problem: NEUROLOGICAL - ADULT  Goal: Achieves stable or improved neurological status  Description: INTERVENTIONS  - Assess for and report changes in neurological status  - Initiate measures to prevent increased intracranial pressure  - Maintain blood pressure and fluid volume within ordered parameters to optimize cerebral perfusion and minimize risk of hemorrhage  - Monitor temperature, glucose, and sodium.  Initiate appropriate interventions as ordered  Outcome: Progressing     Problem: Impaired Functional Mobility  Goal: Achieve highest/safest level of mobility/gait  Description: Interventions:  - Assess patient's functional ability and stability  - Promote increasing activity/tolerance for mobility and gait  - Educate and engage patient/family in tolerated activity level and precautions  Outcome: Progressing     Problem: Impaired Cognition  Goal: Patient will exhibit improved attention, thought processing and/or memory  Description: Interventions:  Outcome: Progressing     Problem: COPING  Goal: Pt/Family able to verbalize concerns and demonstrate effective coping strategies  Description: INTERVENTIONS:  - Assist patient/family to identify coping skills, available support systems and cultural and spiritual values  - Provide emotional support, including active listening and acknowledgement of concerns of patient and caregivers  - Reduce environmental stimuli, as able  - Instruct patient/family in relaxation techniques, as appropriate  - Assess for spiritual and psychosocial needs and initiate Spiritual Care or Behavioral Health consult as needed  Outcome: Progressing     Problem: CONFUSION  Goal: Confusion, delirium, dementia or psychosis is improved or at baseline  Description: INTERVENTIONS:  - Assess for possible contributors to thought disturbance, including medications, impaired vision or hearing, underlying metabolic abnormalities, dehydration, psychiatric diagnoses, and notify attending MD/LIP  - [place] high risk fall precautions, as indicated  - Provide frequent short contacts to provide reality reorientation, refocusing and direction  - Decrease environmental stimuli, including noise as appropriate  - Monitor and intervene to maintain adequate nutrition, hydration, elimination, sleep and activity  - Consider the need for a sitter if unable to leave patient unattended  - [address] consult as indicated  - Consult Pharmacy as needed  Outcome: Progressing     Problem: HEMATOLOGIC - ADULT  Goal: Maintains hematologic stability  Description: INTERVENTIONS  - Assess for signs and symptoms of bleeding or hemorrhage  - Monitor labs and vital signs for trends  - Administer supportive blood products/factors, fluids and medications as ordered and appropriate  - Administer supportive blood products/factors as ordered and appropriate  Outcome: Progressing

## 2022-06-05 NOTE — PLAN OF CARE
Assumed care of pt at 1930 a/o x4 and pleasant with some periods of forgetfulness to situation. Was able to get her to eat corby crackers before bed but declined a sandwich. No bm tonight and is on stool softeners. No pain tonight. She did remove her crystal hose on her own saying she did not like them. IV patent and without redness or swelling upon flushing. CBC for am to trend hgb. DC to [facility]. Safety precautions in place and needs attended to. Problem: Patient/Family Goals  Goal: Patient/Family Long Term Goal  Description: Patient's Long Term Goal: \"dc to [facility]\"    Interventions:  -update family on POC  -Monitor hgb  - See additional Care Plan goals for specific interventions  Outcome: Progressing  Goal: Patient/Family Short Term Goal  Description: Patient's Short Term Goal: \"increase activity\"    Interventions:   -Ambulate as tolerated  -PT  -monitor hgb  - See additional Care Plan goals for specific interventions  Outcome: Progressing     Problem: CARDIOVASCULAR - ADULT  Goal: Maintains optimal cardiac output and hemodynamic stability  Description: INTERVENTIONS:  - Monitor vital signs, rhythm, and trends  - Monitor for bleeding, hypotension and signs of decreased cardiac output  - Evaluate effectiveness of vasoactive medications to optimize hemodynamic stability  - Monitor arterial and/or venous puncture sites for bleeding and/or hematoma  - Assess quality of pulses, skin color and temperature  - Assess for signs of decreased coronary artery perfusion - ex.  Angina  - Evaluate fluid balance, assess for edema, trend weights  Outcome: Progressing  Goal: Absence of cardiac arrhythmias or at baseline  Description: INTERVENTIONS:  - Continuous cardiac monitoring, monitor vital signs, obtain 12 lead EKG if indicated  - Evaluate effectiveness of antiarrhythmic and heart rate control medications as ordered  - Initiate emergency measures for life threatening arrhythmias  - Monitor electrolytes and administer replacement therapy as ordered  Outcome: Progressing     Problem: Impaired Cognition  Goal: Patient will exhibit improved attention, thought processing and/or memory  Description: Interventions:  Outcome: Progressing     Problem: HEMATOLOGIC - ADULT  Goal: Maintains hematologic stability  Description: INTERVENTIONS  - Assess for signs and symptoms of bleeding or hemorrhage  - Monitor labs and vital signs for trends  - Administer supportive blood products/factors, fluids and medications as ordered and appropriate  - Administer supportive blood products/factors as ordered and appropriate  Outcome: Progressing

## 2022-06-06 VITALS
HEART RATE: 84 BPM | SYSTOLIC BLOOD PRESSURE: 93 MMHG | TEMPERATURE: 98 F | OXYGEN SATURATION: 91 % | DIASTOLIC BLOOD PRESSURE: 41 MMHG | WEIGHT: 132.69 LBS | RESPIRATION RATE: 20 BRPM | BODY MASS INDEX: 25 KG/M2

## 2022-06-06 NOTE — PLAN OF CARE
NURSING DISCHARGE NOTE    Discharged [facility] facility via Wheel[facility]. Accompanied by RN  Belongings Taken by patient/family. AVS printed and given to patient. AVS reviewed with patient sister at bedside. Education completed and questions answered. IV and tele removed. Patient was discharged w/ no complaints.

## 2022-06-06 NOTE — PROGRESS NOTES
06/06/22 0512 06/06/22 0513 06/06/22 0515   Vital Signs   /46 (!) 89/41 (!) 54/40   MAP (mmHg) 65 53 43   BP Location Left arm Left arm Left arm   BP Method Automatic Automatic Automatic   Patient Position Lying Sitting Standing     Pt stated she was \"a little dizzy\" but overall felt ok when changing positions

## 2022-06-06 NOTE — PLAN OF CARE
RN SHIFT NOTE    Assumed care of pt at 0700. Pt denies pain at this time. Patient is alert and oriented x 3 w/ forgetfulness. Reminders and reorientation completed. Patient is NSR on tele, S1 and S2 present and pt denies cardiac symptoms. Lung sounds clear bilaterally. Abdomen soft and rounded. Last known BM on 6/5/2022. +1 edema noted in BLE. Tolerating medications and care needs have been met. POC: Discharge today      Problem: CARDIOVASCULAR - ADULT  Goal: Maintains optimal cardiac output and hemodynamic stability  Description: INTERVENTIONS:  - Monitor vital signs, rhythm, and trends  - Monitor for bleeding, hypotension and signs of decreased cardiac output  - Evaluate effectiveness of vasoactive medications to optimize hemodynamic stability  - Monitor arterial and/or venous puncture sites for bleeding and/or hematoma  - Assess quality of pulses, skin color and temperature  - Assess for signs of decreased coronary artery perfusion - ex.  Angina  - Evaluate fluid balance, assess for edema, trend weights  Outcome: Progressing  Goal: Absence of cardiac arrhythmias or at baseline  Description: INTERVENTIONS:  - Continuous cardiac monitoring, monitor vital signs, obtain 12 lead EKG if indicated  - Evaluate effectiveness of antiarrhythmic and heart rate control medications as ordered  - Initiate emergency measures for life threatening arrhythmias  - Monitor electrolytes and administer replacement therapy as ordered  Outcome: Progressing

## 2022-06-06 NOTE — CM/SW NOTE
SW delivered Medicare important message (IM) to patient. Also spoke with patient's sister/POA, Josh Aguilar, and informed her SW left IM in patient's room. Spoke with March Repress [phone] from [address] [place] [address] and informed her of possible discharge today. Sun to update SW early afternoon with bed availability. SW will remain available. Addendum 3pm  Spoke with Sun from [address] [place] [address] who confirmed bed availability for today. Updated RN who confirmed patient is medically cleared for discharged. Spoke with patient and sister/POA OTP, they are agreeable with discharge and sister will transport patient at 4:30pm. SW will remain available.      [facility] [address]      Jessica Kim, [facility]  Discharge Planner  [phone]

## 2022-06-06 NOTE — PLAN OF CARE
A&Ox4  O2 sat WNL on RA  NSR on tele  Daily orthos- see note/flowsheet for documentation. Continent of bladder and bowel  Up with SBA and walker    No complaints overnight. Plan: Discharge     Problem: Patient/Family Goals  Goal: Patient/Family Long Term Goal  Description: Patient's Long Term Goal: \"go to [facility]\"    Interventions:  -Negative tagged RBC   -Monitor hgb  - See additional Care Plan goals for specific interventions  Outcome: Progressing  Goal: Patient/Family Short Term Goal  Description: Patient's Short Term Goal: \"increase activity\"    Interventions:   -Ambulate as tolerated  -PT  - See additional Care Plan goals for specific interventions  Outcome: Progressing     Problem: CARDIOVASCULAR - ADULT  Goal: Maintains optimal cardiac output and hemodynamic stability  Description: INTERVENTIONS:  - Monitor vital signs, rhythm, and trends  - Monitor for bleeding, hypotension and signs of decreased cardiac output  - Evaluate effectiveness of vasoactive medications to optimize hemodynamic stability  - Monitor arterial and/or venous puncture sites for bleeding and/or hematoma  - Assess quality of pulses, skin color and temperature  - Assess for signs of decreased coronary artery perfusion - ex.  Angina  - Evaluate fluid balance, assess for edema, trend weights  Outcome: Progressing  Goal: Absence of cardiac arrhythmias or at baseline  Description: INTERVENTIONS:  - Continuous cardiac monitoring, monitor vital signs, obtain 12 lead EKG if indicated  - Evaluate effectiveness of antiarrhythmic and heart rate control medications as ordered  - Initiate emergency measures for life threatening arrhythmias  - Monitor electrolytes and administer replacement therapy as ordered  Outcome: Progressing     Problem: NEUROLOGICAL - ADULT  Goal: Achieves stable or improved neurological status  Description: INTERVENTIONS  - Assess for and report changes in neurological status  - Initiate measures to prevent increased intracranial pressure  - Maintain blood pressure and fluid volume within ordered parameters to optimize cerebral perfusion and minimize risk of hemorrhage  - Monitor temperature, glucose, and sodium.  Initiate appropriate interventions as ordered  Outcome: Progressing     Problem: Impaired Functional Mobility  Goal: Achieve highest/safest level of mobility/gait  Description: Interventions:  - Assess patient's functional ability and stability  - Promote increasing activity/tolerance for mobility and gait  - Educate and engage patient/family in tolerated activity level and precautions    Outcome: Progressing     Problem: Impaired Cognition  Goal: Patient will exhibit improved attention, thought processing and/or memory  Description: Interventions:    Outcome: Progressing     Problem: COPING  Goal: Pt/Family able to verbalize concerns and demonstrate effective coping strategies  Description: INTERVENTIONS:  - Assist patient/family to identify coping skills, available support systems and cultural and spiritual values  - Provide emotional support, including active listening and acknowledgement of concerns of patient and caregivers  - Reduce environmental stimuli, as able  - Instruct patient/family in relaxation techniques, as appropriate  - Assess for spiritual and psychosocial needs and initiate Spiritual Care or Behavioral Health consult as needed  Outcome: Progressing     Problem: CONFUSION  Goal: Confusion, delirium, dementia or psychosis is improved or at baseline  Description: INTERVENTIONS:  - Assess for possible contributors to thought disturbance, including medications, impaired vision or hearing, underlying metabolic abnormalities, dehydration, psychiatric diagnoses, and notify attending MD/LIP  - [place] high risk fall precautions, as indicated  - Provide frequent short contacts to provide reality reorientation, refocusing and direction  - Decrease environmental stimuli, including noise as appropriate  - Monitor and intervene to maintain adequate nutrition, hydration, elimination, sleep and activity  - Consider the need for a sitter if unable to leave patient unattended  - [address] consult as indicated  - Consult Pharmacy as needed  Outcome: Progressing     Problem: HEMATOLOGIC - ADULT  Goal: Maintains hematologic stability  Description: INTERVENTIONS  - Assess for signs and symptoms of bleeding or hemorrhage  - Monitor labs and vital signs for trends  - Administer supportive blood products/factors, fluids and medications as ordered and appropriate  - Administer supportive blood products/factors as ordered and appropriate  Outcome: Progressing

## 2022-06-20 ENCOUNTER — TRANSCRIBE ORDERS (OUTPATIENT)
Dept: POST ACUTE CARE | Age: 77
End: 2022-06-20

## 2022-06-20 DIAGNOSIS — B96.20 E. COLI UTI: Primary | ICD-10-CM

## 2022-06-20 DIAGNOSIS — R53.1 GENERALIZED WEAKNESS: ICD-10-CM

## 2022-06-20 DIAGNOSIS — I10 PRIMARY HYPERTENSION: ICD-10-CM

## 2022-06-20 DIAGNOSIS — N39.0 E. COLI UTI: Primary | ICD-10-CM

## 2022-06-20 DIAGNOSIS — D64.9 ANEMIA, UNSPECIFIED TYPE: ICD-10-CM

## 2022-08-31 ENCOUNTER — LAB REQUISITION (OUTPATIENT)
Dept: LAB | Facility: HOSPITAL | Age: 77
End: 2022-08-31
Payer: MEDICARE

## 2022-08-31 DIAGNOSIS — F32.A DEPRESSION, UNSPECIFIED: ICD-10-CM

## 2022-08-31 DIAGNOSIS — F29 UNSPECIFIED PSYCHOSIS NOT DUE TO A SUBSTANCE OR KNOWN PHYSIOLOGICAL CONDITION (HCC): ICD-10-CM

## 2022-08-31 LAB
ALBUMIN SERPL-MCNC: 3 G/DL (ref 3.4–5)
ALBUMIN/GLOB SERPL: 0.8 {RATIO} (ref 1–2)
ALP LIVER SERPL-CCNC: 72 U/L
ALT SERPL-CCNC: 38 U/L
AMMONIA PLAS-MCNC: 60 UMOL/L (ref 11–32)
ANION GAP SERPL CALC-SCNC: 7 MMOL/L (ref 0–18)
AST SERPL-CCNC: 36 U/L (ref 15–37)
BASOPHILS # BLD AUTO: 0.07 X10(3) UL (ref 0–0.2)
BASOPHILS NFR BLD AUTO: 1.1 %
BILIRUB SERPL-MCNC: 0.4 MG/DL (ref 0.1–2)
BUN BLD-MCNC: 14 MG/DL (ref 7–18)
CALCIUM BLD-MCNC: 8.8 MG/DL (ref 8.5–10.1)
CHLORIDE SERPL-SCNC: 106 MMOL/L (ref 98–112)
CO2 SERPL-SCNC: 24 MMOL/L (ref 21–32)
CREAT BLD-MCNC: 0.78 MG/DL
EOSINOPHIL # BLD AUTO: 0.14 X10(3) UL (ref 0–0.7)
EOSINOPHIL NFR BLD AUTO: 2.1 %
ERYTHROCYTE [DISTWIDTH] IN BLOOD BY AUTOMATED COUNT: 18.3 %
GFR SERPLBLD BASED ON 1.73 SQ M-ARVRAT: 79 ML/MIN/1.73M2 (ref 60–?)
GLOBULIN PLAS-MCNC: 4 G/DL (ref 2.8–4.4)
GLUCOSE BLD-MCNC: 109 MG/DL (ref 70–99)
HCT VFR BLD AUTO: 52.6 %
HGB BLD-MCNC: 16.6 G/DL
IMM GRANULOCYTES # BLD AUTO: 0.04 X10(3) UL (ref 0–1)
IMM GRANULOCYTES NFR BLD: 0.6 %
LYMPHOCYTES # BLD AUTO: 1.5 X10(3) UL (ref 1–4)
LYMPHOCYTES NFR BLD AUTO: 23 %
MCH RBC QN AUTO: 31.9 PG (ref 26–34)
MCHC RBC AUTO-ENTMCNC: 31.6 G/DL (ref 31–37)
MCV RBC AUTO: 101.2 FL
MONOCYTES # BLD AUTO: 0.61 X10(3) UL (ref 0.1–1)
MONOCYTES NFR BLD AUTO: 9.4 %
NEUTROPHILS # BLD AUTO: 4.16 X10 (3) UL (ref 1.5–7.7)
NEUTROPHILS # BLD AUTO: 4.16 X10(3) UL (ref 1.5–7.7)
NEUTROPHILS NFR BLD AUTO: 63.8 %
OSMOLALITY SERPL CALC.SUM OF ELEC: 285 MOSM/KG (ref 275–295)
PLATELET # BLD AUTO: 224 10(3)UL (ref 150–450)
POTASSIUM SERPL-SCNC: 4.4 MMOL/L (ref 3.5–5.1)
PROT SERPL-MCNC: 7 G/DL (ref 6.4–8.2)
RBC # BLD AUTO: 5.2 X10(6)UL
SODIUM SERPL-SCNC: 137 MMOL/L (ref 136–145)
TSI SER-ACNC: 5.23 MIU/ML (ref 0.36–3.74)
WBC # BLD AUTO: 6.5 X10(3) UL (ref 4–11)

## 2022-08-31 PROCEDURE — 80053 COMPREHEN METABOLIC PANEL: CPT | Performed by: FAMILY MEDICINE

## 2022-08-31 PROCEDURE — 82140 ASSAY OF AMMONIA: CPT | Performed by: FAMILY MEDICINE

## 2022-08-31 PROCEDURE — 85025 COMPLETE CBC W/AUTO DIFF WBC: CPT | Performed by: FAMILY MEDICINE

## 2022-08-31 PROCEDURE — 84443 ASSAY THYROID STIM HORMONE: CPT | Performed by: FAMILY MEDICINE

## 2022-09-15 ENCOUNTER — HOSPITAL ENCOUNTER (OUTPATIENT)
Dept: MAMMOGRAPHY | Age: 77
Discharge: HOME | End: 2022-09-15
Attending: FAMILY MEDICINE

## 2022-09-15 DIAGNOSIS — Z98.890 STATUS POST RIGHT BREAST LUMPECTOMY: ICD-10-CM

## 2022-09-15 PROCEDURE — G0279 TOMOSYNTHESIS, MAMMO: HCPCS

## 2022-09-26 ENCOUNTER — HOSPITAL ENCOUNTER (OUTPATIENT)
Dept: ULTRASOUND IMAGING | Age: 77
Discharge: HOME | End: 2022-09-26
Attending: FAMILY MEDICINE

## 2022-09-26 DIAGNOSIS — R92.8 ABNORMAL MAMMOGRAM: ICD-10-CM

## 2022-09-26 DIAGNOSIS — N63.10 MASS OF RIGHT BREAST, UNSPECIFIED QUADRANT: ICD-10-CM

## 2022-09-26 PROCEDURE — 76642 ULTRASOUND BREAST LIMITED: CPT

## 2022-09-28 ENCOUNTER — LAB ENCOUNTER (OUTPATIENT)
Dept: LAB | Facility: HOSPITAL | Age: 77
End: 2022-09-28
Attending: FAMILY MEDICINE
Payer: MEDICARE

## 2022-09-28 ENCOUNTER — LAB REQUISITION (OUTPATIENT)
Dept: LAB | Facility: HOSPITAL | Age: 77
End: 2022-09-28
Payer: MEDICARE

## 2022-09-28 DIAGNOSIS — E72.20 DISORDER OF UREA CYCLE METABOLISM, UNSPECIFIED (HCC): ICD-10-CM

## 2022-09-28 DIAGNOSIS — R79.89 INCREASED AMMONIA LEVEL: Primary | ICD-10-CM

## 2022-09-28 LAB
ALBUMIN SERPL-MCNC: 2.8 G/DL (ref 3.4–5)
ALBUMIN/GLOB SERPL: 0.7 {RATIO} (ref 1–2)
ALP LIVER SERPL-CCNC: 64 U/L
ALT SERPL-CCNC: 57 U/L
AMMONIA PLAS-MCNC: 45 UMOL/L (ref 11–32)
ANION GAP SERPL CALC-SCNC: 6 MMOL/L (ref 0–18)
AST SERPL-CCNC: 30 U/L (ref 15–37)
BILIRUB SERPL-MCNC: 0.5 MG/DL (ref 0.1–2)
BUN BLD-MCNC: 11 MG/DL (ref 7–18)
CALCIUM BLD-MCNC: 8.9 MG/DL (ref 8.5–10.1)
CHLORIDE SERPL-SCNC: 105 MMOL/L (ref 98–112)
CO2 SERPL-SCNC: 28 MMOL/L (ref 21–32)
CREAT BLD-MCNC: 0.63 MG/DL
GFR SERPLBLD BASED ON 1.73 SQ M-ARVRAT: 92 ML/MIN/1.73M2 (ref 60–?)
GLOBULIN PLAS-MCNC: 4.2 G/DL (ref 2.8–4.4)
GLUCOSE BLD-MCNC: 86 MG/DL (ref 70–99)
OSMOLALITY SERPL CALC.SUM OF ELEC: 287 MOSM/KG (ref 275–295)
POTASSIUM SERPL-SCNC: 4.2 MMOL/L (ref 3.5–5.1)
PROT SERPL-MCNC: 7 G/DL (ref 6.4–8.2)
SODIUM SERPL-SCNC: 139 MMOL/L (ref 136–145)

## 2022-09-28 PROCEDURE — 80053 COMPREHEN METABOLIC PANEL: CPT

## 2022-09-28 PROCEDURE — 82140 ASSAY OF AMMONIA: CPT

## 2022-09-28 PROCEDURE — 36415 COLL VENOUS BLD VENIPUNCTURE: CPT

## 2022-09-29 ENCOUNTER — CASE MANAGEMENT (OUTPATIENT)
Dept: CARE COORDINATION | Age: 77
End: 2022-09-29

## 2022-09-30 ENCOUNTER — CASE MANAGEMENT (OUTPATIENT)
Dept: CARE COORDINATION | Age: 77
End: 2022-09-30

## 2022-10-03 ENCOUNTER — CASE MANAGEMENT (OUTPATIENT)
Dept: CARE COORDINATION | Age: 77
End: 2022-10-03

## 2022-10-04 ENCOUNTER — CASE MANAGEMENT (OUTPATIENT)
Dept: CARE COORDINATION | Age: 77
End: 2022-10-04

## 2022-10-05 ENCOUNTER — CASE MANAGEMENT (OUTPATIENT)
Dept: CARE COORDINATION | Age: 77
End: 2022-10-05

## 2022-10-07 ENCOUNTER — CASE MANAGEMENT (OUTPATIENT)
Dept: CARE COORDINATION | Age: 77
End: 2022-10-07

## 2022-10-10 ENCOUNTER — CASE MANAGEMENT (OUTPATIENT)
Dept: CARE COORDINATION | Age: 77
End: 2022-10-10

## 2022-10-13 ENCOUNTER — HOSPITAL ENCOUNTER (OUTPATIENT)
Dept: ULTRASOUND IMAGING | Age: 77
Discharge: HOME | End: 2022-10-13
Attending: FAMILY MEDICINE

## 2022-10-13 ENCOUNTER — HOSPITAL ENCOUNTER (OUTPATIENT)
Dept: MAMMOGRAPHY | Age: 77
Discharge: HOME | End: 2022-10-13
Attending: FAMILY MEDICINE

## 2022-10-13 DIAGNOSIS — N63.11 MASS OF UPPER OUTER QUADRANT OF RIGHT BREAST: ICD-10-CM

## 2022-10-13 DIAGNOSIS — R92.8 ABNORMAL FINDING ON BREAST IMAGING: ICD-10-CM

## 2022-10-13 PROCEDURE — 77065 DX MAMMO INCL CAD UNI: CPT

## 2022-10-13 PROCEDURE — 88342 IMHCHEM/IMCYTCHM 1ST ANTB: CPT

## 2022-10-13 PROCEDURE — 88305 TISSUE EXAM BY PATHOLOGIST: CPT

## 2022-10-13 PROCEDURE — 10006027 HB SUPPLY 278

## 2022-10-13 PROCEDURE — 10006023 HB SUPPLY 272

## 2022-10-13 PROCEDURE — 10002801 HB RX 250 W/O HCPCS: Performed by: RADIOLOGY

## 2022-10-13 PROCEDURE — A4648 IMPLANTABLE TISSUE MARKER: HCPCS

## 2022-10-13 PROCEDURE — 19083 BX BREAST 1ST LESION US IMAG: CPT

## 2022-10-13 RX ORDER — LIDOCAINE HYDROCHLORIDE 20 MG/ML
4 INJECTION, SOLUTION INFILTRATION; PERINEURAL ONCE
Status: COMPLETED | OUTPATIENT
Start: 2022-10-13 | End: 2022-10-13

## 2022-10-13 RX ORDER — LIDOCAINE HYDROCHLORIDE 20 MG/ML
1 INJECTION, SOLUTION INFILTRATION; PERINEURAL ONCE
Status: COMPLETED | OUTPATIENT
Start: 2022-10-13 | End: 2022-10-13

## 2022-10-13 RX ADMIN — LIDOCAINE HYDROCHLORIDE 20 MG: 20 INJECTION, SOLUTION INFILTRATION; PERINEURAL at 09:15

## 2022-10-13 RX ADMIN — LIDOCAINE HYDROCHLORIDE 80 MG: 20 INJECTION, SOLUTION INFILTRATION; PERINEURAL at 09:05

## 2022-10-14 ENCOUNTER — CASE MANAGEMENT (OUTPATIENT)
Dept: CARE COORDINATION | Age: 77
End: 2022-10-14

## 2022-10-18 ENCOUNTER — TELEPHONE (OUTPATIENT)
Dept: MAMMOGRAPHY | Age: 77
End: 2022-10-18

## 2022-10-18 ENCOUNTER — CANCER NAVIGATOR (OUTPATIENT)
Dept: ONCOLOGY | Age: 77
End: 2022-10-18

## 2022-10-18 LAB
ASR DISCLAIMER: NORMAL
CASE RPRT: NORMAL
CLINICAL INFO: NORMAL
IHC PROGNOSTIC MARKER RESULT, ADDENDUM: NORMAL
PATH REPORT ADDENDUM.SYNOPTIC DOC: NORMAL
PATH REPORT.FINAL DX SPEC: NORMAL
PATH REPORT.GROSS SPEC: NORMAL

## 2022-10-19 ENCOUNTER — LAB REQUISITION (OUTPATIENT)
Dept: LAB | Facility: HOSPITAL | Age: 77
End: 2022-10-19
Payer: MEDICARE

## 2022-10-19 DIAGNOSIS — F32.A DEPRESSION, UNSPECIFIED: ICD-10-CM

## 2022-10-19 DIAGNOSIS — F29 UNSPECIFIED PSYCHOSIS NOT DUE TO A SUBSTANCE OR KNOWN PHYSIOLOGICAL CONDITION (HCC): ICD-10-CM

## 2022-10-19 DIAGNOSIS — I15.8 OTHER SECONDARY HYPERTENSION: ICD-10-CM

## 2022-10-19 DIAGNOSIS — I95.9 HYPOTENSION, UNSPECIFIED: ICD-10-CM

## 2022-10-19 LAB
ALBUMIN SERPL-MCNC: 2.7 G/DL (ref 3.4–5)
ALBUMIN/GLOB SERPL: 0.6 {RATIO} (ref 1–2)
ALP LIVER SERPL-CCNC: 55 U/L
ALT SERPL-CCNC: 38 U/L
ANION GAP SERPL CALC-SCNC: 5 MMOL/L (ref 0–18)
AST SERPL-CCNC: 25 U/L (ref 15–37)
BASOPHILS # BLD AUTO: 0.06 X10(3) UL (ref 0–0.2)
BASOPHILS NFR BLD AUTO: 0.9 %
BILIRUB SERPL-MCNC: 0.7 MG/DL (ref 0.1–2)
BUN BLD-MCNC: 10 MG/DL (ref 7–18)
CALCIUM BLD-MCNC: 8.9 MG/DL (ref 8.5–10.1)
CHLORIDE SERPL-SCNC: 108 MMOL/L (ref 98–112)
CO2 SERPL-SCNC: 27 MMOL/L (ref 21–32)
CREAT BLD-MCNC: 0.66 MG/DL
EOSINOPHIL # BLD AUTO: 0.15 X10(3) UL (ref 0–0.7)
EOSINOPHIL NFR BLD AUTO: 2.3 %
ERYTHROCYTE [DISTWIDTH] IN BLOOD BY AUTOMATED COUNT: 14.3 %
GFR SERPLBLD BASED ON 1.73 SQ M-ARVRAT: 91 ML/MIN/1.73M2 (ref 60–?)
GLOBULIN PLAS-MCNC: 4.2 G/DL (ref 2.8–4.4)
GLUCOSE BLD-MCNC: 75 MG/DL (ref 70–99)
HCT VFR BLD AUTO: 49 %
HGB BLD-MCNC: 15.6 G/DL
IMM GRANULOCYTES # BLD AUTO: 0.04 X10(3) UL (ref 0–1)
IMM GRANULOCYTES NFR BLD: 0.6 %
LYMPHOCYTES # BLD AUTO: 1.81 X10(3) UL (ref 1–4)
LYMPHOCYTES NFR BLD AUTO: 28 %
MCH RBC QN AUTO: 33 PG (ref 26–34)
MCHC RBC AUTO-ENTMCNC: 31.8 G/DL (ref 31–37)
MCV RBC AUTO: 103.6 FL
MONOCYTES # BLD AUTO: 0.75 X10(3) UL (ref 0.1–1)
MONOCYTES NFR BLD AUTO: 11.6 %
NEUTROPHILS # BLD AUTO: 3.66 X10 (3) UL (ref 1.5–7.7)
NEUTROPHILS # BLD AUTO: 3.66 X10(3) UL (ref 1.5–7.7)
NEUTROPHILS NFR BLD AUTO: 56.6 %
OSMOLALITY SERPL CALC.SUM OF ELEC: 288 MOSM/KG (ref 275–295)
PLATELET # BLD AUTO: 229 10(3)UL (ref 150–450)
POTASSIUM SERPL-SCNC: 4.6 MMOL/L (ref 3.5–5.1)
PROT SERPL-MCNC: 6.9 G/DL (ref 6.4–8.2)
RBC # BLD AUTO: 4.73 X10(6)UL
SODIUM SERPL-SCNC: 140 MMOL/L (ref 136–145)
TSI SER-ACNC: 5.07 MIU/ML (ref 0.36–3.74)
WBC # BLD AUTO: 6.5 X10(3) UL (ref 4–11)

## 2022-10-19 PROCEDURE — 85025 COMPLETE CBC W/AUTO DIFF WBC: CPT | Performed by: FAMILY MEDICINE

## 2022-10-19 PROCEDURE — 80053 COMPREHEN METABOLIC PANEL: CPT | Performed by: FAMILY MEDICINE

## 2022-10-19 PROCEDURE — 84443 ASSAY THYROID STIM HORMONE: CPT | Performed by: FAMILY MEDICINE

## 2022-11-07 ENCOUNTER — APPOINTMENT (OUTPATIENT)
Dept: SURGERY | Age: 77
End: 2022-11-07
Attending: SURGERY

## 2022-11-07 ENCOUNTER — OFFICE VISIT (OUTPATIENT)
Dept: SURGERY | Age: 77
End: 2022-11-07

## 2022-11-07 VITALS
RESPIRATION RATE: 16 BRPM | DIASTOLIC BLOOD PRESSURE: 61 MMHG | HEIGHT: 62 IN | SYSTOLIC BLOOD PRESSURE: 81 MMHG | HEART RATE: 99 BPM | BODY MASS INDEX: 29.63 KG/M2 | WEIGHT: 161 LBS

## 2022-11-07 DIAGNOSIS — Z17.0 MALIGNANT NEOPLASM OF UPPER-OUTER QUADRANT OF RIGHT BREAST IN FEMALE, ESTROGEN RECEPTOR POSITIVE (CMD): ICD-10-CM

## 2022-11-07 DIAGNOSIS — C50.411 MALIGNANT NEOPLASM OF UPPER-OUTER QUADRANT OF RIGHT BREAST IN FEMALE, ESTROGEN RECEPTOR POSITIVE (CMD): ICD-10-CM

## 2022-11-07 DIAGNOSIS — Z01.810 PRE-OPERATIVE CARDIOVASCULAR EXAMINATION: Primary | ICD-10-CM

## 2022-11-07 PROCEDURE — 99202 OFFICE O/P NEW SF 15 MIN: CPT

## 2022-11-07 PROCEDURE — 99204 OFFICE O/P NEW MOD 45 MIN: CPT | Performed by: SURGERY

## 2022-11-09 ENCOUNTER — LAB REQUISITION (OUTPATIENT)
Dept: LAB | Facility: HOSPITAL | Age: 77
End: 2022-11-09
Payer: MEDICARE

## 2022-11-09 DIAGNOSIS — E03.9 HYPOTHYROIDISM, UNSPECIFIED: ICD-10-CM

## 2022-11-09 DIAGNOSIS — K76.82 HEPATIC ENCEPHALOPATHY: ICD-10-CM

## 2022-11-09 LAB
ALBUMIN SERPL-MCNC: 2.8 G/DL (ref 3.4–5)
ALBUMIN/GLOB SERPL: 0.8 {RATIO} (ref 1–2)
ALP LIVER SERPL-CCNC: 56 U/L
ALT SERPL-CCNC: 39 U/L
ANION GAP SERPL CALC-SCNC: 4 MMOL/L (ref 0–18)
AST SERPL-CCNC: 20 U/L (ref 15–37)
BASOPHILS # BLD AUTO: 0.09 X10(3) UL (ref 0–0.2)
BASOPHILS NFR BLD AUTO: 1.4 %
BILIRUB SERPL-MCNC: 0.5 MG/DL (ref 0.1–2)
BUN BLD-MCNC: 12 MG/DL (ref 7–18)
CALCIUM BLD-MCNC: 8.6 MG/DL (ref 8.5–10.1)
CHLORIDE SERPL-SCNC: 109 MMOL/L (ref 98–112)
CO2 SERPL-SCNC: 30 MMOL/L (ref 21–32)
CREAT BLD-MCNC: 0.6 MG/DL
EOSINOPHIL # BLD AUTO: 0.17 X10(3) UL (ref 0–0.7)
EOSINOPHIL NFR BLD AUTO: 2.6 %
ERYTHROCYTE [DISTWIDTH] IN BLOOD BY AUTOMATED COUNT: 14.9 %
GFR SERPLBLD BASED ON 1.73 SQ M-ARVRAT: 94 ML/MIN/1.73M2 (ref 60–?)
GLOBULIN PLAS-MCNC: 3.5 G/DL (ref 2.8–4.4)
GLUCOSE BLD-MCNC: 89 MG/DL (ref 70–99)
HCT VFR BLD AUTO: 43.8 %
HGB BLD-MCNC: 14.5 G/DL
IMM GRANULOCYTES # BLD AUTO: 0.07 X10(3) UL (ref 0–1)
IMM GRANULOCYTES NFR BLD: 1.1 %
LYMPHOCYTES # BLD AUTO: 1.61 X10(3) UL (ref 1–4)
LYMPHOCYTES NFR BLD AUTO: 25 %
MCH RBC QN AUTO: 35.4 PG (ref 26–34)
MCHC RBC AUTO-ENTMCNC: 33.1 G/DL (ref 31–37)
MCV RBC AUTO: 106.8 FL
MONOCYTES # BLD AUTO: 0.79 X10(3) UL (ref 0.1–1)
MONOCYTES NFR BLD AUTO: 12.3 %
NEUTROPHILS # BLD AUTO: 3.71 X10 (3) UL (ref 1.5–7.7)
NEUTROPHILS # BLD AUTO: 3.71 X10(3) UL (ref 1.5–7.7)
NEUTROPHILS NFR BLD AUTO: 57.6 %
OSMOLALITY SERPL CALC.SUM OF ELEC: 295 MOSM/KG (ref 275–295)
PLATELET # BLD AUTO: 273 10(3)UL (ref 150–450)
POTASSIUM SERPL-SCNC: 4.2 MMOL/L (ref 3.5–5.1)
PROT SERPL-MCNC: 6.3 G/DL (ref 6.4–8.2)
RBC # BLD AUTO: 4.1 X10(6)UL
SODIUM SERPL-SCNC: 143 MMOL/L (ref 136–145)
WBC # BLD AUTO: 6.4 X10(3) UL (ref 4–11)

## 2022-11-09 PROCEDURE — 85025 COMPLETE CBC W/AUTO DIFF WBC: CPT | Performed by: FAMILY MEDICINE

## 2022-11-09 PROCEDURE — 80053 COMPREHEN METABOLIC PANEL: CPT | Performed by: FAMILY MEDICINE

## 2022-11-10 ENCOUNTER — OFFICE VISIT (OUTPATIENT)
Dept: CARDIOLOGY | Age: 77
End: 2022-11-10

## 2022-11-10 ENCOUNTER — TELEPHONE (OUTPATIENT)
Dept: CARDIOLOGY | Age: 77
End: 2022-11-10

## 2022-11-10 VITALS
HEIGHT: 62 IN | SYSTOLIC BLOOD PRESSURE: 79 MMHG | HEART RATE: 106 BPM | DIASTOLIC BLOOD PRESSURE: 54 MMHG | BODY MASS INDEX: 29.62 KG/M2 | WEIGHT: 160.94 LBS

## 2022-11-10 DIAGNOSIS — I95.1 ORTHOSTATIC HYPOTENSION: Primary | ICD-10-CM

## 2022-11-10 PROCEDURE — 99204 OFFICE O/P NEW MOD 45 MIN: CPT | Performed by: INTERNAL MEDICINE

## 2022-11-10 SDOH — HEALTH STABILITY: PHYSICAL HEALTH: ON AVERAGE, HOW MANY MINUTES DO YOU ENGAGE IN EXERCISE AT THIS LEVEL?: 0 MIN

## 2022-11-10 SDOH — HEALTH STABILITY: PHYSICAL HEALTH: ON AVERAGE, HOW MANY DAYS PER WEEK DO YOU ENGAGE IN MODERATE TO STRENUOUS EXERCISE (LIKE A BRISK WALK)?: 0 DAYS

## 2022-11-10 ASSESSMENT — PATIENT HEALTH QUESTIONNAIRE - PHQ9
1. LITTLE INTEREST OR PLEASURE IN DOING THINGS: SEVERAL DAYS
2. FEELING DOWN, DEPRESSED OR HOPELESS: NOT AT ALL
SUM OF ALL RESPONSES TO PHQ9 QUESTIONS 1 AND 2: 1
CLINICAL INTERPRETATION OF PHQ2 SCORE: NO FURTHER SCREENING NEEDED
SUM OF ALL RESPONSES TO PHQ9 QUESTIONS 1 AND 2: 1

## 2022-11-15 ENCOUNTER — TELEPHONE (OUTPATIENT)
Dept: SURGERY | Age: 77
End: 2022-11-15

## 2022-11-16 ENCOUNTER — EXTERNAL LAB (OUTPATIENT)
Dept: OTHER | Age: 77
End: 2022-11-16

## 2022-11-16 ENCOUNTER — LAB REQUISITION (OUTPATIENT)
Dept: LAB | Facility: HOSPITAL | Age: 77
End: 2022-11-16
Payer: MEDICARE

## 2022-11-16 DIAGNOSIS — Z01.818 ENCOUNTER FOR OTHER PREPROCEDURAL EXAMINATION: ICD-10-CM

## 2022-11-16 DIAGNOSIS — C50.211 MALIGNANT NEOPLASM OF UPPER-INNER QUADRANT OF RIGHT FEMALE BREAST (HCC): ICD-10-CM

## 2022-11-16 LAB
ALBUMIN SERPL-MCNC: 3 G/DL (ref 3.4–5)
ALBUMIN/GLOB SERPL: 0.8 {RATIO} (ref 1–2)
ALP LIVER SERPL-CCNC: 61 U/L
ALT SERPL-CCNC: 32 U/L
AMMONIA PLAS-MCNC: 30 UMOL/L (ref 11–32)
ANION GAP SERPL CALC-SCNC: 1 MMOL/L (ref 0–18)
AST SERPL-CCNC: 28 U/L (ref 15–37)
BILIRUB SERPL-MCNC: 0.6 MG/DL (ref 0.1–2)
BUN BLD-MCNC: 11 MG/DL (ref 7–18)
CALCIUM BLD-MCNC: 9.1 MG/DL (ref 8.5–10.1)
CHLORIDE SERPL-SCNC: 107 MMOL/L (ref 98–112)
CO2 SERPL-SCNC: 30 MMOL/L (ref 21–32)
CREAT BLD-MCNC: 0.69 MG/DL
GFR SERPLBLD BASED ON 1.73 SQ M-ARVRAT: 90 ML/MIN/1.73M2 (ref 60–?)
GLOBULIN PLAS-MCNC: 4 G/DL (ref 2.8–4.4)
GLUCOSE BLD-MCNC: 92 MG/DL (ref 70–99)
LAB RESULT: NORMAL
OSMOLALITY SERPL CALC.SUM OF ELEC: 285 MOSM/KG (ref 275–295)
POTASSIUM SERPL-SCNC: 4.2 MMOL/L (ref 3.5–5.1)
PROT SERPL-MCNC: 7 G/DL (ref 6.4–8.2)
SODIUM SERPL-SCNC: 138 MMOL/L (ref 136–145)
TSI SER-ACNC: 2.31 MIU/ML (ref 0.36–3.74)

## 2022-11-16 PROCEDURE — 83516 IMMUNOASSAY NONANTIBODY: CPT | Performed by: SPECIALIST

## 2022-11-16 PROCEDURE — 86225 DNA ANTIBODY NATIVE: CPT | Performed by: SPECIALIST

## 2022-11-16 PROCEDURE — 86036 ANCA SCREEN EACH ANTIBODY: CPT | Performed by: SPECIALIST

## 2022-11-16 PROCEDURE — 86235 NUCLEAR ANTIGEN ANTIBODY: CPT | Performed by: SPECIALIST

## 2022-11-16 PROCEDURE — 84443 ASSAY THYROID STIM HORMONE: CPT | Performed by: SPECIALIST

## 2022-11-16 PROCEDURE — 86038 ANTINUCLEAR ANTIBODIES: CPT | Performed by: SPECIALIST

## 2022-11-16 PROCEDURE — 82140 ASSAY OF AMMONIA: CPT | Performed by: SPECIALIST

## 2022-11-16 PROCEDURE — 80053 COMPREHEN METABOLIC PANEL: CPT | Performed by: SPECIALIST

## 2022-11-16 PROCEDURE — 82164 ANGIOTENSIN I ENZYME TEST: CPT | Performed by: SPECIALIST

## 2022-11-17 LAB — ANGIOTENSIN CONVERTING ENZYME: 61 U/L

## 2022-11-18 ENCOUNTER — TELEPHONE (OUTPATIENT)
Dept: CARDIOLOGY | Age: 77
End: 2022-11-18

## 2022-11-18 LAB
DSDNA IGG SERPL IA-ACNC: 1.8 IU/ML
ENA AB SER QL IA: <0.09 UG/L
ENA AB SER QL IA: NEGATIVE
ENA SS-A IGG SER IA-ACNC: 0.5 U/ML
ENA SS-B IGG SER IA-ACNC: <0.4 U/ML

## 2022-11-20 LAB
MYELOPEROX ANTIBODIES, IGG: 0 AU/ML
SERINE PROTEASE 3, IGG: 0 AU/ML

## 2022-11-21 ENCOUNTER — EXTERNAL RECORD (OUTPATIENT)
Dept: HEALTH INFORMATION MANAGEMENT | Facility: OTHER | Age: 77
End: 2022-11-21

## 2022-11-22 ENCOUNTER — EXTERNAL RECORD (OUTPATIENT)
Dept: HEALTH INFORMATION MANAGEMENT | Facility: OTHER | Age: 77
End: 2022-11-22

## 2022-11-23 ENCOUNTER — LAB REQUISITION (OUTPATIENT)
Dept: LAB | Facility: HOSPITAL | Age: 77
End: 2022-11-23
Payer: MEDICARE

## 2022-11-23 DIAGNOSIS — C50.311 MALIGNANT NEOPLASM OF LOWER-INNER QUADRANT OF RIGHT FEMALE BREAST (HCC): ICD-10-CM

## 2022-11-23 LAB — AMMONIA PLAS-MCNC: 40 UMOL/L (ref 11–32)

## 2022-11-23 PROCEDURE — 82140 ASSAY OF AMMONIA: CPT | Performed by: FAMILY MEDICINE

## 2022-12-01 ENCOUNTER — HOSPITAL ENCOUNTER (OUTPATIENT)
Dept: CT IMAGING | Age: 77
Discharge: HOME | End: 2022-12-01
Attending: SURGERY

## 2022-12-01 DIAGNOSIS — Z98.890 STATUS POST RIGHT BREAST BIOPSY: ICD-10-CM

## 2022-12-01 DIAGNOSIS — C50.411 MALIGNANT NEOPLASM OF UPPER-OUTER QUADRANT OF RIGHT BREAST IN FEMALE, ESTROGEN RECEPTOR POSITIVE (CMD): ICD-10-CM

## 2022-12-01 DIAGNOSIS — Z17.0 MALIGNANT NEOPLASM OF UPPER-OUTER QUADRANT OF RIGHT BREAST IN FEMALE, ESTROGEN RECEPTOR POSITIVE (CMD): ICD-10-CM

## 2022-12-01 PROCEDURE — 10002801 HB RX 250 W/O HCPCS: Performed by: RADIOLOGY

## 2022-12-01 PROCEDURE — 10006027 HB SUPPLY 278

## 2022-12-01 PROCEDURE — 19285 PERQ DEV BREAST 1ST US IMAG: CPT

## 2022-12-01 PROCEDURE — A4648 IMPLANTABLE TISSUE MARKER: HCPCS

## 2022-12-01 PROCEDURE — 77065 DX MAMMO INCL CAD UNI: CPT

## 2022-12-01 RX ORDER — LIDOCAINE HYDROCHLORIDE 20 MG/ML
INJECTION, SOLUTION INFILTRATION; PERINEURAL PRN
Status: COMPLETED | OUTPATIENT
Start: 2022-12-01 | End: 2022-12-01

## 2022-12-01 RX ADMIN — LIDOCAINE HYDROCHLORIDE 1.5 ML: 20 INJECTION, SOLUTION INFILTRATION; PERINEURAL at 10:32

## 2022-12-06 ENCOUNTER — TELEPHONE (OUTPATIENT)
Dept: CARDIOLOGY | Age: 77
End: 2022-12-06

## 2022-12-07 ENCOUNTER — LAB REQUISITION (OUTPATIENT)
Dept: LAB | Facility: HOSPITAL | Age: 77
End: 2022-12-07
Payer: MEDICARE

## 2022-12-07 DIAGNOSIS — E72.20 DISORDER OF UREA CYCLE METABOLISM, UNSPECIFIED (HCC): ICD-10-CM

## 2022-12-07 LAB
ALBUMIN SERPL-MCNC: 2.6 G/DL (ref 3.4–5)
ALBUMIN/GLOB SERPL: 0.7 {RATIO} (ref 1–2)
ALP LIVER SERPL-CCNC: 52 U/L
ALT SERPL-CCNC: 29 U/L
AMMONIA PLAS-MCNC: 60 UMOL/L (ref 11–32)
ANION GAP SERPL CALC-SCNC: 4 MMOL/L (ref 0–18)
AST SERPL-CCNC: 23 U/L (ref 15–37)
BILIRUB SERPL-MCNC: 0.4 MG/DL (ref 0.1–2)
BUN BLD-MCNC: 13 MG/DL (ref 7–18)
CALCIUM BLD-MCNC: 8.4 MG/DL (ref 8.5–10.1)
CHLORIDE SERPL-SCNC: 110 MMOL/L (ref 98–112)
CO2 SERPL-SCNC: 27 MMOL/L (ref 21–32)
CREAT BLD-MCNC: 0.66 MG/DL
GFR SERPLBLD BASED ON 1.73 SQ M-ARVRAT: 91 ML/MIN/1.73M2 (ref 60–?)
GLOBULIN PLAS-MCNC: 3.8 G/DL (ref 2.8–4.4)
GLUCOSE BLD-MCNC: 99 MG/DL (ref 70–99)
OSMOLALITY SERPL CALC.SUM OF ELEC: 292 MOSM/KG (ref 275–295)
POTASSIUM SERPL-SCNC: 3.9 MMOL/L (ref 3.5–5.1)
PROT SERPL-MCNC: 6.4 G/DL (ref 6.4–8.2)
SODIUM SERPL-SCNC: 141 MMOL/L (ref 136–145)

## 2022-12-07 PROCEDURE — 82140 ASSAY OF AMMONIA: CPT | Performed by: FAMILY MEDICINE

## 2022-12-07 PROCEDURE — 80053 COMPREHEN METABOLIC PANEL: CPT | Performed by: FAMILY MEDICINE

## 2022-12-09 ASSESSMENT — ACTIVITIES OF DAILY LIVING (ADL)
MOBILITY_ASSIST_DEVICES: STANDARD WALKER
ADL_SCORE: 3
SENSORY_SUPPORT_DEVICES: CONTACTS;EYEGLASSES
ADL_BEFORE_ADMISSION: NEEDS/REQUIRES ASSISTANCE
DRESSING: NEEDS ASSISTANCE
BATHING: NEEDS ASSISTANCE
TOILETING: NEEDS ASSISTANCE

## 2022-12-12 ENCOUNTER — LAB REQUISITION (OUTPATIENT)
Dept: LAB | Facility: HOSPITAL | Age: 77
End: 2022-12-12
Payer: MEDICARE

## 2022-12-12 DIAGNOSIS — Q61.4 RENAL DYSPLASIA: ICD-10-CM

## 2022-12-12 LAB — AMMONIA PLAS-MCNC: 55 UMOL/L (ref 11–32)

## 2022-12-12 PROCEDURE — 82140 ASSAY OF AMMONIA: CPT | Performed by: FAMILY MEDICINE

## 2022-12-13 ENCOUNTER — ANESTHESIA EVENT (OUTPATIENT)
Dept: SURGERY | Age: 77
End: 2022-12-13

## 2022-12-13 ENCOUNTER — ANESTHESIA (OUTPATIENT)
Dept: SURGERY | Age: 77
End: 2022-12-13

## 2022-12-13 ENCOUNTER — HOSPITAL ENCOUNTER (OUTPATIENT)
Dept: CT IMAGING | Age: 77
Discharge: HOME | End: 2022-12-13
Attending: SURGERY

## 2022-12-13 ENCOUNTER — HOSPITAL ENCOUNTER (OUTPATIENT)
Age: 77
Discharge: HOME | End: 2022-12-13
Attending: SURGERY | Admitting: SURGERY

## 2022-12-13 VITALS
SYSTOLIC BLOOD PRESSURE: 131 MMHG | TEMPERATURE: 96.8 F | DIASTOLIC BLOOD PRESSURE: 58 MMHG | HEART RATE: 82 BPM | HEIGHT: 62 IN | BODY MASS INDEX: 29.98 KG/M2 | WEIGHT: 162.92 LBS | OXYGEN SATURATION: 95 % | RESPIRATION RATE: 16 BRPM

## 2022-12-13 DIAGNOSIS — K76.9 LIVER DISEASE: Primary | ICD-10-CM

## 2022-12-13 DIAGNOSIS — C50.411 MALIGNANT NEOPLASM OF UPPER-OUTER QUADRANT OF RIGHT BREAST IN FEMALE, ESTROGEN RECEPTOR POSITIVE (CMD): ICD-10-CM

## 2022-12-13 DIAGNOSIS — Z17.0 MALIGNANT NEOPLASM OF UPPER-OUTER QUADRANT OF RIGHT BREAST IN FEMALE, ESTROGEN RECEPTOR POSITIVE (CMD): ICD-10-CM

## 2022-12-13 LAB
APTT PPP: 26 SEC (ref 22–30)
SARS-COV-2 RNA RESP QL NAA+PROBE: NOT DETECTED
SERVICE CMNT-IMP: NORMAL
SERVICE CMNT-IMP: NORMAL

## 2022-12-13 PROCEDURE — 13000037 HB COMPLEX CASE EACH ADD MINUTE: Performed by: SURGERY

## 2022-12-13 PROCEDURE — 88307 TISSUE EXAM BY PATHOLOGIST: CPT | Performed by: SURGERY

## 2022-12-13 PROCEDURE — 10002807 HB RX 258: Performed by: SURGERY

## 2022-12-13 PROCEDURE — 13000007 HB ANESTHESIA MAC EA ADD MINUTE: Performed by: SURGERY

## 2022-12-13 PROCEDURE — 10004452 HB PACU ADDL 30 MINUTES: Performed by: SURGERY

## 2022-12-13 PROCEDURE — 10004451 HB PACU RECOVERY 1ST 30 MINUTES: Performed by: SURGERY

## 2022-12-13 PROCEDURE — 10002801 HB RX 250 W/O HCPCS: Performed by: SURGERY

## 2022-12-13 PROCEDURE — 87635 SARS-COV-2 COVID-19 AMP PRB: CPT | Performed by: SURGERY

## 2022-12-13 PROCEDURE — 10002807 HB RX 258: Performed by: ANESTHESIOLOGY

## 2022-12-13 PROCEDURE — 10002801 HB RX 250 W/O HCPCS: Performed by: ANESTHESIOLOGY

## 2022-12-13 PROCEDURE — 10002803 HB RX 637: Performed by: SURGERY

## 2022-12-13 PROCEDURE — 13000006 HB ANESTHESIA MAC S/U + 1ST 15 MIN: Performed by: SURGERY

## 2022-12-13 PROCEDURE — 19301 PARTIAL MASTECTOMY: CPT | Performed by: SURGERY

## 2022-12-13 PROCEDURE — 10006023 HB SUPPLY 272: Performed by: SURGERY

## 2022-12-13 PROCEDURE — 10006027 HB SUPPLY 278: Performed by: SURGERY

## 2022-12-13 PROCEDURE — 13000001 HB PHASE II RECOVERY EA 30 MINUTES: Performed by: SURGERY

## 2022-12-13 PROCEDURE — 85730 THROMBOPLASTIN TIME PARTIAL: CPT

## 2022-12-13 PROCEDURE — 13000036 HB COMPLEX  CASE S/U + 1ST 15 MIN: Performed by: SURGERY

## 2022-12-13 PROCEDURE — 10002800 HB RX 250 W HCPCS: Performed by: ANESTHESIOLOGY

## 2022-12-13 PROCEDURE — 76098 X-RAY EXAM SURGICAL SPECIMEN: CPT

## 2022-12-13 PROCEDURE — 10004651 HB RX, NO CHARGE ITEM: Performed by: SURGERY

## 2022-12-13 DEVICE — LIGACLIP MCA MULTIPLE CLIP APPLIERS, 20 MEDIUM CLIPS
Type: IMPLANTABLE DEVICE | Site: BREAST | Status: FUNCTIONAL
Brand: LIGACLIP

## 2022-12-13 RX ORDER — 0.9 % SODIUM CHLORIDE 0.9 %
2 VIAL (ML) INJECTION EVERY 12 HOURS SCHEDULED
Status: DISCONTINUED | OUTPATIENT
Start: 2022-12-13 | End: 2022-12-13 | Stop reason: HOSPADM

## 2022-12-13 RX ORDER — TRAMADOL HYDROCHLORIDE 50 MG/1
50 TABLET ORAL EVERY 6 HOURS PRN
Qty: 20 TABLET | Refills: 0 | Status: ON HOLD | OUTPATIENT
Start: 2022-12-13 | End: 2023-01-29 | Stop reason: SDUPTHER

## 2022-12-13 RX ORDER — ACETAMINOPHEN 325 MG/1
650 TABLET ORAL EVERY 4 HOURS PRN
Status: DISCONTINUED | OUTPATIENT
Start: 2022-12-13 | End: 2022-12-13 | Stop reason: HOSPADM

## 2022-12-13 RX ORDER — ACETAMINOPHEN 650 MG/1
650 SUPPOSITORY RECTAL EVERY 4 HOURS PRN
Status: DISCONTINUED | OUTPATIENT
Start: 2022-12-13 | End: 2022-12-13 | Stop reason: HOSPADM

## 2022-12-13 RX ORDER — TRAMADOL HYDROCHLORIDE 50 MG/1
50 TABLET ORAL EVERY 8 HOURS PRN
Qty: 10 TABLET | Refills: 0 | Status: SHIPPED | OUTPATIENT
Start: 2022-12-13 | End: 2023-01-05

## 2022-12-13 RX ORDER — NALOXONE HCL 0.4 MG/ML
0.2 VIAL (ML) INJECTION EVERY 5 MIN PRN
Status: DISCONTINUED | OUTPATIENT
Start: 2022-12-13 | End: 2022-12-13 | Stop reason: HOSPADM

## 2022-12-13 RX ORDER — TRAMADOL HYDROCHLORIDE 50 MG/1
50 TABLET ORAL EVERY 12 HOURS PRN
Status: DISCONTINUED | OUTPATIENT
Start: 2022-12-13 | End: 2022-12-13 | Stop reason: HOSPADM

## 2022-12-13 RX ORDER — FAMOTIDINE 20 MG/1
20 TABLET, FILM COATED ORAL
Status: DISCONTINUED | OUTPATIENT
Start: 2022-12-13 | End: 2022-12-13 | Stop reason: HOSPADM

## 2022-12-13 RX ORDER — CELECOXIB 200 MG/1
400 CAPSULE ORAL
Status: COMPLETED | OUTPATIENT
Start: 2022-12-13 | End: 2022-12-13

## 2022-12-13 RX ORDER — CLINDAMYCIN PHOSPHATE 900 MG/50ML
900 INJECTION INTRAVENOUS
Status: COMPLETED | OUTPATIENT
Start: 2022-12-13 | End: 2022-12-13

## 2022-12-13 RX ORDER — ACETAMINOPHEN 500 MG
1000 TABLET ORAL
Status: COMPLETED | OUTPATIENT
Start: 2022-12-13 | End: 2022-12-13

## 2022-12-13 RX ORDER — SODIUM CHLORIDE, SODIUM LACTATE, POTASSIUM CHLORIDE, CALCIUM CHLORIDE 600; 310; 30; 20 MG/100ML; MG/100ML; MG/100ML; MG/100ML
INJECTION, SOLUTION INTRAVENOUS CONTINUOUS
Status: DISCONTINUED | OUTPATIENT
Start: 2022-12-13 | End: 2022-12-13 | Stop reason: HOSPADM

## 2022-12-13 RX ORDER — HUMAN INSULIN 100 [IU]/ML
INJECTION, SOLUTION SUBCUTANEOUS
Status: DISCONTINUED | OUTPATIENT
Start: 2022-12-13 | End: 2022-12-13 | Stop reason: HOSPADM

## 2022-12-13 RX ORDER — EPHEDRINE SULFATE/0.9% NACL/PF 50 MG/10ML
SYRINGE (ML) INTRAVENOUS PRN
Status: DISCONTINUED | OUTPATIENT
Start: 2022-12-13 | End: 2022-12-13

## 2022-12-13 RX ORDER — HYDRALAZINE HYDROCHLORIDE 20 MG/ML
5 INJECTION INTRAMUSCULAR; INTRAVENOUS EVERY 10 MIN PRN
Status: DISCONTINUED | OUTPATIENT
Start: 2022-12-13 | End: 2022-12-13 | Stop reason: HOSPADM

## 2022-12-13 RX ORDER — SODIUM CHLORIDE, SODIUM LACTATE, POTASSIUM CHLORIDE, CALCIUM CHLORIDE 600; 310; 30; 20 MG/100ML; MG/100ML; MG/100ML; MG/100ML
INJECTION, SOLUTION INTRAVENOUS CONTINUOUS PRN
Status: DISCONTINUED | OUTPATIENT
Start: 2022-12-13 | End: 2022-12-13

## 2022-12-13 RX ORDER — GABAPENTIN 300 MG/1
300 CAPSULE ORAL
Status: COMPLETED | OUTPATIENT
Start: 2022-12-13 | End: 2022-12-13

## 2022-12-13 RX ORDER — MIDAZOLAM HYDROCHLORIDE 1 MG/ML
INJECTION, SOLUTION INTRAMUSCULAR; INTRAVENOUS PRN
Status: DISCONTINUED | OUTPATIENT
Start: 2022-12-13 | End: 2022-12-13

## 2022-12-13 RX ORDER — SCOLOPAMINE TRANSDERMAL SYSTEM 1 MG/1
1 PATCH, EXTENDED RELEASE TRANSDERMAL PRN
Status: DISCONTINUED | OUTPATIENT
Start: 2022-12-13 | End: 2022-12-13 | Stop reason: HOSPADM

## 2022-12-13 RX ADMIN — CLINDAMYCIN IN 5 PERCENT DEXTROSE 900 MG: 18 INJECTION, SOLUTION INTRAVENOUS at 11:45

## 2022-12-13 RX ADMIN — MIDAZOLAM HYDROCHLORIDE 2 MG: 1 INJECTION, SOLUTION INTRAMUSCULAR; INTRAVENOUS at 11:35

## 2022-12-13 RX ADMIN — EPHEDRINE SULFATE 25 MG: 5 INJECTION INTRAVENOUS at 12:12

## 2022-12-13 RX ADMIN — SODIUM CHLORIDE, POTASSIUM CHLORIDE, SODIUM LACTATE AND CALCIUM CHLORIDE: 600; 310; 30; 20 INJECTION, SOLUTION INTRAVENOUS at 10:15

## 2022-12-13 RX ADMIN — SODIUM CHLORIDE, POTASSIUM CHLORIDE, SODIUM LACTATE AND CALCIUM CHLORIDE: 600; 310; 30; 20 INJECTION, SOLUTION INTRAVENOUS at 11:31

## 2022-12-13 RX ADMIN — GABAPENTIN 300 MG: 300 CAPSULE ORAL at 11:10

## 2022-12-13 RX ADMIN — CELECOXIB 400 MG: 200 CAPSULE ORAL at 11:10

## 2022-12-13 RX ADMIN — FENTANYL CITRATE 100 MCG: 50 INJECTION, SOLUTION INTRAMUSCULAR; INTRAVENOUS at 11:35

## 2022-12-13 RX ADMIN — ACETAMINOPHEN 1000 MG: 500 TABLET ORAL at 11:10

## 2022-12-13 RX ADMIN — PROPOFOL 150 MCG/KG/MIN: 10 INJECTION, EMULSION INTRAVENOUS at 11:35

## 2022-12-13 RX ADMIN — EPHEDRINE SULFATE 25 MG: 5 INJECTION INTRAVENOUS at 12:19

## 2022-12-13 ASSESSMENT — PAIN SCALES - GENERAL
PAINLEVEL_OUTOF10: 0

## 2022-12-13 ASSESSMENT — ENCOUNTER SYMPTOMS: EXERCISE TOLERANCE: GOOD (>4 METS)

## 2022-12-15 ENCOUNTER — TELEPHONE (OUTPATIENT)
Dept: SURGERY | Age: 77
End: 2022-12-15

## 2022-12-15 LAB
ASR DISCLAIMER: NORMAL
CASE RPRT: NORMAL
CLINICAL INFO: NORMAL
PATH REPORT ADDENDUM.SYNOPTIC DOC: NORMAL
PATH REPORT.FINAL DX SPEC: NORMAL
PATH REPORT.GROSS SPEC: NORMAL

## 2022-12-19 ENCOUNTER — OFFICE VISIT (OUTPATIENT)
Dept: SURGERY | Age: 77
End: 2022-12-19
Attending: FAMILY MEDICINE

## 2022-12-19 DIAGNOSIS — C50.411 MALIGNANT NEOPLASM OF UPPER-OUTER QUADRANT OF RIGHT BREAST IN FEMALE, ESTROGEN RECEPTOR POSITIVE (CMD): ICD-10-CM

## 2022-12-19 DIAGNOSIS — Z09 POSTOP CHECK: ICD-10-CM

## 2022-12-19 DIAGNOSIS — Z98.890 S/P LUMPECTOMY, RIGHT BREAST: Primary | ICD-10-CM

## 2022-12-19 DIAGNOSIS — Z17.0 MALIGNANT NEOPLASM OF UPPER-OUTER QUADRANT OF RIGHT BREAST IN FEMALE, ESTROGEN RECEPTOR POSITIVE (CMD): ICD-10-CM

## 2022-12-19 PROCEDURE — 99024 POSTOP FOLLOW-UP VISIT: CPT | Performed by: SURGERY

## 2022-12-19 PROCEDURE — 99211 OFF/OP EST MAY X REQ PHY/QHP: CPT

## 2022-12-21 ENCOUNTER — LAB REQUISITION (OUTPATIENT)
Dept: LAB | Facility: HOSPITAL | Age: 77
End: 2022-12-21
Payer: MEDICARE

## 2022-12-21 DIAGNOSIS — Q61.4 RENAL DYSPLASIA: ICD-10-CM

## 2022-12-21 LAB — AMMONIA PLAS-MCNC: 29 UMOL/L (ref 11–32)

## 2022-12-21 PROCEDURE — 82140 ASSAY OF AMMONIA: CPT | Performed by: FAMILY MEDICINE

## 2022-12-27 ENCOUNTER — TELEPHONE (OUTPATIENT)
Dept: HEMATOLOGY/ONCOLOGY | Age: 77
End: 2022-12-27

## 2023-01-04 ENCOUNTER — LAB REQUISITION (OUTPATIENT)
Dept: LAB | Facility: HOSPITAL | Age: 78
End: 2023-01-04
Payer: MEDICARE

## 2023-01-04 DIAGNOSIS — R79.89 OTHER SPECIFIED ABNORMAL FINDINGS OF BLOOD CHEMISTRY: ICD-10-CM

## 2023-01-04 DIAGNOSIS — K76.82 HEPATIC ENCEPHALOPATHY: ICD-10-CM

## 2023-01-04 LAB
A1AT SERPL-MCNC: 203 MG/DL (ref 90–200)
AMMONIA PLAS-MCNC: 37 UMOL/L (ref 11–32)
DEPRECATED HBV CORE AB SER IA-ACNC: 36.4 NG/ML
HCV AB SERPL QL IA: NONREACTIVE

## 2023-01-04 PROCEDURE — 86225 DNA ANTIBODY NATIVE: CPT | Performed by: FAMILY MEDICINE

## 2023-01-04 PROCEDURE — 82140 ASSAY OF AMMONIA: CPT | Performed by: FAMILY MEDICINE

## 2023-01-04 PROCEDURE — 86803 HEPATITIS C AB TEST: CPT | Performed by: FAMILY MEDICINE

## 2023-01-04 PROCEDURE — 82728 ASSAY OF FERRITIN: CPT | Performed by: FAMILY MEDICINE

## 2023-01-04 PROCEDURE — 83516 IMMUNOASSAY NONANTIBODY: CPT | Performed by: FAMILY MEDICINE

## 2023-01-04 PROCEDURE — 82103 ALPHA-1-ANTITRYPSIN TOTAL: CPT | Performed by: FAMILY MEDICINE

## 2023-01-04 PROCEDURE — 86038 ANTINUCLEAR ANTIBODIES: CPT | Performed by: FAMILY MEDICINE

## 2023-01-05 ENCOUNTER — APPOINTMENT (OUTPATIENT)
Dept: CT IMAGING | Age: 78
DRG: 176 | End: 2023-01-05
Attending: EMERGENCY MEDICINE

## 2023-01-05 ENCOUNTER — APPOINTMENT (OUTPATIENT)
Dept: GENERAL RADIOLOGY | Age: 78
DRG: 176 | End: 2023-01-05
Attending: EMERGENCY MEDICINE

## 2023-01-05 ENCOUNTER — APPOINTMENT (OUTPATIENT)
Dept: NUCLEAR MEDICINE | Age: 78
DRG: 176 | End: 2023-01-05
Attending: EMERGENCY MEDICINE

## 2023-01-05 ENCOUNTER — APPOINTMENT (OUTPATIENT)
Dept: ULTRASOUND IMAGING | Age: 78
DRG: 176 | End: 2023-01-05
Attending: EMERGENCY MEDICINE

## 2023-01-05 ENCOUNTER — HOSPITAL ENCOUNTER (INPATIENT)
Age: 78
LOS: 1 days | Discharge: NURSING, REHAB OR LONG-TERM CARE | DRG: 176 | End: 2023-01-07
Attending: EMERGENCY MEDICINE | Admitting: INTERNAL MEDICINE

## 2023-01-05 DIAGNOSIS — T80.818A EXTRAVASATION OF INTRAVENOUS CONTRAST MEDIUM: ICD-10-CM

## 2023-01-05 DIAGNOSIS — I26.99 ACUTE PULMONARY EMBOLISM WITHOUT ACUTE COR PULMONALE, UNSPECIFIED PULMONARY EMBOLISM TYPE (CMD): ICD-10-CM

## 2023-01-05 DIAGNOSIS — S30.0XXA CONTUSION OF BUTTOCK, INITIAL ENCOUNTER: ICD-10-CM

## 2023-01-05 DIAGNOSIS — R07.1 CHEST PAIN ON BREATHING: Primary | ICD-10-CM

## 2023-01-05 LAB
ALBUMIN SERPL-MCNC: 2.7 G/DL (ref 3.6–5.1)
ALBUMIN/GLOB SERPL: 0.6 {RATIO} (ref 1–2.4)
ALP SERPL-CCNC: 68 UNITS/L (ref 45–117)
ALT SERPL-CCNC: 29 UNITS/L
AMMONIA PLAS-SCNC: 40 MCMOL/L
ANION GAP SERPL CALC-SCNC: 8 MMOL/L (ref 7–19)
AST SERPL-CCNC: 33 UNITS/L
ATRIAL RATE (BPM): 73
BASOPHILS # BLD: 0.1 K/MCL (ref 0–0.3)
BASOPHILS NFR BLD: 1 %
BILIRUB SERPL-MCNC: 0.5 MG/DL (ref 0.2–1)
BUN SERPL-MCNC: 12 MG/DL (ref 6–20)
BUN/CREAT SERPL: 17 (ref 7–25)
CALCIUM SERPL-MCNC: 9 MG/DL (ref 8.4–10.2)
CHLORIDE SERPL-SCNC: 104 MMOL/L (ref 97–110)
CO2 SERPL-SCNC: 29 MMOL/L (ref 21–32)
CREAT SERPL-MCNC: 0.72 MG/DL (ref 0.51–0.95)
D DIMER PPP FEU-MCNC: 3.99 MG/L (FEU)
DEPRECATED RDW RBC: 51.8 FL (ref 39–50)
EOSINOPHIL # BLD: 0.1 K/MCL (ref 0–0.5)
EOSINOPHIL NFR BLD: 1 %
ERYTHROCYTE [DISTWIDTH] IN BLOOD: 13.3 % (ref 11–15)
FASTING DURATION TIME PATIENT: ABNORMAL H
GFR SERPLBLD BASED ON 1.73 SQ M-ARVRAT: 87 ML/MIN
GLOBULIN SER-MCNC: 4.2 G/DL (ref 2–4)
GLUCOSE SERPL-MCNC: 107 MG/DL (ref 70–99)
HCT VFR BLD CALC: 42.6 % (ref 36–46.5)
HGB BLD-MCNC: 13.9 G/DL (ref 12–15.5)
IMM GRANULOCYTES # BLD AUTO: 0.1 K/MCL (ref 0–0.2)
IMM GRANULOCYTES # BLD: 1 %
LYMPHOCYTES # BLD: 1.4 K/MCL (ref 1–4)
LYMPHOCYTES NFR BLD: 15 %
MCH RBC QN AUTO: 34.3 PG (ref 26–34)
MCHC RBC AUTO-ENTMCNC: 32.6 G/DL (ref 32–36.5)
MCV RBC AUTO: 105.2 FL (ref 78–100)
MONOCYTES # BLD: 1.1 K/MCL (ref 0.3–0.9)
MONOCYTES NFR BLD: 12 %
NEUTROPHILS # BLD: 6.4 K/MCL (ref 1.8–7.7)
NEUTROPHILS NFR BLD: 70 %
NRBC BLD MANUAL-RTO: 0 /100 WBC
P AXIS (DEGREES): 72
PLATELET # BLD AUTO: 232 K/MCL (ref 140–450)
POTASSIUM SERPL-SCNC: 4.1 MMOL/L (ref 3.4–5.1)
PR-INTERVAL (MSEC): 168
PROT SERPL-MCNC: 6.9 G/DL (ref 6.4–8.2)
QRS-INTERVAL (MSEC): 82
QT-INTERVAL (MSEC): 438
QTC: 483
R AXIS (DEGREES): 26
RAINBOW EXTRA TUBES HOLD SPECIMEN: NORMAL
RBC # BLD: 4.05 MIL/MCL (ref 4–5.2)
REPORT TEXT: NORMAL
SARS-COV-2 RNA RESP QL NAA+PROBE: NOT DETECTED
SERVICE CMNT-IMP: NORMAL
SERVICE CMNT-IMP: NORMAL
SODIUM SERPL-SCNC: 137 MMOL/L (ref 135–145)
T AXIS (DEGREES): 62
TROPONIN I SERPL DL<=0.01 NG/ML-MCNC: 12 NG/L
VENTRICULAR RATE EKG/MIN (BPM): 73
WBC # BLD: 9.2 K/MCL (ref 4.2–11)

## 2023-01-05 PROCEDURE — 80053 COMPREHEN METABOLIC PANEL: CPT | Performed by: EMERGENCY MEDICINE

## 2023-01-05 PROCEDURE — 10002805 HB CONTRAST AGENT: Performed by: EMERGENCY MEDICINE

## 2023-01-05 PROCEDURE — 87635 SARS-COV-2 COVID-19 AMP PRB: CPT | Performed by: EMERGENCY MEDICINE

## 2023-01-05 PROCEDURE — G0378 HOSPITAL OBSERVATION PER HR: HCPCS

## 2023-01-05 PROCEDURE — 93005 ELECTROCARDIOGRAM TRACING: CPT | Performed by: EMERGENCY MEDICINE

## 2023-01-05 PROCEDURE — 82140 ASSAY OF AMMONIA: CPT | Performed by: EMERGENCY MEDICINE

## 2023-01-05 PROCEDURE — 85025 COMPLETE CBC W/AUTO DIFF WBC: CPT | Performed by: EMERGENCY MEDICINE

## 2023-01-05 PROCEDURE — 71275 CT ANGIOGRAPHY CHEST: CPT

## 2023-01-05 PROCEDURE — 36415 COLL VENOUS BLD VENIPUNCTURE: CPT | Performed by: EMERGENCY MEDICINE

## 2023-01-05 PROCEDURE — G1004 CDSM NDSC: HCPCS

## 2023-01-05 PROCEDURE — 85379 FIBRIN DEGRADATION QUANT: CPT | Performed by: EMERGENCY MEDICINE

## 2023-01-05 PROCEDURE — 13003289 HB OXYGEN THERAPY DAILY

## 2023-01-05 PROCEDURE — 84484 ASSAY OF TROPONIN QUANT: CPT | Performed by: EMERGENCY MEDICINE

## 2023-01-05 PROCEDURE — A9558 XE133 XENON 10MCI: HCPCS | Performed by: EMERGENCY MEDICINE

## 2023-01-05 PROCEDURE — 78582 LUNG VENTILAT&PERFUS IMAGING: CPT

## 2023-01-05 PROCEDURE — 71046 X-RAY EXAM CHEST 2 VIEWS: CPT

## 2023-01-05 PROCEDURE — 10006150 HB RX 343: Performed by: EMERGENCY MEDICINE

## 2023-01-05 PROCEDURE — 96372 THER/PROPH/DIAG INJ SC/IM: CPT

## 2023-01-05 PROCEDURE — 99285 EMERGENCY DEPT VISIT HI MDM: CPT

## 2023-01-05 PROCEDURE — A9540 TC99M MAA: HCPCS | Performed by: EMERGENCY MEDICINE

## 2023-01-05 PROCEDURE — 93970 EXTREMITY STUDY: CPT

## 2023-01-05 PROCEDURE — 10004651 HB RX, NO CHARGE ITEM: Performed by: INTERNAL MEDICINE

## 2023-01-05 PROCEDURE — 73502 X-RAY EXAM HIP UNI 2-3 VIEWS: CPT

## 2023-01-05 PROCEDURE — 10002800 HB RX 250 W HCPCS: Performed by: EMERGENCY MEDICINE

## 2023-01-05 PROCEDURE — C9803 HOPD COVID-19 SPEC COLLECT: HCPCS

## 2023-01-05 RX ORDER — SENNOSIDES 8.6 MG
1300 CAPSULE ORAL EVERY 8 HOURS PRN
COMMUNITY
End: 2023-01-05 | Stop reason: ALTCHOICE

## 2023-01-05 RX ORDER — LACTULOSE 10 G/15ML
20 SOLUTION ORAL 2 TIMES DAILY
Status: DISCONTINUED | OUTPATIENT
Start: 2023-01-05 | End: 2023-01-07 | Stop reason: HOSPADM

## 2023-01-05 RX ORDER — ARIPIPRAZOLE 2 MG/1
2 TABLET ORAL DAILY
Status: DISCONTINUED | OUTPATIENT
Start: 2023-01-06 | End: 2023-01-07 | Stop reason: HOSPADM

## 2023-01-05 RX ORDER — VENLAFAXINE HYDROCHLORIDE 150 MG/1
150 CAPSULE, EXTENDED RELEASE ORAL DAILY
Status: DISCONTINUED | OUTPATIENT
Start: 2023-01-05 | End: 2023-01-05

## 2023-01-05 RX ORDER — ACETAMINOPHEN 500 MG
500-1000 TABLET ORAL EVERY 6 HOURS PRN
Status: ON HOLD | COMMUNITY
End: 2023-04-04 | Stop reason: HOSPADM

## 2023-01-05 RX ORDER — VENLAFAXINE HYDROCHLORIDE 75 MG/1
75 CAPSULE, EXTENDED RELEASE ORAL DAILY
Status: DISCONTINUED | OUTPATIENT
Start: 2023-01-05 | End: 2023-01-05

## 2023-01-05 RX ORDER — ACETAMINOPHEN 325 MG/1
650 TABLET ORAL EVERY 4 HOURS PRN
Status: DISCONTINUED | OUTPATIENT
Start: 2023-01-05 | End: 2023-01-07 | Stop reason: HOSPADM

## 2023-01-05 RX ORDER — ACETAMINOPHEN 500 MG
1000 TABLET ORAL PRN
COMMUNITY
End: 2023-01-05

## 2023-01-05 RX ORDER — XENON XE-133 10 MCI/1
20 GAS RESPIRATORY (INHALATION) ONCE
Status: COMPLETED | OUTPATIENT
Start: 2023-01-05 | End: 2023-01-05

## 2023-01-05 RX ORDER — ENOXAPARIN SODIUM 100 MG/ML
1 INJECTION SUBCUTANEOUS EVERY 12 HOURS
Status: DISCONTINUED | OUTPATIENT
Start: 2023-01-06 | End: 2023-01-06

## 2023-01-05 RX ORDER — LEVOTHYROXINE SODIUM 0.03 MG/1
25 TABLET ORAL
Status: DISCONTINUED | OUTPATIENT
Start: 2023-01-06 | End: 2023-01-07 | Stop reason: HOSPADM

## 2023-01-05 RX ORDER — 0.9 % SODIUM CHLORIDE 0.9 %
2 VIAL (ML) INJECTION EVERY 12 HOURS SCHEDULED
Status: DISCONTINUED | OUTPATIENT
Start: 2023-01-05 | End: 2023-01-07 | Stop reason: HOSPADM

## 2023-01-05 RX ORDER — FLUDROCORTISONE ACETATE 0.1 MG/1
0.1 TABLET ORAL DAILY
Status: DISCONTINUED | OUTPATIENT
Start: 2023-01-06 | End: 2023-01-07 | Stop reason: HOSPADM

## 2023-01-05 RX ORDER — ENOXAPARIN SODIUM 100 MG/ML
1 INJECTION SUBCUTANEOUS ONCE
Status: COMPLETED | OUTPATIENT
Start: 2023-01-05 | End: 2023-01-05

## 2023-01-05 RX ORDER — LIDOCAINE 50 MG/G
2 PATCH TOPICAL EVERY 24 HOURS
COMMUNITY
End: 2023-01-05

## 2023-01-05 RX ADMIN — SODIUM CHLORIDE, PRESERVATIVE FREE 2 ML: 5 INJECTION INTRAVENOUS at 21:35

## 2023-01-05 RX ADMIN — KIT FOR THE PREPARATION OF TECHNETIUM TC 99M ALBUMIN AGGREGATED 4.1 MILLICURIE: 2.5 INJECTION, POWDER, FOR SOLUTION INTRAVENOUS at 13:45

## 2023-01-05 RX ADMIN — IOHEXOL 70 ML: 350 INJECTION, SOLUTION INTRAVENOUS at 11:46

## 2023-01-05 RX ADMIN — ACETAMINOPHEN 650 MG: 325 TABLET ORAL at 21:36

## 2023-01-05 RX ADMIN — XENON XE-133 6.5 MILLICURIE: 10 GAS RESPIRATORY (INHALATION) at 13:30

## 2023-01-05 RX ADMIN — ENOXAPARIN SODIUM 80 MG: 80 INJECTION SUBCUTANEOUS at 15:42

## 2023-01-05 SDOH — HEALTH STABILITY: PHYSICAL HEALTH: DO YOU HAVE SERIOUS DIFFICULTY WALKING OR CLIMBING STAIRS?: YES

## 2023-01-05 SDOH — ECONOMIC STABILITY: FOOD INSECURITY: HOW OFTEN IN THE PAST 12 MONTHS WERE YOU WORRIED OR STRESSED ABOUT HAVING ENOUGH MONEY TO BUY NUTRITIOUS MEALS?: NEVER

## 2023-01-05 SDOH — HEALTH STABILITY: GENERAL: BECAUSE OF A PHYSICAL, MENTAL, OR EMOTIONAL CONDITION, DO YOU HAVE DIFFICULTY DOING ERRANDS ALONE?: NO

## 2023-01-05 SDOH — HEALTH STABILITY: GENERAL
BECAUSE OF A PHYSICAL, MENTAL, OR EMOTIONAL CONDITION, DO YOU HAVE SERIOUS DIFFICULTY CONCENTRATING, REMEMBERING OR MAKING DECISIONS?: NO

## 2023-01-05 SDOH — ECONOMIC STABILITY: HOUSING INSECURITY: ARE YOU WORRIED ABOUT LOSING YOUR HOUSING?: NO

## 2023-01-05 SDOH — SOCIAL STABILITY: SOCIAL NETWORK: SUPPORT SYSTEMS: FAMILY MEMBERS

## 2023-01-05 SDOH — ECONOMIC STABILITY: GENERAL

## 2023-01-05 SDOH — ECONOMIC STABILITY: HOUSING INSECURITY: WHAT IS YOUR LIVING SITUATION TODAY?: ALONE

## 2023-01-05 SDOH — SOCIAL STABILITY: SOCIAL NETWORK
HOW OFTEN DO YOU SEE OR TALK TO PEOPLE THAT YOU CARE ABOUT AND FEEL CLOSE TO? (FOR EXAMPLE: TALKING TO FRIENDS ON THE PHONE, VISITING FRIENDS OR FAMILY, GOING TO CHURCH OR CLUB MEETINGS): LESS THAN ONCE A WEEK

## 2023-01-05 SDOH — ECONOMIC STABILITY: HOUSING INSECURITY: WHAT IS YOUR LIVING SITUATION TODAY?: ASSISTED LIVING

## 2023-01-05 SDOH — HEALTH STABILITY: PHYSICAL HEALTH: DO YOU HAVE DIFFICULTY DRESSING OR BATHING?: YES

## 2023-01-05 SDOH — ECONOMIC STABILITY: TRANSPORTATION INSECURITY
IN THE PAST 12 MONTHS, HAS THE LACK OF TRANSPORTATION KEPT YOU FROM MEDICAL APPOINTMENTS OR FROM GETTING MEDICATIONS?: NO

## 2023-01-05 SDOH — ECONOMIC STABILITY: TRANSPORTATION INSECURITY
IN THE PAST 12 MONTHS, HAS LACK OF TRANSPORTATION KEPT YOU FROM MEETINGS, WORK, OR FROM GETTING THINGS NEEDED FOR DAILY LIVING?: NO

## 2023-01-05 ASSESSMENT — PAIN SCALES - GENERAL
PAINLEVEL_OUTOF10: 8
PAINLEVEL_OUTOF10: 7
PAINLEVEL_OUTOF10: 3
PAINLEVEL_OUTOF10: 3

## 2023-01-05 ASSESSMENT — ACTIVITIES OF DAILY LIVING (ADL)
ADL_SCORE: 7
RECENT_DECLINE_ADL: NO
ADL_SHORT_OF_BREATH: NO
BATHING: NEEDS ASSISTANCE
FEEDING: INDEPENDENT
ADL_BEFORE_ADMISSION: NEEDS/REQUIRES ASSISTANCE
DRESSING: NEEDS ASSISTANCE
TOILETING: NEEDS ASSISTANCE

## 2023-01-05 ASSESSMENT — ENCOUNTER SYMPTOMS
HEADACHES: 0
ACTIVITY CHANGE: 0
NUMBNESS: 0
FEVER: 0
FACIAL SWELLING: 0
POLYPHAGIA: 0
DIARRHEA: 0
WOUND: 0
NAUSEA: 0
BRUISES/BLEEDS EASILY: 0
ABDOMINAL PAIN: 0
WHEEZING: 0
DIZZINESS: 0
EYE DISCHARGE: 0
POLYDIPSIA: 0
CHILLS: 0
SHORTNESS OF BREATH: 0
COLOR CHANGE: 0
EYE REDNESS: 0
VOMITING: 0
COUGH: 0
SORE THROAT: 0
CONFUSION: 0
EYE PAIN: 0
BACK PAIN: 0

## 2023-01-05 ASSESSMENT — PATIENT HEALTH QUESTIONNAIRE - PHQ9
1. LITTLE INTEREST OR PLEASURE IN DOING THINGS: SEVERAL DAYS
IS PATIENT ABLE TO COMPLETE PHQ2 OR PHQ9: YES
2. FEELING DOWN, DEPRESSED OR HOPELESS: NOT AT ALL
SUM OF ALL RESPONSES TO PHQ9 QUESTIONS 1 AND 2: 0
SUM OF ALL RESPONSES TO PHQ9 QUESTIONS 1 AND 2: 0
1. LITTLE INTEREST OR PLEASURE IN DOING THINGS: SEVERAL DAYS
CLINICAL INTERPRETATION OF PHQ2 SCORE: NO FURTHER SCREENING NEEDED
IS PATIENT ABLE TO COMPLETE PHQ2 OR PHQ9: YES
2. FEELING DOWN, DEPRESSED OR HOPELESS: SEVERAL DAYS
1. LITTLE INTEREST OR PLEASURE IN DOING THINGS: NOT AT ALL
IS PATIENT ABLE TO COMPLETE PHQ2 OR PHQ9: YES

## 2023-01-05 ASSESSMENT — LIFESTYLE VARIABLES
HOW OFTEN DO YOU HAVE A DRINK CONTAINING ALCOHOL: NEVER
HOW OFTEN DO YOU HAVE 6 OR MORE DRINKS ON ONE OCCASION: NEVER
ALCOHOL_USE_STATUS: NO OR LOW RISK WITH VALIDATED TOOL
AUDIT-C TOTAL SCORE: 0
HOW MANY STANDARD DRINKS CONTAINING ALCOHOL DO YOU HAVE ON A TYPICAL DAY: 0,1 OR 2

## 2023-01-05 ASSESSMENT — COLUMBIA-SUICIDE SEVERITY RATING SCALE - C-SSRS
IS THE PATIENT ABLE TO COMPLETE C-SSRS: YES
1. WITHIN THE PAST MONTH, HAVE YOU WISHED YOU WERE DEAD OR WISHED YOU COULD GO TO SLEEP AND NOT WAKE UP?: NO
6. HAVE YOU EVER DONE ANYTHING, STARTED TO DO ANYTHING, OR PREPARED TO DO ANYTHING TO END YOUR LIFE?: NO
2. HAVE YOU ACTUALLY HAD ANY THOUGHTS OF KILLING YOURSELF?: NO

## 2023-01-06 ENCOUNTER — APPOINTMENT (OUTPATIENT)
Dept: CARDIOLOGY | Age: 78
DRG: 176 | End: 2023-01-06
Attending: INTERNAL MEDICINE

## 2023-01-06 LAB
ANION GAP SERPL CALC-SCNC: 12 MMOL/L (ref 7–19)
AORTIC VALVE AREA (AVA): 0.64
ASCENDING AORTA (AAD): 3
AV PEAK GRADIENT (AVPG): 8
AV PEAK VELOCITY (AVPV): 1.4
AV STENOSIS SEVERITY TEXT: NORMAL
AVI LVOT PEAK GRADIENT (LVOTMG): 1
BASOPHILS # BLD: 0.1 K/MCL (ref 0–0.3)
BASOPHILS NFR BLD: 1 %
BUN SERPL-MCNC: 17 MG/DL (ref 6–20)
BUN/CREAT SERPL: 25 (ref 7–25)
CALCIUM SERPL-MCNC: 8.5 MG/DL (ref 8.4–10.2)
CHLORIDE SERPL-SCNC: 103 MMOL/L (ref 97–110)
CO2 SERPL-SCNC: 27 MMOL/L (ref 21–32)
CREAT SERPL-MCNC: 0.68 MG/DL (ref 0.51–0.95)
DEPRECATED RDW RBC: 52.4 FL (ref 39–50)
DSDNA IGG SERPL IA-ACNC: 1.7 IU/ML
E WAVE DECELARATION TIME (MDT): 13.61
ENA AB SER QL IA: 0.1 UG/L
ENA AB SER QL IA: NEGATIVE
EOSINOPHIL # BLD: 0.2 K/MCL (ref 0–0.5)
EOSINOPHIL NFR BLD: 2 %
ERYTHROCYTE [DISTWIDTH] IN BLOOD: 13.2 % (ref 11–15)
FASTING DURATION TIME PATIENT: NORMAL H
GFR SERPLBLD BASED ON 1.73 SQ M-ARVRAT: >90 ML/MIN
GLUCOSE SERPL-MCNC: 93 MG/DL (ref 70–99)
HCT VFR BLD CALC: 41.9 % (ref 36–46.5)
HGB BLD-MCNC: 13.8 G/DL (ref 12–15.5)
IMM GRANULOCYTES # BLD AUTO: 0 K/MCL (ref 0–0.2)
IMM GRANULOCYTES # BLD: 1 %
INTERVENTRICULAR SEPTUM IN END DIASTOLE (IVSD): 2.6
LEFT INTERNAL DIMENSION IN SYSTOLE (LVSD): 1
LEFT VENTRICULAR INTERNAL DIMENSION IN DIASTOLE (LVDD): 2.6
LEFT VENTRICULAR POSTERIOR WALL IN END DIASTOLE (LVPW): 4.2
LV EF: NORMAL %
LVOT VTI (LVOTVTI): 1.26
LYMPHOCYTES # BLD: 1.2 K/MCL (ref 1–4)
LYMPHOCYTES NFR BLD: 17 %
MAGNESIUM SERPL-MCNC: 2.2 MG/DL (ref 1.7–2.4)
MCH RBC QN AUTO: 34.8 PG (ref 26–34)
MCHC RBC AUTO-ENTMCNC: 32.9 G/DL (ref 32–36.5)
MCV RBC AUTO: 105.5 FL (ref 78–100)
MITOCHONDRIAL M2 AB, IGG: 5.5 UNITS
MONOCYTES # BLD: 0.8 K/MCL (ref 0.3–0.9)
MONOCYTES NFR BLD: 11 %
MV E TISSUE VEL MED (MESV): 5.11
MV E WAVE VEL/E TISSUE VEL MED(MSR): 4.68
MV PEAK A VELOCITY (MVPAV): 317
MV PEAK E VELOCITY (MVPEV): 0.92
NEUTROPHILS # BLD: 4.9 K/MCL (ref 1.8–7.7)
NEUTROPHILS NFR BLD: 68 %
NRBC BLD MANUAL-RTO: 0 /100 WBC
PLATELET # BLD AUTO: 235 K/MCL (ref 140–450)
POTASSIUM SERPL-SCNC: 3.6 MMOL/L (ref 3.4–5.1)
RBC # BLD: 3.97 MIL/MCL (ref 4–5.2)
RV END SYSTOLIC LONGITUDINAL STRAIN FREE WALL (RVGS): 2
SODIUM SERPL-SCNC: 138 MMOL/L (ref 135–145)
TRICUSPID VALVE ANNULAR PEAK VELOCITY (TVAPV): 33
TRICUSPID VALVE PEAK REGURGITATION VELOCITY (TRPV): 2.7
TV ESTIMATED RIGHT ARTERIAL PRESSURE (RAP): 13.1
WBC # BLD: 7 K/MCL (ref 4.2–11)

## 2023-01-06 PROCEDURE — 80048 BASIC METABOLIC PNL TOTAL CA: CPT | Performed by: INTERNAL MEDICINE

## 2023-01-06 PROCEDURE — 10006031 HB ROOM CHARGE TELEMETRY

## 2023-01-06 PROCEDURE — 76376 3D RENDER W/INTRP POSTPROCES: CPT | Performed by: INTERNAL MEDICINE

## 2023-01-06 PROCEDURE — 93306 TTE W/DOPPLER COMPLETE: CPT

## 2023-01-06 PROCEDURE — 96372 THER/PROPH/DIAG INJ SC/IM: CPT

## 2023-01-06 PROCEDURE — 10002800 HB RX 250 W HCPCS: Performed by: INTERNAL MEDICINE

## 2023-01-06 PROCEDURE — 97161 PT EVAL LOW COMPLEX 20 MIN: CPT

## 2023-01-06 PROCEDURE — 93306 TTE W/DOPPLER COMPLETE: CPT | Performed by: INTERNAL MEDICINE

## 2023-01-06 PROCEDURE — 85025 COMPLETE CBC W/AUTO DIFF WBC: CPT | Performed by: INTERNAL MEDICINE

## 2023-01-06 PROCEDURE — G0378 HOSPITAL OBSERVATION PER HR: HCPCS

## 2023-01-06 PROCEDURE — 10002801 HB RX 250 W/O HCPCS: Performed by: INTERNAL MEDICINE

## 2023-01-06 PROCEDURE — 83735 ASSAY OF MAGNESIUM: CPT | Performed by: INTERNAL MEDICINE

## 2023-01-06 PROCEDURE — 10002803 HB RX 637: Performed by: INTERNAL MEDICINE

## 2023-01-06 PROCEDURE — 10004651 HB RX, NO CHARGE ITEM: Performed by: INTERNAL MEDICINE

## 2023-01-06 PROCEDURE — 36415 COLL VENOUS BLD VENIPUNCTURE: CPT | Performed by: INTERNAL MEDICINE

## 2023-01-06 PROCEDURE — 97530 THERAPEUTIC ACTIVITIES: CPT

## 2023-01-06 RX ADMIN — ACETAMINOPHEN 650 MG: 325 TABLET ORAL at 06:45

## 2023-01-06 RX ADMIN — SODIUM CHLORIDE, PRESERVATIVE FREE 2 ML: 5 INJECTION INTRAVENOUS at 20:44

## 2023-01-06 RX ADMIN — LEVOTHYROXINE SODIUM 25 MCG: 0.03 TABLET ORAL at 05:43

## 2023-01-06 RX ADMIN — LACTULOSE 20 G: 10 SOLUTION ORAL at 10:45

## 2023-01-06 RX ADMIN — Medication 50 MCG: at 10:45

## 2023-01-06 RX ADMIN — ARIPIPRAZOLE 2 MG: 2 TABLET ORAL at 10:45

## 2023-01-06 RX ADMIN — APIXABAN 10 MG: 5 TABLET, FILM COATED ORAL at 20:42

## 2023-01-06 RX ADMIN — ACETAMINOPHEN 650 MG: 325 TABLET ORAL at 20:42

## 2023-01-06 RX ADMIN — ENOXAPARIN SODIUM 80 MG: 80 INJECTION SUBCUTANEOUS at 10:45

## 2023-01-06 RX ADMIN — ACETAMINOPHEN 650 MG: 325 TABLET ORAL at 10:46

## 2023-01-06 RX ADMIN — VENLAFAXINE HYDROCHLORIDE 225 MG: 150 CAPSULE, EXTENDED RELEASE ORAL at 10:45

## 2023-01-06 RX ADMIN — LACTULOSE 20 G: 10 SOLUTION ORAL at 20:42

## 2023-01-06 RX ADMIN — SODIUM CHLORIDE, PRESERVATIVE FREE 2 ML: 5 INJECTION INTRAVENOUS at 10:00

## 2023-01-06 RX ADMIN — FLUDROCORTISONE ACETATE 0.1 MG: 0.1 TABLET ORAL at 10:45

## 2023-01-06 ASSESSMENT — COGNITIVE AND FUNCTIONAL STATUS - GENERAL
DAILY_ACTIVITY_RAW_SCORE: 20
BASIC_MOBILITY_CONVERTED_SCORE: 41.05
HELP NEEDED FOR TOILETING: A LITTLE
REMEMBERING WHERE THINGS ARE: A LITTLE
APPLIED_COGNITIVE_RAW_SCORE: 19
APPLIED_COGNITIVE_CONVERTED_SCORE: 39.77
HELP NEEDED DRESSING REGULAR LOWER BODY CLOTHING: A LITTLE
TAKING CARE OF COMPLICATED TASKS: A LOT
REMEMBERING TO TAKE MEDICATION: A LITTLE
HELP NEEDED FOR BATHING: A LITTLE
HELP NEEDED DRESSING REGULAR UPPER BODY CLOTHING: A LITTLE
REMEMBERING 5 ERRANDS WITH NO LIST: A LITTLE
BASIC_MOBILITY_RAW_SCORE: 18
DAILY_ACTIVITY_CONVERTED_SCORE: 42.03

## 2023-01-06 ASSESSMENT — PAIN SCALES - GENERAL
PAINLEVEL_OUTOF10: 8
PAINLEVEL_OUTOF10: 0
PAINLEVEL_OUTOF10: 3
PAINLEVEL_OUTOF10: 6
PAINLEVEL_OUTOF10: 8
PAINLEVEL_OUTOF10: 0

## 2023-01-06 ASSESSMENT — ACTIVITIES OF DAILY LIVING (ADL)
PRIOR_ADL: MINIMAL ASSIST (MIN)
PRIOR_ADL_BATHING: MINIMAL ASSIST (MIN)

## 2023-01-07 VITALS
HEIGHT: 62 IN | HEART RATE: 83 BPM | DIASTOLIC BLOOD PRESSURE: 80 MMHG | OXYGEN SATURATION: 91 % | WEIGHT: 164.24 LBS | TEMPERATURE: 97.9 F | BODY MASS INDEX: 30.22 KG/M2 | SYSTOLIC BLOOD PRESSURE: 134 MMHG | RESPIRATION RATE: 14 BRPM

## 2023-01-07 LAB
ANION GAP SERPL CALC-SCNC: 10 MMOL/L (ref 7–19)
BASOPHILS # BLD: 0 K/MCL (ref 0–0.3)
BASOPHILS NFR BLD: 1 %
BUN SERPL-MCNC: 17 MG/DL (ref 6–20)
BUN/CREAT SERPL: 27 (ref 7–25)
CALCIUM SERPL-MCNC: 8.4 MG/DL (ref 8.4–10.2)
CHLORIDE SERPL-SCNC: 105 MMOL/L (ref 97–110)
CO2 SERPL-SCNC: 27 MMOL/L (ref 21–32)
CREAT SERPL-MCNC: 0.62 MG/DL (ref 0.51–0.95)
DEPRECATED RDW RBC: 50.3 FL (ref 39–50)
EOSINOPHIL # BLD: 0.2 K/MCL (ref 0–0.5)
EOSINOPHIL NFR BLD: 3 %
ERYTHROCYTE [DISTWIDTH] IN BLOOD: 13.2 % (ref 11–15)
FASTING DURATION TIME PATIENT: ABNORMAL H
GFR SERPLBLD BASED ON 1.73 SQ M-ARVRAT: >90 ML/MIN
GLUCOSE SERPL-MCNC: 90 MG/DL (ref 70–99)
HCT VFR BLD CALC: 38.8 % (ref 36–46.5)
HGB BLD-MCNC: 12.6 G/DL (ref 12–15.5)
IMM GRANULOCYTES # BLD AUTO: 0 K/MCL (ref 0–0.2)
IMM GRANULOCYTES # BLD: 1 %
LYMPHOCYTES # BLD: 1.2 K/MCL (ref 1–4)
LYMPHOCYTES NFR BLD: 20 %
MCH RBC QN AUTO: 33.8 PG (ref 26–34)
MCHC RBC AUTO-ENTMCNC: 32.5 G/DL (ref 32–36.5)
MCV RBC AUTO: 104 FL (ref 78–100)
MONOCYTES # BLD: 0.8 K/MCL (ref 0.3–0.9)
MONOCYTES NFR BLD: 13 %
NEUTROPHILS # BLD: 3.7 K/MCL (ref 1.8–7.7)
NEUTROPHILS NFR BLD: 62 %
NRBC BLD MANUAL-RTO: 0 /100 WBC
PLATELET # BLD AUTO: 239 K/MCL (ref 140–450)
POTASSIUM SERPL-SCNC: 3.7 MMOL/L (ref 3.4–5.1)
RBC # BLD: 3.73 MIL/MCL (ref 4–5.2)
SODIUM SERPL-SCNC: 138 MMOL/L (ref 135–145)
WBC # BLD: 5.8 K/MCL (ref 4.2–11)

## 2023-01-07 PROCEDURE — 10002801 HB RX 250 W/O HCPCS: Performed by: INTERNAL MEDICINE

## 2023-01-07 PROCEDURE — 10002803 HB RX 637: Performed by: INTERNAL MEDICINE

## 2023-01-07 PROCEDURE — 85025 COMPLETE CBC W/AUTO DIFF WBC: CPT | Performed by: INTERNAL MEDICINE

## 2023-01-07 PROCEDURE — 80048 BASIC METABOLIC PNL TOTAL CA: CPT | Performed by: INTERNAL MEDICINE

## 2023-01-07 PROCEDURE — 36415 COLL VENOUS BLD VENIPUNCTURE: CPT | Performed by: INTERNAL MEDICINE

## 2023-01-07 PROCEDURE — 10004651 HB RX, NO CHARGE ITEM: Performed by: INTERNAL MEDICINE

## 2023-01-07 RX ADMIN — LACTULOSE 20 G: 10 SOLUTION ORAL at 10:36

## 2023-01-07 RX ADMIN — ARIPIPRAZOLE 2 MG: 2 TABLET ORAL at 10:36

## 2023-01-07 RX ADMIN — VENLAFAXINE HYDROCHLORIDE 225 MG: 150 CAPSULE, EXTENDED RELEASE ORAL at 10:36

## 2023-01-07 RX ADMIN — FLUDROCORTISONE ACETATE 0.1 MG: 0.1 TABLET ORAL at 10:36

## 2023-01-07 RX ADMIN — LEVOTHYROXINE SODIUM 25 MCG: 0.03 TABLET ORAL at 05:03

## 2023-01-07 RX ADMIN — Medication 50 MCG: at 10:36

## 2023-01-07 RX ADMIN — SODIUM CHLORIDE, PRESERVATIVE FREE 2 ML: 5 INJECTION INTRAVENOUS at 10:38

## 2023-01-07 RX ADMIN — APIXABAN 10 MG: 5 TABLET, FILM COATED ORAL at 10:36

## 2023-01-07 ASSESSMENT — PAIN SCALES - GENERAL: PAINLEVEL_OUTOF10: 0

## 2023-01-11 ENCOUNTER — APPOINTMENT (OUTPATIENT)
Dept: ULTRASOUND IMAGING | Age: 78
End: 2023-01-11
Attending: EMERGENCY MEDICINE

## 2023-01-11 ENCOUNTER — LAB REQUISITION (OUTPATIENT)
Dept: LAB | Facility: HOSPITAL | Age: 78
End: 2023-01-11
Payer: MEDICARE

## 2023-01-11 ENCOUNTER — APPOINTMENT (OUTPATIENT)
Dept: GENERAL RADIOLOGY | Age: 78
End: 2023-01-11
Attending: EMERGENCY MEDICINE

## 2023-01-11 ENCOUNTER — HOSPITAL ENCOUNTER (OUTPATIENT)
Age: 78
Setting detail: OBSERVATION
LOS: 1 days | Discharge: HOME HEALTH | End: 2023-01-12
Attending: EMERGENCY MEDICINE | Admitting: INTERNAL MEDICINE

## 2023-01-11 DIAGNOSIS — F03.C0 UNSPECIFIED DEMENTIA, SEVERE, WITHOUT BEHAVIORAL DISTURBANCE, PSYCHOTIC DISTURBANCE, MOOD DISTURBANCE, AND ANXIETY: ICD-10-CM

## 2023-01-11 DIAGNOSIS — R54 AGE-RELATED PHYSICAL DEBILITY: ICD-10-CM

## 2023-01-11 DIAGNOSIS — S51.801A OPEN WOUND OF RIGHT FOREARM WITH COMPLICATION, INITIAL ENCOUNTER: Primary | ICD-10-CM

## 2023-01-11 PROBLEM — T14.8XXA WOUND OF SKIN: Status: ACTIVE | Noted: 2023-01-11

## 2023-01-11 LAB
AMMONIA PLAS-MCNC: 30 UMOL/L (ref 11–32)
ANION GAP SERPL CALC-SCNC: 9 MMOL/L (ref 0–18)
ANION GAP SERPL CALC-SCNC: 9 MMOL/L (ref 7–19)
BASOPHILS # BLD AUTO: 0.07 X10(3) UL (ref 0–0.2)
BASOPHILS # BLD: 0.1 K/MCL (ref 0–0.3)
BASOPHILS NFR BLD AUTO: 1 %
BASOPHILS NFR BLD: 1 %
BUN BLD-MCNC: 11 MG/DL (ref 7–18)
BUN SERPL-MCNC: 14 MG/DL (ref 6–20)
BUN/CREAT SERPL: 17 (ref 7–25)
CALCIUM BLD-MCNC: 9.2 MG/DL (ref 8.5–10.1)
CALCIUM SERPL-MCNC: 9.3 MG/DL (ref 8.4–10.2)
CHLORIDE SERPL-SCNC: 104 MMOL/L (ref 97–110)
CHLORIDE SERPL-SCNC: 104 MMOL/L (ref 98–112)
CO2 SERPL-SCNC: 27 MMOL/L (ref 21–32)
CO2 SERPL-SCNC: 27 MMOL/L (ref 21–32)
CREAT BLD-MCNC: 0.78 MG/DL
CREAT SERPL-MCNC: 0.83 MG/DL (ref 0.51–0.95)
DEPRECATED RDW RBC: 52.3 FL (ref 39–50)
EOSINOPHIL # BLD AUTO: 0.16 X10(3) UL (ref 0–0.7)
EOSINOPHIL # BLD: 0.1 K/MCL (ref 0–0.5)
EOSINOPHIL NFR BLD AUTO: 2.3 %
EOSINOPHIL NFR BLD: 1 %
ERYTHROCYTE [DISTWIDTH] IN BLOOD BY AUTOMATED COUNT: 13.1 %
ERYTHROCYTE [DISTWIDTH] IN BLOOD: 13.2 % (ref 11–15)
FASTING DURATION TIME PATIENT: ABNORMAL H
GFR SERPLBLD BASED ON 1.73 SQ M-ARVRAT: 73 ML/MIN
GFR SERPLBLD BASED ON 1.73 SQ M-ARVRAT: 79 ML/MIN/1.73M2 (ref 60–?)
GLUCOSE BLD-MCNC: 99 MG/DL (ref 70–99)
GLUCOSE SERPL-MCNC: 105 MG/DL (ref 70–99)
HCT VFR BLD AUTO: 43.5 %
HCT VFR BLD CALC: 42.7 % (ref 36–46.5)
HGB BLD-MCNC: 13.9 G/DL (ref 12–15.5)
HGB BLD-MCNC: 14.2 G/DL
IMM GRANULOCYTES # BLD AUTO: 0.07 X10(3) UL (ref 0–1)
IMM GRANULOCYTES # BLD AUTO: 0.1 K/MCL (ref 0–0.2)
IMM GRANULOCYTES # BLD: 1 %
IMM GRANULOCYTES NFR BLD: 1 %
LYMPHOCYTES # BLD AUTO: 1.4 X10(3) UL (ref 1–4)
LYMPHOCYTES # BLD: 1.1 K/MCL (ref 1–4)
LYMPHOCYTES NFR BLD AUTO: 19.7 %
LYMPHOCYTES NFR BLD: 13 %
MCH RBC QN AUTO: 34.1 PG (ref 26–34)
MCH RBC QN AUTO: 34.6 PG (ref 26–34)
MCHC RBC AUTO-ENTMCNC: 32.6 G/DL (ref 31–37)
MCHC RBC AUTO-ENTMCNC: 32.6 G/DL (ref 32–36.5)
MCV RBC AUTO: 104.6 FL
MCV RBC AUTO: 106.2 FL (ref 78–100)
MONOCYTES # BLD AUTO: 0.87 X10(3) UL (ref 0.1–1)
MONOCYTES # BLD: 0.9 K/MCL (ref 0.3–0.9)
MONOCYTES NFR BLD AUTO: 12.3 %
MONOCYTES NFR BLD: 11 %
NEUTROPHILS # BLD AUTO: 4.52 X10 (3) UL (ref 1.5–7.7)
NEUTROPHILS # BLD AUTO: 4.52 X10(3) UL (ref 1.5–7.7)
NEUTROPHILS # BLD: 6 K/MCL (ref 1.8–7.7)
NEUTROPHILS NFR BLD AUTO: 63.7 %
NEUTROPHILS NFR BLD: 73 %
NRBC BLD MANUAL-RTO: 0 /100 WBC
OSMOLALITY SERPL CALC.SUM OF ELEC: 289 MOSM/KG (ref 275–295)
PLATELET # BLD AUTO: 353 K/MCL (ref 140–450)
PLATELET # BLD AUTO: 370 10(3)UL (ref 150–450)
POTASSIUM SERPL-SCNC: 3.7 MMOL/L (ref 3.4–5.1)
POTASSIUM SERPL-SCNC: 3.8 MMOL/L (ref 3.5–5.1)
RAINBOW EXTRA TUBES HOLD SPECIMEN: NORMAL
RAINBOW EXTRA TUBES HOLD SPECIMEN: NORMAL
RBC # BLD AUTO: 4.16 X10(6)UL
RBC # BLD: 4.02 MIL/MCL (ref 4–5.2)
SARS-COV-2 RNA RESP QL NAA+PROBE: NOT DETECTED
SERVICE CMNT-IMP: NORMAL
SERVICE CMNT-IMP: NORMAL
SODIUM SERPL-SCNC: 136 MMOL/L (ref 135–145)
SODIUM SERPL-SCNC: 140 MMOL/L (ref 136–145)
WBC # BLD AUTO: 7.1 X10(3) UL (ref 4–11)
WBC # BLD: 8.2 K/MCL (ref 4.2–11)

## 2023-01-11 PROCEDURE — 10002801 HB RX 250 W/O HCPCS: Performed by: INTERNAL MEDICINE

## 2023-01-11 PROCEDURE — 99284 EMERGENCY DEPT VISIT MOD MDM: CPT

## 2023-01-11 PROCEDURE — 80048 BASIC METABOLIC PNL TOTAL CA: CPT | Performed by: FAMILY MEDICINE

## 2023-01-11 PROCEDURE — 80048 BASIC METABOLIC PNL TOTAL CA: CPT | Performed by: EMERGENCY MEDICINE

## 2023-01-11 PROCEDURE — 87635 SARS-COV-2 COVID-19 AMP PRB: CPT | Performed by: EMERGENCY MEDICINE

## 2023-01-11 PROCEDURE — 85025 COMPLETE CBC W/AUTO DIFF WBC: CPT | Performed by: FAMILY MEDICINE

## 2023-01-11 PROCEDURE — 73090 X-RAY EXAM OF FOREARM: CPT

## 2023-01-11 PROCEDURE — 10002800 HB RX 250 W HCPCS: Performed by: EMERGENCY MEDICINE

## 2023-01-11 PROCEDURE — 85025 COMPLETE CBC W/AUTO DIFF WBC: CPT | Performed by: EMERGENCY MEDICINE

## 2023-01-11 PROCEDURE — 10004651 HB RX, NO CHARGE ITEM: Performed by: INTERNAL MEDICINE

## 2023-01-11 PROCEDURE — 10002803 HB RX 637: Performed by: SPECIALIST

## 2023-01-11 PROCEDURE — 10002803 HB RX 637: Performed by: INTERNAL MEDICINE

## 2023-01-11 PROCEDURE — 36415 COLL VENOUS BLD VENIPUNCTURE: CPT

## 2023-01-11 PROCEDURE — 93931 UPPER EXTREMITY STUDY: CPT

## 2023-01-11 PROCEDURE — 10000002 HB ROOM CHARGE MED SURG

## 2023-01-11 PROCEDURE — 82140 ASSAY OF AMMONIA: CPT | Performed by: FAMILY MEDICINE

## 2023-01-11 PROCEDURE — 96374 THER/PROPH/DIAG INJ IV PUSH: CPT

## 2023-01-11 RX ORDER — POLYETHYLENE GLYCOL 3350 17 G/17G
17 POWDER, FOR SOLUTION ORAL DAILY
Status: DISCONTINUED | OUTPATIENT
Start: 2023-01-12 | End: 2023-01-12 | Stop reason: HOSPADM

## 2023-01-11 RX ORDER — PEDIATRIC MULTIVITAMIN NO.17
1 TABLET,CHEWABLE ORAL DAILY
Status: DISCONTINUED | OUTPATIENT
Start: 2023-01-11 | End: 2023-01-11 | Stop reason: RX

## 2023-01-11 RX ORDER — FLUDROCORTISONE ACETATE 0.1 MG/1
0.1 TABLET ORAL DAILY
Status: DISCONTINUED | OUTPATIENT
Start: 2023-01-12 | End: 2023-01-12 | Stop reason: HOSPADM

## 2023-01-11 RX ORDER — ACETAMINOPHEN 325 MG/1
650 TABLET ORAL EVERY 4 HOURS PRN
Status: DISCONTINUED | OUTPATIENT
Start: 2023-01-11 | End: 2023-01-12 | Stop reason: HOSPADM

## 2023-01-11 RX ORDER — CEFAZOLIN SODIUM/WATER 1 G/10 ML
1000 SYRINGE (ML) INTRAVENOUS ONCE
Status: COMPLETED | OUTPATIENT
Start: 2023-01-11 | End: 2023-01-11

## 2023-01-11 RX ORDER — LEVOTHYROXINE SODIUM 0.03 MG/1
25 TABLET ORAL
Status: DISCONTINUED | OUTPATIENT
Start: 2023-01-12 | End: 2023-01-12 | Stop reason: HOSPADM

## 2023-01-11 RX ORDER — ARIPIPRAZOLE 2 MG/1
2 TABLET ORAL DAILY
Status: DISCONTINUED | OUTPATIENT
Start: 2023-01-12 | End: 2023-01-12 | Stop reason: HOSPADM

## 2023-01-11 RX ORDER — MIDODRINE HYDROCHLORIDE 5 MG/1
15 TABLET ORAL
Status: DISCONTINUED | OUTPATIENT
Start: 2023-01-11 | End: 2023-01-12 | Stop reason: HOSPADM

## 2023-01-11 RX ORDER — VENLAFAXINE HYDROCHLORIDE 75 MG/1
150 CAPSULE, EXTENDED RELEASE ORAL NIGHTLY
Status: DISCONTINUED | OUTPATIENT
Start: 2023-01-11 | End: 2023-01-12 | Stop reason: HOSPADM

## 2023-01-11 RX ORDER — VENLAFAXINE HYDROCHLORIDE 75 MG/1
75 CAPSULE, EXTENDED RELEASE ORAL DAILY
Status: DISCONTINUED | OUTPATIENT
Start: 2023-01-12 | End: 2023-01-12 | Stop reason: HOSPADM

## 2023-01-11 RX ORDER — POLYETHYLENE GLYCOL 3350 17 G/17G
17 POWDER, FOR SOLUTION ORAL DAILY PRN
Status: DISCONTINUED | OUTPATIENT
Start: 2023-01-11 | End: 2023-01-12 | Stop reason: HOSPADM

## 2023-01-11 RX ORDER — LACTULOSE 10 G/15ML
20 SOLUTION ORAL 2 TIMES DAILY
Status: DISCONTINUED | OUTPATIENT
Start: 2023-01-11 | End: 2023-01-12 | Stop reason: HOSPADM

## 2023-01-11 RX ORDER — 0.9 % SODIUM CHLORIDE 0.9 %
2 VIAL (ML) INJECTION EVERY 12 HOURS SCHEDULED
Status: DISCONTINUED | OUTPATIENT
Start: 2023-01-11 | End: 2023-01-12 | Stop reason: HOSPADM

## 2023-01-11 RX ORDER — BISACODYL 10 MG
10 SUPPOSITORY, RECTAL RECTAL DAILY PRN
Status: DISCONTINUED | OUTPATIENT
Start: 2023-01-11 | End: 2023-01-12 | Stop reason: HOSPADM

## 2023-01-11 RX ORDER — AMOXICILLIN 250 MG
2 CAPSULE ORAL DAILY PRN
Status: DISCONTINUED | OUTPATIENT
Start: 2023-01-11 | End: 2023-01-12 | Stop reason: HOSPADM

## 2023-01-11 RX ADMIN — LACTULOSE 20 G: 10 SOLUTION ORAL at 20:36

## 2023-01-11 RX ADMIN — VENLAFAXINE HYDROCHLORIDE 150 MG: 75 CAPSULE, EXTENDED RELEASE ORAL at 20:32

## 2023-01-11 RX ADMIN — APIXABAN 10 MG: 5 TABLET, FILM COATED ORAL at 20:33

## 2023-01-11 RX ADMIN — CEFTRIAXONE SODIUM 1000 MG: 10 INJECTION, POWDER, FOR SOLUTION INTRAVENOUS at 16:29

## 2023-01-11 RX ADMIN — MIDODRINE HYDROCHLORIDE 15 MG: 5 TABLET ORAL at 20:33

## 2023-01-11 RX ADMIN — COLLAGENASE SANTYL 1 G: 250 OINTMENT TOPICAL at 20:33

## 2023-01-11 RX ADMIN — SODIUM CHLORIDE, PRESERVATIVE FREE 2 ML: 5 INJECTION INTRAVENOUS at 20:33

## 2023-01-11 SDOH — SOCIAL STABILITY: SOCIAL NETWORK
HOW OFTEN DO YOU SEE OR TALK TO PEOPLE THAT YOU CARE ABOUT AND FEEL CLOSE TO? (FOR EXAMPLE: TALKING TO FRIENDS ON THE PHONE, VISITING FRIENDS OR FAMILY, GOING TO CHURCH OR CLUB MEETINGS): 5 OR MORE TIMES A WEEK

## 2023-01-11 SDOH — HEALTH STABILITY: PHYSICAL HEALTH: DO YOU HAVE SERIOUS DIFFICULTY WALKING OR CLIMBING STAIRS?: YES

## 2023-01-11 SDOH — ECONOMIC STABILITY: HOUSING INSECURITY: WHAT IS YOUR LIVING SITUATION TODAY?: ALONE;OTHER (COMMENTS)

## 2023-01-11 SDOH — ECONOMIC STABILITY: HOUSING INSECURITY: WHAT IS YOUR LIVING SITUATION TODAY?: ASSISTED LIVING

## 2023-01-11 SDOH — ECONOMIC STABILITY: HOUSING INSECURITY: ARE YOU WORRIED ABOUT LOSING YOUR HOUSING?: NO

## 2023-01-11 SDOH — ECONOMIC STABILITY: FOOD INSECURITY: HOW OFTEN IN THE PAST 12 MONTHS WERE YOU WORRIED OR STRESSED ABOUT HAVING ENOUGH MONEY TO BUY NUTRITIOUS MEALS?: NEVER

## 2023-01-11 SDOH — HEALTH STABILITY: GENERAL: BECAUSE OF A PHYSICAL, MENTAL, OR EMOTIONAL CONDITION, DO YOU HAVE DIFFICULTY DOING ERRANDS ALONE?: YES

## 2023-01-11 SDOH — HEALTH STABILITY: PHYSICAL HEALTH: DO YOU HAVE DIFFICULTY DRESSING OR BATHING?: YES

## 2023-01-11 SDOH — ECONOMIC STABILITY: GENERAL

## 2023-01-11 SDOH — SOCIAL STABILITY: SOCIAL NETWORK: SUPPORT SYSTEMS: FAMILY MEMBERS

## 2023-01-11 ASSESSMENT — LIFESTYLE VARIABLES
HOW OFTEN DO YOU HAVE A DRINK CONTAINING ALCOHOL: NEVER
HOW MANY STANDARD DRINKS CONTAINING ALCOHOL DO YOU HAVE ON A TYPICAL DAY: 0,1 OR 2
HOW OFTEN DO YOU HAVE 6 OR MORE DRINKS ON ONE OCCASION: NEVER
AUDIT-C TOTAL SCORE: 0
ALCOHOL_USE_STATUS: NO OR LOW RISK WITH VALIDATED TOOL

## 2023-01-11 ASSESSMENT — ACTIVITIES OF DAILY LIVING (ADL)
ADL_BEFORE_ADMISSION: INDEPENDENT
BATHING: NEEDS ASSISTANCE
ADL_SHORT_OF_BREATH: YES
FEEDING: INDEPENDENT
ADL_SCORE: 22
RECENT_DECLINE_ADL: YES, DECLINE IN BATHING/DRESSING/FEEDING, COLLABORATE WITH PROVIDER (T);YES, DECLINE IN AMBULATION/TRANSFERRING, COLLABORATE WITH PROVIDER (T)
DRESSING: NEEDS ASSISTANCE
TOILETING: INDEPENDENT

## 2023-01-11 ASSESSMENT — PAIN SCALES - GENERAL
PAINLEVEL_OUTOF10: 0
PAINLEVEL_OUTOF10: 2

## 2023-01-12 VITALS
HEART RATE: 78 BPM | DIASTOLIC BLOOD PRESSURE: 71 MMHG | RESPIRATION RATE: 16 BRPM | TEMPERATURE: 98.1 F | SYSTOLIC BLOOD PRESSURE: 112 MMHG | WEIGHT: 160 LBS | BODY MASS INDEX: 29.44 KG/M2 | HEIGHT: 62 IN | OXYGEN SATURATION: 94 %

## 2023-01-12 LAB
ANION GAP SERPL CALC-SCNC: 8 MMOL/L (ref 7–19)
BASOPHILS # BLD: 0.1 K/MCL (ref 0–0.3)
BASOPHILS NFR BLD: 1 %
BUN SERPL-MCNC: 13 MG/DL (ref 6–20)
BUN/CREAT SERPL: 19 (ref 7–25)
CALCIUM SERPL-MCNC: 8.7 MG/DL (ref 8.4–10.2)
CHLORIDE SERPL-SCNC: 104 MMOL/L (ref 97–110)
CO2 SERPL-SCNC: 28 MMOL/L (ref 21–32)
CREAT SERPL-MCNC: 0.68 MG/DL (ref 0.51–0.95)
DEPRECATED RDW RBC: 49.1 FL (ref 39–50)
EOSINOPHIL # BLD: 0.1 K/MCL (ref 0–0.5)
EOSINOPHIL NFR BLD: 1 %
ERYTHROCYTE [DISTWIDTH] IN BLOOD: 13.2 % (ref 11–15)
FASTING DURATION TIME PATIENT: NORMAL H
GFR SERPLBLD BASED ON 1.73 SQ M-ARVRAT: >90 ML/MIN
GLUCOSE SERPL-MCNC: 97 MG/DL (ref 70–99)
HCT VFR BLD CALC: 39.2 % (ref 36–46.5)
HGB BLD-MCNC: 13.2 G/DL (ref 12–15.5)
IMM GRANULOCYTES # BLD AUTO: 0.1 K/MCL (ref 0–0.2)
IMM GRANULOCYTES # BLD: 1 %
LYMPHOCYTES # BLD: 1.2 K/MCL (ref 1–4)
LYMPHOCYTES NFR BLD: 16 %
MAGNESIUM SERPL-MCNC: 2.1 MG/DL (ref 1.7–2.4)
MCH RBC QN AUTO: 34.2 PG (ref 26–34)
MCHC RBC AUTO-ENTMCNC: 33.7 G/DL (ref 32–36.5)
MCV RBC AUTO: 101.6 FL (ref 78–100)
MONOCYTES # BLD: 0.9 K/MCL (ref 0.3–0.9)
MONOCYTES NFR BLD: 12 %
NEUTROPHILS # BLD: 5.4 K/MCL (ref 1.8–7.7)
NEUTROPHILS NFR BLD: 69 %
NRBC BLD MANUAL-RTO: 0 /100 WBC
PLATELET # BLD AUTO: 349 K/MCL (ref 140–450)
POTASSIUM SERPL-SCNC: 3.8 MMOL/L (ref 3.4–5.1)
RBC # BLD: 3.86 MIL/MCL (ref 4–5.2)
SODIUM SERPL-SCNC: 136 MMOL/L (ref 135–145)
WBC # BLD: 7.8 K/MCL (ref 4.2–11)

## 2023-01-12 PROCEDURE — G0378 HOSPITAL OBSERVATION PER HR: HCPCS

## 2023-01-12 PROCEDURE — 80048 BASIC METABOLIC PNL TOTAL CA: CPT | Performed by: INTERNAL MEDICINE

## 2023-01-12 PROCEDURE — 10004651 HB RX, NO CHARGE ITEM: Performed by: INTERNAL MEDICINE

## 2023-01-12 PROCEDURE — 10002803 HB RX 637: Performed by: INTERNAL MEDICINE

## 2023-01-12 PROCEDURE — 83735 ASSAY OF MAGNESIUM: CPT | Performed by: INTERNAL MEDICINE

## 2023-01-12 PROCEDURE — 85025 COMPLETE CBC W/AUTO DIFF WBC: CPT | Performed by: INTERNAL MEDICINE

## 2023-01-12 PROCEDURE — 36415 COLL VENOUS BLD VENIPUNCTURE: CPT | Performed by: INTERNAL MEDICINE

## 2023-01-12 PROCEDURE — 10002803 HB RX 637: Performed by: SPECIALIST

## 2023-01-12 PROCEDURE — 10002801 HB RX 250 W/O HCPCS: Performed by: INTERNAL MEDICINE

## 2023-01-12 RX ADMIN — MIDODRINE HYDROCHLORIDE 15 MG: 5 TABLET ORAL at 11:37

## 2023-01-12 RX ADMIN — LACTULOSE 20 G: 10 SOLUTION ORAL at 08:48

## 2023-01-12 RX ADMIN — COLLAGENASE SANTYL: 250 OINTMENT TOPICAL at 08:52

## 2023-01-12 RX ADMIN — ARIPIPRAZOLE 2 MG: 2 TABLET ORAL at 08:48

## 2023-01-12 RX ADMIN — MIDODRINE HYDROCHLORIDE 15 MG: 5 TABLET ORAL at 06:00

## 2023-01-12 RX ADMIN — APIXABAN 10 MG: 5 TABLET, FILM COATED ORAL at 08:49

## 2023-01-12 RX ADMIN — FLUDROCORTISONE ACETATE 0.1 MG: 0.1 TABLET ORAL at 08:49

## 2023-01-12 RX ADMIN — Medication 75 MCG: at 08:48

## 2023-01-12 RX ADMIN — VENLAFAXINE HYDROCHLORIDE 75 MG: 75 CAPSULE, EXTENDED RELEASE ORAL at 08:48

## 2023-01-12 RX ADMIN — LEVOTHYROXINE SODIUM 25 MCG: 0.03 TABLET ORAL at 05:55

## 2023-01-12 RX ADMIN — SODIUM CHLORIDE, PRESERVATIVE FREE 2 ML: 5 INJECTION INTRAVENOUS at 08:50

## 2023-01-12 ASSESSMENT — PATIENT HEALTH QUESTIONNAIRE - PHQ9
IS PATIENT ABLE TO COMPLETE PHQ2 OR PHQ9: YES
1. LITTLE INTEREST OR PLEASURE IN DOING THINGS: NOT AT ALL
SUM OF ALL RESPONSES TO PHQ9 QUESTIONS 1 AND 2: 0
2. FEELING DOWN, DEPRESSED OR HOPELESS: NOT AT ALL
CLINICAL INTERPRETATION OF PHQ2 SCORE: NO FURTHER SCREENING NEEDED
SUM OF ALL RESPONSES TO PHQ9 QUESTIONS 1 AND 2: 0

## 2023-01-12 ASSESSMENT — PAIN SCALES - GENERAL: PAINLEVEL_OUTOF10: 0

## 2023-01-12 ASSESSMENT — COGNITIVE AND FUNCTIONAL STATUS - GENERAL: DO YOU HAVE DIFFICULTY DRESSING OR BATHING: YES

## 2023-01-19 ENCOUNTER — APPOINTMENT (OUTPATIENT)
Dept: CT IMAGING | Age: 78
DRG: 377 | End: 2023-01-19
Attending: EMERGENCY MEDICINE

## 2023-01-19 ENCOUNTER — HOSPITAL ENCOUNTER (INPATIENT)
Age: 78
LOS: 10 days | Discharge: NURSING, REHAB OR LONG-TERM CARE | DRG: 377 | End: 2023-01-29
Attending: EMERGENCY MEDICINE | Admitting: INTERNAL MEDICINE

## 2023-01-19 DIAGNOSIS — K92.2 LOWER GI BLEED: ICD-10-CM

## 2023-01-19 DIAGNOSIS — Z17.0 MALIGNANT NEOPLASM OF UPPER-OUTER QUADRANT OF RIGHT BREAST IN FEMALE, ESTROGEN RECEPTOR POSITIVE (CMD): ICD-10-CM

## 2023-01-19 DIAGNOSIS — C50.411 MALIGNANT NEOPLASM OF UPPER-OUTER QUADRANT OF RIGHT BREAST IN FEMALE, ESTROGEN RECEPTOR POSITIVE (CMD): ICD-10-CM

## 2023-01-19 DIAGNOSIS — K44.9 HIATAL HERNIA: ICD-10-CM

## 2023-01-19 DIAGNOSIS — K57.92 DIVERTICULITIS: Primary | ICD-10-CM

## 2023-01-19 PROBLEM — K92.1 BLOOD IN STOOL: Status: ACTIVE | Noted: 2023-01-19

## 2023-01-19 PROBLEM — G93.40 ENCEPHALOPATHY: Status: ACTIVE | Noted: 2023-01-19

## 2023-01-19 PROBLEM — I95.89 CHRONIC HYPOTENSION: Status: ACTIVE | Noted: 2023-01-19

## 2023-01-19 PROBLEM — N39.0 UTI (URINARY TRACT INFECTION): Status: ACTIVE | Noted: 2023-01-19

## 2023-01-19 LAB
ABO + RH BLD: NORMAL
ALBUMIN SERPL-MCNC: 2.8 G/DL (ref 3.6–5.1)
ALBUMIN SERPL-MCNC: 2.9 G/DL (ref 3.6–5.1)
ALBUMIN/GLOB SERPL: 0.8 {RATIO} (ref 1–2.4)
ALBUMIN/GLOB SERPL: 0.8 {RATIO} (ref 1–2.4)
ALP SERPL-CCNC: 58 UNITS/L (ref 45–117)
ALP SERPL-CCNC: 59 UNITS/L (ref 45–117)
ALT SERPL-CCNC: 20 UNITS/L
ALT SERPL-CCNC: 24 UNITS/L
AMMONIA PLAS-SCNC: 42 MCMOL/L
ANION GAP SERPL CALC-SCNC: 11 MMOL/L (ref 7–19)
ANION GAP SERPL CALC-SCNC: 11 MMOL/L (ref 7–19)
APPEARANCE UR: CLEAR
APTT PPP: 28 SEC (ref 22–30)
AST SERPL-CCNC: 18 UNITS/L
AST SERPL-CCNC: 18 UNITS/L
BACTERIA #/AREA URNS HPF: ABNORMAL /HPF
BASOPHILS # BLD: 0.1 K/MCL (ref 0–0.3)
BASOPHILS # BLD: 0.1 K/MCL (ref 0–0.3)
BASOPHILS NFR BLD: 1 %
BASOPHILS NFR BLD: 1 %
BILIRUB SERPL-MCNC: 0.2 MG/DL (ref 0.2–1)
BILIRUB SERPL-MCNC: 0.2 MG/DL (ref 0.2–1)
BILIRUB UR QL STRIP: NEGATIVE
BLD GP AB INVEST PLASRBC-IMP: NORMAL
BLD GP AB SCN SERPL QL GEL: POSITIVE
BLOOD EXPIRATION DATE: NORMAL
BUN SERPL-MCNC: 26 MG/DL (ref 6–20)
BUN SERPL-MCNC: 28 MG/DL (ref 6–20)
BUN/CREAT SERPL: 32 (ref 7–25)
BUN/CREAT SERPL: 40 (ref 7–25)
CALCIUM SERPL-MCNC: 8.3 MG/DL (ref 8.4–10.2)
CALCIUM SERPL-MCNC: 8.7 MG/DL (ref 8.4–10.2)
CHLORIDE SERPL-SCNC: 108 MMOL/L (ref 97–110)
CHLORIDE SERPL-SCNC: 111 MMOL/L (ref 97–110)
CO2 SERPL-SCNC: 24 MMOL/L (ref 21–32)
CO2 SERPL-SCNC: 27 MMOL/L (ref 21–32)
COLOR UR: YELLOW
CREAT SERPL-MCNC: 0.65 MG/DL (ref 0.51–0.95)
CREAT SERPL-MCNC: 0.88 MG/DL (ref 0.51–0.95)
CROSSMATCH RESULT: NORMAL
DEPRECATED RDW RBC: 51.7 FL (ref 39–50)
DEPRECATED RDW RBC: 51.8 FL (ref 39–50)
DISPENSE STATUS: NORMAL
EOSINOPHIL # BLD: 0.1 K/MCL (ref 0–0.5)
EOSINOPHIL # BLD: 0.1 K/MCL (ref 0–0.5)
EOSINOPHIL NFR BLD: 1 %
EOSINOPHIL NFR BLD: 1 %
ERYTHROCYTE [DISTWIDTH] IN BLOOD: 13.2 % (ref 11–15)
ERYTHROCYTE [DISTWIDTH] IN BLOOD: 13.3 % (ref 11–15)
FASTING DURATION TIME PATIENT: ABNORMAL H
FASTING DURATION TIME PATIENT: ABNORMAL H
GFR SERPLBLD BASED ON 1.73 SQ M-ARVRAT: 68 ML/MIN
GFR SERPLBLD BASED ON 1.73 SQ M-ARVRAT: >90 ML/MIN
GLOBULIN SER-MCNC: 3.6 G/DL (ref 2–4)
GLOBULIN SER-MCNC: 3.7 G/DL (ref 2–4)
GLUCOSE SERPL-MCNC: 115 MG/DL (ref 70–99)
GLUCOSE SERPL-MCNC: 157 MG/DL (ref 70–99)
GLUCOSE UR STRIP-MCNC: NEGATIVE MG/DL
HCT VFR BLD CALC: 37.5 % (ref 36–46.5)
HCT VFR BLD CALC: 38.1 % (ref 36–46.5)
HGB BLD-MCNC: 12 G/DL (ref 12–15.5)
HGB BLD-MCNC: 12.1 G/DL (ref 12–15.5)
HGB UR QL STRIP: NEGATIVE
HYALINE CASTS #/AREA URNS LPF: ABNORMAL /LPF
IMM GRANULOCYTES # BLD AUTO: 0.1 K/MCL (ref 0–0.2)
IMM GRANULOCYTES # BLD AUTO: 0.1 K/MCL (ref 0–0.2)
IMM GRANULOCYTES # BLD: 1 %
IMM GRANULOCYTES # BLD: 2 %
INR PPP: 1.2
ISBT BLOOD TYPE: 600
KETONES UR STRIP-MCNC: 15 MG/DL
LACTATE BLDV-SCNC: 1.5 MMOL/L (ref 0–2)
LEUKOCYTE ESTERASE UR QL STRIP: ABNORMAL
LIPASE SERPL-CCNC: 62 UNITS/L (ref 73–393)
LYMPHOCYTES # BLD: 1.9 K/MCL (ref 1–4)
LYMPHOCYTES # BLD: 2 K/MCL (ref 1–4)
LYMPHOCYTES NFR BLD: 18 %
LYMPHOCYTES NFR BLD: 24 %
MCH RBC QN AUTO: 33.6 PG (ref 26–34)
MCH RBC QN AUTO: 33.6 PG (ref 26–34)
MCHC RBC AUTO-ENTMCNC: 31.8 G/DL (ref 32–36.5)
MCHC RBC AUTO-ENTMCNC: 32 G/DL (ref 32–36.5)
MCV RBC AUTO: 105 FL (ref 78–100)
MCV RBC AUTO: 105.8 FL (ref 78–100)
MONOCYTES # BLD: 0.6 K/MCL (ref 0.3–0.9)
MONOCYTES # BLD: 0.9 K/MCL (ref 0.3–0.9)
MONOCYTES NFR BLD: 7 %
MONOCYTES NFR BLD: 8 %
NEUTROPHILS # BLD: 5.1 K/MCL (ref 1.8–7.7)
NEUTROPHILS # BLD: 8.2 K/MCL (ref 1.8–7.7)
NEUTROPHILS NFR BLD: 65 %
NEUTROPHILS NFR BLD: 71 %
NITRITE UR QL STRIP: POSITIVE
NRBC BLD MANUAL-RTO: 0 /100 WBC
NRBC BLD MANUAL-RTO: 0 /100 WBC
PH UR STRIP: 6 [PH] (ref 5–7)
PLATELET # BLD AUTO: 493 K/MCL (ref 140–450)
PLATELET # BLD AUTO: 530 K/MCL (ref 140–450)
POTASSIUM SERPL-SCNC: 3.7 MMOL/L (ref 3.4–5.1)
POTASSIUM SERPL-SCNC: 4 MMOL/L (ref 3.4–5.1)
PRODUCT CODE: NORMAL
PRODUCT DESCRIPTION: NORMAL
PRODUCT ID: NORMAL
PROT SERPL-MCNC: 6.5 G/DL (ref 6.4–8.2)
PROT SERPL-MCNC: 6.5 G/DL (ref 6.4–8.2)
PROT UR STRIP-MCNC: ABNORMAL MG/DL
PROTHROMBIN TIME: 11.8 SEC (ref 9.7–11.8)
RAINBOW EXTRA TUBES HOLD SPECIMEN: NORMAL
RBC # BLD: 3.57 MIL/MCL (ref 4–5.2)
RBC # BLD: 3.6 MIL/MCL (ref 4–5.2)
RBC #/AREA URNS HPF: ABNORMAL /HPF
SODIUM SERPL-SCNC: 142 MMOL/L (ref 135–145)
SODIUM SERPL-SCNC: 142 MMOL/L (ref 135–145)
SP GR UR STRIP: <1.005 (ref 1–1.03)
SQUAMOUS #/AREA URNS HPF: ABNORMAL /HPF
TRANS CELLS #/AREA URNS HPF: ABNORMAL /HPF
TYPE AND SCREEN EXPIRATION DATE: NORMAL
UNIT BLOOD TYPE: NORMAL
UNIT NUMBER: NORMAL
UROBILINOGEN UR STRIP-MCNC: 0.2 MG/DL
WBC # BLD: 11.4 K/MCL (ref 4.2–11)
WBC # BLD: 7.9 K/MCL (ref 4.2–11)
WBC #/AREA URNS HPF: >100 /HPF
YEAST URNS QL MICRO: PRESENT

## 2023-01-19 PROCEDURE — 82140 ASSAY OF AMMONIA: CPT | Performed by: EMERGENCY MEDICINE

## 2023-01-19 PROCEDURE — P9612 CATHETERIZE FOR URINE SPEC: HCPCS

## 2023-01-19 PROCEDURE — 10004651 HB RX, NO CHARGE ITEM: Performed by: INTERNAL MEDICINE

## 2023-01-19 PROCEDURE — 10002800 HB RX 250 W HCPCS: Performed by: EMERGENCY MEDICINE

## 2023-01-19 PROCEDURE — 36415 COLL VENOUS BLD VENIPUNCTURE: CPT | Performed by: INTERNAL MEDICINE

## 2023-01-19 PROCEDURE — 83605 ASSAY OF LACTIC ACID: CPT | Performed by: EMERGENCY MEDICINE

## 2023-01-19 PROCEDURE — 10002803 HB RX 637: Performed by: INTERNAL MEDICINE

## 2023-01-19 PROCEDURE — G1004 CDSM NDSC: HCPCS

## 2023-01-19 PROCEDURE — C9113 INJ PANTOPRAZOLE SODIUM, VIA: HCPCS | Performed by: INTERNAL MEDICINE

## 2023-01-19 PROCEDURE — 10002801 HB RX 250 W/O HCPCS: Performed by: EMERGENCY MEDICINE

## 2023-01-19 PROCEDURE — 83690 ASSAY OF LIPASE: CPT | Performed by: EMERGENCY MEDICINE

## 2023-01-19 PROCEDURE — 10002805 HB CONTRAST AGENT: Performed by: EMERGENCY MEDICINE

## 2023-01-19 PROCEDURE — 10002800 HB RX 250 W HCPCS: Performed by: INTERNAL MEDICINE

## 2023-01-19 PROCEDURE — 81001 URINALYSIS AUTO W/SCOPE: CPT | Performed by: EMERGENCY MEDICINE

## 2023-01-19 PROCEDURE — 10002801 HB RX 250 W/O HCPCS: Performed by: INTERNAL MEDICINE

## 2023-01-19 PROCEDURE — 10002807 HB RX 258: Performed by: INTERNAL MEDICINE

## 2023-01-19 PROCEDURE — 85730 THROMBOPLASTIN TIME PARTIAL: CPT | Performed by: EMERGENCY MEDICINE

## 2023-01-19 PROCEDURE — 86920 COMPATIBILITY TEST SPIN: CPT

## 2023-01-19 PROCEDURE — 86901 BLOOD TYPING SEROLOGIC RH(D): CPT | Performed by: EMERGENCY MEDICINE

## 2023-01-19 PROCEDURE — 87186 SC STD MICRODIL/AGAR DIL: CPT | Performed by: EMERGENCY MEDICINE

## 2023-01-19 PROCEDURE — 85610 PROTHROMBIN TIME: CPT | Performed by: EMERGENCY MEDICINE

## 2023-01-19 PROCEDURE — 99223 1ST HOSP IP/OBS HIGH 75: CPT | Performed by: INTERNAL MEDICINE

## 2023-01-19 PROCEDURE — 86870 RBC ANTIBODY IDENTIFICATION: CPT | Performed by: EMERGENCY MEDICINE

## 2023-01-19 PROCEDURE — 10006031 HB ROOM CHARGE TELEMETRY

## 2023-01-19 PROCEDURE — 99285 EMERGENCY DEPT VISIT HI MDM: CPT

## 2023-01-19 PROCEDURE — 85025 COMPLETE CBC W/AUTO DIFF WBC: CPT | Performed by: INTERNAL MEDICINE

## 2023-01-19 PROCEDURE — 85025 COMPLETE CBC W/AUTO DIFF WBC: CPT | Performed by: EMERGENCY MEDICINE

## 2023-01-19 PROCEDURE — 74177 CT ABD & PELVIS W/CONTRAST: CPT

## 2023-01-19 PROCEDURE — 80053 COMPREHEN METABOLIC PANEL: CPT | Performed by: INTERNAL MEDICINE

## 2023-01-19 PROCEDURE — 80053 COMPREHEN METABOLIC PANEL: CPT | Performed by: EMERGENCY MEDICINE

## 2023-01-19 RX ORDER — DEXTROSE, SODIUM CHLORIDE, SODIUM LACTATE, POTASSIUM CHLORIDE, AND CALCIUM CHLORIDE 5; .6; .31; .03; .02 G/100ML; G/100ML; G/100ML; G/100ML; G/100ML
INJECTION, SOLUTION INTRAVENOUS CONTINUOUS
Status: DISCONTINUED | OUTPATIENT
Start: 2023-01-19 | End: 2023-01-22

## 2023-01-19 RX ORDER — ACETAMINOPHEN 325 MG/1
650 TABLET ORAL EVERY 4 HOURS PRN
Status: DISCONTINUED | OUTPATIENT
Start: 2023-01-19 | End: 2023-01-29 | Stop reason: HOSPADM

## 2023-01-19 RX ORDER — 0.9 % SODIUM CHLORIDE 0.9 %
2 VIAL (ML) INJECTION EVERY 12 HOURS SCHEDULED
Status: DISCONTINUED | OUTPATIENT
Start: 2023-01-19 | End: 2023-01-29 | Stop reason: HOSPADM

## 2023-01-19 RX ORDER — POLYETHYLENE GLYCOL 3350 17 G/17G
17 POWDER, FOR SOLUTION ORAL DAILY PRN
Status: DISCONTINUED | OUTPATIENT
Start: 2023-01-19 | End: 2023-01-29 | Stop reason: HOSPADM

## 2023-01-19 RX ORDER — ONDANSETRON 2 MG/ML
4 INJECTION INTRAMUSCULAR; INTRAVENOUS 2 TIMES DAILY PRN
Status: DISCONTINUED | OUTPATIENT
Start: 2023-01-19 | End: 2023-01-29 | Stop reason: HOSPADM

## 2023-01-19 RX ORDER — FLUDROCORTISONE ACETATE 0.1 MG/1
0.1 TABLET ORAL DAILY
Status: DISCONTINUED | OUTPATIENT
Start: 2023-01-19 | End: 2023-01-26

## 2023-01-19 RX ORDER — LEVOTHYROXINE SODIUM 0.03 MG/1
25 TABLET ORAL
Status: DISCONTINUED | OUTPATIENT
Start: 2023-01-20 | End: 2023-01-29 | Stop reason: HOSPADM

## 2023-01-19 RX ORDER — MIDODRINE HYDROCHLORIDE 5 MG/1
5 TABLET ORAL 3 TIMES DAILY
Status: DISCONTINUED | OUTPATIENT
Start: 2023-01-19 | End: 2023-01-20

## 2023-01-19 RX ORDER — AMOXICILLIN 250 MG
2 CAPSULE ORAL DAILY PRN
Status: DISCONTINUED | OUTPATIENT
Start: 2023-01-19 | End: 2023-01-29 | Stop reason: HOSPADM

## 2023-01-19 RX ORDER — COLLAGENASE SANTYL 250 [ARB'U]/G
OINTMENT TOPICAL
COMMUNITY
Start: 2023-01-13 | End: 2023-02-21

## 2023-01-19 RX ORDER — CEFAZOLIN SODIUM/WATER 1 G/10 ML
1000 SYRINGE (ML) INTRAVENOUS DAILY
Status: DISCONTINUED | OUTPATIENT
Start: 2023-01-20 | End: 2023-01-21

## 2023-01-19 RX ORDER — PANTOPRAZOLE SODIUM 40 MG/10ML
40 INJECTION, POWDER, LYOPHILIZED, FOR SOLUTION INTRAVENOUS EVERY 12 HOURS SCHEDULED
Status: DISCONTINUED | OUTPATIENT
Start: 2023-01-19 | End: 2023-01-23

## 2023-01-19 RX ORDER — CEFAZOLIN SODIUM/WATER 2 G/20 ML
2000 SYRINGE (ML) INTRAVENOUS ONCE
Status: COMPLETED | OUTPATIENT
Start: 2023-01-19 | End: 2023-01-19

## 2023-01-19 RX ADMIN — COLLAGENASE SANTYL 1 G: 250 OINTMENT TOPICAL at 21:50

## 2023-01-19 RX ADMIN — METRONIDAZOLE 500 MG: 500 INJECTION, SOLUTION INTRAVENOUS at 15:52

## 2023-01-19 RX ADMIN — IOHEXOL 75 ML: 350 INJECTION, SOLUTION INTRAVENOUS at 14:42

## 2023-01-19 RX ADMIN — CEFTRIAXONE SODIUM 2000 MG: 10 INJECTION, POWDER, FOR SOLUTION INTRAVENOUS at 15:48

## 2023-01-19 RX ADMIN — METRONIDAZOLE 500 MG: 500 INJECTION, SOLUTION INTRAVENOUS at 22:03

## 2023-01-19 RX ADMIN — SODIUM CHLORIDE, SODIUM LACTATE, POTASSIUM CHLORIDE, CALCIUM CHLORIDE AND DEXTROSE MONOHYDRATE: 5; 600; 310; 30; 20 INJECTION, SOLUTION INTRAVENOUS at 20:18

## 2023-01-19 RX ADMIN — PANTOPRAZOLE SODIUM 40 MG: 40 INJECTION, POWDER, FOR SOLUTION INTRAVENOUS at 21:52

## 2023-01-19 RX ADMIN — SODIUM CHLORIDE, PRESERVATIVE FREE 2 ML: 5 INJECTION INTRAVENOUS at 20:19

## 2023-01-19 RX ADMIN — SODIUM CHLORIDE 25 ML: 9 INJECTION, SOLUTION INTRAVENOUS at 22:01

## 2023-01-19 SDOH — ECONOMIC STABILITY: GENERAL

## 2023-01-19 SDOH — HEALTH STABILITY: PHYSICAL HEALTH: DO YOU HAVE SERIOUS DIFFICULTY WALKING OR CLIMBING STAIRS?: YES

## 2023-01-19 SDOH — ECONOMIC STABILITY: HOUSING INSECURITY: WHAT IS YOUR LIVING SITUATION TODAY?: ASSISTED LIVING

## 2023-01-19 SDOH — HEALTH STABILITY: GENERAL: BECAUSE OF A PHYSICAL, MENTAL, OR EMOTIONAL CONDITION, DO YOU HAVE DIFFICULTY DOING ERRANDS ALONE?: YES

## 2023-01-19 SDOH — SOCIAL STABILITY: SOCIAL NETWORK: SUPPORT SYSTEMS: FAMILY MEMBERS

## 2023-01-19 SDOH — ECONOMIC STABILITY: HOUSING INSECURITY: ARE YOU WORRIED ABOUT LOSING YOUR HOUSING?: NO

## 2023-01-19 SDOH — SOCIAL STABILITY: SOCIAL NETWORK
HOW OFTEN DO YOU SEE OR TALK TO PEOPLE THAT YOU CARE ABOUT AND FEEL CLOSE TO? (FOR EXAMPLE: TALKING TO FRIENDS ON THE PHONE, VISITING FRIENDS OR FAMILY, GOING TO CHURCH OR CLUB MEETINGS): 3 TO 5 TIMES A WEEK

## 2023-01-19 SDOH — HEALTH STABILITY: PHYSICAL HEALTH: DO YOU HAVE DIFFICULTY DRESSING OR BATHING?: YES

## 2023-01-19 SDOH — ECONOMIC STABILITY: HOUSING INSECURITY: WHAT IS YOUR LIVING SITUATION TODAY?: FAMILY MEMBERS

## 2023-01-19 SDOH — ECONOMIC STABILITY: FOOD INSECURITY: HOW OFTEN IN THE PAST 12 MONTHS WERE YOU WORRIED OR STRESSED ABOUT HAVING ENOUGH MONEY TO BUY NUTRITIOUS MEALS?: NEVER

## 2023-01-19 ASSESSMENT — ACTIVITIES OF DAILY LIVING (ADL)
TOILETING: NEEDS ASSISTANCE
FEEDING: INDEPENDENT
RECENT_DECLINE_ADL: NO
ADL_SCORE: 8
BATHING: DEPENDENT
DRESSING: NEEDS ASSISTANCE
ADL_BEFORE_ADMISSION: NEEDS/REQUIRES ASSISTANCE
ADL_SHORT_OF_BREATH: NO

## 2023-01-19 ASSESSMENT — COLUMBIA-SUICIDE SEVERITY RATING SCALE - C-SSRS
2. HAVE YOU ACTUALLY HAD ANY THOUGHTS OF KILLING YOURSELF?: NO
6. HAVE YOU EVER DONE ANYTHING, STARTED TO DO ANYTHING, OR PREPARED TO DO ANYTHING TO END YOUR LIFE?: NO
IS THE PATIENT ABLE TO COMPLETE C-SSRS: YES
1. WITHIN THE PAST MONTH, HAVE YOU WISHED YOU WERE DEAD OR WISHED YOU COULD GO TO SLEEP AND NOT WAKE UP?: NO

## 2023-01-19 ASSESSMENT — LIFESTYLE VARIABLES
HOW OFTEN DO YOU HAVE A DRINK CONTAINING ALCOHOL: NEVER
HOW OFTEN DO YOU HAVE 6 OR MORE DRINKS ON ONE OCCASION: NEVER
HOW MANY STANDARD DRINKS CONTAINING ALCOHOL DO YOU HAVE ON A TYPICAL DAY: 0,1 OR 2
ALCOHOL_USE_STATUS: NO OR LOW RISK WITH VALIDATED TOOL
AUDIT-C TOTAL SCORE: 0

## 2023-01-19 ASSESSMENT — PAIN SCALES - GENERAL
PAINLEVEL_OUTOF10: 0
PAINLEVEL_OUTOF10: 7
PAINLEVEL_OUTOF10: 0

## 2023-01-19 ASSESSMENT — ENCOUNTER SYMPTOMS
BLOOD IN STOOL: 1
NEUROLOGICAL NEGATIVE: 1
ENDOCRINE NEGATIVE: 1
VOMITING: 1
HEMATOLOGIC/LYMPHATIC NEGATIVE: 1
EYES NEGATIVE: 1
ABDOMINAL PAIN: 1
CONSTITUTIONAL NEGATIVE: 1
NAUSEA: 1
PSYCHIATRIC NEGATIVE: 1
RESPIRATORY NEGATIVE: 1
ALLERGIC/IMMUNOLOGIC NEGATIVE: 1

## 2023-01-19 ASSESSMENT — PAIN DESCRIPTION - PAIN TYPE: TYPE: ACUTE PAIN

## 2023-01-19 ASSESSMENT — PATIENT HEALTH QUESTIONNAIRE - PHQ9
CLINICAL INTERPRETATION OF PHQ2 SCORE: NO FURTHER SCREENING NEEDED
1. LITTLE INTEREST OR PLEASURE IN DOING THINGS: NOT AT ALL
IS PATIENT ABLE TO COMPLETE PHQ2 OR PHQ9: YES
SUM OF ALL RESPONSES TO PHQ9 QUESTIONS 1 AND 2: 0
SUM OF ALL RESPONSES TO PHQ9 QUESTIONS 1 AND 2: 0
2. FEELING DOWN, DEPRESSED OR HOPELESS: NOT AT ALL

## 2023-01-19 ASSESSMENT — PAIN SCALES - BEHAVIORAL PAIN SCALE (BPS): BPS_SCORE: 3

## 2023-01-20 LAB
ALBUMIN SERPL-MCNC: 2.8 G/DL (ref 3.6–5.1)
ALBUMIN/GLOB SERPL: 0.8 {RATIO} (ref 1–2.4)
ALP SERPL-CCNC: 56 UNITS/L (ref 45–117)
ALT SERPL-CCNC: 21 UNITS/L
AMMONIA PLAS-SCNC: 45 MCMOL/L
ANION GAP SERPL CALC-SCNC: 11 MMOL/L (ref 7–19)
AST SERPL-CCNC: 19 UNITS/L
BASOPHILS # BLD: 0.1 K/MCL (ref 0–0.3)
BASOPHILS NFR BLD: 1 %
BILIRUB SERPL-MCNC: 0.3 MG/DL (ref 0.2–1)
BUN SERPL-MCNC: 28 MG/DL (ref 6–20)
BUN/CREAT SERPL: 41 (ref 7–25)
CALCIUM SERPL-MCNC: 8.6 MG/DL (ref 8.4–10.2)
CHLORIDE SERPL-SCNC: 112 MMOL/L (ref 97–110)
CO2 SERPL-SCNC: 22 MMOL/L (ref 21–32)
CREAT SERPL-MCNC: 0.69 MG/DL (ref 0.51–0.95)
DEPRECATED RDW RBC: 50.4 FL (ref 39–50)
EOSINOPHIL # BLD: 0.2 K/MCL (ref 0–0.5)
EOSINOPHIL NFR BLD: 2 %
ERYTHROCYTE [DISTWIDTH] IN BLOOD: 13.3 % (ref 11–15)
FASTING DURATION TIME PATIENT: ABNORMAL H
GFR SERPLBLD BASED ON 1.73 SQ M-ARVRAT: 90 ML/MIN
GLOBULIN SER-MCNC: 3.3 G/DL (ref 2–4)
GLUCOSE BLDC GLUCOMTR-MCNC: 117 MG/DL (ref 70–99)
GLUCOSE SERPL-MCNC: 127 MG/DL (ref 70–99)
HCT VFR BLD CALC: 32.9 % (ref 36–46.5)
HGB BLD-MCNC: 10.7 G/DL (ref 12–15.5)
IMM GRANULOCYTES # BLD AUTO: 0.1 K/MCL (ref 0–0.2)
IMM GRANULOCYTES # BLD: 1 %
LYMPHOCYTES # BLD: 1.5 K/MCL (ref 1–4)
LYMPHOCYTES NFR BLD: 14 %
MAGNESIUM SERPL-MCNC: 1.9 MG/DL (ref 1.7–2.4)
MCH RBC QN AUTO: 33.5 PG (ref 26–34)
MCHC RBC AUTO-ENTMCNC: 32.5 G/DL (ref 32–36.5)
MCV RBC AUTO: 103.1 FL (ref 78–100)
MONOCYTES # BLD: 1.1 K/MCL (ref 0.3–0.9)
MONOCYTES NFR BLD: 11 %
NEUTROPHILS # BLD: 7.4 K/MCL (ref 1.8–7.7)
NEUTROPHILS NFR BLD: 71 %
NRBC BLD MANUAL-RTO: 0 /100 WBC
PATH REV BLD -IMP: NORMAL
PLATELET # BLD AUTO: 449 K/MCL (ref 140–450)
POTASSIUM SERPL-SCNC: 3.3 MMOL/L (ref 3.4–5.1)
POTASSIUM SERPL-SCNC: 3.5 MMOL/L (ref 3.4–5.1)
POTASSIUM SERPL-SCNC: 4.1 MMOL/L (ref 3.4–5.1)
PROT SERPL-MCNC: 6.1 G/DL (ref 6.4–8.2)
RAINBOW EXTRA TUBES HOLD SPECIMEN: NORMAL
RBC # BLD: 3.19 MIL/MCL (ref 4–5.2)
SODIUM SERPL-SCNC: 142 MMOL/L (ref 135–145)
WBC # BLD: 10.3 K/MCL (ref 4.2–11)

## 2023-01-20 PROCEDURE — C9113 INJ PANTOPRAZOLE SODIUM, VIA: HCPCS | Performed by: INTERNAL MEDICINE

## 2023-01-20 PROCEDURE — 10006031 HB ROOM CHARGE TELEMETRY

## 2023-01-20 PROCEDURE — 10002801 HB RX 250 W/O HCPCS: Performed by: INTERNAL MEDICINE

## 2023-01-20 PROCEDURE — 85025 COMPLETE CBC W/AUTO DIFF WBC: CPT | Performed by: INTERNAL MEDICINE

## 2023-01-20 PROCEDURE — 10004651 HB RX, NO CHARGE ITEM: Performed by: INTERNAL MEDICINE

## 2023-01-20 PROCEDURE — 83735 ASSAY OF MAGNESIUM: CPT | Performed by: INTERNAL MEDICINE

## 2023-01-20 PROCEDURE — 10002803 HB RX 637: Performed by: INTERNAL MEDICINE

## 2023-01-20 PROCEDURE — 10002800 HB RX 250 W HCPCS: Performed by: INTERNAL MEDICINE

## 2023-01-20 PROCEDURE — 84132 ASSAY OF SERUM POTASSIUM: CPT | Performed by: INTERNAL MEDICINE

## 2023-01-20 PROCEDURE — 36415 COLL VENOUS BLD VENIPUNCTURE: CPT | Performed by: INTERNAL MEDICINE

## 2023-01-20 PROCEDURE — 99233 SBSQ HOSP IP/OBS HIGH 50: CPT | Performed by: INTERNAL MEDICINE

## 2023-01-20 PROCEDURE — 82140 ASSAY OF AMMONIA: CPT | Performed by: INTERNAL MEDICINE

## 2023-01-20 PROCEDURE — 10002807 HB RX 258: Performed by: INTERNAL MEDICINE

## 2023-01-20 PROCEDURE — 80053 COMPREHEN METABOLIC PANEL: CPT | Performed by: INTERNAL MEDICINE

## 2023-01-20 RX ORDER — TRAMADOL HYDROCHLORIDE 50 MG/1
25 TABLET ORAL EVERY 6 HOURS PRN
Status: DISCONTINUED | OUTPATIENT
Start: 2023-01-20 | End: 2023-01-20

## 2023-01-20 RX ORDER — POTASSIUM CHLORIDE 20 MEQ/1
40 TABLET, EXTENDED RELEASE ORAL ONCE
Status: COMPLETED | OUTPATIENT
Start: 2023-01-20 | End: 2023-01-20

## 2023-01-20 RX ORDER — TRAMADOL HYDROCHLORIDE 50 MG/1
50 TABLET ORAL EVERY 6 HOURS PRN
Status: DISCONTINUED | OUTPATIENT
Start: 2023-01-20 | End: 2023-01-29 | Stop reason: HOSPADM

## 2023-01-20 RX ORDER — VENLAFAXINE HYDROCHLORIDE 75 MG/1
150 CAPSULE, EXTENDED RELEASE ORAL NIGHTLY
Status: DISCONTINUED | OUTPATIENT
Start: 2023-01-20 | End: 2023-01-29 | Stop reason: HOSPADM

## 2023-01-20 RX ORDER — ARIPIPRAZOLE 2 MG/1
2 TABLET ORAL DAILY
Status: DISCONTINUED | OUTPATIENT
Start: 2023-01-20 | End: 2023-01-29 | Stop reason: HOSPADM

## 2023-01-20 RX ADMIN — ACETAMINOPHEN 650 MG: 325 TABLET ORAL at 03:47

## 2023-01-20 RX ADMIN — LEVOTHYROXINE SODIUM 25 MCG: 0.03 TABLET ORAL at 05:46

## 2023-01-20 RX ADMIN — VENLAFAXINE HYDROCHLORIDE 150 MG: 75 CAPSULE, EXTENDED RELEASE ORAL at 20:49

## 2023-01-20 RX ADMIN — CEFTRIAXONE SODIUM 1000 MG: 10 INJECTION, POWDER, FOR SOLUTION INTRAVENOUS at 09:50

## 2023-01-20 RX ADMIN — POTASSIUM CHLORIDE 40 MEQ: 1500 TABLET, EXTENDED RELEASE ORAL at 16:45

## 2023-01-20 RX ADMIN — TRAMADOL HYDROCHLORIDE 25 MG: 50 TABLET, COATED ORAL at 16:45

## 2023-01-20 RX ADMIN — SODIUM CHLORIDE, SODIUM LACTATE, POTASSIUM CHLORIDE, CALCIUM CHLORIDE AND DEXTROSE MONOHYDRATE: 5; 600; 310; 30; 20 INJECTION, SOLUTION INTRAVENOUS at 23:56

## 2023-01-20 RX ADMIN — ONDANSETRON 4 MG: 2 INJECTION INTRAMUSCULAR; INTRAVENOUS at 20:57

## 2023-01-20 RX ADMIN — MIDODRINE HYDROCHLORIDE 5 MG: 5 TABLET ORAL at 09:49

## 2023-01-20 RX ADMIN — ACETAMINOPHEN 650 MG: 325 TABLET ORAL at 09:34

## 2023-01-20 RX ADMIN — POTASSIUM CHLORIDE 40 MEQ: 1500 TABLET, EXTENDED RELEASE ORAL at 09:49

## 2023-01-20 RX ADMIN — ACETAMINOPHEN 650 MG: 325 TABLET ORAL at 13:43

## 2023-01-20 RX ADMIN — METRONIDAZOLE 500 MG: 500 INJECTION, SOLUTION INTRAVENOUS at 05:46

## 2023-01-20 RX ADMIN — PANTOPRAZOLE SODIUM 40 MG: 40 INJECTION, POWDER, FOR SOLUTION INTRAVENOUS at 09:49

## 2023-01-20 RX ADMIN — COLLAGENASE SANTYL 1 G: 250 OINTMENT TOPICAL at 10:01

## 2023-01-20 RX ADMIN — SODIUM CHLORIDE, PRESERVATIVE FREE 2 ML: 5 INJECTION INTRAVENOUS at 10:41

## 2023-01-20 RX ADMIN — METRONIDAZOLE 500 MG: 500 INJECTION, SOLUTION INTRAVENOUS at 21:39

## 2023-01-20 RX ADMIN — MIDODRINE HYDROCHLORIDE 5 MG: 5 TABLET ORAL at 13:43

## 2023-01-20 RX ADMIN — ARIPIPRAZOLE 2 MG: 2 TABLET ORAL at 17:03

## 2023-01-20 RX ADMIN — SODIUM CHLORIDE, SODIUM LACTATE, POTASSIUM CHLORIDE, CALCIUM CHLORIDE AND DEXTROSE MONOHYDRATE: 5; 600; 310; 30; 20 INJECTION, SOLUTION INTRAVENOUS at 10:09

## 2023-01-20 RX ADMIN — PANTOPRAZOLE SODIUM 40 MG: 40 INJECTION, POWDER, FOR SOLUTION INTRAVENOUS at 20:50

## 2023-01-20 RX ADMIN — METRONIDAZOLE 500 MG: 500 INJECTION, SOLUTION INTRAVENOUS at 13:57

## 2023-01-20 RX ADMIN — FLUDROCORTISONE ACETATE 0.1 MG: 0.1 TABLET ORAL at 09:49

## 2023-01-20 RX ADMIN — SODIUM CHLORIDE, PRESERVATIVE FREE 2 ML: 5 INJECTION INTRAVENOUS at 20:49

## 2023-01-20 ASSESSMENT — PAIN SCALES - GENERAL
PAINLEVEL_OUTOF10: 8
PAINLEVEL_OUTOF10: 8
PAINLEVEL_OUTOF10: 10
PAINLEVEL_OUTOF10: 8
PAINLEVEL_OUTOF10: 1
PAINLEVEL_OUTOF10: 0

## 2023-01-20 ASSESSMENT — PAIN SCALES - WONG BAKER
WONGBAKER_NUMERICALRESPONSE: 0
WONGBAKER_NUMERICALRESPONSE: 0

## 2023-01-20 ASSESSMENT — COGNITIVE AND FUNCTIONAL STATUS - GENERAL
BECAUSE OF A PHYSICAL, MENTAL, OR EMOTIONAL CONDITION, DO YOU HAVE DIFFICULTY DOING ERRANDS ALONE: YES
BECAUSE OF A PHYSICAL, MENTAL, OR EMOTIONAL CONDITION, DO YOU HAVE SERIOUS DIFFICULTY CONCENTRATING, REMEMBERING OR MAKING DECISIONS: NO

## 2023-01-21 LAB
ANION GAP SERPL CALC-SCNC: 8 MMOL/L (ref 7–19)
BACTERIA UR CULT: ABNORMAL
BASOPHILS # BLD: 0.1 K/MCL (ref 0–0.3)
BASOPHILS NFR BLD: 1 %
BUN SERPL-MCNC: 13 MG/DL (ref 6–20)
BUN/CREAT SERPL: 25 (ref 7–25)
CALCIUM SERPL-MCNC: 8 MG/DL (ref 8.4–10.2)
CHLORIDE SERPL-SCNC: 113 MMOL/L (ref 97–110)
CO2 SERPL-SCNC: 27 MMOL/L (ref 21–32)
CREAT SERPL-MCNC: 0.52 MG/DL (ref 0.51–0.95)
DEPRECATED RDW RBC: 50.7 FL (ref 39–50)
EOSINOPHIL # BLD: 0.2 K/MCL (ref 0–0.5)
EOSINOPHIL NFR BLD: 2 %
ERYTHROCYTE [DISTWIDTH] IN BLOOD: 13.3 % (ref 11–15)
FASTING DURATION TIME PATIENT: ABNORMAL H
GFR SERPLBLD BASED ON 1.73 SQ M-ARVRAT: >90 ML/MIN
GLUCOSE SERPL-MCNC: 106 MG/DL (ref 70–99)
HCT VFR BLD CALC: 26.1 % (ref 36–46.5)
HCT VFR BLD CALC: 27.8 % (ref 36–46.5)
HGB BLD-MCNC: 8.5 G/DL (ref 12–15.5)
HGB BLD-MCNC: 8.9 G/DL (ref 12–15.5)
IMM GRANULOCYTES # BLD AUTO: 0.1 K/MCL (ref 0–0.2)
IMM GRANULOCYTES # BLD: 1 %
LYMPHOCYTES # BLD: 1.4 K/MCL (ref 1–4)
LYMPHOCYTES NFR BLD: 20 %
MCH RBC QN AUTO: 33.9 PG (ref 26–34)
MCHC RBC AUTO-ENTMCNC: 32.6 G/DL (ref 32–36.5)
MCV RBC AUTO: 104 FL (ref 78–100)
MONOCYTES # BLD: 0.8 K/MCL (ref 0.3–0.9)
MONOCYTES NFR BLD: 11 %
NEUTROPHILS # BLD: 4.7 K/MCL (ref 1.8–7.7)
NEUTROPHILS NFR BLD: 65 %
NRBC BLD MANUAL-RTO: 0 /100 WBC
PLATELET # BLD AUTO: 372 K/MCL (ref 140–450)
POTASSIUM SERPL-SCNC: 3.7 MMOL/L (ref 3.4–5.1)
RBC # BLD: 2.51 MIL/MCL (ref 4–5.2)
SODIUM SERPL-SCNC: 144 MMOL/L (ref 135–145)
WBC # BLD: 7.1 K/MCL (ref 4.2–11)

## 2023-01-21 PROCEDURE — 99233 SBSQ HOSP IP/OBS HIGH 50: CPT | Performed by: INTERNAL MEDICINE

## 2023-01-21 PROCEDURE — 10002807 HB RX 258: Performed by: INTERNAL MEDICINE

## 2023-01-21 PROCEDURE — 80048 BASIC METABOLIC PNL TOTAL CA: CPT | Performed by: INTERNAL MEDICINE

## 2023-01-21 PROCEDURE — 10004651 HB RX, NO CHARGE ITEM: Performed by: INTERNAL MEDICINE

## 2023-01-21 PROCEDURE — 10002801 HB RX 250 W/O HCPCS: Performed by: INTERNAL MEDICINE

## 2023-01-21 PROCEDURE — 10006031 HB ROOM CHARGE TELEMETRY

## 2023-01-21 PROCEDURE — C9113 INJ PANTOPRAZOLE SODIUM, VIA: HCPCS | Performed by: INTERNAL MEDICINE

## 2023-01-21 PROCEDURE — 10002803 HB RX 637: Performed by: INTERNAL MEDICINE

## 2023-01-21 PROCEDURE — 10002800 HB RX 250 W HCPCS: Performed by: INTERNAL MEDICINE

## 2023-01-21 PROCEDURE — 85014 HEMATOCRIT: CPT | Performed by: INTERNAL MEDICINE

## 2023-01-21 PROCEDURE — 85025 COMPLETE CBC W/AUTO DIFF WBC: CPT | Performed by: INTERNAL MEDICINE

## 2023-01-21 PROCEDURE — 36415 COLL VENOUS BLD VENIPUNCTURE: CPT | Performed by: INTERNAL MEDICINE

## 2023-01-21 RX ORDER — MIDODRINE HYDROCHLORIDE 5 MG/1
5 TABLET ORAL
Status: DISCONTINUED | OUTPATIENT
Start: 2023-01-21 | End: 2023-01-22

## 2023-01-21 RX ADMIN — SODIUM CHLORIDE, PRESERVATIVE FREE 2 ML: 5 INJECTION INTRAVENOUS at 09:45

## 2023-01-21 RX ADMIN — VENLAFAXINE HYDROCHLORIDE 150 MG: 75 CAPSULE, EXTENDED RELEASE ORAL at 21:34

## 2023-01-21 RX ADMIN — MEROPENEM 500 MG: 500 INJECTION INTRAVENOUS at 14:11

## 2023-01-21 RX ADMIN — COLLAGENASE SANTYL 1 G: 250 OINTMENT TOPICAL at 13:26

## 2023-01-21 RX ADMIN — SODIUM CHLORIDE, PRESERVATIVE FREE 2 ML: 5 INJECTION INTRAVENOUS at 21:44

## 2023-01-21 RX ADMIN — METRONIDAZOLE 500 MG: 500 INJECTION, SOLUTION INTRAVENOUS at 13:19

## 2023-01-21 RX ADMIN — ARIPIPRAZOLE 2 MG: 2 TABLET ORAL at 09:45

## 2023-01-21 RX ADMIN — PANTOPRAZOLE SODIUM 40 MG: 40 INJECTION, POWDER, FOR SOLUTION INTRAVENOUS at 09:46

## 2023-01-21 RX ADMIN — MEROPENEM 500 MG: 500 INJECTION INTRAVENOUS at 21:37

## 2023-01-21 RX ADMIN — METRONIDAZOLE 500 MG: 500 INJECTION, SOLUTION INTRAVENOUS at 05:50

## 2023-01-21 RX ADMIN — SODIUM CHLORIDE, SODIUM LACTATE, POTASSIUM CHLORIDE, CALCIUM CHLORIDE AND DEXTROSE MONOHYDRATE: 5; 600; 310; 30; 20 INJECTION, SOLUTION INTRAVENOUS at 16:35

## 2023-01-21 RX ADMIN — LEVOTHYROXINE SODIUM 25 MCG: 0.03 TABLET ORAL at 05:50

## 2023-01-21 RX ADMIN — CEFTRIAXONE SODIUM 1000 MG: 10 INJECTION, POWDER, FOR SOLUTION INTRAVENOUS at 09:46

## 2023-01-21 RX ADMIN — MIDODRINE HYDROCHLORIDE 5 MG: 5 TABLET ORAL at 14:11

## 2023-01-21 RX ADMIN — PANTOPRAZOLE SODIUM 40 MG: 40 INJECTION, POWDER, FOR SOLUTION INTRAVENOUS at 21:34

## 2023-01-21 ASSESSMENT — PAIN SCALES - WONG BAKER: WONGBAKER_NUMERICALRESPONSE: 0

## 2023-01-21 ASSESSMENT — PAIN SCALES - GENERAL
PAINLEVEL_OUTOF10: 0
PAINLEVEL_OUTOF10: 0

## 2023-01-21 ASSESSMENT — PAIN SCALES - BEHAVIORAL PAIN SCALE (BPS): BPS_SCORE: 3

## 2023-01-22 ENCOUNTER — ANESTHESIA (OUTPATIENT)
Dept: GASTROENTEROLOGY | Age: 78
DRG: 377 | End: 2023-01-22

## 2023-01-22 ENCOUNTER — APPOINTMENT (OUTPATIENT)
Dept: GASTROENTEROLOGY | Age: 78
DRG: 377 | End: 2023-01-22
Attending: INTERNAL MEDICINE

## 2023-01-22 ENCOUNTER — ANESTHESIA EVENT (OUTPATIENT)
Dept: GASTROENTEROLOGY | Age: 78
DRG: 377 | End: 2023-01-22

## 2023-01-22 LAB
ANION GAP SERPL CALC-SCNC: 9 MMOL/L (ref 7–19)
BASOPHILS # BLD: 0.1 K/MCL (ref 0–0.3)
BASOPHILS NFR BLD: 1 %
BUN SERPL-MCNC: 7 MG/DL (ref 6–20)
BUN/CREAT SERPL: 12 (ref 7–25)
CALCIUM SERPL-MCNC: 8 MG/DL (ref 8.4–10.2)
CHLORIDE SERPL-SCNC: 110 MMOL/L (ref 97–110)
CO2 SERPL-SCNC: 27 MMOL/L (ref 21–32)
CREAT SERPL-MCNC: 0.58 MG/DL (ref 0.51–0.95)
DEPRECATED RDW RBC: 52.8 FL (ref 39–50)
EOSINOPHIL # BLD: 0.2 K/MCL (ref 0–0.5)
EOSINOPHIL NFR BLD: 4 %
ERYTHROCYTE [DISTWIDTH] IN BLOOD: 13.3 % (ref 11–15)
FASTING DURATION TIME PATIENT: ABNORMAL H
GFR SERPLBLD BASED ON 1.73 SQ M-ARVRAT: >90 ML/MIN
GLUCOSE SERPL-MCNC: 102 MG/DL (ref 70–99)
HCT VFR BLD CALC: 28.5 % (ref 36–46.5)
HCT VFR BLD CALC: 28.7 % (ref 36–46.5)
HGB BLD-MCNC: 8.7 G/DL (ref 12–15.5)
HGB BLD-MCNC: 9 G/DL (ref 12–15.5)
IMM GRANULOCYTES # BLD AUTO: 0.1 K/MCL (ref 0–0.2)
IMM GRANULOCYTES # BLD: 1 %
LYMPHOCYTES # BLD: 1.8 K/MCL (ref 1–4)
LYMPHOCYTES NFR BLD: 26 %
MCH RBC QN AUTO: 33.2 PG (ref 26–34)
MCHC RBC AUTO-ENTMCNC: 30.5 G/DL (ref 32–36.5)
MCV RBC AUTO: 108.8 FL (ref 78–100)
MONOCYTES # BLD: 0.8 K/MCL (ref 0.3–0.9)
MONOCYTES NFR BLD: 12 %
NEUTROPHILS # BLD: 3.8 K/MCL (ref 1.8–7.7)
NEUTROPHILS NFR BLD: 56 %
NRBC BLD MANUAL-RTO: 0 /100 WBC
PLATELET # BLD AUTO: 376 K/MCL (ref 140–450)
POTASSIUM SERPL-SCNC: 3.4 MMOL/L (ref 3.4–5.1)
POTASSIUM SERPL-SCNC: 3.5 MMOL/L (ref 3.4–5.1)
RBC # BLD: 2.62 MIL/MCL (ref 4–5.2)
SODIUM SERPL-SCNC: 143 MMOL/L (ref 135–145)
WBC # BLD: 6.6 K/MCL (ref 4.2–11)

## 2023-01-22 PROCEDURE — 0DB78ZX EXCISION OF STOMACH, PYLORUS, VIA NATURAL OR ARTIFICIAL OPENING ENDOSCOPIC, DIAGNOSTIC: ICD-10-PCS | Performed by: INTERNAL MEDICINE

## 2023-01-22 PROCEDURE — 0DB68ZX EXCISION OF STOMACH, VIA NATURAL OR ARTIFICIAL OPENING ENDOSCOPIC, DIAGNOSTIC: ICD-10-PCS | Performed by: INTERNAL MEDICINE

## 2023-01-22 PROCEDURE — 97165 OT EVAL LOW COMPLEX 30 MIN: CPT

## 2023-01-22 PROCEDURE — 84132 ASSAY OF SERUM POTASSIUM: CPT | Performed by: INTERNAL MEDICINE

## 2023-01-22 PROCEDURE — 10002807 HB RX 258: Performed by: ANESTHESIOLOGY

## 2023-01-22 PROCEDURE — 10002801 HB RX 250 W/O HCPCS: Performed by: INTERNAL MEDICINE

## 2023-01-22 PROCEDURE — 99233 SBSQ HOSP IP/OBS HIGH 50: CPT | Performed by: INTERNAL MEDICINE

## 2023-01-22 PROCEDURE — 0DB98ZX EXCISION OF DUODENUM, VIA NATURAL OR ARTIFICIAL OPENING ENDOSCOPIC, DIAGNOSTIC: ICD-10-PCS | Performed by: INTERNAL MEDICINE

## 2023-01-22 PROCEDURE — 13000024 HB GI COMPLEX CASE S/U + 1ST 15 MIN: Performed by: INTERNAL MEDICINE

## 2023-01-22 PROCEDURE — 10006031 HB ROOM CHARGE TELEMETRY

## 2023-01-22 PROCEDURE — 10002800 HB RX 250 W HCPCS: Performed by: INTERNAL MEDICINE

## 2023-01-22 PROCEDURE — 88305 TISSUE EXAM BY PATHOLOGIST: CPT | Performed by: INTERNAL MEDICINE

## 2023-01-22 PROCEDURE — 85025 COMPLETE CBC W/AUTO DIFF WBC: CPT | Performed by: INTERNAL MEDICINE

## 2023-01-22 PROCEDURE — 85014 HEMATOCRIT: CPT | Performed by: INTERNAL MEDICINE

## 2023-01-22 PROCEDURE — 13000008 HB ANESTHESIA MAC OUTSIDE OR: Performed by: INTERNAL MEDICINE

## 2023-01-22 PROCEDURE — 80048 BASIC METABOLIC PNL TOTAL CA: CPT | Performed by: INTERNAL MEDICINE

## 2023-01-22 PROCEDURE — 10002800 HB RX 250 W HCPCS: Performed by: ANESTHESIOLOGY

## 2023-01-22 PROCEDURE — 97530 THERAPEUTIC ACTIVITIES: CPT

## 2023-01-22 PROCEDURE — 10004451 HB PACU RECOVERY 1ST 30 MINUTES: Performed by: INTERNAL MEDICINE

## 2023-01-22 PROCEDURE — 10002801 HB RX 250 W/O HCPCS: Performed by: ANESTHESIOLOGY

## 2023-01-22 PROCEDURE — 36415 COLL VENOUS BLD VENIPUNCTURE: CPT | Performed by: INTERNAL MEDICINE

## 2023-01-22 PROCEDURE — 13000001 HB PHASE II RECOVERY EA 30 MINUTES: Performed by: INTERNAL MEDICINE

## 2023-01-22 PROCEDURE — 10004651 HB RX, NO CHARGE ITEM: Performed by: INTERNAL MEDICINE

## 2023-01-22 PROCEDURE — C9113 INJ PANTOPRAZOLE SODIUM, VIA: HCPCS | Performed by: INTERNAL MEDICINE

## 2023-01-22 PROCEDURE — 10002803 HB RX 637: Performed by: INTERNAL MEDICINE

## 2023-01-22 PROCEDURE — 97161 PT EVAL LOW COMPLEX 20 MIN: CPT

## 2023-01-22 PROCEDURE — 10002807 HB RX 258: Performed by: INTERNAL MEDICINE

## 2023-01-22 RX ORDER — MIDODRINE HYDROCHLORIDE 5 MG/1
10 TABLET ORAL
Status: DISCONTINUED | OUTPATIENT
Start: 2023-01-22 | End: 2023-01-24

## 2023-01-22 RX ORDER — POTASSIUM CHLORIDE 20 MEQ/1
40 TABLET, EXTENDED RELEASE ORAL ONCE
Status: COMPLETED | OUTPATIENT
Start: 2023-01-22 | End: 2023-01-22

## 2023-01-22 RX ORDER — PROPOFOL 10 MG/ML
INJECTION, EMULSION INTRAVENOUS PRN
Status: DISCONTINUED | OUTPATIENT
Start: 2023-01-22 | End: 2023-01-22

## 2023-01-22 RX ORDER — SODIUM CHLORIDE 9 MG/ML
INJECTION, SOLUTION INTRAVENOUS CONTINUOUS PRN
Status: DISCONTINUED | OUTPATIENT
Start: 2023-01-22 | End: 2023-01-22

## 2023-01-22 RX ORDER — LIDOCAINE HYDROCHLORIDE 20 MG/ML
INJECTION, SOLUTION EPIDURAL; INFILTRATION; INTRACAUDAL; PERINEURAL PRN
Status: DISCONTINUED | OUTPATIENT
Start: 2023-01-22 | End: 2023-01-22

## 2023-01-22 RX ADMIN — LIDOCAINE HYDROCHLORIDE 30 MG: 20 INJECTION, SOLUTION EPIDURAL; INFILTRATION; INTRACAUDAL; PERINEURAL at 11:19

## 2023-01-22 RX ADMIN — SODIUM CHLORIDE, PRESERVATIVE FREE 2 ML: 5 INJECTION INTRAVENOUS at 09:10

## 2023-01-22 RX ADMIN — MEROPENEM 500 MG: 500 INJECTION INTRAVENOUS at 15:27

## 2023-01-22 RX ADMIN — PANTOPRAZOLE SODIUM 40 MG: 40 INJECTION, POWDER, FOR SOLUTION INTRAVENOUS at 09:09

## 2023-01-22 RX ADMIN — TRAMADOL HYDROCHLORIDE 50 MG: 50 TABLET, COATED ORAL at 01:59

## 2023-01-22 RX ADMIN — PROPOFOL 100 MCG/KG/MIN: 10 INJECTION, EMULSION INTRAVENOUS at 11:19

## 2023-01-22 RX ADMIN — MEROPENEM 500 MG: 500 INJECTION INTRAVENOUS at 02:52

## 2023-01-22 RX ADMIN — SODIUM CHLORIDE, PRESERVATIVE FREE 2 ML: 5 INJECTION INTRAVENOUS at 20:40

## 2023-01-22 RX ADMIN — SODIUM CHLORIDE, SODIUM LACTATE, POTASSIUM CHLORIDE, CALCIUM CHLORIDE AND DEXTROSE MONOHYDRATE: 5; 600; 310; 30; 20 INJECTION, SOLUTION INTRAVENOUS at 06:23

## 2023-01-22 RX ADMIN — COLLAGENASE SANTYL 1 G: 250 OINTMENT TOPICAL at 14:19

## 2023-01-22 RX ADMIN — LEVOTHYROXINE SODIUM 25 MCG: 0.03 TABLET ORAL at 06:20

## 2023-01-22 RX ADMIN — ARIPIPRAZOLE 2 MG: 2 TABLET ORAL at 14:15

## 2023-01-22 RX ADMIN — SODIUM CHLORIDE: 9 INJECTION, SOLUTION INTRAVENOUS at 11:15

## 2023-01-22 RX ADMIN — MIDODRINE HYDROCHLORIDE 10 MG: 5 TABLET ORAL at 15:22

## 2023-01-22 RX ADMIN — MIDODRINE HYDROCHLORIDE 5 MG: 5 TABLET ORAL at 06:26

## 2023-01-22 RX ADMIN — POTASSIUM CHLORIDE 40 MEQ: 1500 TABLET, EXTENDED RELEASE ORAL at 20:40

## 2023-01-22 RX ADMIN — VENLAFAXINE HYDROCHLORIDE 150 MG: 75 CAPSULE, EXTENDED RELEASE ORAL at 20:40

## 2023-01-22 RX ADMIN — FLUDROCORTISONE ACETATE 0.1 MG: 0.1 TABLET ORAL at 14:15

## 2023-01-22 RX ADMIN — MEROPENEM 500 MG: 500 INJECTION INTRAVENOUS at 09:13

## 2023-01-22 RX ADMIN — PROPOFOL 30 MG: 10 INJECTION, EMULSION INTRAVENOUS at 11:19

## 2023-01-22 RX ADMIN — PANTOPRAZOLE SODIUM 40 MG: 40 INJECTION, POWDER, FOR SOLUTION INTRAVENOUS at 20:40

## 2023-01-22 RX ADMIN — TRAMADOL HYDROCHLORIDE 50 MG: 50 TABLET, COATED ORAL at 22:54

## 2023-01-22 RX ADMIN — POTASSIUM CHLORIDE 40 MEQ: 1500 TABLET, EXTENDED RELEASE ORAL at 14:16

## 2023-01-22 RX ADMIN — MEROPENEM 500 MG: 500 INJECTION INTRAVENOUS at 20:55

## 2023-01-22 SDOH — SOCIAL STABILITY: SOCIAL INSECURITY: RISK FACTORS: BMI> 30 (OBESITY)

## 2023-01-22 SDOH — SOCIAL STABILITY: SOCIAL INSECURITY: RISK FACTORS: AGE

## 2023-01-22 ASSESSMENT — PAIN SCALES - GENERAL
PAINLEVEL_OUTOF10: 8
PAINLEVEL_OUTOF10: 0
PAINLEVEL_OUTOF10: 2
PAINLEVEL_OUTOF10: 3
PAINLEVEL_OUTOF10: 0
PAINLEVEL_OUTOF10: 0
PAINLEVEL_OUTOF10: 2
PAINLEVEL_OUTOF10: 0
PAINLEVEL_OUTOF10: 4

## 2023-01-22 ASSESSMENT — ACTIVITIES OF DAILY LIVING (ADL)
PRIOR_ADL_TOILETING: INDEPENDENT
PRIOR_ADL_BATHING: MINIMAL ASSIST (MIN)
PRIOR_ADL: MINIMAL ASSIST (MIN)
GROOMING: INDEPENDENT
EATING: INDEPENDENT

## 2023-01-22 ASSESSMENT — COGNITIVE AND FUNCTIONAL STATUS - GENERAL
HELP NEEDED FOR TOILETING: TOTAL
HELP NEEDED FOR BATHING: A LOT
DAILY_ACTIVITY_RAW_SCORE: 12
DAILY_ACTIVITY_CONVERTED_SCORE: 30.60
HELP NEEDED DRESSING REGULAR LOWER BODY CLOTHING: TOTAL
BASIC_MOBILITY_CONVERTED_SCORE: 35.55
HELP NEEDED DRESSING REGULAR UPPER BODY CLOTHING: A LOT
BASIC_MOBILITY_RAW_SCORE: 14
HELP NEEDED FOR PERSONAL GROOMING: A LITTLE

## 2023-01-22 ASSESSMENT — PAIN SCALES - WONG BAKER: WONGBAKER_NUMERICALRESPONSE: 0

## 2023-01-22 ASSESSMENT — ENCOUNTER SYMPTOMS: PAIN SEVERITY NOW: 0

## 2023-01-23 ENCOUNTER — APPOINTMENT (OUTPATIENT)
Dept: WOUND CARE | Age: 78
End: 2023-01-23
Attending: SPECIALIST

## 2023-01-23 LAB
ANION GAP SERPL CALC-SCNC: 6 MMOL/L (ref 7–19)
BASOPHILS # BLD: 0.1 K/MCL (ref 0–0.3)
BASOPHILS NFR BLD: 1 %
BUN SERPL-MCNC: 11 MG/DL (ref 6–20)
BUN/CREAT SERPL: 17 (ref 7–25)
CALCIUM SERPL-MCNC: 7.8 MG/DL (ref 8.4–10.2)
CHLORIDE SERPL-SCNC: 110 MMOL/L (ref 97–110)
CO2 SERPL-SCNC: 28 MMOL/L (ref 21–32)
CREAT SERPL-MCNC: 0.66 MG/DL (ref 0.51–0.95)
DEPRECATED RDW RBC: 51.8 FL (ref 39–50)
EOSINOPHIL # BLD: 0.2 K/MCL (ref 0–0.5)
EOSINOPHIL NFR BLD: 3 %
ERYTHROCYTE [DISTWIDTH] IN BLOOD: 13.4 % (ref 11–15)
FASTING DURATION TIME PATIENT: ABNORMAL H
GFR SERPLBLD BASED ON 1.73 SQ M-ARVRAT: >90 ML/MIN
GLUCOSE SERPL-MCNC: 113 MG/DL (ref 70–99)
HCT VFR BLD CALC: 28.3 % (ref 36–46.5)
HCT VFR BLD CALC: 28.7 % (ref 36–46.5)
HGB BLD-MCNC: 9 G/DL (ref 12–15.5)
HGB BLD-MCNC: 9.1 G/DL (ref 12–15.5)
IMM GRANULOCYTES # BLD AUTO: 0.1 K/MCL (ref 0–0.2)
IMM GRANULOCYTES # BLD: 2 %
LYMPHOCYTES # BLD: 1.3 K/MCL (ref 1–4)
LYMPHOCYTES NFR BLD: 19 %
MCH RBC QN AUTO: 34 PG (ref 26–34)
MCHC RBC AUTO-ENTMCNC: 31.8 G/DL (ref 32–36.5)
MCV RBC AUTO: 106.8 FL (ref 78–100)
MONOCYTES # BLD: 0.7 K/MCL (ref 0.3–0.9)
MONOCYTES NFR BLD: 10 %
NEUTROPHILS # BLD: 4.5 K/MCL (ref 1.8–7.7)
NEUTROPHILS NFR BLD: 65 %
NRBC BLD MANUAL-RTO: 0 /100 WBC
PLATELET # BLD AUTO: 351 K/MCL (ref 140–450)
POTASSIUM SERPL-SCNC: 4.1 MMOL/L (ref 3.4–5.1)
POTASSIUM SERPL-SCNC: 4.1 MMOL/L (ref 3.4–5.1)
RBC # BLD: 2.65 MIL/MCL (ref 4–5.2)
SODIUM SERPL-SCNC: 140 MMOL/L (ref 135–145)
WBC # BLD: 7 K/MCL (ref 4.2–11)

## 2023-01-23 PROCEDURE — 84132 ASSAY OF SERUM POTASSIUM: CPT | Performed by: INTERNAL MEDICINE

## 2023-01-23 PROCEDURE — 10002803 HB RX 637: Performed by: INTERNAL MEDICINE

## 2023-01-23 PROCEDURE — 10002800 HB RX 250 W HCPCS: Performed by: INTERNAL MEDICINE

## 2023-01-23 PROCEDURE — 10002801 HB RX 250 W/O HCPCS: Performed by: INTERNAL MEDICINE

## 2023-01-23 PROCEDURE — C9113 INJ PANTOPRAZOLE SODIUM, VIA: HCPCS | Performed by: INTERNAL MEDICINE

## 2023-01-23 PROCEDURE — 36415 COLL VENOUS BLD VENIPUNCTURE: CPT | Performed by: INTERNAL MEDICINE

## 2023-01-23 PROCEDURE — 10004651 HB RX, NO CHARGE ITEM: Performed by: INTERNAL MEDICINE

## 2023-01-23 PROCEDURE — 85014 HEMATOCRIT: CPT | Performed by: INTERNAL MEDICINE

## 2023-01-23 PROCEDURE — 99232 SBSQ HOSP IP/OBS MODERATE 35: CPT | Performed by: INTERNAL MEDICINE

## 2023-01-23 PROCEDURE — 80048 BASIC METABOLIC PNL TOTAL CA: CPT | Performed by: INTERNAL MEDICINE

## 2023-01-23 PROCEDURE — 10002807 HB RX 258: Performed by: INTERNAL MEDICINE

## 2023-01-23 PROCEDURE — 85025 COMPLETE CBC W/AUTO DIFF WBC: CPT | Performed by: INTERNAL MEDICINE

## 2023-01-23 PROCEDURE — 10006031 HB ROOM CHARGE TELEMETRY

## 2023-01-23 RX ORDER — PANTOPRAZOLE SODIUM 40 MG/1
40 TABLET, DELAYED RELEASE ORAL EVERY 12 HOURS SCHEDULED
Status: DISCONTINUED | OUTPATIENT
Start: 2023-01-23 | End: 2023-01-29 | Stop reason: HOSPADM

## 2023-01-23 RX ADMIN — VENLAFAXINE HYDROCHLORIDE 150 MG: 75 CAPSULE, EXTENDED RELEASE ORAL at 20:59

## 2023-01-23 RX ADMIN — ARIPIPRAZOLE 2 MG: 2 TABLET ORAL at 08:37

## 2023-01-23 RX ADMIN — FLUDROCORTISONE ACETATE 0.1 MG: 0.1 TABLET ORAL at 08:37

## 2023-01-23 RX ADMIN — COLLAGENASE SANTYL 1 G: 250 OINTMENT TOPICAL at 14:20

## 2023-01-23 RX ADMIN — MEROPENEM 500 MG: 500 INJECTION INTRAVENOUS at 08:35

## 2023-01-23 RX ADMIN — LEVOTHYROXINE SODIUM 25 MCG: 0.03 TABLET ORAL at 06:11

## 2023-01-23 RX ADMIN — TRAMADOL HYDROCHLORIDE 50 MG: 50 TABLET, COATED ORAL at 20:59

## 2023-01-23 RX ADMIN — MEROPENEM 500 MG: 500 INJECTION INTRAVENOUS at 21:08

## 2023-01-23 RX ADMIN — PANTOPRAZOLE SODIUM 40 MG: 40 TABLET, DELAYED RELEASE ORAL at 20:59

## 2023-01-23 RX ADMIN — MIDODRINE HYDROCHLORIDE 10 MG: 5 TABLET ORAL at 12:03

## 2023-01-23 RX ADMIN — MIDODRINE HYDROCHLORIDE 10 MG: 5 TABLET ORAL at 06:11

## 2023-01-23 RX ADMIN — SODIUM CHLORIDE, PRESERVATIVE FREE 2 ML: 5 INJECTION INTRAVENOUS at 08:37

## 2023-01-23 RX ADMIN — PANTOPRAZOLE SODIUM 40 MG: 40 INJECTION, POWDER, FOR SOLUTION INTRAVENOUS at 08:35

## 2023-01-23 RX ADMIN — MEROPENEM 500 MG: 500 INJECTION INTRAVENOUS at 03:03

## 2023-01-23 RX ADMIN — MEROPENEM 500 MG: 500 INJECTION INTRAVENOUS at 14:19

## 2023-01-23 RX ADMIN — MIDODRINE HYDROCHLORIDE 10 MG: 5 TABLET ORAL at 17:20

## 2023-01-23 RX ADMIN — SODIUM CHLORIDE, PRESERVATIVE FREE 2 ML: 5 INJECTION INTRAVENOUS at 20:59

## 2023-01-23 ASSESSMENT — PAIN SCALES - GENERAL
PAINLEVEL_OUTOF10: 0
PAINLEVEL_OUTOF10: 8

## 2023-01-24 LAB
AMMONIA PLAS-SCNC: 45 MCMOL/L
ANION GAP SERPL CALC-SCNC: 11 MMOL/L (ref 7–19)
ASR DISCLAIMER: NORMAL
BASOPHILS # BLD: 0.1 K/MCL (ref 0–0.3)
BASOPHILS NFR BLD: 1 %
BUN SERPL-MCNC: 11 MG/DL (ref 6–20)
BUN/CREAT SERPL: 22 (ref 7–25)
CALCIUM SERPL-MCNC: 8.2 MG/DL (ref 8.4–10.2)
CASE RPRT: NORMAL
CHLORIDE SERPL-SCNC: 107 MMOL/L (ref 97–110)
CLINICAL INFO: NORMAL
CO2 SERPL-SCNC: 25 MMOL/L (ref 21–32)
CREAT SERPL-MCNC: 0.51 MG/DL (ref 0.51–0.95)
DEPRECATED RDW RBC: 51.3 FL (ref 39–50)
EOSINOPHIL # BLD: 0.2 K/MCL (ref 0–0.5)
EOSINOPHIL NFR BLD: 3 %
ERYTHROCYTE [DISTWIDTH] IN BLOOD: 13.5 % (ref 11–15)
FASTING DURATION TIME PATIENT: ABNORMAL H
GFR SERPLBLD BASED ON 1.73 SQ M-ARVRAT: >90 ML/MIN
GLUCOSE SERPL-MCNC: 97 MG/DL (ref 70–99)
HCT VFR BLD CALC: 27 % (ref 36–46.5)
HCT VFR BLD CALC: 30.4 % (ref 36–46.5)
HGB BLD-MCNC: 8.7 G/DL (ref 12–15.5)
HGB BLD-MCNC: 9.7 G/DL (ref 12–15.5)
IMM GRANULOCYTES # BLD AUTO: 0.1 K/MCL (ref 0–0.2)
IMM GRANULOCYTES # BLD: 1 %
LYMPHOCYTES # BLD: 1.5 K/MCL (ref 1–4)
LYMPHOCYTES NFR BLD: 25 %
MCH RBC QN AUTO: 33.9 PG (ref 26–34)
MCHC RBC AUTO-ENTMCNC: 31.9 G/DL (ref 32–36.5)
MCV RBC AUTO: 106.3 FL (ref 78–100)
MONOCYTES # BLD: 0.6 K/MCL (ref 0.3–0.9)
MONOCYTES NFR BLD: 10 %
NEUTROPHILS # BLD: 3.7 K/MCL (ref 1.8–7.7)
NEUTROPHILS NFR BLD: 60 %
NRBC BLD MANUAL-RTO: 0 /100 WBC
PATH REPORT.FINAL DX SPEC: NORMAL
PATH REPORT.GROSS SPEC: NORMAL
PLATELET # BLD AUTO: 344 K/MCL (ref 140–450)
POTASSIUM SERPL-SCNC: 3.7 MMOL/L (ref 3.4–5.1)
RBC # BLD: 2.86 MIL/MCL (ref 4–5.2)
SODIUM SERPL-SCNC: 139 MMOL/L (ref 135–145)
WBC # BLD: 6.1 K/MCL (ref 4.2–11)

## 2023-01-24 PROCEDURE — 10002801 HB RX 250 W/O HCPCS: Performed by: INTERNAL MEDICINE

## 2023-01-24 PROCEDURE — 10002803 HB RX 637: Performed by: INTERNAL MEDICINE

## 2023-01-24 PROCEDURE — 36415 COLL VENOUS BLD VENIPUNCTURE: CPT | Performed by: INTERNAL MEDICINE

## 2023-01-24 PROCEDURE — 97110 THERAPEUTIC EXERCISES: CPT

## 2023-01-24 PROCEDURE — 85025 COMPLETE CBC W/AUTO DIFF WBC: CPT | Performed by: INTERNAL MEDICINE

## 2023-01-24 PROCEDURE — 10004281 HB COUNTER-STAFF TIME PER 15 MIN

## 2023-01-24 PROCEDURE — 99233 SBSQ HOSP IP/OBS HIGH 50: CPT | Performed by: FAMILY MEDICINE

## 2023-01-24 PROCEDURE — 10006031 HB ROOM CHARGE TELEMETRY

## 2023-01-24 PROCEDURE — 10004651 HB RX, NO CHARGE ITEM: Performed by: INTERNAL MEDICINE

## 2023-01-24 PROCEDURE — 80048 BASIC METABOLIC PNL TOTAL CA: CPT | Performed by: INTERNAL MEDICINE

## 2023-01-24 PROCEDURE — 10002800 HB RX 250 W HCPCS: Performed by: INTERNAL MEDICINE

## 2023-01-24 PROCEDURE — 97530 THERAPEUTIC ACTIVITIES: CPT

## 2023-01-24 PROCEDURE — 82140 ASSAY OF AMMONIA: CPT | Performed by: INTERNAL MEDICINE

## 2023-01-24 PROCEDURE — 10002807 HB RX 258: Performed by: INTERNAL MEDICINE

## 2023-01-24 PROCEDURE — 97535 SELF CARE MNGMENT TRAINING: CPT

## 2023-01-24 PROCEDURE — 10002803 HB RX 637: Performed by: FAMILY MEDICINE

## 2023-01-24 PROCEDURE — 85018 HEMOGLOBIN: CPT | Performed by: INTERNAL MEDICINE

## 2023-01-24 RX ORDER — MIDODRINE HYDROCHLORIDE 5 MG/1
15 TABLET ORAL
Status: DISCONTINUED | OUTPATIENT
Start: 2023-01-24 | End: 2023-01-29 | Stop reason: HOSPADM

## 2023-01-24 RX ADMIN — MIDODRINE HYDROCHLORIDE 10 MG: 5 TABLET ORAL at 12:18

## 2023-01-24 RX ADMIN — MIDODRINE HYDROCHLORIDE 10 MG: 5 TABLET ORAL at 06:23

## 2023-01-24 RX ADMIN — SODIUM CHLORIDE 25 ML: 9 INJECTION, SOLUTION INTRAVENOUS at 08:51

## 2023-01-24 RX ADMIN — PANTOPRAZOLE SODIUM 40 MG: 40 TABLET, DELAYED RELEASE ORAL at 21:08

## 2023-01-24 RX ADMIN — COLLAGENASE SANTYL: 250 OINTMENT TOPICAL at 15:00

## 2023-01-24 RX ADMIN — MEROPENEM 500 MG: 500 INJECTION INTRAVENOUS at 08:53

## 2023-01-24 RX ADMIN — SODIUM CHLORIDE, PRESERVATIVE FREE 2 ML: 5 INJECTION INTRAVENOUS at 08:48

## 2023-01-24 RX ADMIN — ARIPIPRAZOLE 2 MG: 2 TABLET ORAL at 08:53

## 2023-01-24 RX ADMIN — PANTOPRAZOLE SODIUM 40 MG: 40 TABLET, DELAYED RELEASE ORAL at 08:53

## 2023-01-24 RX ADMIN — MEROPENEM 500 MG: 500 INJECTION INTRAVENOUS at 03:10

## 2023-01-24 RX ADMIN — TRAMADOL HYDROCHLORIDE 50 MG: 50 TABLET, COATED ORAL at 21:08

## 2023-01-24 RX ADMIN — SODIUM CHLORIDE 25 ML: 9 INJECTION, SOLUTION INTRAVENOUS at 15:38

## 2023-01-24 RX ADMIN — FLUDROCORTISONE ACETATE 0.1 MG: 0.1 TABLET ORAL at 08:54

## 2023-01-24 RX ADMIN — MEROPENEM 500 MG: 500 INJECTION INTRAVENOUS at 21:16

## 2023-01-24 RX ADMIN — MEROPENEM 500 MG: 500 INJECTION INTRAVENOUS at 15:39

## 2023-01-24 RX ADMIN — SODIUM CHLORIDE, PRESERVATIVE FREE 2 ML: 5 INJECTION INTRAVENOUS at 21:19

## 2023-01-24 RX ADMIN — MIDODRINE HYDROCHLORIDE 15 MG: 5 TABLET ORAL at 15:31

## 2023-01-24 RX ADMIN — VENLAFAXINE HYDROCHLORIDE 150 MG: 75 CAPSULE, EXTENDED RELEASE ORAL at 21:08

## 2023-01-24 RX ADMIN — LEVOTHYROXINE SODIUM 25 MCG: 0.03 TABLET ORAL at 06:23

## 2023-01-24 ASSESSMENT — COGNITIVE AND FUNCTIONAL STATUS - GENERAL
HELP NEEDED DRESSING REGULAR UPPER BODY CLOTHING: A LOT
DAILY_ACTIVITY_CONVERTED_SCORE: 30.60
HELP NEEDED FOR BATHING: A LOT
DAILY_ACTIVITY_RAW_SCORE: 12
BASIC_MOBILITY_CONVERTED_SCORE: 38.32
HELP NEEDED FOR PERSONAL GROOMING: A LITTLE
HELP NEEDED DRESSING REGULAR LOWER BODY CLOTHING: TOTAL
HELP NEEDED FOR TOILETING: TOTAL
BASIC_MOBILITY_RAW_SCORE: 16

## 2023-01-24 ASSESSMENT — PAIN SCALES - GENERAL
PAINLEVEL_OUTOF10: 0
PAINLEVEL_OUTOF10: 8

## 2023-01-24 ASSESSMENT — ACTIVITIES OF DAILY LIVING (ADL): HOME_MANAGEMENT_TIME_ENTRY: 24

## 2023-01-24 ASSESSMENT — ENCOUNTER SYMPTOMS: PAIN SEVERITY NOW: 0

## 2023-01-25 ENCOUNTER — APPOINTMENT (OUTPATIENT)
Dept: HEMATOLOGY/ONCOLOGY | Age: 78
End: 2023-01-25
Attending: SURGERY

## 2023-01-25 ENCOUNTER — APPOINTMENT (OUTPATIENT)
Dept: RADIATION ONCOLOGY | Age: 78
End: 2023-01-25
Attending: SURGERY

## 2023-01-25 LAB
HCT VFR BLD CALC: 29 % (ref 36–46.5)
HCT VFR BLD CALC: 29.1 % (ref 36–46.5)
HGB BLD-MCNC: 9.1 G/DL (ref 12–15.5)
HGB BLD-MCNC: 9.2 G/DL (ref 12–15.5)
RAINBOW EXTRA TUBES HOLD SPECIMEN: NORMAL

## 2023-01-25 PROCEDURE — 36415 COLL VENOUS BLD VENIPUNCTURE: CPT | Performed by: INTERNAL MEDICINE

## 2023-01-25 PROCEDURE — 10002803 HB RX 637: Performed by: FAMILY MEDICINE

## 2023-01-25 PROCEDURE — 85018 HEMOGLOBIN: CPT | Performed by: INTERNAL MEDICINE

## 2023-01-25 PROCEDURE — 10004651 HB RX, NO CHARGE ITEM: Performed by: INTERNAL MEDICINE

## 2023-01-25 PROCEDURE — 99233 SBSQ HOSP IP/OBS HIGH 50: CPT | Performed by: FAMILY MEDICINE

## 2023-01-25 PROCEDURE — 10006031 HB ROOM CHARGE TELEMETRY

## 2023-01-25 PROCEDURE — 10002807 HB RX 258: Performed by: INTERNAL MEDICINE

## 2023-01-25 PROCEDURE — 10002803 HB RX 637: Performed by: INTERNAL MEDICINE

## 2023-01-25 PROCEDURE — 13003291 HB INS MIDLINE W/GUIDE INCL CATH

## 2023-01-25 PROCEDURE — 10002801 HB RX 250 W/O HCPCS: Performed by: INTERNAL MEDICINE

## 2023-01-25 PROCEDURE — 10004281 HB COUNTER-STAFF TIME PER 15 MIN

## 2023-01-25 PROCEDURE — 10002800 HB RX 250 W HCPCS: Performed by: INTERNAL MEDICINE

## 2023-01-25 RX ORDER — 0.9 % SODIUM CHLORIDE 0.9 %
10 VIAL (ML) INJECTION EVERY 12 HOURS SCHEDULED
Status: DISCONTINUED | OUTPATIENT
Start: 2023-01-25 | End: 2023-01-29 | Stop reason: HOSPADM

## 2023-01-25 RX ORDER — 0.9 % SODIUM CHLORIDE 0.9 %
10 VIAL (ML) INJECTION PRN
Status: DISCONTINUED | OUTPATIENT
Start: 2023-01-25 | End: 2023-01-29 | Stop reason: HOSPADM

## 2023-01-25 RX ADMIN — PANTOPRAZOLE SODIUM 40 MG: 40 TABLET, DELAYED RELEASE ORAL at 09:18

## 2023-01-25 RX ADMIN — MIDODRINE HYDROCHLORIDE 15 MG: 5 TABLET ORAL at 15:45

## 2023-01-25 RX ADMIN — MEROPENEM 500 MG: 500 INJECTION INTRAVENOUS at 15:50

## 2023-01-25 RX ADMIN — LEVOTHYROXINE SODIUM 25 MCG: 0.03 TABLET ORAL at 06:16

## 2023-01-25 RX ADMIN — SODIUM CHLORIDE, PRESERVATIVE FREE 2 ML: 5 INJECTION INTRAVENOUS at 20:24

## 2023-01-25 RX ADMIN — MEROPENEM 500 MG: 500 INJECTION INTRAVENOUS at 03:13

## 2023-01-25 RX ADMIN — TRAMADOL HYDROCHLORIDE 50 MG: 50 TABLET, COATED ORAL at 20:28

## 2023-01-25 RX ADMIN — MIDODRINE HYDROCHLORIDE 15 MG: 5 TABLET ORAL at 06:16

## 2023-01-25 RX ADMIN — PANTOPRAZOLE SODIUM 40 MG: 40 TABLET, DELAYED RELEASE ORAL at 20:23

## 2023-01-25 RX ADMIN — VENLAFAXINE HYDROCHLORIDE 150 MG: 75 CAPSULE, EXTENDED RELEASE ORAL at 20:23

## 2023-01-25 RX ADMIN — SODIUM CHLORIDE, PRESERVATIVE FREE 10 ML: 5 INJECTION INTRAVENOUS at 20:24

## 2023-01-25 RX ADMIN — MIDODRINE HYDROCHLORIDE 15 MG: 5 TABLET ORAL at 10:50

## 2023-01-25 RX ADMIN — ARIPIPRAZOLE 2 MG: 2 TABLET ORAL at 09:18

## 2023-01-25 RX ADMIN — MEROPENEM 500 MG: 500 INJECTION INTRAVENOUS at 20:28

## 2023-01-25 RX ADMIN — SODIUM CHLORIDE, PRESERVATIVE FREE 2 ML: 5 INJECTION INTRAVENOUS at 09:31

## 2023-01-25 RX ADMIN — FLUDROCORTISONE ACETATE 0.1 MG: 0.1 TABLET ORAL at 09:18

## 2023-01-25 RX ADMIN — MEROPENEM 500 MG: 500 INJECTION INTRAVENOUS at 09:18

## 2023-01-25 RX ADMIN — COLLAGENASE SANTYL 1 G: 250 OINTMENT TOPICAL at 14:01

## 2023-01-25 ASSESSMENT — PAIN SCALES - GENERAL
PAINLEVEL_OUTOF10: 0
PAINLEVEL_OUTOF10: 6
PAINLEVEL_OUTOF10: 8

## 2023-01-26 ENCOUNTER — APPOINTMENT (OUTPATIENT)
Dept: HEMATOLOGY/ONCOLOGY | Age: 78
End: 2023-01-26
Attending: SURGERY

## 2023-01-26 LAB
HCT VFR BLD CALC: 27.4 % (ref 36–46.5)
HCT VFR BLD CALC: 27.7 % (ref 36–46.5)
HGB BLD-MCNC: 8.5 G/DL (ref 12–15.5)
HGB BLD-MCNC: 8.8 G/DL (ref 12–15.5)

## 2023-01-26 PROCEDURE — 97110 THERAPEUTIC EXERCISES: CPT

## 2023-01-26 PROCEDURE — 10002807 HB RX 258: Performed by: INTERNAL MEDICINE

## 2023-01-26 PROCEDURE — 10004651 HB RX, NO CHARGE ITEM: Performed by: INTERNAL MEDICINE

## 2023-01-26 PROCEDURE — 99233 SBSQ HOSP IP/OBS HIGH 50: CPT | Performed by: FAMILY MEDICINE

## 2023-01-26 PROCEDURE — 97530 THERAPEUTIC ACTIVITIES: CPT

## 2023-01-26 PROCEDURE — 36415 COLL VENOUS BLD VENIPUNCTURE: CPT | Performed by: INTERNAL MEDICINE

## 2023-01-26 PROCEDURE — 10002801 HB RX 250 W/O HCPCS: Performed by: INTERNAL MEDICINE

## 2023-01-26 PROCEDURE — 10006031 HB ROOM CHARGE TELEMETRY

## 2023-01-26 PROCEDURE — 85014 HEMATOCRIT: CPT | Performed by: INTERNAL MEDICINE

## 2023-01-26 PROCEDURE — 10002803 HB RX 637: Performed by: FAMILY MEDICINE

## 2023-01-26 PROCEDURE — 10002800 HB RX 250 W HCPCS: Performed by: INTERNAL MEDICINE

## 2023-01-26 PROCEDURE — 10002803 HB RX 637: Performed by: INTERNAL MEDICINE

## 2023-01-26 RX ORDER — FLUDROCORTISONE ACETATE 0.1 MG/1
0.2 TABLET ORAL DAILY
Status: DISCONTINUED | OUTPATIENT
Start: 2023-01-27 | End: 2023-01-29 | Stop reason: HOSPADM

## 2023-01-26 RX ADMIN — SODIUM CHLORIDE, PRESERVATIVE FREE 2 ML: 5 INJECTION INTRAVENOUS at 09:55

## 2023-01-26 RX ADMIN — MEROPENEM 500 MG: 500 INJECTION INTRAVENOUS at 22:05

## 2023-01-26 RX ADMIN — MEROPENEM 500 MG: 500 INJECTION INTRAVENOUS at 16:25

## 2023-01-26 RX ADMIN — FLUDROCORTISONE ACETATE 0.1 MG: 0.1 TABLET ORAL at 09:54

## 2023-01-26 RX ADMIN — LEVOTHYROXINE SODIUM 25 MCG: 0.03 TABLET ORAL at 06:17

## 2023-01-26 RX ADMIN — ARIPIPRAZOLE 2 MG: 2 TABLET ORAL at 09:54

## 2023-01-26 RX ADMIN — PANTOPRAZOLE SODIUM 40 MG: 40 TABLET, DELAYED RELEASE ORAL at 22:06

## 2023-01-26 RX ADMIN — VENLAFAXINE HYDROCHLORIDE 150 MG: 75 CAPSULE, EXTENDED RELEASE ORAL at 22:06

## 2023-01-26 RX ADMIN — TRAMADOL HYDROCHLORIDE 50 MG: 50 TABLET, COATED ORAL at 22:05

## 2023-01-26 RX ADMIN — MIDODRINE HYDROCHLORIDE 15 MG: 5 TABLET ORAL at 10:34

## 2023-01-26 RX ADMIN — PANTOPRAZOLE SODIUM 40 MG: 40 TABLET, DELAYED RELEASE ORAL at 09:55

## 2023-01-26 RX ADMIN — SODIUM CHLORIDE, PRESERVATIVE FREE 10 ML: 5 INJECTION INTRAVENOUS at 09:55

## 2023-01-26 RX ADMIN — SODIUM CHLORIDE, PRESERVATIVE FREE 2 ML: 5 INJECTION INTRAVENOUS at 22:06

## 2023-01-26 RX ADMIN — MIDODRINE HYDROCHLORIDE 15 MG: 5 TABLET ORAL at 16:28

## 2023-01-26 RX ADMIN — SODIUM CHLORIDE, PRESERVATIVE FREE 10 ML: 5 INJECTION INTRAVENOUS at 22:07

## 2023-01-26 RX ADMIN — MEROPENEM 500 MG: 500 INJECTION INTRAVENOUS at 02:28

## 2023-01-26 RX ADMIN — SENNOSIDES AND DOCUSATE SODIUM 2 TABLET: 50; 8.6 TABLET ORAL at 06:24

## 2023-01-26 RX ADMIN — MIDODRINE HYDROCHLORIDE 15 MG: 5 TABLET ORAL at 06:18

## 2023-01-26 RX ADMIN — MEROPENEM 500 MG: 500 INJECTION INTRAVENOUS at 09:58

## 2023-01-26 RX ADMIN — COLLAGENASE SANTYL 1 G: 250 OINTMENT TOPICAL at 09:55

## 2023-01-26 ASSESSMENT — PAIN SCALES - GENERAL
PAINLEVEL_OUTOF10: 3
PAINLEVEL_OUTOF10: 8

## 2023-01-26 ASSESSMENT — COGNITIVE AND FUNCTIONAL STATUS - GENERAL
BASIC_MOBILITY_CONVERTED_SCORE: 36.97
BASIC_MOBILITY_RAW_SCORE: 15

## 2023-01-27 LAB
ALBUMIN SERPL-MCNC: 2.3 G/DL (ref 3.6–5.1)
ALBUMIN/GLOB SERPL: 0.7 {RATIO} (ref 1–2.4)
ALP SERPL-CCNC: 50 UNITS/L (ref 45–117)
ALT SERPL-CCNC: 16 UNITS/L
ANION GAP SERPL CALC-SCNC: 9 MMOL/L (ref 7–19)
AST SERPL-CCNC: 16 UNITS/L
BILIRUB SERPL-MCNC: 0.2 MG/DL (ref 0.2–1)
BUN SERPL-MCNC: 17 MG/DL (ref 6–20)
BUN/CREAT SERPL: 33 (ref 7–25)
CALCIUM SERPL-MCNC: 7.9 MG/DL (ref 8.4–10.2)
CHLORIDE SERPL-SCNC: 106 MMOL/L (ref 97–110)
CO2 SERPL-SCNC: 28 MMOL/L (ref 21–32)
CREAT SERPL-MCNC: 0.51 MG/DL (ref 0.51–0.95)
DEPRECATED RDW RBC: 50.8 FL (ref 39–50)
ERYTHROCYTE [DISTWIDTH] IN BLOOD: 13.4 % (ref 11–15)
FASTING DURATION TIME PATIENT: ABNORMAL H
GFR SERPLBLD BASED ON 1.73 SQ M-ARVRAT: >90 ML/MIN
GLOBULIN SER-MCNC: 3.4 G/DL (ref 2–4)
GLUCOSE SERPL-MCNC: 97 MG/DL (ref 70–99)
HCT VFR BLD CALC: 26.8 % (ref 36–46.5)
HCT VFR BLD CALC: 28 % (ref 36–46.5)
HGB BLD-MCNC: 8.7 G/DL (ref 12–15.5)
HGB BLD-MCNC: 8.8 G/DL (ref 12–15.5)
MCH RBC QN AUTO: 33.1 PG (ref 26–34)
MCHC RBC AUTO-ENTMCNC: 31.4 G/DL (ref 32–36.5)
MCV RBC AUTO: 105.3 FL (ref 78–100)
NRBC BLD MANUAL-RTO: 0 /100 WBC
PLATELET # BLD AUTO: 338 K/MCL (ref 140–450)
POTASSIUM SERPL-SCNC: 3.2 MMOL/L (ref 3.4–5.1)
POTASSIUM SERPL-SCNC: 3.7 MMOL/L (ref 3.4–5.1)
PROT SERPL-MCNC: 5.7 G/DL (ref 6.4–8.2)
RBC # BLD: 2.66 MIL/MCL (ref 4–5.2)
SODIUM SERPL-SCNC: 140 MMOL/L (ref 135–145)
WBC # BLD: 7.4 K/MCL (ref 4.2–11)

## 2023-01-27 PROCEDURE — 10006031 HB ROOM CHARGE TELEMETRY

## 2023-01-27 PROCEDURE — 85027 COMPLETE CBC AUTOMATED: CPT | Performed by: FAMILY MEDICINE

## 2023-01-27 PROCEDURE — 10004651 HB RX, NO CHARGE ITEM: Performed by: INTERNAL MEDICINE

## 2023-01-27 PROCEDURE — 84132 ASSAY OF SERUM POTASSIUM: CPT | Performed by: FAMILY MEDICINE

## 2023-01-27 PROCEDURE — 10002807 HB RX 258: Performed by: INTERNAL MEDICINE

## 2023-01-27 PROCEDURE — 36415 COLL VENOUS BLD VENIPUNCTURE: CPT | Performed by: FAMILY MEDICINE

## 2023-01-27 PROCEDURE — 99233 SBSQ HOSP IP/OBS HIGH 50: CPT | Performed by: FAMILY MEDICINE

## 2023-01-27 PROCEDURE — 10002801 HB RX 250 W/O HCPCS: Performed by: INTERNAL MEDICINE

## 2023-01-27 PROCEDURE — 10002800 HB RX 250 W HCPCS: Performed by: INTERNAL MEDICINE

## 2023-01-27 PROCEDURE — 10002803 HB RX 637: Performed by: INTERNAL MEDICINE

## 2023-01-27 PROCEDURE — 85018 HEMOGLOBIN: CPT | Performed by: INTERNAL MEDICINE

## 2023-01-27 PROCEDURE — 10002803 HB RX 637: Performed by: FAMILY MEDICINE

## 2023-01-27 PROCEDURE — 80053 COMPREHEN METABOLIC PANEL: CPT | Performed by: FAMILY MEDICINE

## 2023-01-27 RX ORDER — POTASSIUM CHLORIDE 20 MEQ/1
40 TABLET, EXTENDED RELEASE ORAL ONCE
Status: COMPLETED | OUTPATIENT
Start: 2023-01-27 | End: 2023-01-27

## 2023-01-27 RX ADMIN — VENLAFAXINE HYDROCHLORIDE 150 MG: 75 CAPSULE, EXTENDED RELEASE ORAL at 20:27

## 2023-01-27 RX ADMIN — MIDODRINE HYDROCHLORIDE 15 MG: 5 TABLET ORAL at 12:19

## 2023-01-27 RX ADMIN — MEROPENEM 500 MG: 500 INJECTION INTRAVENOUS at 20:42

## 2023-01-27 RX ADMIN — FLUDROCORTISONE ACETATE 0.2 MG: 0.1 TABLET ORAL at 09:15

## 2023-01-27 RX ADMIN — POTASSIUM CHLORIDE 40 MEQ: 1500 TABLET, EXTENDED RELEASE ORAL at 09:14

## 2023-01-27 RX ADMIN — SODIUM CHLORIDE, PRESERVATIVE FREE 10 ML: 5 INJECTION INTRAVENOUS at 20:30

## 2023-01-27 RX ADMIN — TRAMADOL HYDROCHLORIDE 50 MG: 50 TABLET, COATED ORAL at 20:28

## 2023-01-27 RX ADMIN — MEROPENEM 500 MG: 500 INJECTION INTRAVENOUS at 09:22

## 2023-01-27 RX ADMIN — SODIUM CHLORIDE, PRESERVATIVE FREE 2 ML: 5 INJECTION INTRAVENOUS at 20:29

## 2023-01-27 RX ADMIN — PANTOPRAZOLE SODIUM 40 MG: 40 TABLET, DELAYED RELEASE ORAL at 20:28

## 2023-01-27 RX ADMIN — MEROPENEM 500 MG: 500 INJECTION INTRAVENOUS at 15:11

## 2023-01-27 RX ADMIN — MEROPENEM 500 MG: 500 INJECTION INTRAVENOUS at 04:03

## 2023-01-27 RX ADMIN — SODIUM CHLORIDE, PRESERVATIVE FREE 10 ML: 5 INJECTION INTRAVENOUS at 09:15

## 2023-01-27 RX ADMIN — PANTOPRAZOLE SODIUM 40 MG: 40 TABLET, DELAYED RELEASE ORAL at 09:15

## 2023-01-27 RX ADMIN — COLLAGENASE SANTYL 1 G: 250 OINTMENT TOPICAL at 09:15

## 2023-01-27 RX ADMIN — LEVOTHYROXINE SODIUM 25 MCG: 0.03 TABLET ORAL at 06:18

## 2023-01-27 RX ADMIN — SODIUM CHLORIDE, PRESERVATIVE FREE 2 ML: 5 INJECTION INTRAVENOUS at 09:15

## 2023-01-27 RX ADMIN — ARIPIPRAZOLE 2 MG: 2 TABLET ORAL at 09:15

## 2023-01-27 ASSESSMENT — PAIN SCALES - GENERAL
PAINLEVEL_OUTOF10: 5
PAINLEVEL_OUTOF10: 0
PAINLEVEL_OUTOF10: 7
PAINLEVEL_OUTOF10: 0

## 2023-01-28 LAB
ALBUMIN SERPL-MCNC: 2.3 G/DL (ref 3.6–5.1)
ALBUMIN/GLOB SERPL: 0.6 {RATIO} (ref 1–2.4)
ALP SERPL-CCNC: 58 UNITS/L (ref 45–117)
ALT SERPL-CCNC: 18 UNITS/L
ANION GAP SERPL CALC-SCNC: 9 MMOL/L (ref 7–19)
AST SERPL-CCNC: 18 UNITS/L
BILIRUB SERPL-MCNC: 0.2 MG/DL (ref 0.2–1)
BUN SERPL-MCNC: 15 MG/DL (ref 6–20)
BUN/CREAT SERPL: 31 (ref 7–25)
CALCIUM SERPL-MCNC: 7.8 MG/DL (ref 8.4–10.2)
CHLORIDE SERPL-SCNC: 106 MMOL/L (ref 97–110)
CO2 SERPL-SCNC: 27 MMOL/L (ref 21–32)
CREAT SERPL-MCNC: 0.49 MG/DL (ref 0.51–0.95)
DEPRECATED RDW RBC: 49.9 FL (ref 39–50)
ERYTHROCYTE [DISTWIDTH] IN BLOOD: 13.3 % (ref 11–15)
FASTING DURATION TIME PATIENT: ABNORMAL H
GFR SERPLBLD BASED ON 1.73 SQ M-ARVRAT: >90 ML/MIN
GLOBULIN SER-MCNC: 3.8 G/DL (ref 2–4)
GLUCOSE SERPL-MCNC: 104 MG/DL (ref 70–99)
HCT VFR BLD CALC: 28 % (ref 36–46.5)
HCT VFR BLD CALC: 28.9 % (ref 36–46.5)
HGB BLD-MCNC: 9 G/DL (ref 12–15.5)
HGB BLD-MCNC: 9.1 G/DL (ref 12–15.5)
MCH RBC QN AUTO: 33 PG (ref 26–34)
MCHC RBC AUTO-ENTMCNC: 32.1 G/DL (ref 32–36.5)
MCV RBC AUTO: 102.6 FL (ref 78–100)
NRBC BLD MANUAL-RTO: 0 /100 WBC
PLATELET # BLD AUTO: 344 K/MCL (ref 140–450)
POTASSIUM SERPL-SCNC: 3.3 MMOL/L (ref 3.4–5.1)
POTASSIUM SERPL-SCNC: 3.6 MMOL/L (ref 3.4–5.1)
PROT SERPL-MCNC: 6.1 G/DL (ref 6.4–8.2)
RBC # BLD: 2.73 MIL/MCL (ref 4–5.2)
SODIUM SERPL-SCNC: 139 MMOL/L (ref 135–145)
WBC # BLD: 6.9 K/MCL (ref 4.2–11)

## 2023-01-28 PROCEDURE — 10002803 HB RX 637: Performed by: INTERNAL MEDICINE

## 2023-01-28 PROCEDURE — 10004651 HB RX, NO CHARGE ITEM: Performed by: INTERNAL MEDICINE

## 2023-01-28 PROCEDURE — 85014 HEMATOCRIT: CPT | Performed by: INTERNAL MEDICINE

## 2023-01-28 PROCEDURE — 85027 COMPLETE CBC AUTOMATED: CPT | Performed by: FAMILY MEDICINE

## 2023-01-28 PROCEDURE — 80053 COMPREHEN METABOLIC PANEL: CPT | Performed by: FAMILY MEDICINE

## 2023-01-28 PROCEDURE — 10002800 HB RX 250 W HCPCS: Performed by: INTERNAL MEDICINE

## 2023-01-28 PROCEDURE — 10002803 HB RX 637: Performed by: FAMILY MEDICINE

## 2023-01-28 PROCEDURE — 10002801 HB RX 250 W/O HCPCS: Performed by: INTERNAL MEDICINE

## 2023-01-28 PROCEDURE — 36415 COLL VENOUS BLD VENIPUNCTURE: CPT | Performed by: FAMILY MEDICINE

## 2023-01-28 PROCEDURE — 99233 SBSQ HOSP IP/OBS HIGH 50: CPT | Performed by: FAMILY MEDICINE

## 2023-01-28 PROCEDURE — 10006031 HB ROOM CHARGE TELEMETRY

## 2023-01-28 PROCEDURE — 10002807 HB RX 258: Performed by: INTERNAL MEDICINE

## 2023-01-28 PROCEDURE — 84132 ASSAY OF SERUM POTASSIUM: CPT | Performed by: FAMILY MEDICINE

## 2023-01-28 RX ORDER — POTASSIUM CHLORIDE 20 MEQ/1
40 TABLET, EXTENDED RELEASE ORAL ONCE
Status: COMPLETED | OUTPATIENT
Start: 2023-01-28 | End: 2023-01-28

## 2023-01-28 RX ADMIN — SODIUM CHLORIDE, PRESERVATIVE FREE 2 ML: 5 INJECTION INTRAVENOUS at 20:43

## 2023-01-28 RX ADMIN — POTASSIUM CHLORIDE 40 MEQ: 1500 TABLET, EXTENDED RELEASE ORAL at 09:05

## 2023-01-28 RX ADMIN — FLUDROCORTISONE ACETATE 0.2 MG: 0.1 TABLET ORAL at 09:05

## 2023-01-28 RX ADMIN — SODIUM CHLORIDE, PRESERVATIVE FREE 10 ML: 5 INJECTION INTRAVENOUS at 09:07

## 2023-01-28 RX ADMIN — SODIUM CHLORIDE, PRESERVATIVE FREE 10 ML: 5 INJECTION INTRAVENOUS at 20:42

## 2023-01-28 RX ADMIN — TRAMADOL HYDROCHLORIDE 50 MG: 50 TABLET, COATED ORAL at 20:44

## 2023-01-28 RX ADMIN — COLLAGENASE SANTYL 1 G: 250 OINTMENT TOPICAL at 09:07

## 2023-01-28 RX ADMIN — LEVOTHYROXINE SODIUM 25 MCG: 0.03 TABLET ORAL at 05:27

## 2023-01-28 RX ADMIN — VENLAFAXINE HYDROCHLORIDE 150 MG: 75 CAPSULE, EXTENDED RELEASE ORAL at 20:43

## 2023-01-28 RX ADMIN — MIDODRINE HYDROCHLORIDE 15 MG: 5 TABLET ORAL at 15:03

## 2023-01-28 RX ADMIN — PANTOPRAZOLE SODIUM 40 MG: 40 TABLET, DELAYED RELEASE ORAL at 20:43

## 2023-01-28 RX ADMIN — PANTOPRAZOLE SODIUM 40 MG: 40 TABLET, DELAYED RELEASE ORAL at 09:05

## 2023-01-28 RX ADMIN — ARIPIPRAZOLE 2 MG: 2 TABLET ORAL at 09:05

## 2023-01-28 RX ADMIN — SODIUM CHLORIDE, PRESERVATIVE FREE 2 ML: 5 INJECTION INTRAVENOUS at 09:07

## 2023-01-28 RX ADMIN — MEROPENEM 500 MG: 500 INJECTION INTRAVENOUS at 15:03

## 2023-01-28 RX ADMIN — MEROPENEM 500 MG: 500 INJECTION INTRAVENOUS at 20:46

## 2023-01-28 RX ADMIN — SODIUM CHLORIDE 25 ML: 9 INJECTION, SOLUTION INTRAVENOUS at 15:02

## 2023-01-28 RX ADMIN — MEROPENEM 500 MG: 500 INJECTION INTRAVENOUS at 03:06

## 2023-01-28 RX ADMIN — MEROPENEM 500 MG: 500 INJECTION INTRAVENOUS at 09:04

## 2023-01-28 ASSESSMENT — PAIN SCALES - GENERAL
PAINLEVEL_OUTOF10: 0
PAINLEVEL_OUTOF10: 6

## 2023-01-29 VITALS
OXYGEN SATURATION: 98 % | RESPIRATION RATE: 16 BRPM | DIASTOLIC BLOOD PRESSURE: 66 MMHG | WEIGHT: 165.79 LBS | TEMPERATURE: 98 F | HEART RATE: 90 BPM | SYSTOLIC BLOOD PRESSURE: 113 MMHG | HEIGHT: 62 IN | BODY MASS INDEX: 30.51 KG/M2

## 2023-01-29 LAB
ALBUMIN SERPL-MCNC: 2.3 G/DL (ref 3.6–5.1)
ALBUMIN/GLOB SERPL: 0.7 {RATIO} (ref 1–2.4)
ALP SERPL-CCNC: 51 UNITS/L (ref 45–117)
ALT SERPL-CCNC: 16 UNITS/L
ANION GAP SERPL CALC-SCNC: 7 MMOL/L (ref 7–19)
AST SERPL-CCNC: 15 UNITS/L
BILIRUB SERPL-MCNC: 0.3 MG/DL (ref 0.2–1)
BUN SERPL-MCNC: 12 MG/DL (ref 6–20)
BUN/CREAT SERPL: 27 (ref 7–25)
CALCIUM SERPL-MCNC: 8.1 MG/DL (ref 8.4–10.2)
CHLORIDE SERPL-SCNC: 108 MMOL/L (ref 97–110)
CO2 SERPL-SCNC: 27 MMOL/L (ref 21–32)
CREAT SERPL-MCNC: 0.44 MG/DL (ref 0.51–0.95)
DEPRECATED RDW RBC: 49.6 FL (ref 39–50)
ERYTHROCYTE [DISTWIDTH] IN BLOOD: 13.2 % (ref 11–15)
FASTING DURATION TIME PATIENT: ABNORMAL H
GFR SERPLBLD BASED ON 1.73 SQ M-ARVRAT: >90 ML/MIN
GLOBULIN SER-MCNC: 3.5 G/DL (ref 2–4)
GLUCOSE SERPL-MCNC: 94 MG/DL (ref 70–99)
HCT VFR BLD CALC: 27.8 % (ref 36–46.5)
HGB BLD-MCNC: 8.8 G/DL (ref 12–15.5)
MCH RBC QN AUTO: 32.6 PG (ref 26–34)
MCHC RBC AUTO-ENTMCNC: 31.7 G/DL (ref 32–36.5)
MCV RBC AUTO: 103 FL (ref 78–100)
NRBC BLD MANUAL-RTO: 0 /100 WBC
PLATELET # BLD AUTO: 355 K/MCL (ref 140–450)
POTASSIUM SERPL-SCNC: 3.7 MMOL/L (ref 3.4–5.1)
PROT SERPL-MCNC: 5.8 G/DL (ref 6.4–8.2)
RBC # BLD: 2.7 MIL/MCL (ref 4–5.2)
SARS-COV-2 RNA RESP QL NAA+PROBE: NOT DETECTED
SERVICE CMNT-IMP: NORMAL
SERVICE CMNT-IMP: NORMAL
SODIUM SERPL-SCNC: 138 MMOL/L (ref 135–145)
WBC # BLD: 5.6 K/MCL (ref 4.2–11)

## 2023-01-29 PROCEDURE — 10004651 HB RX, NO CHARGE ITEM: Performed by: INTERNAL MEDICINE

## 2023-01-29 PROCEDURE — 85027 COMPLETE CBC AUTOMATED: CPT | Performed by: FAMILY MEDICINE

## 2023-01-29 PROCEDURE — 36415 COLL VENOUS BLD VENIPUNCTURE: CPT | Performed by: FAMILY MEDICINE

## 2023-01-29 PROCEDURE — 10002803 HB RX 637: Performed by: FAMILY MEDICINE

## 2023-01-29 PROCEDURE — 10002803 HB RX 637: Performed by: INTERNAL MEDICINE

## 2023-01-29 PROCEDURE — 80053 COMPREHEN METABOLIC PANEL: CPT | Performed by: FAMILY MEDICINE

## 2023-01-29 PROCEDURE — 87635 SARS-COV-2 COVID-19 AMP PRB: CPT | Performed by: FAMILY MEDICINE

## 2023-01-29 PROCEDURE — 99239 HOSP IP/OBS DSCHRG MGMT >30: CPT | Performed by: FAMILY MEDICINE

## 2023-01-29 RX ORDER — MIDODRINE HYDROCHLORIDE 5 MG/1
15 TABLET ORAL
Qty: 270 TABLET | Refills: 0 | Status: ON HOLD | OUTPATIENT
Start: 2023-01-29 | End: 2023-04-04 | Stop reason: HOSPADM

## 2023-01-29 RX ORDER — FLUDROCORTISONE ACETATE 0.1 MG/1
0.2 TABLET ORAL DAILY
Qty: 60 TABLET | Refills: 0 | Status: SHIPPED | OUTPATIENT
Start: 2023-01-30 | End: 2023-03-01

## 2023-01-29 RX ORDER — PANTOPRAZOLE SODIUM 40 MG/1
40 TABLET, DELAYED RELEASE ORAL EVERY 12 HOURS SCHEDULED
Qty: 60 TABLET | Refills: 0 | Status: SHIPPED | OUTPATIENT
Start: 2023-01-29 | End: 2023-05-23

## 2023-01-29 RX ORDER — TRAMADOL HYDROCHLORIDE 50 MG/1
50 TABLET ORAL EVERY 8 HOURS PRN
Qty: 30 TABLET | Refills: 0 | Status: ON HOLD | OUTPATIENT
Start: 2023-01-29 | End: 2023-04-04 | Stop reason: HOSPADM

## 2023-01-29 RX ADMIN — SODIUM CHLORIDE, PRESERVATIVE FREE 10 ML: 5 INJECTION INTRAVENOUS at 09:14

## 2023-01-29 RX ADMIN — ARIPIPRAZOLE 2 MG: 2 TABLET ORAL at 09:00

## 2023-01-29 RX ADMIN — SODIUM CHLORIDE, PRESERVATIVE FREE 2 ML: 5 INJECTION INTRAVENOUS at 09:14

## 2023-01-29 RX ADMIN — MIDODRINE HYDROCHLORIDE 15 MG: 5 TABLET ORAL at 11:24

## 2023-01-29 RX ADMIN — FLUDROCORTISONE ACETATE 0.2 MG: 0.1 TABLET ORAL at 09:00

## 2023-01-29 RX ADMIN — PANTOPRAZOLE SODIUM 40 MG: 40 TABLET, DELAYED RELEASE ORAL at 09:00

## 2023-01-29 RX ADMIN — COLLAGENASE SANTYL 1 G: 250 OINTMENT TOPICAL at 09:00

## 2023-01-29 RX ADMIN — LEVOTHYROXINE SODIUM 25 MCG: 0.03 TABLET ORAL at 06:26

## 2023-01-29 ASSESSMENT — PAIN SCALES - GENERAL: PAINLEVEL_OUTOF10: 0

## 2023-02-01 ENCOUNTER — HOSPITAL ENCOUNTER (OUTPATIENT)
Dept: RADIATION ONCOLOGY | Age: 78
Discharge: HOME | End: 2023-02-02
Attending: RADIOLOGY

## 2023-02-01 ENCOUNTER — CANCER NAVIGATOR (OUTPATIENT)
Dept: ONCOLOGY | Age: 78
End: 2023-02-01

## 2023-02-01 ENCOUNTER — OFFICE VISIT (OUTPATIENT)
Dept: HEMATOLOGY/ONCOLOGY | Age: 78
End: 2023-02-01
Attending: SURGERY

## 2023-02-01 VITALS
BODY MASS INDEX: 30.32 KG/M2 | HEIGHT: 62 IN | OXYGEN SATURATION: 95 % | SYSTOLIC BLOOD PRESSURE: 89 MMHG | TEMPERATURE: 97.2 F | DIASTOLIC BLOOD PRESSURE: 61 MMHG | HEART RATE: 91 BPM

## 2023-02-01 VITALS
OXYGEN SATURATION: 96 % | WEIGHT: 157.52 LBS | HEART RATE: 81 BPM | RESPIRATION RATE: 16 BRPM | DIASTOLIC BLOOD PRESSURE: 78 MMHG | SYSTOLIC BLOOD PRESSURE: 113 MMHG | BODY MASS INDEX: 28.81 KG/M2 | TEMPERATURE: 97.8 F

## 2023-02-01 DIAGNOSIS — Z17.0 MALIGNANT NEOPLASM OF UPPER-OUTER QUADRANT OF RIGHT BREAST IN FEMALE, ESTROGEN RECEPTOR POSITIVE (CMD): ICD-10-CM

## 2023-02-01 DIAGNOSIS — C50.411 MALIGNANT NEOPLASM OF UPPER-OUTER QUADRANT OF RIGHT BREAST IN FEMALE, ESTROGEN RECEPTOR POSITIVE (CMD): ICD-10-CM

## 2023-02-01 PROCEDURE — 99202 OFFICE O/P NEW SF 15 MIN: CPT

## 2023-02-01 PROCEDURE — 99204 OFFICE O/P NEW MOD 45 MIN: CPT | Performed by: STUDENT IN AN ORGANIZED HEALTH CARE EDUCATION/TRAINING PROGRAM

## 2023-02-01 ASSESSMENT — ENCOUNTER SYMPTOMS
ENDOCRINE NEGATIVE: 1
GASTROINTESTINAL NEGATIVE: 1
FATIGUE: 1
NEUROLOGICAL NEGATIVE: 1
EYES NEGATIVE: 1
RESPIRATORY NEGATIVE: 1
PSYCHIATRIC NEGATIVE: 1
HEMATOLOGIC/LYMPHATIC NEGATIVE: 1

## 2023-02-01 ASSESSMENT — PATIENT HEALTH QUESTIONNAIRE - PHQ9
1. LITTLE INTEREST OR PLEASURE IN DOING THINGS: NOT AT ALL
1. LITTLE INTEREST OR PLEASURE IN DOING THINGS: NOT AT ALL
CLINICAL INTERPRETATION OF PHQ2 SCORE: NO FURTHER SCREENING NEEDED
SUM OF ALL RESPONSES TO PHQ9 QUESTIONS 1 AND 2: 0
CLINICAL INTERPRETATION OF PHQ2 SCORE: NO FURTHER SCREENING NEEDED
SUM OF ALL RESPONSES TO PHQ9 QUESTIONS 1 AND 2: 0
2. FEELING DOWN, DEPRESSED OR HOPELESS: NOT AT ALL
2. FEELING DOWN, DEPRESSED OR HOPELESS: NOT AT ALL
SUM OF ALL RESPONSES TO PHQ9 QUESTIONS 1 AND 2: 0
SUM OF ALL RESPONSES TO PHQ9 QUESTIONS 1 AND 2: 0

## 2023-02-01 ASSESSMENT — PAIN SCALES - GENERAL
PAINLEVEL: 8
PAINLEVEL: 0

## 2023-02-10 ENCOUNTER — OFFICE VISIT (OUTPATIENT)
Dept: ORTHOPEDICS | Age: 78
End: 2023-02-10

## 2023-02-10 ENCOUNTER — IMAGING SERVICES (OUTPATIENT)
Dept: GENERAL RADIOLOGY | Age: 78
End: 2023-02-10
Attending: ORTHOPAEDIC SURGERY

## 2023-02-10 DIAGNOSIS — M25.562 PAIN IN BOTH KNEES, UNSPECIFIED CHRONICITY: ICD-10-CM

## 2023-02-10 DIAGNOSIS — M25.562 PAIN IN BOTH KNEES, UNSPECIFIED CHRONICITY: Primary | ICD-10-CM

## 2023-02-10 DIAGNOSIS — M65.341 TRIGGER RING FINGER OF RIGHT HAND: ICD-10-CM

## 2023-02-10 DIAGNOSIS — M25.561 PAIN IN BOTH KNEES, UNSPECIFIED CHRONICITY: Primary | ICD-10-CM

## 2023-02-10 DIAGNOSIS — M17.11 PRIMARY OSTEOARTHRITIS OF RIGHT KNEE: ICD-10-CM

## 2023-02-10 DIAGNOSIS — M25.561 PAIN IN BOTH KNEES, UNSPECIFIED CHRONICITY: ICD-10-CM

## 2023-02-10 PROCEDURE — 20610 DRAIN/INJ JOINT/BURSA W/O US: CPT | Performed by: ORTHOPAEDIC SURGERY

## 2023-02-10 PROCEDURE — 99204 OFFICE O/P NEW MOD 45 MIN: CPT | Performed by: ORTHOPAEDIC SURGERY

## 2023-02-10 PROCEDURE — 73564 X-RAY EXAM KNEE 4 OR MORE: CPT | Performed by: ORTHOPAEDIC SURGERY

## 2023-02-10 RX ADMIN — BUPIVACAINE HYDROCHLORIDE 2 ML: 5 INJECTION, SOLUTION PERINEURAL at 13:57

## 2023-02-10 RX ADMIN — TRIAMCINOLONE ACETONIDE 80 MG: 40 INJECTION, SUSPENSION INTRA-ARTICULAR; INTRAMUSCULAR at 13:57

## 2023-02-10 RX ADMIN — LIDOCAINE HYDROCHLORIDE 2 ML: 10 INJECTION, SOLUTION INFILTRATION; PERINEURAL at 13:57

## 2023-02-13 RX ORDER — LIDOCAINE HYDROCHLORIDE 10 MG/ML
2 INJECTION, SOLUTION INFILTRATION; PERINEURAL
Status: COMPLETED | OUTPATIENT
Start: 2023-02-10 | End: 2023-02-10

## 2023-02-13 RX ORDER — TRIAMCINOLONE ACETONIDE 40 MG/ML
80 INJECTION, SUSPENSION INTRA-ARTICULAR; INTRAMUSCULAR
Status: COMPLETED | OUTPATIENT
Start: 2023-02-10 | End: 2023-02-10

## 2023-02-13 RX ORDER — BUPIVACAINE HYDROCHLORIDE 5 MG/ML
2 INJECTION, SOLUTION PERINEURAL
Status: COMPLETED | OUTPATIENT
Start: 2023-02-10 | End: 2023-02-10

## 2023-02-15 ENCOUNTER — TRANSCRIBE ORDERS (OUTPATIENT)
Dept: POST ACUTE CARE | Age: 78
End: 2023-02-15

## 2023-02-15 DIAGNOSIS — N39.0 URINARY TRACT INFECTION WITHOUT HEMATURIA, SITE UNSPECIFIED: ICD-10-CM

## 2023-02-15 DIAGNOSIS — D50.0 IRON DEFICIENCY ANEMIA DUE TO CHRONIC BLOOD LOSS: ICD-10-CM

## 2023-02-15 DIAGNOSIS — K57.80 DIVERTICULITIS OF INTESTINE WITH PERFORATION WITHOUT ABSCESS OR BLEEDING, UNSPECIFIED PART OF INTESTINAL TRACT: Primary | ICD-10-CM

## 2023-02-15 DIAGNOSIS — C50.911 MALIGNANT NEOPLASM OF RIGHT FEMALE BREAST, UNSPECIFIED ESTROGEN RECEPTOR STATUS, UNSPECIFIED SITE OF BREAST (CMD): ICD-10-CM

## 2023-02-15 DIAGNOSIS — I95.89 OTHER SPECIFIED HYPOTENSION: ICD-10-CM

## 2023-02-21 ENCOUNTER — TELEPHONE (OUTPATIENT)
Dept: CARDIOLOGY | Age: 78
End: 2023-02-21

## 2023-02-21 ENCOUNTER — OFFICE VISIT (OUTPATIENT)
Dept: CARDIOLOGY | Age: 78
End: 2023-02-21

## 2023-02-21 VITALS
DIASTOLIC BLOOD PRESSURE: 64 MMHG | BODY MASS INDEX: 29.58 KG/M2 | HEART RATE: 94 BPM | WEIGHT: 160.72 LBS | OXYGEN SATURATION: 96 % | HEIGHT: 62 IN | SYSTOLIC BLOOD PRESSURE: 89 MMHG

## 2023-02-21 DIAGNOSIS — Z85.3 PERSONAL HISTORY OF BREAST CANCER: ICD-10-CM

## 2023-02-21 DIAGNOSIS — I95.89 CHRONIC HYPOTENSION: Primary | ICD-10-CM

## 2023-02-21 PROCEDURE — 99213 OFFICE O/P EST LOW 20 MIN: CPT | Performed by: INTERNAL MEDICINE

## 2023-02-21 SDOH — HEALTH STABILITY: MENTAL HEALTH: DEPRESSION SCREENING SCORE: 1

## 2023-02-21 SDOH — HEALTH STABILITY: MENTAL HEALTH: LITTLE INTEREST OR PLEASURE IN ACTIVITY?: NOT AT ALL

## 2023-02-21 SDOH — HEALTH STABILITY: MENTAL HEALTH: FEELING DOWN, DEPRESSED OR HOPELESS?: SEVERAL DAYS

## 2023-02-21 SDOH — HEALTH STABILITY: MENTAL HEALTH: PHQ2 INTERPRETATION: NO FURTHER SCREENING NEEDED

## 2023-02-21 ASSESSMENT — PATIENT HEALTH QUESTIONNAIRE - PHQ9: SUM OF ALL RESPONSES TO PHQ9 QUESTIONS 1 AND 2: 1

## 2023-02-22 ENCOUNTER — EXTERNAL LAB (OUTPATIENT)
Dept: CARDIOLOGY | Age: 78
End: 2023-02-22

## 2023-02-22 ENCOUNTER — LAB REQUISITION (OUTPATIENT)
Dept: LAB | Facility: HOSPITAL | Age: 78
End: 2023-02-22
Payer: MEDICARE

## 2023-02-22 DIAGNOSIS — K76.9 LIVER DISEASE, UNSPECIFIED: ICD-10-CM

## 2023-02-22 DIAGNOSIS — I10 ESSENTIAL (PRIMARY) HYPERTENSION: ICD-10-CM

## 2023-02-22 DIAGNOSIS — F32.A DEPRESSION, UNSPECIFIED: ICD-10-CM

## 2023-02-22 LAB
ABSOLUTE IMMATURE GRANULOCYTES (OFFPRE24): 0.04 X10(3)UL (ref 0–1)
AMMONIA PLAS-MCNC: 18 UMOL/L (ref 11–32)
AMMONIA PLAS-SCNC: 18 UMOL/L (ref 11–32)
BASOPHILS # BLD AUTO: 0.05 X10(3) UL (ref 0–0.2)
BASOPHILS # BLD: 0.05 X10(3)UL (ref 0–0.2)
BASOPHILS NFR BLD AUTO: 0.7 %
BASOPHILS NFR BLD: 0.7 %
DEPRECATED HBV CORE AB SER IA-ACNC: 6.6 NG/ML
EOSINOPHIL # BLD AUTO: 0.12 X10(3) UL (ref 0–0.7)
EOSINOPHIL # BLD: 0.12 % (ref 0–0.7)
EOSINOPHIL NFR BLD AUTO: 1.7 %
EOSINOPHIL NFR BLD: 1.7 %
ERYTHROCYTE [DISTWIDTH] IN BLOOD BY AUTOMATED COUNT: 14.9 %
ERYTHROCYTE [DISTWIDTH] IN BLOOD: 14.9 %
FERRITIN SERPL-MCNC: 6.6 NG/ML (ref 16–340)
HCT VFR BLD AUTO: 32.1 %
HCT VFR BLD CALC: 32.1 % (ref 35–48)
HGB BLD-MCNC: 9.5 G/DL
HGB BLD-MCNC: 9.5 G/DL (ref 12–16)
IMM GRANULOCYTES # BLD AUTO: 0.04 X10(3) UL (ref 0–1)
IMM GRANULOCYTES NFR BLD: 0.6 %
IMMATURE GRANULOCYTES (OFFPRE25): 0.6 %
IRON SATN MFR SERPL: 7 %
IRON SATN MFR SERPL: 7 % (ref 15–50)
IRON SERPL-MCNC: 31 UG/DL
IRON SERPL-MCNC: 31 UG/DL (ref 50–170)
LYMPHOCYTES # BLD AUTO: 1.26 X10(3) UL (ref 1–4)
LYMPHOCYTES # BLD: 1.26 X10(3)UL (ref 1–4)
LYMPHOCYTES NFR BLD AUTO: 18 %
LYMPHOCYTES NFR BLD: 18 %
MCH RBC QN AUTO: 28.7 PG (ref 26–34)
MCH RBC QN AUTO: 28.7 PG (ref 26–34)
MCHC RBC AUTO-ENTMCNC: 29.6 G/DL (ref 31–37)
MCHC RBC AUTO-ENTMCNC: 29.6 G/DL (ref 31–37)
MCV RBC AUTO: 97 FL
MCV RBC AUTO: 97 FL (ref 80–100)
MONOCYTES # BLD AUTO: 0.47 X10(3) UL (ref 0.1–1)
MONOCYTES # BLD: 0.47 X10(3)UL (ref 0.1–1)
MONOCYTES NFR BLD AUTO: 6.7 %
MONOCYTES NFR BLD: 6.7 %
NEUTROPHILS # BLD AUTO: 5.06 X10 (3) UL (ref 1.5–7.7)
NEUTROPHILS # BLD AUTO: 5.06 X10(3) UL (ref 1.5–7.7)
NEUTROPHILS # BLD: 5.06 X10(3)UL (ref 1.5–7.7)
NEUTROPHILS NFR BLD AUTO: 72.3 %
NEUTROPHILS NFR BLD: 72.3 %
PLATELET # BLD AUTO: 328 10(3)UL (ref 150–450)
PLATELET # BLD: 328 10(3)UL (ref 150–450)
RBC # BLD AUTO: 3.31 X10(6)UL
RBC # BLD: 3.31 X10(6)UL (ref 3.8–5.3)
TIBC SERPL-MCNC: 446 UG/DL (ref 240–450)
TIBC SERPL-MCNC: 446 UG/DL (ref 240–450)
TRANSFERRIN SERPL-MCNC: 299 MG/DL (ref 200–360)
TRANSFERRIN SERPL-MCNC: 299 MG/DL (ref 200–360)
WBC # BLD AUTO: 7 X10(3) UL (ref 4–11)
WBC # BLD: 7 X10(3)UL (ref 4–11)

## 2023-02-22 PROCEDURE — 82140 ASSAY OF AMMONIA: CPT | Performed by: INTERNAL MEDICINE

## 2023-02-22 PROCEDURE — 83540 ASSAY OF IRON: CPT | Performed by: INTERNAL MEDICINE

## 2023-02-22 PROCEDURE — 82728 ASSAY OF FERRITIN: CPT | Performed by: INTERNAL MEDICINE

## 2023-02-22 PROCEDURE — 85025 COMPLETE CBC W/AUTO DIFF WBC: CPT | Performed by: INTERNAL MEDICINE

## 2023-02-22 PROCEDURE — 83550 IRON BINDING TEST: CPT | Performed by: INTERNAL MEDICINE

## 2023-02-23 ENCOUNTER — TELEPHONE (OUTPATIENT)
Dept: CARDIOLOGY | Age: 78
End: 2023-02-23

## 2023-02-23 ENCOUNTER — HOSPITAL ENCOUNTER (OUTPATIENT)
Dept: RADIATION ONCOLOGY | Age: 78
Discharge: STILL ADMITTED OR ADMINISTRATIVE | End: 2023-02-24

## 2023-02-23 ASSESSMENT — PAIN SCALES - GENERAL: PAINLEVEL: 0

## 2023-02-23 ASSESSMENT — ENCOUNTER SYMPTOMS
PSYCHIATRIC NEGATIVE: 1
RESPIRATORY NEGATIVE: 1
EYES NEGATIVE: 1
GASTROINTESTINAL NEGATIVE: 1
NEUROLOGICAL NEGATIVE: 1

## 2023-02-23 ASSESSMENT — PATIENT HEALTH QUESTIONNAIRE - PHQ9
CLINICAL INTERPRETATION OF PHQ2 SCORE: NO FURTHER SCREENING NEEDED
1. LITTLE INTEREST OR PLEASURE IN DOING THINGS: NOT AT ALL
SUM OF ALL RESPONSES TO PHQ9 QUESTIONS 1 AND 2: 1
SUM OF ALL RESPONSES TO PHQ9 QUESTIONS 1 AND 2: 1
2. FEELING DOWN, DEPRESSED OR HOPELESS: SEVERAL DAYS

## 2023-03-01 ENCOUNTER — LAB REQUISITION (OUTPATIENT)
Dept: LAB | Facility: HOSPITAL | Age: 78
End: 2023-03-01
Payer: MEDICARE

## 2023-03-01 DIAGNOSIS — K76.82 HEPATIC ENCEPHALOPATHY (HCC): ICD-10-CM

## 2023-03-01 LAB — AMMONIA PLAS-MCNC: 38 UMOL/L (ref 11–32)

## 2023-03-01 PROCEDURE — 82140 ASSAY OF AMMONIA: CPT | Performed by: FAMILY MEDICINE

## 2023-03-09 ENCOUNTER — OFFICE VISIT (OUTPATIENT)
Dept: SURGERY | Age: 78
End: 2023-03-09
Attending: SURGERY

## 2023-03-09 VITALS
WEIGHT: 160 LBS | HEIGHT: 62 IN | SYSTOLIC BLOOD PRESSURE: 73 MMHG | BODY MASS INDEX: 29.44 KG/M2 | DIASTOLIC BLOOD PRESSURE: 49 MMHG | RESPIRATION RATE: 18 BRPM | HEART RATE: 106 BPM

## 2023-03-09 DIAGNOSIS — Z17.0 MALIGNANT NEOPLASM OF UPPER-OUTER QUADRANT OF RIGHT BREAST IN FEMALE, ESTROGEN RECEPTOR POSITIVE (CMD): Primary | ICD-10-CM

## 2023-03-09 DIAGNOSIS — C50.411 MALIGNANT NEOPLASM OF UPPER-OUTER QUADRANT OF RIGHT BREAST IN FEMALE, ESTROGEN RECEPTOR POSITIVE (CMD): Primary | ICD-10-CM

## 2023-03-09 PROCEDURE — 99024 POSTOP FOLLOW-UP VISIT: CPT | Performed by: SURGERY

## 2023-03-09 PROCEDURE — 99211 OFF/OP EST MAY X REQ PHY/QHP: CPT

## 2023-03-22 ENCOUNTER — LAB REQUISITION (OUTPATIENT)
Dept: LAB | Facility: HOSPITAL | Age: 78
End: 2023-03-22
Payer: MEDICARE

## 2023-03-22 ENCOUNTER — OFFICE VISIT (OUTPATIENT)
Dept: ORTHOPEDICS | Age: 78
End: 2023-03-22

## 2023-03-22 ENCOUNTER — IMAGING SERVICES (OUTPATIENT)
Dept: GENERAL RADIOLOGY | Age: 78
End: 2023-03-22

## 2023-03-22 DIAGNOSIS — M65.30 TRIGGER FINGER OF RIGHT HAND, UNSPECIFIED FINGER: ICD-10-CM

## 2023-03-22 DIAGNOSIS — M65.341 TRIGGER RING FINGER OF RIGHT HAND: ICD-10-CM

## 2023-03-22 DIAGNOSIS — K76.9 LIVER DISEASE, UNSPECIFIED: ICD-10-CM

## 2023-03-22 DIAGNOSIS — M65.30 TRIGGER FINGER OF LEFT HAND, UNSPECIFIED FINGER: Primary | ICD-10-CM

## 2023-03-22 DIAGNOSIS — M65.30 TRIGGER FINGER OF LEFT HAND, UNSPECIFIED FINGER: ICD-10-CM

## 2023-03-22 DIAGNOSIS — M72.0 DUPUYTREN'S DISEASE: ICD-10-CM

## 2023-03-22 LAB
ALBUMIN SERPL-MCNC: 3 G/DL (ref 3.4–5)
ALBUMIN/GLOB SERPL: 0.7 {RATIO} (ref 1–2)
ALP LIVER SERPL-CCNC: 59 U/L
ALT SERPL-CCNC: 25 U/L
AMMONIA PLAS-MCNC: 61 UMOL/L (ref 11–32)
ANION GAP SERPL CALC-SCNC: 13 MMOL/L (ref 0–18)
AST SERPL-CCNC: 39 U/L (ref 15–37)
BASOPHILS # BLD AUTO: 0.07 X10(3) UL (ref 0–0.2)
BASOPHILS NFR BLD AUTO: 1 %
BILIRUB SERPL-MCNC: 0.4 MG/DL (ref 0.1–2)
BUN BLD-MCNC: 17 MG/DL (ref 7–18)
CALCIUM BLD-MCNC: 8.9 MG/DL (ref 8.5–10.1)
CHLORIDE SERPL-SCNC: 107 MMOL/L (ref 98–112)
CO2 SERPL-SCNC: 19 MMOL/L (ref 21–32)
CREAT BLD-MCNC: 0.88 MG/DL
EOSINOPHIL # BLD AUTO: 0.09 X10(3) UL (ref 0–0.7)
EOSINOPHIL NFR BLD AUTO: 1.2 %
ERYTHROCYTE [DISTWIDTH] IN BLOOD BY AUTOMATED COUNT: 16 %
GFR SERPLBLD BASED ON 1.73 SQ M-ARVRAT: 68 ML/MIN/1.73M2 (ref 60–?)
GLOBULIN PLAS-MCNC: 4.3 G/DL (ref 2.8–4.4)
GLUCOSE BLD-MCNC: 123 MG/DL (ref 70–99)
HCT VFR BLD AUTO: 38.2 %
HGB BLD-MCNC: 11 G/DL
IMM GRANULOCYTES # BLD AUTO: 0.03 X10(3) UL (ref 0–1)
IMM GRANULOCYTES NFR BLD: 0.4 %
LYMPHOCYTES # BLD AUTO: 2.3 X10(3) UL (ref 1–4)
LYMPHOCYTES NFR BLD AUTO: 31.5 %
MCH RBC QN AUTO: 25.7 PG (ref 26–34)
MCHC RBC AUTO-ENTMCNC: 28.8 G/DL (ref 31–37)
MCV RBC AUTO: 89.3 FL
MONOCYTES # BLD AUTO: 0.69 X10(3) UL (ref 0.1–1)
MONOCYTES NFR BLD AUTO: 9.5 %
NEUTROPHILS # BLD AUTO: 4.12 X10 (3) UL (ref 1.5–7.7)
NEUTROPHILS # BLD AUTO: 4.12 X10(3) UL (ref 1.5–7.7)
NEUTROPHILS NFR BLD AUTO: 56.4 %
OSMOLALITY SERPL CALC.SUM OF ELEC: 291 MOSM/KG (ref 275–295)
PLATELET # BLD AUTO: 447 10(3)UL (ref 150–450)
POTASSIUM SERPL-SCNC: 4.8 MMOL/L (ref 3.5–5.1)
PROT SERPL-MCNC: 7.3 G/DL (ref 6.4–8.2)
RBC # BLD AUTO: 4.28 X10(6)UL
SODIUM SERPL-SCNC: 139 MMOL/L (ref 136–145)
WBC # BLD AUTO: 7.3 X10(3) UL (ref 4–11)

## 2023-03-22 PROCEDURE — 80053 COMPREHEN METABOLIC PANEL: CPT | Performed by: FAMILY MEDICINE

## 2023-03-22 PROCEDURE — 99203 OFFICE O/P NEW LOW 30 MIN: CPT | Performed by: ORTHOPAEDIC SURGERY

## 2023-03-22 PROCEDURE — 73130 X-RAY EXAM OF HAND: CPT | Performed by: ORTHOPAEDIC SURGERY

## 2023-03-22 PROCEDURE — 82140 ASSAY OF AMMONIA: CPT | Performed by: FAMILY MEDICINE

## 2023-03-22 PROCEDURE — 85025 COMPLETE CBC W/AUTO DIFF WBC: CPT | Performed by: FAMILY MEDICINE

## 2023-03-24 ENCOUNTER — TELEPHONE (OUTPATIENT)
Dept: ORTHOPEDICS | Age: 78
End: 2023-03-24

## 2023-03-27 ENCOUNTER — HOSPITAL ENCOUNTER (INPATIENT)
Age: 78
LOS: 9 days | Discharge: NURSING, REHAB OR LONG-TERM CARE | DRG: 378 | End: 2023-04-05
Attending: EMERGENCY MEDICINE | Admitting: INTERNAL MEDICINE

## 2023-03-27 ENCOUNTER — APPOINTMENT (OUTPATIENT)
Dept: GENERAL RADIOLOGY | Age: 78
DRG: 378 | End: 2023-03-27
Attending: EMERGENCY MEDICINE

## 2023-03-27 DIAGNOSIS — K62.5 RECTAL BLEEDING: Primary | ICD-10-CM

## 2023-03-27 LAB
ABO + RH BLD: NORMAL
ALBUMIN SERPL-MCNC: 3 G/DL (ref 3.6–5.1)
ALBUMIN/GLOB SERPL: 0.9 {RATIO} (ref 1–2.4)
ALP SERPL-CCNC: 62 UNITS/L (ref 45–117)
ALT SERPL-CCNC: 20 UNITS/L
ANION GAP SERPL CALC-SCNC: 11 MMOL/L (ref 7–19)
APTT PPP: <21 SEC (ref 22–30)
AST SERPL-CCNC: 19 UNITS/L
ATRIAL RATE (BPM): 99
BASOPHILS # BLD: 0.1 K/MCL (ref 0–0.3)
BASOPHILS NFR BLD: 1 %
BILIRUB SERPL-MCNC: 0.3 MG/DL (ref 0.2–1)
BLD GP AB INVEST PLASRBC-IMP: NORMAL
BLD GP AB SCN SERPL QL GEL: POSITIVE
BLOOD EXPIRATION DATE: NORMAL
BUN SERPL-MCNC: 25 MG/DL (ref 6–20)
BUN/CREAT SERPL: 36 (ref 7–25)
CALCIUM SERPL-MCNC: 8.2 MG/DL (ref 8.4–10.2)
CHLORIDE SERPL-SCNC: 111 MMOL/L (ref 97–110)
CO2 SERPL-SCNC: 24 MMOL/L (ref 21–32)
CREAT SERPL-MCNC: 0.7 MG/DL (ref 0.51–0.95)
CROSSMATCH RESULT: NORMAL
DEPRECATED RDW RBC: 53.5 FL (ref 39–50)
DISPENSE STATUS: NORMAL
EOSINOPHIL # BLD: 0.1 K/MCL (ref 0–0.5)
EOSINOPHIL NFR BLD: 1 %
ERYTHROCYTE [DISTWIDTH] IN BLOOD: 16.5 % (ref 11–15)
FASTING DURATION TIME PATIENT: ABNORMAL H
GFR SERPLBLD BASED ON 1.73 SQ M-ARVRAT: 90 ML/MIN
GLOBULIN SER-MCNC: 3.3 G/DL (ref 2–4)
GLUCOSE BLDC GLUCOMTR-MCNC: 95 MG/DL (ref 70–99)
GLUCOSE SERPL-MCNC: 132 MG/DL (ref 70–99)
HCT VFR BLD CALC: 28 % (ref 36–46.5)
HCT VFR BLD CALC: 28.3 % (ref 36–46.5)
HGB BLD-MCNC: 8.2 G/DL (ref 12–15.5)
HGB BLD-MCNC: 8.6 G/DL (ref 12–15.5)
IMM GRANULOCYTES # BLD AUTO: 0 K/MCL (ref 0–0.2)
IMM GRANULOCYTES # BLD: 1 %
INR PPP: 1.1
ISBT BLOOD TYPE: 5100
ISBT BLOOD TYPE: 9500
ISSUE DATE/TIME: NORMAL
ISSUE DATE/TIME: NORMAL
LYMPHOCYTES # BLD: 1.5 K/MCL (ref 1–4)
LYMPHOCYTES NFR BLD: 25 %
MAGNESIUM SERPL-MCNC: 2.2 MG/DL (ref 1.7–2.4)
MCH RBC QN AUTO: 25.5 PG (ref 26–34)
MCHC RBC AUTO-ENTMCNC: 29 G/DL (ref 32–36.5)
MCV RBC AUTO: 88.2 FL (ref 78–100)
MONOCYTES # BLD: 0.6 K/MCL (ref 0.3–0.9)
MONOCYTES NFR BLD: 11 %
NEUTROPHILS # BLD: 3.7 K/MCL (ref 1.8–7.7)
NEUTROPHILS NFR BLD: 61 %
NRBC BLD MANUAL-RTO: 0 /100 WBC
P AXIS (DEGREES): 65
PLATELET # BLD AUTO: 341 K/MCL (ref 140–450)
POTASSIUM SERPL-SCNC: 3.5 MMOL/L (ref 3.4–5.1)
PR-INTERVAL (MSEC): 176
PRODUCT CODE: NORMAL
PRODUCT DESCRIPTION: NORMAL
PRODUCT ID: NORMAL
PROT SERPL-MCNC: 6.3 G/DL (ref 6.4–8.2)
PROTHROMBIN TIME: 11.1 SEC (ref 9.7–11.8)
QRS-INTERVAL (MSEC): 72
QT-INTERVAL (MSEC): 380
QTC: 488
R AXIS (DEGREES): 5
RAINBOW EXTRA TUBES HOLD SPECIMEN: NORMAL
RBC # BLD: 3.21 MIL/MCL (ref 4–5.2)
REPORT TEXT: NORMAL
SODIUM SERPL-SCNC: 142 MMOL/L (ref 135–145)
T AXIS (DEGREES): 66
TROPONIN I SERPL DL<=0.01 NG/ML-MCNC: 17 NG/L
TYPE AND SCREEN EXPIRATION DATE: NORMAL
UNIT BLOOD TYPE: NORMAL
UNIT NUMBER: NORMAL
VENTRICULAR RATE EKG/MIN (BPM): 99
WBC # BLD: 6 K/MCL (ref 4.2–11)

## 2023-03-27 PROCEDURE — 10004651 HB RX, NO CHARGE ITEM: Performed by: INTERNAL MEDICINE

## 2023-03-27 PROCEDURE — 93005 ELECTROCARDIOGRAM TRACING: CPT | Performed by: EMERGENCY MEDICINE

## 2023-03-27 PROCEDURE — 86920 COMPATIBILITY TEST SPIN: CPT

## 2023-03-27 PROCEDURE — P9016 RBC LEUKOCYTES REDUCED: HCPCS

## 2023-03-27 PROCEDURE — 10002807 HB RX 258: Performed by: INTERNAL MEDICINE

## 2023-03-27 PROCEDURE — 36415 COLL VENOUS BLD VENIPUNCTURE: CPT | Performed by: EMERGENCY MEDICINE

## 2023-03-27 PROCEDURE — 10002807 HB RX 258: Performed by: EMERGENCY MEDICINE

## 2023-03-27 PROCEDURE — 85014 HEMATOCRIT: CPT | Performed by: EMERGENCY MEDICINE

## 2023-03-27 PROCEDURE — 10002803 HB RX 637: Performed by: INTERNAL MEDICINE

## 2023-03-27 PROCEDURE — 86922 COMPATIBILITY TEST ANTIGLOB: CPT

## 2023-03-27 PROCEDURE — 86870 RBC ANTIBODY IDENTIFICATION: CPT | Performed by: EMERGENCY MEDICINE

## 2023-03-27 PROCEDURE — 84484 ASSAY OF TROPONIN QUANT: CPT | Performed by: EMERGENCY MEDICINE

## 2023-03-27 PROCEDURE — 10002800 HB RX 250 W HCPCS: Performed by: EMERGENCY MEDICINE

## 2023-03-27 PROCEDURE — 80053 COMPREHEN METABOLIC PANEL: CPT | Performed by: EMERGENCY MEDICINE

## 2023-03-27 PROCEDURE — 83735 ASSAY OF MAGNESIUM: CPT | Performed by: EMERGENCY MEDICINE

## 2023-03-27 PROCEDURE — C9113 INJ PANTOPRAZOLE SODIUM, VIA: HCPCS | Performed by: EMERGENCY MEDICINE

## 2023-03-27 PROCEDURE — 86901 BLOOD TYPING SEROLOGIC RH(D): CPT | Performed by: EMERGENCY MEDICINE

## 2023-03-27 PROCEDURE — 71045 X-RAY EXAM CHEST 1 VIEW: CPT

## 2023-03-27 PROCEDURE — 85730 THROMBOPLASTIN TIME PARTIAL: CPT | Performed by: EMERGENCY MEDICINE

## 2023-03-27 PROCEDURE — 85610 PROTHROMBIN TIME: CPT | Performed by: EMERGENCY MEDICINE

## 2023-03-27 PROCEDURE — 99285 EMERGENCY DEPT VISIT HI MDM: CPT

## 2023-03-27 PROCEDURE — 85025 COMPLETE CBC W/AUTO DIFF WBC: CPT | Performed by: EMERGENCY MEDICINE

## 2023-03-27 PROCEDURE — 10003585 HB ROOM CHARGE INTERMEDIATE CARE

## 2023-03-27 RX ORDER — ACETAMINOPHEN 325 MG/1
650 TABLET ORAL EVERY 6 HOURS PRN
Status: ON HOLD | COMMUNITY
End: 2023-04-04 | Stop reason: HOSPADM

## 2023-03-27 RX ORDER — ONDANSETRON 2 MG/ML
4 INJECTION INTRAMUSCULAR; INTRAVENOUS 2 TIMES DAILY PRN
Status: DISCONTINUED | OUTPATIENT
Start: 2023-03-27 | End: 2023-04-05 | Stop reason: HOSPADM

## 2023-03-27 RX ORDER — FLUDROCORTISONE ACETATE 0.1 MG/1
0.2 TABLET ORAL EVERY MORNING
Status: ON HOLD | COMMUNITY
End: 2023-04-04 | Stop reason: HOSPADM

## 2023-03-27 RX ORDER — ALPRAZOLAM 0.25 MG/1
0.5 TABLET ORAL EVERY 6 HOURS PRN
Status: DISCONTINUED | OUTPATIENT
Start: 2023-03-27 | End: 2023-04-05 | Stop reason: HOSPADM

## 2023-03-27 RX ORDER — SODIUM CHLORIDE, SODIUM LACTATE, POTASSIUM CHLORIDE, CALCIUM CHLORIDE 600; 310; 30; 20 MG/100ML; MG/100ML; MG/100ML; MG/100ML
INJECTION, SOLUTION INTRAVENOUS CONTINUOUS
Status: DISCONTINUED | OUTPATIENT
Start: 2023-03-27 | End: 2023-04-03

## 2023-03-27 RX ORDER — SODIUM CHLORIDE 9 MG/ML
INJECTION, SOLUTION INTRAVENOUS CONTINUOUS PRN
Status: DISCONTINUED | OUTPATIENT
Start: 2023-03-27 | End: 2023-04-05 | Stop reason: HOSPADM

## 2023-03-27 RX ORDER — AMOXICILLIN 250 MG
2 CAPSULE ORAL DAILY PRN
Status: DISCONTINUED | OUTPATIENT
Start: 2023-03-27 | End: 2023-04-05 | Stop reason: HOSPADM

## 2023-03-27 RX ORDER — MAGNESIUM HYDROXIDE/ALUMINUM HYDROXICE/SIMETHICONE 120; 1200; 1200 MG/30ML; MG/30ML; MG/30ML
30 SUSPENSION ORAL EVERY 4 HOURS PRN
Status: DISCONTINUED | OUTPATIENT
Start: 2023-03-27 | End: 2023-04-05 | Stop reason: HOSPADM

## 2023-03-27 RX ORDER — PANTOPRAZOLE SODIUM 40 MG/10ML
80 INJECTION, POWDER, LYOPHILIZED, FOR SOLUTION INTRAVENOUS ONCE
Status: COMPLETED | OUTPATIENT
Start: 2023-03-27 | End: 2023-03-27

## 2023-03-27 RX ORDER — PANTOPRAZOLE SODIUM 40 MG/1
8 INJECTION, POWDER, FOR SOLUTION INTRAVENOUS CONTINUOUS
Status: DISCONTINUED | OUTPATIENT
Start: 2023-03-27 | End: 2023-03-31

## 2023-03-27 RX ORDER — POTASSIUM CHLORIDE 20 MEQ/1
40 TABLET, EXTENDED RELEASE ORAL ONCE
Status: COMPLETED | OUTPATIENT
Start: 2023-03-27 | End: 2023-03-27

## 2023-03-27 RX ORDER — ZOLPIDEM TARTRATE 5 MG/1
5 TABLET ORAL NIGHTLY PRN
Status: DISCONTINUED | OUTPATIENT
Start: 2023-03-27 | End: 2023-04-05 | Stop reason: HOSPADM

## 2023-03-27 RX ORDER — ACETAMINOPHEN 500 MG
1000 TABLET ORAL EVERY 6 HOURS PRN
Status: DISCONTINUED | OUTPATIENT
Start: 2023-03-27 | End: 2023-04-05 | Stop reason: HOSPADM

## 2023-03-27 RX ADMIN — SODIUM CHLORIDE 1000 ML: 9 INJECTION, SOLUTION INTRAVENOUS at 09:58

## 2023-03-27 RX ADMIN — PANTOPRAZOLE SODIUM 80 MG: 40 INJECTION, POWDER, FOR SOLUTION INTRAVENOUS at 10:51

## 2023-03-27 RX ADMIN — ACETAMINOPHEN 1000 MG: 500 TABLET ORAL at 20:53

## 2023-03-27 RX ADMIN — PANTOPRAZOLE SODIUM 8 MG/HR: 40 INJECTION, POWDER, FOR SOLUTION INTRAVENOUS at 22:16

## 2023-03-27 RX ADMIN — ACETAMINOPHEN 1000 MG: 500 TABLET ORAL at 15:58

## 2023-03-27 RX ADMIN — POTASSIUM CHLORIDE 40 MEQ: 1500 TABLET, EXTENDED RELEASE ORAL at 15:57

## 2023-03-27 RX ADMIN — PANTOPRAZOLE SODIUM 8 MG/HR: 40 INJECTION, POWDER, FOR SOLUTION INTRAVENOUS at 10:54

## 2023-03-27 RX ADMIN — SODIUM CHLORIDE, POTASSIUM CHLORIDE, SODIUM LACTATE AND CALCIUM CHLORIDE: 600; 310; 30; 20 INJECTION, SOLUTION INTRAVENOUS at 16:12

## 2023-03-27 SDOH — ECONOMIC STABILITY: HOUSING INSECURITY: WHAT IS YOUR LIVING SITUATION TODAY?: ALONE

## 2023-03-27 SDOH — ECONOMIC STABILITY: HOUSING INSECURITY: ARE YOU WORRIED ABOUT LOSING YOUR HOUSING?: NO

## 2023-03-27 SDOH — HEALTH STABILITY: PHYSICAL HEALTH: DO YOU HAVE DIFFICULTY DRESSING OR BATHING?: YES

## 2023-03-27 SDOH — SOCIAL STABILITY: SOCIAL NETWORK: SUPPORT SYSTEMS: FAMILY MEMBERS

## 2023-03-27 SDOH — HEALTH STABILITY: GENERAL: BECAUSE OF A PHYSICAL, MENTAL, OR EMOTIONAL CONDITION, DO YOU HAVE DIFFICULTY DOING ERRANDS ALONE?: NO

## 2023-03-27 SDOH — ECONOMIC STABILITY: HOUSING INSECURITY: WHAT IS YOUR LIVING SITUATION TODAY?: ASSISTED LIVING

## 2023-03-27 SDOH — ECONOMIC STABILITY: FOOD INSECURITY: HOW OFTEN IN THE PAST 12 MONTHS WERE YOU WORRIED OR STRESSED ABOUT HAVING ENOUGH MONEY TO BUY NUTRITIOUS MEALS?: RARELY

## 2023-03-27 SDOH — HEALTH STABILITY: PHYSICAL HEALTH: DO YOU HAVE SERIOUS DIFFICULTY WALKING OR CLIMBING STAIRS?: YES

## 2023-03-27 ASSESSMENT — PAIN SCALES - GENERAL
PAINLEVEL_OUTOF10: 4
PAINLEVEL_OUTOF10: 0
PAINLEVEL_OUTOF10: 3
PAINLEVEL_OUTOF10: 5

## 2023-03-27 ASSESSMENT — ENCOUNTER SYMPTOMS
DIZZINESS: 1
PSYCHIATRIC NEGATIVE: 1
GASTROINTESTINAL NEGATIVE: 1
NEUROLOGICAL NEGATIVE: 1
BLOOD IN STOOL: 1
EYES NEGATIVE: 1
RESPIRATORY NEGATIVE: 1
CONSTITUTIONAL NEGATIVE: 1

## 2023-03-27 ASSESSMENT — ACTIVITIES OF DAILY LIVING (ADL)
ADL_SCORE: 24
ADL_SHORT_OF_BREATH: NO
RECENT_DECLINE_ADL: YES, DECLINE IN BATHING/DRESSING/FEEDING, COLLABORATE WITH PROVIDER (T);YES, DECLINE IN AMBULATION/TRANSFERRING, COLLABORATE WITH PROVIDER (T)
TOILETING: INDEPENDENT
BATHING: INDEPENDENT
ADL_BEFORE_ADMISSION: INDEPENDENT
FEEDING: INDEPENDENT
DRESSING: INDEPENDENT

## 2023-03-27 ASSESSMENT — LIFESTYLE VARIABLES
ALCOHOL_USE_STATUS: NO OR LOW RISK WITH VALIDATED TOOL
HOW OFTEN DO YOU HAVE A DRINK CONTAINING ALCOHOL: NEVER
HOW OFTEN DO YOU HAVE 6 OR MORE DRINKS ON ONE OCCASION: NEVER

## 2023-03-28 LAB
DEPRECATED RDW RBC: 51.7 FL (ref 39–50)
ERYTHROCYTE [DISTWIDTH] IN BLOOD: 15.9 % (ref 11–15)
HCT VFR BLD CALC: 23.7 % (ref 36–46.5)
HCT VFR BLD CALC: 25.6 % (ref 36–46.5)
HCT VFR BLD CALC: 28 % (ref 36–46.5)
HGB BLD-MCNC: 7.3 G/DL (ref 12–15.5)
HGB BLD-MCNC: 8 G/DL (ref 12–15.5)
HGB BLD-MCNC: 8.3 G/DL (ref 12–15.5)
MCH RBC QN AUTO: 27.6 PG (ref 26–34)
MCHC RBC AUTO-ENTMCNC: 31.3 G/DL (ref 32–36.5)
MCV RBC AUTO: 88.3 FL (ref 78–100)
NRBC BLD MANUAL-RTO: 0 /100 WBC
PLATELET # BLD AUTO: 258 K/MCL (ref 140–450)
POTASSIUM SERPL-SCNC: 3.9 MMOL/L (ref 3.4–5.1)
RBC # BLD: 2.9 MIL/MCL (ref 4–5.2)
WBC # BLD: 7.2 K/MCL (ref 4.2–11)

## 2023-03-28 PROCEDURE — 97530 THERAPEUTIC ACTIVITIES: CPT

## 2023-03-28 PROCEDURE — 36415 COLL VENOUS BLD VENIPUNCTURE: CPT | Performed by: INTERNAL MEDICINE

## 2023-03-28 PROCEDURE — 10002803 HB RX 637: Performed by: INTERNAL MEDICINE

## 2023-03-28 PROCEDURE — 85014 HEMATOCRIT: CPT | Performed by: EMERGENCY MEDICINE

## 2023-03-28 PROCEDURE — 10002807 HB RX 258: Performed by: INTERNAL MEDICINE

## 2023-03-28 PROCEDURE — 10002800 HB RX 250 W HCPCS: Performed by: EMERGENCY MEDICINE

## 2023-03-28 PROCEDURE — 97166 OT EVAL MOD COMPLEX 45 MIN: CPT

## 2023-03-28 PROCEDURE — 85027 COMPLETE CBC AUTOMATED: CPT | Performed by: INTERNAL MEDICINE

## 2023-03-28 PROCEDURE — 97110 THERAPEUTIC EXERCISES: CPT

## 2023-03-28 PROCEDURE — 10004651 HB RX, NO CHARGE ITEM: Performed by: INTERNAL MEDICINE

## 2023-03-28 PROCEDURE — 84132 ASSAY OF SERUM POTASSIUM: CPT | Performed by: INTERNAL MEDICINE

## 2023-03-28 PROCEDURE — 10003585 HB ROOM CHARGE INTERMEDIATE CARE

## 2023-03-28 PROCEDURE — C9113 INJ PANTOPRAZOLE SODIUM, VIA: HCPCS | Performed by: EMERGENCY MEDICINE

## 2023-03-28 PROCEDURE — 97162 PT EVAL MOD COMPLEX 30 MIN: CPT

## 2023-03-28 PROCEDURE — 10002807 HB RX 258: Performed by: EMERGENCY MEDICINE

## 2023-03-28 RX ORDER — LACTULOSE 10 G/15ML
30 SOLUTION ORAL 3 TIMES DAILY
Status: DISCONTINUED | OUTPATIENT
Start: 2023-03-28 | End: 2023-04-05

## 2023-03-28 RX ADMIN — ALPRAZOLAM 0.5 MG: 0.25 TABLET ORAL at 14:05

## 2023-03-28 RX ADMIN — SODIUM CHLORIDE, POTASSIUM CHLORIDE, SODIUM LACTATE AND CALCIUM CHLORIDE: 600; 310; 30; 20 INJECTION, SOLUTION INTRAVENOUS at 06:46

## 2023-03-28 RX ADMIN — PANTOPRAZOLE SODIUM 8 MG/HR: 40 INJECTION, POWDER, FOR SOLUTION INTRAVENOUS at 17:38

## 2023-03-28 RX ADMIN — ACETAMINOPHEN 1000 MG: 500 TABLET ORAL at 14:04

## 2023-03-28 RX ADMIN — ACETAMINOPHEN 1000 MG: 500 TABLET ORAL at 20:43

## 2023-03-28 RX ADMIN — LACTULOSE 30 G: 10 SOLUTION ORAL at 21:37

## 2023-03-28 RX ADMIN — ALPRAZOLAM 0.5 MG: 0.25 TABLET ORAL at 20:44

## 2023-03-28 RX ADMIN — ALPRAZOLAM 0.5 MG: 0.25 TABLET ORAL at 00:00

## 2023-03-28 RX ADMIN — PANTOPRAZOLE SODIUM 8 MG/HR: 40 INJECTION, POWDER, FOR SOLUTION INTRAVENOUS at 06:47

## 2023-03-28 ASSESSMENT — COGNITIVE AND FUNCTIONAL STATUS - GENERAL
HELP NEEDED DRESSING REGULAR LOWER BODY CLOTHING: A LOT
BASIC_MOBILITY_CONVERTED_SCORE: 28.13
HELP NEEDED DRESSING REGULAR UPPER BODY CLOTHING: A LOT
HELP NEEDED FOR BATHING: A LOT
DAILY_ACTIVITY_RAW_SCORE: 13
DAILY_ACTIVITY_CONVERTED_SCORE: 32.03
HELP NEEDED FOR TOILETING: A LOT
BASIC_MOBILITY_RAW_SCORE: 10
HELP NEEDED FOR PERSONAL GROOMING: A LOT

## 2023-03-28 ASSESSMENT — PAIN SCALES - GENERAL
PAINLEVEL_OUTOF10: 2
PAINLEVEL_OUTOF10: 0
PAINLEVEL_OUTOF10: 0
PAINLEVEL_OUTOF10: 3
PAINLEVEL_OUTOF10: 0

## 2023-03-28 ASSESSMENT — ACTIVITIES OF DAILY LIVING (ADL)
PRIOR_ADL: MODERATE ASSIST (MOD)
EATING: INDEPENDENT

## 2023-03-28 ASSESSMENT — ENCOUNTER SYMPTOMS
SHORTNESS OF BREATH: 0
FEVER: 0
NAUSEA: 0
NO PATIENT REPORTED PAIN: 1
PAIN SEVERITY NOW: 0

## 2023-03-29 ENCOUNTER — APPOINTMENT (OUTPATIENT)
Dept: CARDIOLOGY | Age: 78
End: 2023-03-29

## 2023-03-29 LAB
AMMONIA PLAS-SCNC: 47 MCMOL/L
ANION GAP SERPL CALC-SCNC: 11 MMOL/L (ref 7–19)
BASOPHILS # BLD: 0.1 K/MCL (ref 0–0.3)
BASOPHILS NFR BLD: 1 %
BUN SERPL-MCNC: 9 MG/DL (ref 6–20)
BUN/CREAT SERPL: 14 (ref 7–25)
CALCIUM SERPL-MCNC: 8 MG/DL (ref 8.4–10.2)
CHLORIDE SERPL-SCNC: 114 MMOL/L (ref 97–110)
CO2 SERPL-SCNC: 22 MMOL/L (ref 21–32)
CREAT SERPL-MCNC: 0.66 MG/DL (ref 0.51–0.95)
DEPRECATED RDW RBC: 53.4 FL (ref 39–50)
EOSINOPHIL # BLD: 0.1 K/MCL (ref 0–0.5)
EOSINOPHIL NFR BLD: 2 %
ERYTHROCYTE [DISTWIDTH] IN BLOOD: 16.5 % (ref 11–15)
FASTING DURATION TIME PATIENT: ABNORMAL H
GFR SERPLBLD BASED ON 1.73 SQ M-ARVRAT: >90 ML/MIN
GLUCOSE SERPL-MCNC: 170 MG/DL (ref 70–99)
HCT VFR BLD CALC: 23.8 % (ref 36–46.5)
HCT VFR BLD CALC: 29.2 % (ref 36–46.5)
HGB BLD-MCNC: 7.3 G/DL (ref 12–15.5)
HGB BLD-MCNC: 8.9 G/DL (ref 12–15.5)
IMM GRANULOCYTES # BLD AUTO: 0 K/MCL (ref 0–0.2)
IMM GRANULOCYTES # BLD: 0 %
LYMPHOCYTES # BLD: 1.5 K/MCL (ref 1–4)
LYMPHOCYTES NFR BLD: 21 %
MCH RBC QN AUTO: 27.1 PG (ref 26–34)
MCHC RBC AUTO-ENTMCNC: 30.5 G/DL (ref 32–36.5)
MCV RBC AUTO: 89 FL (ref 78–100)
MONOCYTES # BLD: 0.7 K/MCL (ref 0.3–0.9)
MONOCYTES NFR BLD: 9 %
NEUTROPHILS # BLD: 4.7 K/MCL (ref 1.8–7.7)
NEUTROPHILS NFR BLD: 67 %
NRBC BLD MANUAL-RTO: 0 /100 WBC
PLATELET # BLD AUTO: 278 K/MCL (ref 140–450)
POTASSIUM SERPL-SCNC: 3.5 MMOL/L (ref 3.4–5.1)
RBC # BLD: 3.28 MIL/MCL (ref 4–5.2)
SODIUM SERPL-SCNC: 143 MMOL/L (ref 135–145)
WBC # BLD: 7 K/MCL (ref 4.2–11)

## 2023-03-29 PROCEDURE — 36415 COLL VENOUS BLD VENIPUNCTURE: CPT | Performed by: EMERGENCY MEDICINE

## 2023-03-29 PROCEDURE — 85014 HEMATOCRIT: CPT | Performed by: EMERGENCY MEDICINE

## 2023-03-29 PROCEDURE — 13003289 HB OXYGEN THERAPY DAILY

## 2023-03-29 PROCEDURE — 10002807 HB RX 258: Performed by: EMERGENCY MEDICINE

## 2023-03-29 PROCEDURE — 85025 COMPLETE CBC W/AUTO DIFF WBC: CPT | Performed by: INTERNAL MEDICINE

## 2023-03-29 PROCEDURE — C9113 INJ PANTOPRAZOLE SODIUM, VIA: HCPCS | Performed by: EMERGENCY MEDICINE

## 2023-03-29 PROCEDURE — 10002800 HB RX 250 W HCPCS: Performed by: EMERGENCY MEDICINE

## 2023-03-29 PROCEDURE — 10002807 HB RX 258: Performed by: INTERNAL MEDICINE

## 2023-03-29 PROCEDURE — P9016 RBC LEUKOCYTES REDUCED: HCPCS

## 2023-03-29 PROCEDURE — 10003585 HB ROOM CHARGE INTERMEDIATE CARE

## 2023-03-29 PROCEDURE — 82140 ASSAY OF AMMONIA: CPT | Performed by: INTERNAL MEDICINE

## 2023-03-29 PROCEDURE — 10002803 HB RX 637: Performed by: INTERNAL MEDICINE

## 2023-03-29 PROCEDURE — 10004651 HB RX, NO CHARGE ITEM: Performed by: INTERNAL MEDICINE

## 2023-03-29 PROCEDURE — 85018 HEMOGLOBIN: CPT | Performed by: EMERGENCY MEDICINE

## 2023-03-29 PROCEDURE — 10004180 HB COUNTER-TRANSPORT

## 2023-03-29 PROCEDURE — 80048 BASIC METABOLIC PNL TOTAL CA: CPT | Performed by: INTERNAL MEDICINE

## 2023-03-29 RX ORDER — HYDROCODONE BITARTRATE AND ACETAMINOPHEN 5; 325 MG/1; MG/1
1 TABLET ORAL EVERY 6 HOURS PRN
Status: DISCONTINUED | OUTPATIENT
Start: 2023-03-29 | End: 2023-04-05 | Stop reason: HOSPADM

## 2023-03-29 RX ORDER — SODIUM CHLORIDE 9 MG/ML
INJECTION, SOLUTION INTRAVENOUS CONTINUOUS PRN
Status: DISCONTINUED | OUTPATIENT
Start: 2023-03-29 | End: 2023-04-05 | Stop reason: HOSPADM

## 2023-03-29 RX ORDER — POTASSIUM CHLORIDE 20 MEQ/1
40 TABLET, EXTENDED RELEASE ORAL ONCE
Status: COMPLETED | OUTPATIENT
Start: 2023-03-29 | End: 2023-03-29

## 2023-03-29 RX ADMIN — ACETAMINOPHEN 1000 MG: 500 TABLET ORAL at 08:09

## 2023-03-29 RX ADMIN — POTASSIUM CHLORIDE 40 MEQ: 1500 TABLET, EXTENDED RELEASE ORAL at 21:01

## 2023-03-29 RX ADMIN — ALPRAZOLAM 0.5 MG: 0.25 TABLET ORAL at 17:46

## 2023-03-29 RX ADMIN — PANTOPRAZOLE SODIUM 8 MG/HR: 40 INJECTION, POWDER, FOR SOLUTION INTRAVENOUS at 17:08

## 2023-03-29 RX ADMIN — LACTULOSE 30 G: 10 SOLUTION ORAL at 08:09

## 2023-03-29 RX ADMIN — SODIUM CHLORIDE, POTASSIUM CHLORIDE, SODIUM LACTATE AND CALCIUM CHLORIDE: 600; 310; 30; 20 INJECTION, SOLUTION INTRAVENOUS at 19:06

## 2023-03-29 RX ADMIN — ZOLPIDEM TARTRATE 5 MG: 5 TABLET ORAL at 23:16

## 2023-03-29 RX ADMIN — ACETAMINOPHEN 1000 MG: 500 TABLET ORAL at 14:37

## 2023-03-29 RX ADMIN — POLYETHYLENE GLYCOL-3350 AND ELECTROLYTES 4000 ML: 236; 6.74; 5.86; 2.97; 22.74 POWDER, FOR SOLUTION ORAL at 17:07

## 2023-03-29 RX ADMIN — SODIUM CHLORIDE, POTASSIUM CHLORIDE, SODIUM LACTATE AND CALCIUM CHLORIDE: 600; 310; 30; 20 INJECTION, SOLUTION INTRAVENOUS at 05:41

## 2023-03-29 RX ADMIN — LACTULOSE 30 G: 10 SOLUTION ORAL at 14:37

## 2023-03-29 RX ADMIN — PANTOPRAZOLE SODIUM 8 MG/HR: 40 INJECTION, POWDER, FOR SOLUTION INTRAVENOUS at 05:39

## 2023-03-29 ASSESSMENT — PAIN SCALES - GENERAL
PAINLEVEL_OUTOF10: 4
PAINLEVEL_OUTOF10: 5
PAINLEVEL_OUTOF10: 0
PAINLEVEL_OUTOF10: 2
PAINLEVEL_OUTOF10: 0
PAINLEVEL_OUTOF10: 3
PAINLEVEL_OUTOF10: 4
PAINLEVEL_OUTOF10: 2

## 2023-03-29 ASSESSMENT — ENCOUNTER SYMPTOMS
SHORTNESS OF BREATH: 0
NAUSEA: 0
NO PATIENT REPORTED PAIN: 1
FEVER: 0

## 2023-03-30 ENCOUNTER — ANESTHESIA (OUTPATIENT)
Dept: GASTROENTEROLOGY | Age: 78
DRG: 378 | End: 2023-03-30

## 2023-03-30 ENCOUNTER — ANESTHESIA EVENT (OUTPATIENT)
Dept: GASTROENTEROLOGY | Age: 78
DRG: 378 | End: 2023-03-30

## 2023-03-30 ENCOUNTER — APPOINTMENT (OUTPATIENT)
Dept: GASTROENTEROLOGY | Age: 78
DRG: 378 | End: 2023-03-30
Attending: INTERNAL MEDICINE

## 2023-03-30 LAB
ANION GAP SERPL CALC-SCNC: 8 MMOL/L (ref 7–19)
BASOPHILS # BLD: 0.1 K/MCL (ref 0–0.3)
BASOPHILS NFR BLD: 1 %
BUN SERPL-MCNC: 8 MG/DL (ref 6–20)
BUN/CREAT SERPL: 14 (ref 7–25)
CALCIUM SERPL-MCNC: 8.1 MG/DL (ref 8.4–10.2)
CHLORIDE SERPL-SCNC: 114 MMOL/L (ref 97–110)
CO2 SERPL-SCNC: 24 MMOL/L (ref 21–32)
CREAT SERPL-MCNC: 0.59 MG/DL (ref 0.51–0.95)
DEPRECATED RDW RBC: 51.8 FL (ref 39–50)
EOSINOPHIL # BLD: 0.2 K/MCL (ref 0–0.5)
EOSINOPHIL NFR BLD: 3 %
ERYTHROCYTE [DISTWIDTH] IN BLOOD: 16.8 % (ref 11–15)
FASTING DURATION TIME PATIENT: ABNORMAL H
GFR SERPLBLD BASED ON 1.73 SQ M-ARVRAT: >90 ML/MIN
GLUCOSE SERPL-MCNC: 95 MG/DL (ref 70–99)
HCT VFR BLD CALC: 26.9 % (ref 36–46.5)
HCT VFR BLD CALC: 27.3 % (ref 36–46.5)
HCT VFR BLD CALC: 27.7 % (ref 36–46.5)
HCT VFR BLD CALC: 29.1 % (ref 36–46.5)
HGB BLD-MCNC: 8.3 G/DL (ref 12–15.5)
HGB BLD-MCNC: 8.4 G/DL (ref 12–15.5)
HGB BLD-MCNC: 8.6 G/DL (ref 12–15.5)
HGB BLD-MCNC: 8.9 G/DL (ref 12–15.5)
IMM GRANULOCYTES # BLD AUTO: 0 K/MCL (ref 0–0.2)
IMM GRANULOCYTES # BLD: 0 %
LYMPHOCYTES # BLD: 1.5 K/MCL (ref 1–4)
LYMPHOCYTES NFR BLD: 23 %
MAGNESIUM SERPL-MCNC: 2 MG/DL (ref 1.7–2.4)
MCH RBC QN AUTO: 26.6 PG (ref 26–34)
MCHC RBC AUTO-ENTMCNC: 31.2 G/DL (ref 32–36.5)
MCV RBC AUTO: 85.1 FL (ref 78–100)
MONOCYTES # BLD: 0.8 K/MCL (ref 0.3–0.9)
MONOCYTES NFR BLD: 13 %
NEUTROPHILS # BLD: 3.8 K/MCL (ref 1.8–7.7)
NEUTROPHILS NFR BLD: 60 %
NRBC BLD MANUAL-RTO: 0 /100 WBC
PHOSPHATE SERPL-MCNC: 2.1 MG/DL (ref 2.4–4.7)
PLATELET # BLD AUTO: 248 K/MCL (ref 140–450)
POTASSIUM SERPL-SCNC: 3.8 MMOL/L (ref 3.4–5.1)
POTASSIUM SERPL-SCNC: 3.8 MMOL/L (ref 3.4–5.1)
RAINBOW EXTRA TUBES HOLD SPECIMEN: NORMAL
RBC # BLD: 3.16 MIL/MCL (ref 4–5.2)
SODIUM SERPL-SCNC: 142 MMOL/L (ref 135–145)
WBC # BLD: 6.4 K/MCL (ref 4.2–11)

## 2023-03-30 PROCEDURE — 83735 ASSAY OF MAGNESIUM: CPT | Performed by: INTERNAL MEDICINE

## 2023-03-30 PROCEDURE — 84132 ASSAY OF SERUM POTASSIUM: CPT | Performed by: INTERNAL MEDICINE

## 2023-03-30 PROCEDURE — 84100 ASSAY OF PHOSPHORUS: CPT | Performed by: INTERNAL MEDICINE

## 2023-03-30 PROCEDURE — 10004451 HB PACU RECOVERY 1ST 30 MINUTES

## 2023-03-30 PROCEDURE — 0DJD8ZZ INSPECTION OF LOWER INTESTINAL TRACT, VIA NATURAL OR ARTIFICIAL OPENING ENDOSCOPIC: ICD-10-PCS | Performed by: INTERNAL MEDICINE

## 2023-03-30 PROCEDURE — C9113 INJ PANTOPRAZOLE SODIUM, VIA: HCPCS | Performed by: EMERGENCY MEDICINE

## 2023-03-30 PROCEDURE — 85014 HEMATOCRIT: CPT | Performed by: INTERNAL MEDICINE

## 2023-03-30 PROCEDURE — 10002803 HB RX 637: Performed by: INTERNAL MEDICINE

## 2023-03-30 PROCEDURE — 80048 BASIC METABOLIC PNL TOTAL CA: CPT | Performed by: INTERNAL MEDICINE

## 2023-03-30 PROCEDURE — 10003585 HB ROOM CHARGE INTERMEDIATE CARE

## 2023-03-30 PROCEDURE — 10002800 HB RX 250 W HCPCS: Performed by: EMERGENCY MEDICINE

## 2023-03-30 PROCEDURE — 13000024 HB GI COMPLEX CASE S/U + 1ST 15 MIN

## 2023-03-30 PROCEDURE — 10002807 HB RX 258: Performed by: INTERNAL MEDICINE

## 2023-03-30 PROCEDURE — 10002801 HB RX 250 W/O HCPCS

## 2023-03-30 PROCEDURE — 10002807 HB RX 258

## 2023-03-30 PROCEDURE — 85018 HEMOGLOBIN: CPT | Performed by: INTERNAL MEDICINE

## 2023-03-30 PROCEDURE — 13000025 HB GI COMPLEX CASE EACH ADD MINUTE

## 2023-03-30 PROCEDURE — 13000001 HB PHASE II RECOVERY EA 30 MINUTES

## 2023-03-30 PROCEDURE — 13000008 HB ANESTHESIA MAC OUTSIDE OR

## 2023-03-30 PROCEDURE — 85025 COMPLETE CBC W/AUTO DIFF WBC: CPT | Performed by: INTERNAL MEDICINE

## 2023-03-30 PROCEDURE — 10002807 HB RX 258: Performed by: EMERGENCY MEDICINE

## 2023-03-30 PROCEDURE — 10002800 HB RX 250 W HCPCS

## 2023-03-30 PROCEDURE — 36415 COLL VENOUS BLD VENIPUNCTURE: CPT | Performed by: INTERNAL MEDICINE

## 2023-03-30 RX ORDER — SODIUM CHLORIDE 9 MG/ML
INJECTION, SOLUTION INTRAVENOUS CONTINUOUS PRN
Status: DISCONTINUED | OUTPATIENT
Start: 2023-03-30 | End: 2023-03-30

## 2023-03-30 RX ORDER — MIDAZOLAM HYDROCHLORIDE 1 MG/ML
INJECTION, SOLUTION INTRAMUSCULAR; INTRAVENOUS PRN
Status: DISCONTINUED | OUTPATIENT
Start: 2023-03-30 | End: 2023-03-30

## 2023-03-30 RX ORDER — PROPOFOL 10 MG/ML
INJECTION, EMULSION INTRAVENOUS PRN
Status: DISCONTINUED | OUTPATIENT
Start: 2023-03-30 | End: 2023-03-30

## 2023-03-30 RX ADMIN — DIBASIC SODIUM PHOSPHATE, MONOBASIC POTASSIUM PHOSPHATE AND MONOBASIC SODIUM PHOSPHATE 250 MG: 852; 155; 130 TABLET ORAL at 14:58

## 2023-03-30 RX ADMIN — SODIUM CHLORIDE, POTASSIUM CHLORIDE, SODIUM LACTATE AND CALCIUM CHLORIDE: 600; 310; 30; 20 INJECTION, SOLUTION INTRAVENOUS at 15:51

## 2023-03-30 RX ADMIN — ALPRAZOLAM 0.5 MG: 0.25 TABLET ORAL at 21:26

## 2023-03-30 RX ADMIN — SODIUM CHLORIDE, POTASSIUM CHLORIDE, SODIUM LACTATE AND CALCIUM CHLORIDE: 600; 310; 30; 20 INJECTION, SOLUTION INTRAVENOUS at 06:57

## 2023-03-30 RX ADMIN — PANTOPRAZOLE SODIUM 8 MG/HR: 40 INJECTION, POWDER, FOR SOLUTION INTRAVENOUS at 14:49

## 2023-03-30 RX ADMIN — DIBASIC SODIUM PHOSPHATE, MONOBASIC POTASSIUM PHOSPHATE AND MONOBASIC SODIUM PHOSPHATE 250 MG: 852; 155; 130 TABLET ORAL at 09:43

## 2023-03-30 RX ADMIN — PROPOFOL 20 MG: 10 INJECTION, EMULSION INTRAVENOUS at 13:10

## 2023-03-30 RX ADMIN — FENTANYL CITRATE 25 MCG: 50 INJECTION, SOLUTION INTRAMUSCULAR; INTRAVENOUS at 13:15

## 2023-03-30 RX ADMIN — FENTANYL CITRATE 50 MCG: 50 INJECTION, SOLUTION INTRAMUSCULAR; INTRAVENOUS at 13:10

## 2023-03-30 RX ADMIN — MIDAZOLAM HYDROCHLORIDE 2 MG: 1 INJECTION, SOLUTION INTRAMUSCULAR; INTRAVENOUS at 13:10

## 2023-03-30 RX ADMIN — PROPOFOL 75 MCG/KG/MIN: 10 INJECTION, EMULSION INTRAVENOUS at 13:10

## 2023-03-30 RX ADMIN — SODIUM CHLORIDE: 9 INJECTION, SOLUTION INTRAVENOUS at 13:02

## 2023-03-30 RX ADMIN — PANTOPRAZOLE SODIUM 8 MG/HR: 40 INJECTION, POWDER, FOR SOLUTION INTRAVENOUS at 03:14

## 2023-03-30 ASSESSMENT — PAIN SCALES - GENERAL
PAINLEVEL_OUTOF10: 0

## 2023-03-30 ASSESSMENT — ENCOUNTER SYMPTOMS
SHORTNESS OF BREATH: 0
NAUSEA: 0
NO PATIENT REPORTED PAIN: 1
FEVER: 0

## 2023-03-30 ASSESSMENT — PAIN SCALES - WONG BAKER: WONGBAKER_NUMERICALRESPONSE: 0

## 2023-03-31 LAB
AMMONIA PLAS-SCNC: <10 MCMOL/L
HCT VFR BLD CALC: 25.4 % (ref 36–46.5)
HCT VFR BLD CALC: 25.7 % (ref 36–46.5)
HCT VFR BLD CALC: 28.3 % (ref 36–46.5)
HGB BLD-MCNC: 7.9 G/DL (ref 12–15.5)
HGB BLD-MCNC: 8 G/DL (ref 12–15.5)
HGB BLD-MCNC: 8.6 G/DL (ref 12–15.5)
PHOSPHATE SERPL-MCNC: 3.5 MG/DL (ref 2.4–4.7)

## 2023-03-31 PROCEDURE — 10003585 HB ROOM CHARGE INTERMEDIATE CARE

## 2023-03-31 PROCEDURE — 10002807 HB RX 258: Performed by: INTERNAL MEDICINE

## 2023-03-31 PROCEDURE — 85014 HEMATOCRIT: CPT | Performed by: INTERNAL MEDICINE

## 2023-03-31 PROCEDURE — 10002803 HB RX 637: Performed by: INTERNAL MEDICINE

## 2023-03-31 PROCEDURE — 36415 COLL VENOUS BLD VENIPUNCTURE: CPT | Performed by: INTERNAL MEDICINE

## 2023-03-31 PROCEDURE — 10002800 HB RX 250 W HCPCS: Performed by: EMERGENCY MEDICINE

## 2023-03-31 PROCEDURE — 84100 ASSAY OF PHOSPHORUS: CPT | Performed by: INTERNAL MEDICINE

## 2023-03-31 PROCEDURE — C9113 INJ PANTOPRAZOLE SODIUM, VIA: HCPCS | Performed by: EMERGENCY MEDICINE

## 2023-03-31 PROCEDURE — 82140 ASSAY OF AMMONIA: CPT | Performed by: INTERNAL MEDICINE

## 2023-03-31 PROCEDURE — 10002807 HB RX 258: Performed by: EMERGENCY MEDICINE

## 2023-03-31 PROCEDURE — 97530 THERAPEUTIC ACTIVITIES: CPT

## 2023-03-31 RX ORDER — PANTOPRAZOLE SODIUM 40 MG/1
40 TABLET, DELAYED RELEASE ORAL EVERY 12 HOURS SCHEDULED
Status: DISCONTINUED | OUTPATIENT
Start: 2023-03-31 | End: 2023-04-05 | Stop reason: HOSPADM

## 2023-03-31 RX ADMIN — PANTOPRAZOLE SODIUM 40 MG: 40 TABLET, DELAYED RELEASE ORAL at 21:25

## 2023-03-31 RX ADMIN — RIFAXIMIN 550 MG: 550 TABLET ORAL at 21:25

## 2023-03-31 RX ADMIN — PANTOPRAZOLE SODIUM 40 MG: 40 TABLET, DELAYED RELEASE ORAL at 12:46

## 2023-03-31 RX ADMIN — SODIUM CHLORIDE, POTASSIUM CHLORIDE, SODIUM LACTATE AND CALCIUM CHLORIDE: 600; 310; 30; 20 INJECTION, SOLUTION INTRAVENOUS at 12:21

## 2023-03-31 RX ADMIN — PANTOPRAZOLE SODIUM 8 MG/HR: 40 INJECTION, POWDER, FOR SOLUTION INTRAVENOUS at 00:13

## 2023-03-31 RX ADMIN — SODIUM CHLORIDE, POTASSIUM CHLORIDE, SODIUM LACTATE AND CALCIUM CHLORIDE: 600; 310; 30; 20 INJECTION, SOLUTION INTRAVENOUS at 00:12

## 2023-03-31 RX ADMIN — RIFAXIMIN 550 MG: 550 TABLET ORAL at 12:46

## 2023-03-31 RX ADMIN — SODIUM CHLORIDE, POTASSIUM CHLORIDE, SODIUM LACTATE AND CALCIUM CHLORIDE: 600; 310; 30; 20 INJECTION, SOLUTION INTRAVENOUS at 21:32

## 2023-03-31 ASSESSMENT — PAIN SCALES - GENERAL
PAINLEVEL_OUTOF10: 0

## 2023-03-31 ASSESSMENT — ENCOUNTER SYMPTOMS: PAIN SEVERITY NOW: 0

## 2023-03-31 ASSESSMENT — COGNITIVE AND FUNCTIONAL STATUS - GENERAL
BASIC_MOBILITY_CONVERTED_SCORE: 36.97
BASIC_MOBILITY_RAW_SCORE: 15

## 2023-04-01 LAB
ANION GAP SERPL CALC-SCNC: 6 MMOL/L (ref 7–19)
BUN SERPL-MCNC: 8 MG/DL (ref 6–20)
BUN/CREAT SERPL: 14 (ref 7–25)
CALCIUM SERPL-MCNC: 7.9 MG/DL (ref 8.4–10.2)
CHLORIDE SERPL-SCNC: 112 MMOL/L (ref 97–110)
CO2 SERPL-SCNC: 27 MMOL/L (ref 21–32)
CREAT SERPL-MCNC: 0.59 MG/DL (ref 0.51–0.95)
DEPRECATED RDW RBC: 54.1 FL (ref 39–50)
ERYTHROCYTE [DISTWIDTH] IN BLOOD: 17 % (ref 11–15)
FASTING DURATION TIME PATIENT: ABNORMAL H
GFR SERPLBLD BASED ON 1.73 SQ M-ARVRAT: >90 ML/MIN
GLUCOSE SERPL-MCNC: 112 MG/DL (ref 70–99)
HCT VFR BLD CALC: 25.9 % (ref 36–46.5)
HGB BLD-MCNC: 7.9 G/DL (ref 12–15.5)
MCH RBC QN AUTO: 27.1 PG (ref 26–34)
MCHC RBC AUTO-ENTMCNC: 30.5 G/DL (ref 32–36.5)
MCV RBC AUTO: 88.7 FL (ref 78–100)
NRBC BLD MANUAL-RTO: 0 /100 WBC
PLATELET # BLD AUTO: 250 K/MCL (ref 140–450)
POTASSIUM SERPL-SCNC: 3.5 MMOL/L (ref 3.4–5.1)
POTASSIUM SERPL-SCNC: 4.5 MMOL/L (ref 3.4–5.1)
RAINBOW EXTRA TUBES HOLD SPECIMEN: NORMAL
RBC # BLD: 2.92 MIL/MCL (ref 4–5.2)
SODIUM SERPL-SCNC: 141 MMOL/L (ref 135–145)
WBC # BLD: 4.3 K/MCL (ref 4.2–11)

## 2023-04-01 PROCEDURE — 84132 ASSAY OF SERUM POTASSIUM: CPT | Performed by: INTERNAL MEDICINE

## 2023-04-01 PROCEDURE — 10002803 HB RX 637: Performed by: INTERNAL MEDICINE

## 2023-04-01 PROCEDURE — 85027 COMPLETE CBC AUTOMATED: CPT | Performed by: INTERNAL MEDICINE

## 2023-04-01 PROCEDURE — 13003289 HB OXYGEN THERAPY DAILY

## 2023-04-01 PROCEDURE — 10006031 HB ROOM CHARGE TELEMETRY

## 2023-04-01 PROCEDURE — 10002807 HB RX 258: Performed by: INTERNAL MEDICINE

## 2023-04-01 PROCEDURE — 36415 COLL VENOUS BLD VENIPUNCTURE: CPT | Performed by: INTERNAL MEDICINE

## 2023-04-01 PROCEDURE — 80048 BASIC METABOLIC PNL TOTAL CA: CPT | Performed by: INTERNAL MEDICINE

## 2023-04-01 RX ORDER — POTASSIUM CHLORIDE 20 MEQ/1
40 TABLET, EXTENDED RELEASE ORAL ONCE
Status: COMPLETED | OUTPATIENT
Start: 2023-04-01 | End: 2023-04-01

## 2023-04-01 RX ADMIN — RIFAXIMIN 550 MG: 550 TABLET ORAL at 20:28

## 2023-04-01 RX ADMIN — PANTOPRAZOLE SODIUM 40 MG: 40 TABLET, DELAYED RELEASE ORAL at 20:28

## 2023-04-01 RX ADMIN — RIFAXIMIN 550 MG: 550 TABLET ORAL at 08:49

## 2023-04-01 RX ADMIN — POTASSIUM CHLORIDE 40 MEQ: 1500 TABLET, EXTENDED RELEASE ORAL at 08:49

## 2023-04-01 RX ADMIN — ZOLPIDEM TARTRATE 5 MG: 5 TABLET ORAL at 22:51

## 2023-04-01 RX ADMIN — SODIUM CHLORIDE, POTASSIUM CHLORIDE, SODIUM LACTATE AND CALCIUM CHLORIDE: 600; 310; 30; 20 INJECTION, SOLUTION INTRAVENOUS at 05:45

## 2023-04-01 RX ADMIN — PANTOPRAZOLE SODIUM 40 MG: 40 TABLET, DELAYED RELEASE ORAL at 08:49

## 2023-04-01 RX ADMIN — SODIUM CHLORIDE, POTASSIUM CHLORIDE, SODIUM LACTATE AND CALCIUM CHLORIDE: 600; 310; 30; 20 INJECTION, SOLUTION INTRAVENOUS at 18:32

## 2023-04-01 ASSESSMENT — PAIN SCALES - GENERAL
PAINLEVEL_OUTOF10: 0

## 2023-04-01 ASSESSMENT — PAIN SCALES - WONG BAKER: WONGBAKER_NUMERICALRESPONSE: 0

## 2023-04-02 LAB
HCT VFR BLD CALC: 28.6 % (ref 36–46.5)
HGB BLD-MCNC: 8.7 G/DL (ref 12–15.5)
RAINBOW EXTRA TUBES HOLD SPECIMEN: NORMAL

## 2023-04-02 PROCEDURE — 10006031 HB ROOM CHARGE TELEMETRY

## 2023-04-02 PROCEDURE — 10002803 HB RX 637: Performed by: INTERNAL MEDICINE

## 2023-04-02 PROCEDURE — 85014 HEMATOCRIT: CPT | Performed by: INTERNAL MEDICINE

## 2023-04-02 PROCEDURE — 10002807 HB RX 258: Performed by: INTERNAL MEDICINE

## 2023-04-02 PROCEDURE — 36415 COLL VENOUS BLD VENIPUNCTURE: CPT | Performed by: INTERNAL MEDICINE

## 2023-04-02 RX ADMIN — RIFAXIMIN 550 MG: 550 TABLET ORAL at 09:01

## 2023-04-02 RX ADMIN — PANTOPRAZOLE SODIUM 40 MG: 40 TABLET, DELAYED RELEASE ORAL at 09:01

## 2023-04-02 RX ADMIN — SODIUM CHLORIDE, POTASSIUM CHLORIDE, SODIUM LACTATE AND CALCIUM CHLORIDE: 600; 310; 30; 20 INJECTION, SOLUTION INTRAVENOUS at 07:23

## 2023-04-02 RX ADMIN — PANTOPRAZOLE SODIUM 40 MG: 40 TABLET, DELAYED RELEASE ORAL at 21:24

## 2023-04-02 RX ADMIN — ZOLPIDEM TARTRATE 5 MG: 5 TABLET ORAL at 21:24

## 2023-04-02 RX ADMIN — RIFAXIMIN 550 MG: 550 TABLET ORAL at 21:24

## 2023-04-02 RX ADMIN — SODIUM CHLORIDE, POTASSIUM CHLORIDE, SODIUM LACTATE AND CALCIUM CHLORIDE: 600; 310; 30; 20 INJECTION, SOLUTION INTRAVENOUS at 21:30

## 2023-04-02 ASSESSMENT — PAIN SCALES - GENERAL
PAINLEVEL_OUTOF10: 0
PAINLEVEL_OUTOF10: 0

## 2023-04-03 LAB
HCT VFR BLD CALC: 28.4 % (ref 36–46.5)
HGB BLD-MCNC: 8.5 G/DL (ref 12–15.5)

## 2023-04-03 PROCEDURE — 10002803 HB RX 637: Performed by: INTERNAL MEDICINE

## 2023-04-03 PROCEDURE — 10006031 HB ROOM CHARGE TELEMETRY

## 2023-04-03 PROCEDURE — 85014 HEMATOCRIT: CPT | Performed by: INTERNAL MEDICINE

## 2023-04-03 PROCEDURE — 36415 COLL VENOUS BLD VENIPUNCTURE: CPT | Performed by: INTERNAL MEDICINE

## 2023-04-03 PROCEDURE — 97535 SELF CARE MNGMENT TRAINING: CPT

## 2023-04-03 RX ADMIN — RIFAXIMIN 550 MG: 550 TABLET ORAL at 22:07

## 2023-04-03 RX ADMIN — PANTOPRAZOLE SODIUM 40 MG: 40 TABLET, DELAYED RELEASE ORAL at 22:07

## 2023-04-03 RX ADMIN — ZOLPIDEM TARTRATE 5 MG: 5 TABLET ORAL at 22:07

## 2023-04-03 RX ADMIN — PANTOPRAZOLE SODIUM 40 MG: 40 TABLET, DELAYED RELEASE ORAL at 09:19

## 2023-04-03 RX ADMIN — RIFAXIMIN 550 MG: 550 TABLET ORAL at 09:19

## 2023-04-03 ASSESSMENT — COGNITIVE AND FUNCTIONAL STATUS - GENERAL
HELP NEEDED DRESSING REGULAR UPPER BODY CLOTHING: A LITTLE
HELP NEEDED DRESSING REGULAR LOWER BODY CLOTHING: A LITTLE
DAILY_ACTIVITY_RAW_SCORE: 20
HELP NEEDED FOR BATHING: A LITTLE
DAILY_ACTIVITY_CONVERTED_SCORE: 42.03

## 2023-04-03 ASSESSMENT — ENCOUNTER SYMPTOMS: PAIN SEVERITY NOW: 0

## 2023-04-03 ASSESSMENT — PAIN SCALES - GENERAL
PAINLEVEL_OUTOF10: 0
PAINLEVEL_OUTOF10: 0

## 2023-04-03 ASSESSMENT — ACTIVITIES OF DAILY LIVING (ADL): HOME_MANAGEMENT_TIME_ENTRY: 27

## 2023-04-04 LAB
AMMONIA PLAS-SCNC: <10 MCMOL/L
HCT VFR BLD CALC: 28.6 % (ref 36–46.5)
HGB BLD-MCNC: 8.4 G/DL (ref 12–15.5)
RAINBOW EXTRA TUBES HOLD SPECIMEN: NORMAL

## 2023-04-04 PROCEDURE — 10002803 HB RX 637: Performed by: INTERNAL MEDICINE

## 2023-04-04 PROCEDURE — 82140 ASSAY OF AMMONIA: CPT | Performed by: INTERNAL MEDICINE

## 2023-04-04 PROCEDURE — 97530 THERAPEUTIC ACTIVITIES: CPT

## 2023-04-04 PROCEDURE — 36415 COLL VENOUS BLD VENIPUNCTURE: CPT | Performed by: INTERNAL MEDICINE

## 2023-04-04 PROCEDURE — 10006031 HB ROOM CHARGE TELEMETRY

## 2023-04-04 PROCEDURE — 85018 HEMOGLOBIN: CPT | Performed by: INTERNAL MEDICINE

## 2023-04-04 PROCEDURE — 97116 GAIT TRAINING THERAPY: CPT

## 2023-04-04 RX ORDER — LACTULOSE 10 G/15ML
30 SOLUTION ORAL NIGHTLY
Qty: 100 ML | Refills: 0 | Status: SHIPPED | OUTPATIENT
Start: 2023-04-04 | End: 2023-04-05 | Stop reason: HOSPADM

## 2023-04-04 RX ADMIN — PANTOPRAZOLE SODIUM 40 MG: 40 TABLET, DELAYED RELEASE ORAL at 20:47

## 2023-04-04 RX ADMIN — RIFAXIMIN 550 MG: 550 TABLET ORAL at 09:31

## 2023-04-04 RX ADMIN — APIXABAN 5 MG: 5 TABLET, FILM COATED ORAL at 22:38

## 2023-04-04 RX ADMIN — PANTOPRAZOLE SODIUM 40 MG: 40 TABLET, DELAYED RELEASE ORAL at 09:31

## 2023-04-04 RX ADMIN — ZOLPIDEM TARTRATE 5 MG: 5 TABLET ORAL at 20:49

## 2023-04-04 RX ADMIN — RIFAXIMIN 550 MG: 550 TABLET ORAL at 20:47

## 2023-04-04 ASSESSMENT — PAIN SCALES - GENERAL
PAINLEVEL_OUTOF10: 0
PAINLEVEL_OUTOF10: 0

## 2023-04-04 ASSESSMENT — COGNITIVE AND FUNCTIONAL STATUS - GENERAL
BASIC_MOBILITY_CONVERTED_SCORE: 39.67
BASIC_MOBILITY_RAW_SCORE: 17

## 2023-04-05 VITALS
SYSTOLIC BLOOD PRESSURE: 108 MMHG | HEIGHT: 62 IN | BODY MASS INDEX: 28.28 KG/M2 | DIASTOLIC BLOOD PRESSURE: 71 MMHG | HEART RATE: 83 BPM | TEMPERATURE: 98.4 F | OXYGEN SATURATION: 95 % | WEIGHT: 153.66 LBS | RESPIRATION RATE: 16 BRPM

## 2023-04-05 LAB
HCT VFR BLD CALC: 28.6 % (ref 36–46.5)
HGB BLD-MCNC: 8.5 G/DL (ref 12–15.5)
RAINBOW EXTRA TUBES HOLD SPECIMEN: NORMAL

## 2023-04-05 PROCEDURE — 10002803 HB RX 637: Performed by: INTERNAL MEDICINE

## 2023-04-05 PROCEDURE — 36415 COLL VENOUS BLD VENIPUNCTURE: CPT | Performed by: INTERNAL MEDICINE

## 2023-04-05 PROCEDURE — 85014 HEMATOCRIT: CPT | Performed by: INTERNAL MEDICINE

## 2023-04-05 RX ORDER — LACTULOSE 10 G/15ML
30 SOLUTION ORAL NIGHTLY
Qty: 200 ML | Refills: 0 | Status: SHIPPED | OUTPATIENT
Start: 2023-04-05 | End: 2023-05-18 | Stop reason: ALTCHOICE

## 2023-04-05 RX ORDER — LACTULOSE 10 G/15ML
30 SOLUTION ORAL DAILY
Status: DISCONTINUED | OUTPATIENT
Start: 2023-04-06 | End: 2023-04-05 | Stop reason: HOSPADM

## 2023-04-05 RX ADMIN — RIFAXIMIN 550 MG: 550 TABLET ORAL at 08:24

## 2023-04-05 RX ADMIN — PANTOPRAZOLE SODIUM 40 MG: 40 TABLET, DELAYED RELEASE ORAL at 08:24

## 2023-04-05 RX ADMIN — APIXABAN 5 MG: 5 TABLET, FILM COATED ORAL at 08:24

## 2023-04-05 ASSESSMENT — PAIN SCALES - GENERAL: PAINLEVEL_OUTOF10: 0

## 2023-05-03 ENCOUNTER — LAB REQUISITION (OUTPATIENT)
Dept: LAB | Facility: HOSPITAL | Age: 78
End: 2023-05-03
Payer: MEDICARE

## 2023-05-03 DIAGNOSIS — K76.9 LIVER DISEASE, UNSPECIFIED: ICD-10-CM

## 2023-05-03 DIAGNOSIS — D64.9 ANEMIA, UNSPECIFIED: ICD-10-CM

## 2023-05-03 LAB
ANION GAP SERPL CALC-SCNC: 6 MMOL/L (ref 0–18)
BASOPHILS # BLD AUTO: 0.06 X10(3) UL (ref 0–0.2)
BASOPHILS NFR BLD AUTO: 1.2 %
BUN BLD-MCNC: 11 MG/DL (ref 7–18)
CALCIUM BLD-MCNC: 8.5 MG/DL (ref 8.5–10.1)
CHLORIDE SERPL-SCNC: 111 MMOL/L (ref 98–112)
CO2 SERPL-SCNC: 25 MMOL/L (ref 21–32)
CREAT BLD-MCNC: 0.5 MG/DL
DEPRECATED HBV CORE AB SER IA-ACNC: 5.3 NG/ML
EOSINOPHIL # BLD AUTO: 0.36 X10(3) UL (ref 0–0.7)
EOSINOPHIL NFR BLD AUTO: 7.2 %
ERYTHROCYTE [DISTWIDTH] IN BLOOD BY AUTOMATED COUNT: 18.2 %
EST. AVERAGE GLUCOSE BLD GHB EST-MCNC: 117 MG/DL (ref 68–126)
FASTING STATUS PATIENT QL REPORTED: YES
GFR SERPLBLD BASED ON 1.73 SQ M-ARVRAT: 97 ML/MIN/1.73M2 (ref 60–?)
GLUCOSE BLD-MCNC: 86 MG/DL (ref 70–99)
HBA1C MFR BLD: 5.7 % (ref ?–5.7)
HCT VFR BLD AUTO: 29.5 %
HGB BLD-MCNC: 8.4 G/DL
IMM GRANULOCYTES # BLD AUTO: 0.01 X10(3) UL (ref 0–1)
IMM GRANULOCYTES NFR BLD: 0.2 %
IRON SATN MFR SERPL: 3 %
IRON SERPL-MCNC: 13 UG/DL
LYMPHOCYTES # BLD AUTO: 1.21 X10(3) UL (ref 1–4)
LYMPHOCYTES NFR BLD AUTO: 24.2 %
MCH RBC QN AUTO: 22.7 PG (ref 26–34)
MCHC RBC AUTO-ENTMCNC: 28.5 G/DL (ref 31–37)
MCV RBC AUTO: 79.7 FL
MONOCYTES # BLD AUTO: 0.69 X10(3) UL (ref 0.1–1)
MONOCYTES NFR BLD AUTO: 13.8 %
NEUTROPHILS # BLD AUTO: 2.68 X10 (3) UL (ref 1.5–7.7)
NEUTROPHILS # BLD AUTO: 2.68 X10(3) UL (ref 1.5–7.7)
NEUTROPHILS NFR BLD AUTO: 53.4 %
OSMOLALITY SERPL CALC.SUM OF ELEC: 293 MOSM/KG (ref 275–295)
PLATELET # BLD AUTO: 388 10(3)UL (ref 150–450)
POTASSIUM SERPL-SCNC: 3.7 MMOL/L (ref 3.5–5.1)
RBC # BLD AUTO: 3.7 X10(6)UL
SODIUM SERPL-SCNC: 142 MMOL/L (ref 136–145)
TIBC SERPL-MCNC: 381 UG/DL (ref 240–450)
TRANSFERRIN SERPL-MCNC: 256 MG/DL (ref 200–360)
VIT D+METAB SERPL-MCNC: 51.3 NG/ML (ref 30–100)
WBC # BLD AUTO: 5 X10(3) UL (ref 4–11)

## 2023-05-03 PROCEDURE — 82728 ASSAY OF FERRITIN: CPT | Performed by: FAMILY MEDICINE

## 2023-05-03 PROCEDURE — 83540 ASSAY OF IRON: CPT | Performed by: FAMILY MEDICINE

## 2023-05-03 PROCEDURE — 83036 HEMOGLOBIN GLYCOSYLATED A1C: CPT | Performed by: FAMILY MEDICINE

## 2023-05-03 PROCEDURE — 83550 IRON BINDING TEST: CPT | Performed by: FAMILY MEDICINE

## 2023-05-03 PROCEDURE — 82306 VITAMIN D 25 HYDROXY: CPT | Performed by: FAMILY MEDICINE

## 2023-05-03 PROCEDURE — 82140 ASSAY OF AMMONIA: CPT | Performed by: FAMILY MEDICINE

## 2023-05-03 PROCEDURE — 80048 BASIC METABOLIC PNL TOTAL CA: CPT | Performed by: FAMILY MEDICINE

## 2023-05-03 PROCEDURE — 85025 COMPLETE CBC W/AUTO DIFF WBC: CPT | Performed by: FAMILY MEDICINE

## 2023-05-04 ENCOUNTER — LAB REQUISITION (OUTPATIENT)
Dept: LAB | Facility: HOSPITAL | Age: 78
End: 2023-05-04
Payer: MEDICARE

## 2023-05-04 DIAGNOSIS — K76.9 LIVER DISEASE, UNSPECIFIED: ICD-10-CM

## 2023-05-04 DIAGNOSIS — D64.9 ANEMIA, UNSPECIFIED: ICD-10-CM

## 2023-05-04 LAB — AMMONIA PLAS-MCNC: 11 UMOL/L (ref 11–32)

## 2023-05-04 PROCEDURE — 82140 ASSAY OF AMMONIA: CPT | Performed by: FAMILY MEDICINE

## 2023-05-17 ENCOUNTER — LAB REQUISITION (OUTPATIENT)
Dept: LAB | Facility: HOSPITAL | Age: 78
End: 2023-05-17
Payer: MEDICARE

## 2023-05-17 DIAGNOSIS — K76.9 LIVER DISEASE, UNSPECIFIED: ICD-10-CM

## 2023-05-17 DIAGNOSIS — D64.9 ANEMIA, UNSPECIFIED: ICD-10-CM

## 2023-05-17 LAB
AMMONIA PLAS-MCNC: 46 UMOL/L (ref 11–32)
BASOPHILS # BLD AUTO: 0.05 X10(3) UL (ref 0–0.2)
BASOPHILS NFR BLD AUTO: 0.9 %
DEPRECATED HBV CORE AB SER IA-ACNC: 6.2 NG/ML
EOSINOPHIL # BLD AUTO: 0.41 X10(3) UL (ref 0–0.7)
EOSINOPHIL NFR BLD AUTO: 7.8 %
ERYTHROCYTE [DISTWIDTH] IN BLOOD BY AUTOMATED COUNT: 19.2 %
HCT VFR BLD AUTO: 27 %
HGB BLD-MCNC: 7.5 G/DL
IMM GRANULOCYTES # BLD AUTO: 0.02 X10(3) UL (ref 0–1)
IMM GRANULOCYTES NFR BLD: 0.4 %
IRON SATN MFR SERPL: 4 %
IRON SERPL-MCNC: 13 UG/DL
LYMPHOCYTES # BLD AUTO: 1.32 X10(3) UL (ref 1–4)
LYMPHOCYTES NFR BLD AUTO: 25 %
MCH RBC QN AUTO: 22 PG (ref 26–34)
MCHC RBC AUTO-ENTMCNC: 27.8 G/DL (ref 31–37)
MCV RBC AUTO: 79.2 FL
MONOCYTES # BLD AUTO: 0.75 X10(3) UL (ref 0.1–1)
MONOCYTES NFR BLD AUTO: 14.2 %
NEUTROPHILS # BLD AUTO: 2.72 X10 (3) UL (ref 1.5–7.7)
NEUTROPHILS # BLD AUTO: 2.72 X10(3) UL (ref 1.5–7.7)
NEUTROPHILS NFR BLD AUTO: 51.7 %
PLATELET # BLD AUTO: 445 10(3)UL (ref 150–450)
RBC # BLD AUTO: 3.41 X10(6)UL
TIBC SERPL-MCNC: 347 UG/DL (ref 240–450)
TRANSFERRIN SERPL-MCNC: 233 MG/DL (ref 200–360)
WBC # BLD AUTO: 5.3 X10(3) UL (ref 4–11)

## 2023-05-17 PROCEDURE — 85025 COMPLETE CBC W/AUTO DIFF WBC: CPT | Performed by: FAMILY MEDICINE

## 2023-05-17 PROCEDURE — 82140 ASSAY OF AMMONIA: CPT | Performed by: FAMILY MEDICINE

## 2023-05-17 PROCEDURE — 83550 IRON BINDING TEST: CPT | Performed by: FAMILY MEDICINE

## 2023-05-17 PROCEDURE — 82728 ASSAY OF FERRITIN: CPT | Performed by: FAMILY MEDICINE

## 2023-05-17 PROCEDURE — 83540 ASSAY OF IRON: CPT | Performed by: FAMILY MEDICINE

## 2023-05-18 ENCOUNTER — EXTERNAL RECORD (OUTPATIENT)
Dept: HEALTH INFORMATION MANAGEMENT | Facility: OTHER | Age: 78
End: 2023-05-18

## 2023-05-18 ENCOUNTER — TELEPHONE (OUTPATIENT)
Dept: CARDIOLOGY | Age: 78
End: 2023-05-18

## 2023-05-18 RX ORDER — TRAZODONE HYDROCHLORIDE 50 MG/1
50 TABLET ORAL AT BEDTIME
COMMUNITY
Start: 2023-04-28

## 2023-05-18 RX ORDER — MIDODRINE HYDROCHLORIDE 2.5 MG/1
2.5 TABLET ORAL 2 TIMES DAILY
Qty: 60 TABLET | Refills: 0 | Status: SHIPPED | OUTPATIENT
Start: 2023-05-18 | End: 2023-07-12 | Stop reason: DRUGHIGH

## 2023-05-18 RX ORDER — VENLAFAXINE HYDROCHLORIDE 150 MG/1
150 CAPSULE, EXTENDED RELEASE ORAL DAILY
COMMUNITY
Start: 2023-04-21

## 2023-05-18 RX ORDER — ARIPIPRAZOLE 2 MG/1
2 TABLET ORAL DAILY
COMMUNITY
Start: 2023-04-21

## 2023-05-19 ENCOUNTER — TELEPHONE (OUTPATIENT)
Dept: CARDIOLOGY | Age: 78
End: 2023-05-19

## 2023-05-23 ENCOUNTER — OFFICE VISIT (OUTPATIENT)
Dept: HEMATOLOGY/ONCOLOGY | Age: 78
End: 2023-05-23
Attending: STUDENT IN AN ORGANIZED HEALTH CARE EDUCATION/TRAINING PROGRAM

## 2023-05-23 VITALS
HEIGHT: 62 IN | SYSTOLIC BLOOD PRESSURE: 100 MMHG | OXYGEN SATURATION: 99 % | DIASTOLIC BLOOD PRESSURE: 72 MMHG | TEMPERATURE: 98.2 F | HEART RATE: 80 BPM | RESPIRATION RATE: 15 BRPM | BODY MASS INDEX: 28.61 KG/M2 | WEIGHT: 155.5 LBS

## 2023-05-23 DIAGNOSIS — M80.00XD AGE-RELATED OSTEOPOROSIS WITH CURRENT PATHOLOGICAL FRACTURE WITH ROUTINE HEALING: ICD-10-CM

## 2023-05-23 DIAGNOSIS — I26.94 MULTIPLE SUBSEGMENTAL PULMONARY EMBOLI WITHOUT ACUTE COR PULMONALE (CMD): ICD-10-CM

## 2023-05-23 DIAGNOSIS — D50.0 IRON DEFICIENCY ANEMIA DUE TO CHRONIC BLOOD LOSS: Primary | ICD-10-CM

## 2023-05-23 PROBLEM — I26.99 PULMONARY EMBOLISM (CMD): Status: ACTIVE | Noted: 2023-05-23

## 2023-05-23 PROBLEM — R07.1 CHEST PAIN ON BREATHING: Status: RESOLVED | Noted: 2023-01-05 | Resolved: 2023-05-23

## 2023-05-23 PROCEDURE — 99215 OFFICE O/P EST HI 40 MIN: CPT | Performed by: STUDENT IN AN ORGANIZED HEALTH CARE EDUCATION/TRAINING PROGRAM

## 2023-05-23 PROCEDURE — 99211 OFF/OP EST MAY X REQ PHY/QHP: CPT

## 2023-05-23 RX ORDER — DIPHENHYDRAMINE HYDROCHLORIDE 50 MG/ML
50 INJECTION INTRAMUSCULAR; INTRAVENOUS
Status: CANCELLED
Start: 2023-06-05

## 2023-05-23 RX ORDER — FAMOTIDINE 10 MG/ML
20 INJECTION, SOLUTION INTRAVENOUS
Status: CANCELLED | OUTPATIENT
Start: 2023-05-23

## 2023-05-23 RX ORDER — METHYLPREDNISOLONE SODIUM SUCCINATE 125 MG/2ML
125 INJECTION, POWDER, LYOPHILIZED, FOR SOLUTION INTRAMUSCULAR; INTRAVENOUS
Status: CANCELLED | OUTPATIENT
Start: 2023-05-23

## 2023-05-23 RX ORDER — ALBUTEROL SULFATE 90 UG/1
2 AEROSOL, METERED RESPIRATORY (INHALATION)
Status: CANCELLED | OUTPATIENT
Start: 2023-06-05

## 2023-05-23 RX ORDER — METHYLPREDNISOLONE SODIUM SUCCINATE 125 MG/2ML
125 INJECTION, POWDER, LYOPHILIZED, FOR SOLUTION INTRAMUSCULAR; INTRAVENOUS
Status: CANCELLED | OUTPATIENT
Start: 2023-06-05

## 2023-05-23 RX ORDER — DIPHENHYDRAMINE HYDROCHLORIDE 50 MG/ML
50 INJECTION INTRAMUSCULAR; INTRAVENOUS
Status: CANCELLED
Start: 2023-05-23

## 2023-05-23 RX ORDER — ALBUTEROL SULFATE 90 UG/1
2 AEROSOL, METERED RESPIRATORY (INHALATION)
Status: CANCELLED | OUTPATIENT
Start: 2023-05-23

## 2023-05-23 RX ORDER — RIFAXIMIN 550 MG/1
TABLET ORAL
COMMUNITY
Start: 2023-05-18 | End: 2023-07-12

## 2023-05-23 RX ORDER — FAMOTIDINE 10 MG/ML
20 INJECTION, SOLUTION INTRAVENOUS
Status: CANCELLED | OUTPATIENT
Start: 2023-06-05

## 2023-05-23 ASSESSMENT — PATIENT HEALTH QUESTIONNAIRE - PHQ9
2. FEELING DOWN, DEPRESSED OR HOPELESS: SEVERAL DAYS
SUM OF ALL RESPONSES TO PHQ9 QUESTIONS 1 AND 2: 2
1. LITTLE INTEREST OR PLEASURE IN DOING THINGS: NOT AT ALL
SUM OF ALL RESPONSES TO PHQ9 QUESTIONS 1 AND 2: 0
CLINICAL INTERPRETATION OF PHQ2 SCORE: NO FURTHER SCREENING NEEDED
1. LITTLE INTEREST OR PLEASURE IN DOING THINGS: SEVERAL DAYS
CLINICAL INTERPRETATION OF PHQ2 SCORE: NO FURTHER SCREENING NEEDED
2. FEELING DOWN, DEPRESSED OR HOPELESS: NOT AT ALL
SUM OF ALL RESPONSES TO PHQ9 QUESTIONS 1 AND 2: 2
SUM OF ALL RESPONSES TO PHQ9 QUESTIONS 1 AND 2: 0

## 2023-05-23 ASSESSMENT — PAIN SCALES - GENERAL: PAINLEVEL: 0

## 2023-05-24 PROCEDURE — 85025 COMPLETE CBC W/AUTO DIFF WBC: CPT | Performed by: FAMILY MEDICINE

## 2023-05-24 PROCEDURE — 82140 ASSAY OF AMMONIA: CPT | Performed by: FAMILY MEDICINE

## 2023-05-25 ENCOUNTER — LAB REQUISITION (OUTPATIENT)
Dept: LAB | Facility: HOSPITAL | Age: 78
End: 2023-05-25
Payer: MEDICARE

## 2023-05-25 DIAGNOSIS — D64.9 ANEMIA, UNSPECIFIED: ICD-10-CM

## 2023-05-25 DIAGNOSIS — E03.9 HYPOTHYROIDISM, UNSPECIFIED: ICD-10-CM

## 2023-05-25 DIAGNOSIS — K76.9 LIVER DISEASE, UNSPECIFIED: ICD-10-CM

## 2023-05-25 LAB
AMMONIA PLAS-MCNC: 19 UMOL/L (ref 11–32)
BASOPHILS # BLD AUTO: 0.05 X10(3) UL (ref 0–0.2)
BASOPHILS NFR BLD AUTO: 1 %
EOSINOPHIL # BLD AUTO: 0.23 X10(3) UL (ref 0–0.7)
EOSINOPHIL NFR BLD AUTO: 4.8 %
ERYTHROCYTE [DISTWIDTH] IN BLOOD BY AUTOMATED COUNT: 19.1 %
HCT VFR BLD AUTO: 27.5 %
HGB BLD-MCNC: 7.7 G/DL
IMM GRANULOCYTES # BLD AUTO: 0.01 X10(3) UL (ref 0–1)
IMM GRANULOCYTES NFR BLD: 0.2 %
LYMPHOCYTES # BLD AUTO: 1 X10(3) UL (ref 1–4)
LYMPHOCYTES NFR BLD AUTO: 20.9 %
MCH RBC QN AUTO: 21.9 PG (ref 26–34)
MCHC RBC AUTO-ENTMCNC: 28 G/DL (ref 31–37)
MCV RBC AUTO: 78.1 FL
MONOCYTES # BLD AUTO: 0.52 X10(3) UL (ref 0.1–1)
MONOCYTES NFR BLD AUTO: 10.9 %
NEUTROPHILS # BLD AUTO: 2.98 X10 (3) UL (ref 1.5–7.7)
NEUTROPHILS # BLD AUTO: 2.98 X10(3) UL (ref 1.5–7.7)
NEUTROPHILS NFR BLD AUTO: 62.2 %
PLATELET # BLD AUTO: 373 10(3)UL (ref 150–450)
RBC # BLD AUTO: 3.52 X10(6)UL
WBC # BLD AUTO: 4.8 X10(3) UL (ref 4–11)

## 2023-05-26 ENCOUNTER — HOSPITAL ENCOUNTER (OUTPATIENT)
Dept: INFUSION THERAPY | Age: 78
Discharge: STILL ADMITTED OR ADMINISTRATIVE | End: 2023-05-26
Attending: STUDENT IN AN ORGANIZED HEALTH CARE EDUCATION/TRAINING PROGRAM

## 2023-05-26 VITALS
HEART RATE: 72 BPM | SYSTOLIC BLOOD PRESSURE: 112 MMHG | TEMPERATURE: 97.5 F | OXYGEN SATURATION: 96 % | RESPIRATION RATE: 16 BRPM | DIASTOLIC BLOOD PRESSURE: 74 MMHG

## 2023-05-26 DIAGNOSIS — D50.0 IRON DEFICIENCY ANEMIA DUE TO CHRONIC BLOOD LOSS: Primary | ICD-10-CM

## 2023-05-26 PROCEDURE — 96365 THER/PROPH/DIAG IV INF INIT: CPT

## 2023-05-26 PROCEDURE — 10002807 HB RX 258: Performed by: STUDENT IN AN ORGANIZED HEALTH CARE EDUCATION/TRAINING PROGRAM

## 2023-05-26 PROCEDURE — 10002800 HB RX 250 W HCPCS: Performed by: STUDENT IN AN ORGANIZED HEALTH CARE EDUCATION/TRAINING PROGRAM

## 2023-05-26 RX ADMIN — FERRIC CARBOXYMALTOSE INJECTION 750 MG: 50 INJECTION, SOLUTION INTRAVENOUS at 12:59

## 2023-05-26 RX ADMIN — SODIUM CHLORIDE 250 ML: 9 INJECTION, SOLUTION INTRAVENOUS at 12:59

## 2023-06-05 ENCOUNTER — HOSPITAL ENCOUNTER (OUTPATIENT)
Dept: INFUSION THERAPY | Age: 78
Discharge: STILL ADMITTED OR ADMINISTRATIVE | End: 2023-06-05
Attending: STUDENT IN AN ORGANIZED HEALTH CARE EDUCATION/TRAINING PROGRAM

## 2023-06-05 VITALS
RESPIRATION RATE: 16 BRPM | OXYGEN SATURATION: 97 % | DIASTOLIC BLOOD PRESSURE: 75 MMHG | TEMPERATURE: 97.7 F | HEART RATE: 69 BPM | SYSTOLIC BLOOD PRESSURE: 113 MMHG

## 2023-06-05 DIAGNOSIS — M80.00XD AGE-RELATED OSTEOPOROSIS WITH CURRENT PATHOLOGICAL FRACTURE WITH ROUTINE HEALING: ICD-10-CM

## 2023-06-05 DIAGNOSIS — D50.0 IRON DEFICIENCY ANEMIA DUE TO CHRONIC BLOOD LOSS: Primary | ICD-10-CM

## 2023-06-05 PROCEDURE — 96372 THER/PROPH/DIAG INJ SC/IM: CPT | Performed by: STUDENT IN AN ORGANIZED HEALTH CARE EDUCATION/TRAINING PROGRAM

## 2023-06-05 PROCEDURE — 10002800 HB RX 250 W HCPCS: Performed by: STUDENT IN AN ORGANIZED HEALTH CARE EDUCATION/TRAINING PROGRAM

## 2023-06-05 PROCEDURE — 10002807 HB RX 258: Performed by: STUDENT IN AN ORGANIZED HEALTH CARE EDUCATION/TRAINING PROGRAM

## 2023-06-05 PROCEDURE — 96365 THER/PROPH/DIAG IV INF INIT: CPT

## 2023-06-05 RX ADMIN — SODIUM CHLORIDE 250 ML: 9 INJECTION, SOLUTION INTRAVENOUS at 13:23

## 2023-06-05 RX ADMIN — DENOSUMAB 60 MG: 60 INJECTION SUBCUTANEOUS at 13:58

## 2023-06-05 RX ADMIN — FERRIC CARBOXYMALTOSE INJECTION 750 MG: 50 INJECTION, SOLUTION INTRAVENOUS at 13:23

## 2023-06-05 ASSESSMENT — PAIN SCALES - GENERAL: PAINLEVEL: 0

## 2023-06-07 ENCOUNTER — LAB REQUISITION (OUTPATIENT)
Dept: LAB | Facility: HOSPITAL | Age: 78
End: 2023-06-07
Payer: MEDICARE

## 2023-06-07 DIAGNOSIS — D64.9 ANEMIA, UNSPECIFIED: ICD-10-CM

## 2023-06-07 DIAGNOSIS — K76.9 LIVER DISEASE, UNSPECIFIED: ICD-10-CM

## 2023-06-07 LAB
AMMONIA PLAS-MCNC: 49 UMOL/L (ref 11–32)
BASOPHILS # BLD AUTO: 0.05 X10(3) UL (ref 0–0.2)
BASOPHILS NFR BLD AUTO: 0.9 %
EOSINOPHIL # BLD AUTO: 0.16 X10(3) UL (ref 0–0.7)
EOSINOPHIL NFR BLD AUTO: 2.7 %
ERYTHROCYTE [DISTWIDTH] IN BLOOD BY AUTOMATED COUNT: 29.7 %
HCT VFR BLD AUTO: 38.1 %
HGB BLD-MCNC: 10.9 G/DL
IMM GRANULOCYTES # BLD AUTO: 0.05 X10(3) UL (ref 0–1)
IMM GRANULOCYTES NFR BLD: 0.9 %
LYMPHOCYTES # BLD AUTO: 1.11 X10(3) UL (ref 1–4)
LYMPHOCYTES NFR BLD AUTO: 19.1 %
MCH RBC QN AUTO: 24.5 PG (ref 26–34)
MCHC RBC AUTO-ENTMCNC: 28.6 G/DL (ref 31–37)
MCV RBC AUTO: 85.6 FL
MONOCYTES # BLD AUTO: 0.59 X10(3) UL (ref 0.1–1)
MONOCYTES NFR BLD AUTO: 10.1 %
NEUTROPHILS # BLD AUTO: 3.86 X10 (3) UL (ref 1.5–7.7)
NEUTROPHILS # BLD AUTO: 3.86 X10(3) UL (ref 1.5–7.7)
NEUTROPHILS NFR BLD AUTO: 66.3 %
PLATELET # BLD AUTO: 298 10(3)UL (ref 150–450)
PLATELET MORPHOLOGY: NORMAL
RBC # BLD AUTO: 4.45 X10(6)UL
WBC # BLD AUTO: 5.8 X10(3) UL (ref 4–11)

## 2023-06-07 PROCEDURE — 82140 ASSAY OF AMMONIA: CPT | Performed by: FAMILY MEDICINE

## 2023-06-07 PROCEDURE — 85025 COMPLETE CBC W/AUTO DIFF WBC: CPT | Performed by: FAMILY MEDICINE

## 2023-06-21 ENCOUNTER — LAB REQUISITION (OUTPATIENT)
Dept: LAB | Facility: HOSPITAL | Age: 78
End: 2023-06-21
Payer: MEDICARE

## 2023-06-21 DIAGNOSIS — K76.9 LIVER DISEASE, UNSPECIFIED: ICD-10-CM

## 2023-06-21 LAB
ALBUMIN SERPL-MCNC: 2.6 G/DL (ref 3.4–5)
ALP LIVER SERPL-CCNC: 62 U/L
ALT SERPL-CCNC: 17 U/L
AMMONIA PLAS-MCNC: 44 UMOL/L (ref 11–32)
AST SERPL-CCNC: 19 U/L (ref 15–37)
BASOPHILS # BLD AUTO: 0.05 X10(3) UL (ref 0–0.2)
BASOPHILS NFR BLD AUTO: 1.2 %
BILIRUB DIRECT SERPL-MCNC: <0.1 MG/DL (ref 0–0.2)
BILIRUB SERPL-MCNC: 0.4 MG/DL (ref 0.1–2)
EOSINOPHIL # BLD AUTO: 0.11 X10(3) UL (ref 0–0.7)
EOSINOPHIL NFR BLD AUTO: 2.5 %
HCT VFR BLD AUTO: 41.7 %
HGB BLD-MCNC: 12.8 G/DL
IMM GRANULOCYTES # BLD AUTO: 0.03 X10(3) UL (ref 0–1)
IMM GRANULOCYTES NFR BLD: 0.7 %
LYMPHOCYTES # BLD AUTO: 0.84 X10(3) UL (ref 1–4)
LYMPHOCYTES NFR BLD AUTO: 19.4 %
MCH RBC QN AUTO: 27.8 PG (ref 26–34)
MCHC RBC AUTO-ENTMCNC: 30.7 G/DL (ref 31–37)
MCV RBC AUTO: 90.7 FL
MONOCYTES # BLD AUTO: 0.33 X10(3) UL (ref 0.1–1)
MONOCYTES NFR BLD AUTO: 7.6 %
MORPHOLOGY: NORMAL
NEUTROPHILS # BLD AUTO: 2.98 X10 (3) UL (ref 1.5–7.7)
NEUTROPHILS # BLD AUTO: 2.98 X10(3) UL (ref 1.5–7.7)
NEUTROPHILS NFR BLD AUTO: 68.6 %
PLATELET # BLD AUTO: 230 10(3)UL (ref 150–450)
PLATELET MORPHOLOGY: NORMAL
PROT SERPL-MCNC: 6.1 G/DL (ref 6.4–8.2)
RBC # BLD AUTO: 4.6 X10(6)UL
WBC # BLD AUTO: 4.3 X10(3) UL (ref 4–11)

## 2023-06-21 PROCEDURE — 82140 ASSAY OF AMMONIA: CPT | Performed by: FAMILY MEDICINE

## 2023-06-21 PROCEDURE — 80076 HEPATIC FUNCTION PANEL: CPT | Performed by: FAMILY MEDICINE

## 2023-06-21 PROCEDURE — 85025 COMPLETE CBC W/AUTO DIFF WBC: CPT | Performed by: FAMILY MEDICINE

## 2023-06-22 ENCOUNTER — HOSPITAL ENCOUNTER (OUTPATIENT)
Dept: PHYSICAL MEDICINE AND REHAB | Age: 78
Discharge: STILL ADMITTED OR ADMINISTRATIVE | End: 2023-06-22
Attending: ORTHOPAEDIC SURGERY

## 2023-06-22 DIAGNOSIS — M65.30 TRIGGER FINGER OF LEFT HAND, UNSPECIFIED FINGER: ICD-10-CM

## 2023-06-22 DIAGNOSIS — M65.30 TRIGGER FINGER OF RIGHT HAND, UNSPECIFIED FINGER: ICD-10-CM

## 2023-06-22 DIAGNOSIS — M72.0 DUPUYTREN'S DISEASE: ICD-10-CM

## 2023-06-22 DIAGNOSIS — M65.341 TRIGGER RING FINGER OF RIGHT HAND: ICD-10-CM

## 2023-06-29 ENCOUNTER — TELEPHONE (OUTPATIENT)
Dept: INFUSION THERAPY | Age: 78
End: 2023-06-29

## 2023-07-05 ENCOUNTER — LAB SERVICES (OUTPATIENT)
Dept: LAB | Age: 78
End: 2023-07-05
Attending: STUDENT IN AN ORGANIZED HEALTH CARE EDUCATION/TRAINING PROGRAM

## 2023-07-05 ENCOUNTER — OFFICE VISIT (OUTPATIENT)
Dept: HEMATOLOGY/ONCOLOGY | Age: 78
End: 2023-07-05
Attending: STUDENT IN AN ORGANIZED HEALTH CARE EDUCATION/TRAINING PROGRAM

## 2023-07-05 VITALS
SYSTOLIC BLOOD PRESSURE: 99 MMHG | WEIGHT: 154.9 LBS | HEART RATE: 97 BPM | BODY MASS INDEX: 28.5 KG/M2 | DIASTOLIC BLOOD PRESSURE: 69 MMHG | HEIGHT: 62 IN | TEMPERATURE: 97.6 F | OXYGEN SATURATION: 97 %

## 2023-07-05 DIAGNOSIS — D50.0 IRON DEFICIENCY ANEMIA DUE TO CHRONIC BLOOD LOSS: ICD-10-CM

## 2023-07-05 DIAGNOSIS — C50.411 MALIGNANT NEOPLASM OF UPPER-OUTER QUADRANT OF RIGHT BREAST IN FEMALE, ESTROGEN RECEPTOR POSITIVE (CMD): Primary | ICD-10-CM

## 2023-07-05 DIAGNOSIS — M80.00XD AGE-RELATED OSTEOPOROSIS WITH CURRENT PATHOLOGICAL FRACTURE WITH ROUTINE HEALING: ICD-10-CM

## 2023-07-05 DIAGNOSIS — Z17.0 MALIGNANT NEOPLASM OF UPPER-OUTER QUADRANT OF RIGHT BREAST IN FEMALE, ESTROGEN RECEPTOR POSITIVE (CMD): Primary | ICD-10-CM

## 2023-07-05 LAB
ALBUMIN SERPL-MCNC: 3.1 G/DL (ref 3.6–5.1)
ALBUMIN/GLOB SERPL: 0.8 {RATIO} (ref 1–2.4)
ALP SERPL-CCNC: 62 UNITS/L (ref 45–117)
ALT SERPL-CCNC: 19 UNITS/L
ANION GAP SERPL CALC-SCNC: 6 MMOL/L (ref 7–19)
AST SERPL-CCNC: 19 UNITS/L
BASOPHILS # BLD: 0.1 K/MCL (ref 0–0.3)
BASOPHILS NFR BLD: 1 %
BILIRUB SERPL-MCNC: 0.4 MG/DL (ref 0.2–1)
BUN SERPL-MCNC: 12 MG/DL (ref 6–20)
BUN/CREAT SERPL: 19 (ref 7–25)
CALCIUM SERPL-MCNC: 8.2 MG/DL (ref 8.4–10.2)
CHLORIDE SERPL-SCNC: 111 MMOL/L (ref 97–110)
CO2 SERPL-SCNC: 26 MMOL/L (ref 21–32)
CREAT SERPL-MCNC: 0.62 MG/DL (ref 0.51–0.95)
DEPRECATED RDW RBC: ABNORMAL FL
EOSINOPHIL # BLD: 0.1 K/MCL (ref 0–0.5)
EOSINOPHIL NFR BLD: 2 %
ERYTHROCYTE [DISTWIDTH] IN BLOOD: ABNORMAL %
FASTING DURATION TIME PATIENT: ABNORMAL H
FERRITIN SERPL-MCNC: 195 NG/ML (ref 8–252)
GFR SERPLBLD BASED ON 1.73 SQ M-ARVRAT: >90 ML/MIN
GLOBULIN SER-MCNC: 3.8 G/DL (ref 2–4)
GLUCOSE SERPL-MCNC: 106 MG/DL (ref 70–99)
HCT VFR BLD CALC: 49.1 % (ref 36–46.5)
HGB BLD-MCNC: 14.9 G/DL (ref 12–15.5)
IMM GRANULOCYTES # BLD AUTO: 0 K/MCL (ref 0–0.2)
IMM GRANULOCYTES # BLD: 0 %
IRON SATN MFR SERPL: 31 % (ref 15–45)
IRON SERPL-MCNC: 96 MCG/DL (ref 50–170)
LYMPHOCYTES # BLD: 1.3 K/MCL (ref 1–4)
LYMPHOCYTES NFR BLD: 19 %
MCH RBC QN AUTO: 29.2 PG (ref 26–34)
MCHC RBC AUTO-ENTMCNC: 30.3 G/DL (ref 32–36.5)
MCV RBC AUTO: 96.1 FL (ref 78–100)
MONOCYTES # BLD: 0.7 K/MCL (ref 0.3–0.9)
MONOCYTES NFR BLD: 11 %
NEUTROPHILS # BLD: 4.5 K/MCL (ref 1.8–7.7)
NEUTROPHILS NFR BLD: 67 %
NRBC BLD MANUAL-RTO: 0 /100 WBC
PLATELET # BLD AUTO: 246 K/MCL (ref 140–450)
POTASSIUM SERPL-SCNC: 3.8 MMOL/L (ref 3.4–5.1)
PROT SERPL-MCNC: 6.9 G/DL (ref 6.4–8.2)
RAINBOW EXTRA TUBES HOLD SPECIMEN: NORMAL
RBC # BLD: 5.11 MIL/MCL (ref 4–5.2)
SODIUM SERPL-SCNC: 139 MMOL/L (ref 135–145)
TIBC SERPL-MCNC: 313 MCG/DL (ref 250–450)
WBC # BLD: 6.7 K/MCL (ref 4.2–11)

## 2023-07-05 PROCEDURE — 80053 COMPREHEN METABOLIC PANEL: CPT

## 2023-07-05 PROCEDURE — 36415 COLL VENOUS BLD VENIPUNCTURE: CPT

## 2023-07-05 PROCEDURE — 99211 OFF/OP EST MAY X REQ PHY/QHP: CPT

## 2023-07-05 PROCEDURE — 99215 OFFICE O/P EST HI 40 MIN: CPT | Performed by: STUDENT IN AN ORGANIZED HEALTH CARE EDUCATION/TRAINING PROGRAM

## 2023-07-05 PROCEDURE — 85025 COMPLETE CBC W/AUTO DIFF WBC: CPT

## 2023-07-05 PROCEDURE — 82728 ASSAY OF FERRITIN: CPT

## 2023-07-05 PROCEDURE — 83550 IRON BINDING TEST: CPT

## 2023-07-05 ASSESSMENT — PATIENT HEALTH QUESTIONNAIRE - PHQ9
2. FEELING DOWN, DEPRESSED OR HOPELESS: NOT AT ALL
SUM OF ALL RESPONSES TO PHQ9 QUESTIONS 1 AND 2: 0
1. LITTLE INTEREST OR PLEASURE IN DOING THINGS: NOT AT ALL
SUM OF ALL RESPONSES TO PHQ9 QUESTIONS 1 AND 2: 0
CLINICAL INTERPRETATION OF PHQ2 SCORE: NO FURTHER SCREENING NEEDED

## 2023-07-05 ASSESSMENT — PAIN SCALES - GENERAL: PAINLEVEL: 0

## 2023-07-12 ENCOUNTER — TELEPHONE (OUTPATIENT)
Dept: CARDIOLOGY | Age: 78
End: 2023-07-12

## 2023-07-12 ENCOUNTER — LAB SERVICES (OUTPATIENT)
Dept: LAB | Age: 78
End: 2023-07-12

## 2023-07-12 ENCOUNTER — OFFICE VISIT (OUTPATIENT)
Dept: CARDIOLOGY | Age: 78
End: 2023-07-12

## 2023-07-12 VITALS
WEIGHT: 157.19 LBS | OXYGEN SATURATION: 94 % | HEIGHT: 62 IN | DIASTOLIC BLOOD PRESSURE: 62 MMHG | BODY MASS INDEX: 28.93 KG/M2 | HEART RATE: 90 BPM | SYSTOLIC BLOOD PRESSURE: 96 MMHG

## 2023-07-12 DIAGNOSIS — G92.8 ENCEPHALOPATHY DUE TO AMMONIA: ICD-10-CM

## 2023-07-12 DIAGNOSIS — T59.891A ENCEPHALOPATHY DUE TO AMMONIA: ICD-10-CM

## 2023-07-12 DIAGNOSIS — C50.411 MALIGNANT NEOPLASM OF UPPER-OUTER QUADRANT OF RIGHT BREAST IN FEMALE, ESTROGEN RECEPTOR POSITIVE (CMD): ICD-10-CM

## 2023-07-12 DIAGNOSIS — E03.9 ACQUIRED HYPOTHYROIDISM: ICD-10-CM

## 2023-07-12 DIAGNOSIS — Z17.0 MALIGNANT NEOPLASM OF UPPER-OUTER QUADRANT OF RIGHT BREAST IN FEMALE, ESTROGEN RECEPTOR POSITIVE (CMD): ICD-10-CM

## 2023-07-12 DIAGNOSIS — I95.89 CHRONIC HYPOTENSION: Primary | ICD-10-CM

## 2023-07-12 DIAGNOSIS — I95.89 CHRONIC HYPOTENSION: ICD-10-CM

## 2023-07-12 LAB
AMMONIA PLAS-SCNC: 49 MCMOL/L
TSH SERPL-ACNC: 2.58 MCUNITS/ML (ref 0.35–5)

## 2023-07-12 PROCEDURE — 99214 OFFICE O/P EST MOD 30 MIN: CPT | Performed by: INTERNAL MEDICINE

## 2023-07-12 PROCEDURE — 36415 COLL VENOUS BLD VENIPUNCTURE: CPT | Performed by: CLINICAL MEDICAL LABORATORY

## 2023-07-12 PROCEDURE — 84443 ASSAY THYROID STIM HORMONE: CPT | Performed by: CLINICAL MEDICAL LABORATORY

## 2023-07-12 PROCEDURE — 82140 ASSAY OF AMMONIA: CPT | Performed by: CLINICAL MEDICAL LABORATORY

## 2023-07-12 RX ORDER — MIDODRINE HYDROCHLORIDE 5 MG/1
5 TABLET ORAL 2 TIMES DAILY
Qty: 60 TABLET | Refills: 0 | Status: SHIPPED | OUTPATIENT
Start: 2023-07-12

## 2023-07-12 RX ORDER — ACETAMINOPHEN 325 MG/1
650 TABLET ORAL PRN
COMMUNITY

## 2023-07-12 RX ORDER — MIDODRINE HYDROCHLORIDE 5 MG/1
5 TABLET ORAL 2 TIMES DAILY
Qty: 60 TABLET | Refills: 0 | Status: SHIPPED | OUTPATIENT
Start: 2023-07-12 | End: 2023-07-18 | Stop reason: CLARIF

## 2023-07-12 RX ORDER — LACTULOSE 10 G/15ML
20 SOLUTION ORAL DAILY
COMMUNITY
Start: 2023-06-14

## 2023-07-12 SDOH — HEALTH STABILITY: PHYSICAL HEALTH: ON AVERAGE, HOW MANY MINUTES DO YOU ENGAGE IN EXERCISE AT THIS LEVEL?: 0 MIN

## 2023-07-12 SDOH — HEALTH STABILITY: PHYSICAL HEALTH: ON AVERAGE, HOW MANY DAYS PER WEEK DO YOU ENGAGE IN MODERATE TO STRENUOUS EXERCISE (LIKE A BRISK WALK)?: 0 DAYS

## 2023-07-12 SDOH — HEALTH STABILITY: MENTAL HEALTH: FEELING DOWN, DEPRESSED OR HOPELESS?: NOT AT ALL

## 2023-07-12 SDOH — HEALTH STABILITY: MENTAL HEALTH: DEPRESSION SCREENING SCORE: 0

## 2023-07-12 SDOH — HEALTH STABILITY: MENTAL HEALTH: LITTLE INTEREST OR PLEASURE IN ACTIVITY?: NOT AT ALL

## 2023-07-12 SDOH — HEALTH STABILITY: MENTAL HEALTH: PHQ2 INTERPRETATION: NO FURTHER SCREENING NEEDED

## 2023-07-12 ASSESSMENT — PATIENT HEALTH QUESTIONNAIRE - PHQ9: SUM OF ALL RESPONSES TO PHQ9 QUESTIONS 1 AND 2: 0

## 2023-07-14 ENCOUNTER — TELEPHONE (OUTPATIENT)
Dept: CARDIOLOGY | Age: 78
End: 2023-07-14

## 2023-07-18 ENCOUNTER — TELEPHONE (OUTPATIENT)
Dept: HEMATOLOGY/ONCOLOGY | Age: 78
End: 2023-07-18

## 2023-07-18 DIAGNOSIS — C50.411 MALIGNANT NEOPLASM OF UPPER-OUTER QUADRANT OF RIGHT FEMALE BREAST (CMD): Primary | ICD-10-CM

## 2023-07-18 RX ORDER — MIDODRINE HYDROCHLORIDE 5 MG/1
5 TABLET ORAL 2 TIMES DAILY
Qty: 60 TABLET | Refills: 0 | Status: SHIPPED | OUTPATIENT
Start: 2023-07-18

## 2023-07-19 ENCOUNTER — LAB REQUISITION (OUTPATIENT)
Dept: LAB | Facility: HOSPITAL | Age: 78
End: 2023-07-19
Payer: MEDICARE

## 2023-07-19 DIAGNOSIS — R79.89 OTHER SPECIFIED ABNORMAL FINDINGS OF BLOOD CHEMISTRY: ICD-10-CM

## 2023-07-19 LAB — AMMONIA PLAS-MCNC: 17 UMOL/L (ref 11–32)

## 2023-07-19 PROCEDURE — 82140 ASSAY OF AMMONIA: CPT | Performed by: INTERNAL MEDICINE

## 2023-07-24 ENCOUNTER — TELEPHONE (OUTPATIENT)
Dept: HEMATOLOGY/ONCOLOGY | Age: 78
End: 2023-07-24

## 2023-08-01 LAB — HM DILATED EYE EXAM: NORMAL

## 2023-08-09 ENCOUNTER — LAB REQUISITION (OUTPATIENT)
Dept: LAB | Facility: HOSPITAL | Age: 78
End: 2023-08-09
Payer: MEDICARE

## 2023-08-09 DIAGNOSIS — K76.9 LIVER DISEASE, UNSPECIFIED: ICD-10-CM

## 2023-08-09 LAB — AMMONIA PLAS-MCNC: 28 UMOL/L (ref 11–32)

## 2023-08-09 PROCEDURE — 82140 ASSAY OF AMMONIA: CPT | Performed by: FAMILY MEDICINE

## 2023-08-17 ENCOUNTER — HOSPITAL ENCOUNTER (OUTPATIENT)
Dept: CT IMAGING | Age: 78
Discharge: HOME | End: 2023-08-17
Attending: STUDENT IN AN ORGANIZED HEALTH CARE EDUCATION/TRAINING PROGRAM

## 2023-08-17 DIAGNOSIS — C50.411 MALIGNANT NEOPLASM OF UPPER-OUTER QUADRANT OF RIGHT BREAST IN FEMALE, ESTROGEN RECEPTOR POSITIVE (CMD): ICD-10-CM

## 2023-08-17 DIAGNOSIS — C50.411 MALIGNANT NEOPLASM OF UPPER-OUTER QUADRANT OF RIGHT FEMALE BREAST (CMD): ICD-10-CM

## 2023-08-17 DIAGNOSIS — Z17.0 MALIGNANT NEOPLASM OF UPPER-OUTER QUADRANT OF RIGHT BREAST IN FEMALE, ESTROGEN RECEPTOR POSITIVE (CMD): ICD-10-CM

## 2023-08-17 PROCEDURE — G0279 TOMOSYNTHESIS, MAMMO: HCPCS

## 2023-08-21 DIAGNOSIS — Z17.0 MALIGNANT NEOPLASM OF UPPER-OUTER QUADRANT OF RIGHT BREAST IN FEMALE, ESTROGEN RECEPTOR POSITIVE (CMD): Primary | ICD-10-CM

## 2023-08-21 DIAGNOSIS — C50.411 MALIGNANT NEOPLASM OF UPPER-OUTER QUADRANT OF RIGHT BREAST IN FEMALE, ESTROGEN RECEPTOR POSITIVE (CMD): Primary | ICD-10-CM

## 2023-10-05 ENCOUNTER — OFFICE VISIT (OUTPATIENT)
Dept: HEMATOLOGY/ONCOLOGY | Age: 78
End: 2023-10-05
Attending: STUDENT IN AN ORGANIZED HEALTH CARE EDUCATION/TRAINING PROGRAM

## 2023-10-05 ENCOUNTER — LAB SERVICES (OUTPATIENT)
Dept: LAB | Age: 78
End: 2023-10-05
Attending: STUDENT IN AN ORGANIZED HEALTH CARE EDUCATION/TRAINING PROGRAM

## 2023-10-05 VITALS
RESPIRATION RATE: 16 BRPM | HEART RATE: 68 BPM | WEIGHT: 167 LBS | SYSTOLIC BLOOD PRESSURE: 98 MMHG | TEMPERATURE: 97.6 F | DIASTOLIC BLOOD PRESSURE: 60 MMHG | HEIGHT: 62 IN | BODY MASS INDEX: 30.73 KG/M2

## 2023-10-05 DIAGNOSIS — D50.0 IRON DEFICIENCY ANEMIA DUE TO CHRONIC BLOOD LOSS: ICD-10-CM

## 2023-10-05 DIAGNOSIS — M80.00XD AGE-RELATED OSTEOPOROSIS WITH CURRENT PATHOLOGICAL FRACTURE WITH ROUTINE HEALING: ICD-10-CM

## 2023-10-05 DIAGNOSIS — Z17.0 MALIGNANT NEOPLASM OF UPPER-OUTER QUADRANT OF RIGHT BREAST IN FEMALE, ESTROGEN RECEPTOR POSITIVE (CMD): Primary | ICD-10-CM

## 2023-10-05 DIAGNOSIS — C50.411 MALIGNANT NEOPLASM OF UPPER-OUTER QUADRANT OF RIGHT BREAST IN FEMALE, ESTROGEN RECEPTOR POSITIVE (CMD): Primary | ICD-10-CM

## 2023-10-05 LAB
BASOPHILS # BLD: 0.1 K/MCL (ref 0–0.3)
BASOPHILS NFR BLD: 1 %
DEPRECATED RDW RBC: 55.8 FL (ref 39–50)
EOSINOPHIL # BLD: 0.1 K/MCL (ref 0–0.5)
EOSINOPHIL NFR BLD: 2 %
ERYTHROCYTE [DISTWIDTH] IN BLOOD: 14.1 % (ref 11–15)
FERRITIN SERPL-MCNC: 148 NG/ML (ref 8–252)
HCT VFR BLD CALC: 51.2 % (ref 36–46.5)
HGB BLD-MCNC: 16.4 G/DL (ref 12–15.5)
IMM GRANULOCYTES # BLD AUTO: 0.1 K/MCL (ref 0–0.2)
IMM GRANULOCYTES # BLD: 2 %
IRON SATN MFR SERPL: 39 % (ref 15–45)
IRON SERPL-MCNC: 121 MCG/DL (ref 50–170)
LYMPHOCYTES # BLD: 1 K/MCL (ref 1–4)
LYMPHOCYTES NFR BLD: 17 %
MCH RBC QN AUTO: 34 PG (ref 26–34)
MCHC RBC AUTO-ENTMCNC: 32 G/DL (ref 32–36.5)
MCV RBC AUTO: 106 FL (ref 78–100)
MONOCYTES # BLD: 0.6 K/MCL (ref 0.3–0.9)
MONOCYTES NFR BLD: 11 %
NEUTROPHILS # BLD: 3.9 K/MCL (ref 1.8–7.7)
NEUTROPHILS NFR BLD: 67 %
NRBC BLD MANUAL-RTO: 0 /100 WBC
PLATELET # BLD AUTO: 233 K/MCL (ref 140–450)
RBC # BLD: 4.83 MIL/MCL (ref 4–5.2)
TIBC SERPL-MCNC: 307 MCG/DL (ref 250–450)
WBC # BLD: 5.8 K/MCL (ref 4.2–11)

## 2023-10-05 PROCEDURE — 85025 COMPLETE CBC W/AUTO DIFF WBC: CPT

## 2023-10-05 PROCEDURE — 99215 OFFICE O/P EST HI 40 MIN: CPT | Performed by: STUDENT IN AN ORGANIZED HEALTH CARE EDUCATION/TRAINING PROGRAM

## 2023-10-05 PROCEDURE — 82728 ASSAY OF FERRITIN: CPT

## 2023-10-05 PROCEDURE — 36415 COLL VENOUS BLD VENIPUNCTURE: CPT

## 2023-10-05 PROCEDURE — 83540 ASSAY OF IRON: CPT

## 2023-10-05 PROCEDURE — 99211 OFF/OP EST MAY X REQ PHY/QHP: CPT

## 2023-10-05 RX ORDER — ANASTROZOLE 1 MG/1
1 TABLET ORAL DAILY
Qty: 30 TABLET | Refills: 3 | Status: SHIPPED
Start: 2023-10-05 | End: 2024-02-02

## 2023-10-05 ASSESSMENT — PATIENT HEALTH QUESTIONNAIRE - PHQ9
SUM OF ALL RESPONSES TO PHQ9 QUESTIONS 1 AND 2: 0
2. FEELING DOWN, DEPRESSED OR HOPELESS: NOT AT ALL
SUM OF ALL RESPONSES TO PHQ9 QUESTIONS 1 AND 2: 0
1. LITTLE INTEREST OR PLEASURE IN DOING THINGS: NOT AT ALL
CLINICAL INTERPRETATION OF PHQ2 SCORE: NO FURTHER SCREENING NEEDED

## 2023-10-05 ASSESSMENT — PAIN SCALES - GENERAL: PAINLEVEL: 0

## 2023-10-24 ENCOUNTER — OFFICE VISIT (OUTPATIENT)
Dept: INTERNAL MEDICINE | Age: 78
End: 2023-10-24

## 2023-10-24 VITALS
HEIGHT: 62 IN | OXYGEN SATURATION: 93 % | DIASTOLIC BLOOD PRESSURE: 64 MMHG | WEIGHT: 172.62 LBS | TEMPERATURE: 96.7 F | SYSTOLIC BLOOD PRESSURE: 94 MMHG | BODY MASS INDEX: 31.77 KG/M2 | HEART RATE: 85 BPM

## 2023-10-24 DIAGNOSIS — E03.9 ACQUIRED HYPOTHYROIDISM: ICD-10-CM

## 2023-10-24 DIAGNOSIS — R40.0 SOMNOLENCE: Primary | ICD-10-CM

## 2023-10-24 DIAGNOSIS — Z85.3 HX: BREAST CANCER: ICD-10-CM

## 2023-10-24 DIAGNOSIS — H10.9 CONJUNCTIVITIS, UNSPECIFIED CONJUNCTIVITIS TYPE, UNSPECIFIED LATERALITY: ICD-10-CM

## 2023-10-24 PROCEDURE — 99213 OFFICE O/P EST LOW 20 MIN: CPT | Performed by: INTERNAL MEDICINE

## 2023-10-24 ASSESSMENT — PAIN SCALES - GENERAL: PAINLEVEL: 0

## 2023-10-24 ASSESSMENT — PATIENT HEALTH QUESTIONNAIRE - PHQ9
SUM OF ALL RESPONSES TO PHQ9 QUESTIONS 1 AND 2: 0
1. LITTLE INTEREST OR PLEASURE IN DOING THINGS: NOT AT ALL
SUM OF ALL RESPONSES TO PHQ9 QUESTIONS 1 AND 2: 0
CLINICAL INTERPRETATION OF PHQ2 SCORE: NO FURTHER SCREENING NEEDED
2. FEELING DOWN, DEPRESSED OR HOPELESS: NOT AT ALL

## 2023-10-30 ENCOUNTER — TELEPHONE (OUTPATIENT)
Dept: INTERNAL MEDICINE | Age: 78
End: 2023-10-30

## 2023-10-30 RX ORDER — TOBRAMYCIN AND DEXAMETHASONE 3; 1 MG/ML; MG/ML
1 SUSPENSION/ DROPS OPHTHALMIC
Qty: 5 ML | Refills: 0 | Status: SHIPPED | OUTPATIENT
Start: 2023-10-30

## 2023-10-31 ENCOUNTER — TELEPHONE (OUTPATIENT)
Dept: INTERNAL MEDICINE | Age: 78
End: 2023-10-31

## 2023-11-28 ENCOUNTER — APPOINTMENT (OUTPATIENT)
Dept: INTERNAL MEDICINE | Age: 78
End: 2023-11-28

## 2023-11-28 VITALS
SYSTOLIC BLOOD PRESSURE: 98 MMHG | BODY MASS INDEX: 32.7 KG/M2 | WEIGHT: 177.69 LBS | HEIGHT: 62 IN | OXYGEN SATURATION: 96 % | HEART RATE: 96 BPM | TEMPERATURE: 97.6 F | DIASTOLIC BLOOD PRESSURE: 70 MMHG

## 2023-11-28 DIAGNOSIS — E03.9 ACQUIRED HYPOTHYROIDISM: ICD-10-CM

## 2023-11-28 DIAGNOSIS — Z86.59 H/O: DEPRESSION: ICD-10-CM

## 2023-11-28 DIAGNOSIS — I95.9 HYPOTENSION, UNSPECIFIED HYPOTENSION TYPE: ICD-10-CM

## 2023-11-28 DIAGNOSIS — Z85.3 HX: BREAST CANCER: ICD-10-CM

## 2023-11-28 DIAGNOSIS — R40.0 SOMNOLENCE: Primary | ICD-10-CM

## 2023-11-28 PROCEDURE — 99214 OFFICE O/P EST MOD 30 MIN: CPT | Performed by: INTERNAL MEDICINE

## 2023-12-04 ENCOUNTER — CLINICAL ABSTRACT (OUTPATIENT)
Dept: HEMATOLOGY/ONCOLOGY | Age: 78
End: 2023-12-04

## 2023-12-04 DIAGNOSIS — M80.00XD AGE-RELATED OSTEOPOROSIS WITH CURRENT PATHOLOGICAL FRACTURE WITH ROUTINE HEALING: Primary | ICD-10-CM

## 2023-12-04 RX ORDER — ALBUTEROL SULFATE 90 UG/1
2 AEROSOL, METERED RESPIRATORY (INHALATION)
Status: CANCELLED | OUTPATIENT
Start: 2023-12-05

## 2023-12-04 RX ORDER — METHYLPREDNISOLONE SODIUM SUCCINATE 125 MG/2ML
125 INJECTION, POWDER, LYOPHILIZED, FOR SOLUTION INTRAMUSCULAR; INTRAVENOUS
Status: CANCELLED | OUTPATIENT
Start: 2023-12-05

## 2023-12-04 RX ORDER — DIPHENHYDRAMINE HYDROCHLORIDE 50 MG/ML
50 INJECTION INTRAMUSCULAR; INTRAVENOUS
Status: CANCELLED
Start: 2023-12-05

## 2023-12-04 RX ORDER — FAMOTIDINE 10 MG/ML
20 INJECTION, SOLUTION INTRAVENOUS
Status: CANCELLED | OUTPATIENT
Start: 2023-12-05

## 2023-12-05 ENCOUNTER — LAB SERVICES (OUTPATIENT)
Dept: LAB | Age: 78
End: 2023-12-05
Attending: STUDENT IN AN ORGANIZED HEALTH CARE EDUCATION/TRAINING PROGRAM

## 2023-12-05 ENCOUNTER — NURSE ONLY (OUTPATIENT)
Dept: HEMATOLOGY/ONCOLOGY | Age: 78
End: 2023-12-05
Attending: STUDENT IN AN ORGANIZED HEALTH CARE EDUCATION/TRAINING PROGRAM

## 2023-12-05 VITALS
TEMPERATURE: 97.3 F | SYSTOLIC BLOOD PRESSURE: 90 MMHG | DIASTOLIC BLOOD PRESSURE: 53 MMHG | WEIGHT: 177 LBS | BODY MASS INDEX: 32.37 KG/M2 | HEART RATE: 87 BPM

## 2023-12-05 DIAGNOSIS — M80.00XD AGE-RELATED OSTEOPOROSIS WITH CURRENT PATHOLOGICAL FRACTURE WITH ROUTINE HEALING: Primary | ICD-10-CM

## 2023-12-05 DIAGNOSIS — M80.00XD AGE-RELATED OSTEOPOROSIS WITH CURRENT PATHOLOGICAL FRACTURE WITH ROUTINE HEALING: ICD-10-CM

## 2023-12-05 LAB
ALBUMIN SERPL-MCNC: 2.7 G/DL (ref 3.6–5.1)
ALBUMIN/GLOB SERPL: 0.7 {RATIO} (ref 1–2.4)
ALP SERPL-CCNC: 64 UNITS/L (ref 45–117)
ALT SERPL-CCNC: 32 UNITS/L
ANION GAP SERPL CALC-SCNC: 9 MMOL/L (ref 7–19)
AST SERPL-CCNC: 27 UNITS/L
BILIRUB SERPL-MCNC: 0.5 MG/DL (ref 0.2–1)
BUN SERPL-MCNC: 12 MG/DL (ref 6–20)
BUN/CREAT SERPL: 17 (ref 7–25)
CALCIUM SERPL-MCNC: 8.7 MG/DL (ref 8.4–10.2)
CHLORIDE SERPL-SCNC: 106 MMOL/L (ref 97–110)
CO2 SERPL-SCNC: 25 MMOL/L (ref 21–32)
CREAT SERPL-MCNC: 0.7 MG/DL (ref 0.51–0.95)
EGFRCR SERPLBLD CKD-EPI 2021: 90 ML/MIN/{1.73_M2}
FASTING DURATION TIME PATIENT: ABNORMAL H
GLOBULIN SER-MCNC: 4 G/DL (ref 2–4)
GLUCOSE SERPL-MCNC: 137 MG/DL (ref 70–99)
POTASSIUM SERPL-SCNC: 3.6 MMOL/L (ref 3.4–5.1)
PROT SERPL-MCNC: 6.7 G/DL (ref 6.4–8.2)
SODIUM SERPL-SCNC: 136 MMOL/L (ref 135–145)

## 2023-12-05 PROCEDURE — 36415 COLL VENOUS BLD VENIPUNCTURE: CPT

## 2023-12-05 PROCEDURE — 80053 COMPREHEN METABOLIC PANEL: CPT

## 2023-12-05 PROCEDURE — 10002800 HB RX 250 W HCPCS: Performed by: STUDENT IN AN ORGANIZED HEALTH CARE EDUCATION/TRAINING PROGRAM

## 2023-12-05 PROCEDURE — 96372 THER/PROPH/DIAG INJ SC/IM: CPT | Performed by: STUDENT IN AN ORGANIZED HEALTH CARE EDUCATION/TRAINING PROGRAM

## 2023-12-05 RX ADMIN — DENOSUMAB 60 MG: 60 INJECTION SUBCUTANEOUS at 16:02

## 2023-12-05 ASSESSMENT — PAIN SCALES - GENERAL: PAINLEVEL: 0

## 2023-12-17 ENCOUNTER — E-ADVICE (OUTPATIENT)
Dept: OTHER | Age: 78
End: 2023-12-17

## 2024-01-09 ENCOUNTER — TELEPHONE (OUTPATIENT)
Dept: INTERNAL MEDICINE | Age: 79
End: 2024-01-09

## 2024-01-16 ENCOUNTER — APPOINTMENT (OUTPATIENT)
Dept: INTERNAL MEDICINE | Age: 79
End: 2024-01-16

## 2024-01-16 VITALS
SYSTOLIC BLOOD PRESSURE: 92 MMHG | TEMPERATURE: 96.9 F | WEIGHT: 185 LBS | OXYGEN SATURATION: 94 % | HEART RATE: 86 BPM | BODY MASS INDEX: 34.04 KG/M2 | HEIGHT: 62 IN | DIASTOLIC BLOOD PRESSURE: 62 MMHG

## 2024-01-16 DIAGNOSIS — Z86.59 H/O: DEPRESSION: ICD-10-CM

## 2024-01-16 DIAGNOSIS — E72.20 HYPERAMMONEMIA (CMD): ICD-10-CM

## 2024-01-16 DIAGNOSIS — I95.89 CHRONIC HYPOTENSION: Primary | ICD-10-CM

## 2024-01-16 DIAGNOSIS — D75.1 POLYCYTHEMIA: ICD-10-CM

## 2024-01-16 DIAGNOSIS — I95.9 HYPOTENSION, UNSPECIFIED HYPOTENSION TYPE: ICD-10-CM

## 2024-01-16 PROCEDURE — 99214 OFFICE O/P EST MOD 30 MIN: CPT | Performed by: INTERNAL MEDICINE

## 2024-01-16 RX ORDER — LEVOTHYROXINE SODIUM 0.15 MG/1
TABLET ORAL
COMMUNITY
Start: 2024-01-03

## 2024-01-16 RX ORDER — LEVOTHYROXINE SODIUM 0.1 MG/1
TABLET ORAL
COMMUNITY
Start: 2024-01-03

## 2024-01-26 ENCOUNTER — TELEPHONE (OUTPATIENT)
Dept: HEMATOLOGY/ONCOLOGY | Age: 79
End: 2024-01-26

## 2024-01-26 DIAGNOSIS — G93.40 ENCEPHALOPATHY: Primary | ICD-10-CM

## 2024-02-01 ENCOUNTER — APPOINTMENT (OUTPATIENT)
Dept: HEMATOLOGY/ONCOLOGY | Age: 79
End: 2024-02-01
Attending: STUDENT IN AN ORGANIZED HEALTH CARE EDUCATION/TRAINING PROGRAM

## 2024-02-01 ENCOUNTER — APPOINTMENT (OUTPATIENT)
Dept: LAB | Age: 79
End: 2024-02-01
Attending: STUDENT IN AN ORGANIZED HEALTH CARE EDUCATION/TRAINING PROGRAM

## 2024-02-09 ENCOUNTER — TELEPHONE (OUTPATIENT)
Dept: HEMATOLOGY/ONCOLOGY | Age: 79
End: 2024-02-09

## 2024-02-14 ENCOUNTER — EXTERNAL LAB (OUTPATIENT)
Dept: HEMATOLOGY/ONCOLOGY | Age: 79
End: 2024-02-14

## 2024-02-14 ENCOUNTER — LAB REQUISITION (OUTPATIENT)
Dept: LAB | Facility: HOSPITAL | Age: 79
End: 2024-02-14
Payer: MEDICARE

## 2024-02-14 DIAGNOSIS — D50.0 IRON DEFICIENCY ANEMIA SECONDARY TO BLOOD LOSS (CHRONIC): ICD-10-CM

## 2024-02-14 LAB
BASOPHILS # BLD AUTO: 0.06 X10(3) UL (ref 0–0.2)
BASOPHILS # BLD: 0.06 X10(3)UL (ref 0–0.2)
BASOPHILS NFR BLD AUTO: 0.9 %
BASOPHILS NFR BLD: 0.9 %
DEPRECATED HBV CORE AB SER IA-ACNC: 79.1 NG/ML
EOSINOPHIL # BLD AUTO: 0.19 X10(3) UL (ref 0–0.7)
EOSINOPHIL # BLD: 0.19 X10(3)UL (ref 0–0.7)
EOSINOPHIL NFR BLD AUTO: 2.8 %
EOSINOPHIL NFR BLD: 2.8 %
ERYTHROCYTE [DISTWIDTH] IN BLOOD BY AUTOMATED COUNT: 13.5 %
ERYTHROCYTE [DISTWIDTH] IN BLOOD: 13.5 %
FERRITIN SERPL-MCNC: 79.1 NG/ML (ref 18–340)
GLUCOSE BLD-MCNC: 80 MG/DL (ref 70–99)
GLUCOSE SERPL-MCNC: 80 MG/DL (ref 70–99)
HCT VFR BLD AUTO: 48.8 %
HCT VFR BLD CALC: 48.8 % (ref 35–48)
HGB BLD-MCNC: 16 G/DL
HGB BLD-MCNC: 16 G/DL (ref 12–16)
IMM GRANULOCYTES # BLD AUTO: 0.05 X10(3) UL (ref 0–1)
IMM GRANULOCYTES # BLD: 0.05 X10(3)UL (ref 0–1)
IMM GRANULOCYTES NFR BLD: 0.7 %
IMM GRANULOCYTES NFR BLD: 0.7 %
IRON SATN MFR SERPL: 34 %
IRON SATN MFR SERPL: 34 % (ref 15–50)
IRON SERPL-MCNC: 113 UG/DL
IRON SERPL-MCNC: 113 UG/DL (ref 50–170)
LENGTH OF FAST TIME PATIENT: NORMAL H
LYMPHOCYTES # BLD AUTO: 1.63 X10(3) UL (ref 1–4)
LYMPHOCYTES # BLD: 1.63 X10(3)UL (ref 1–4)
LYMPHOCYTES NFR BLD AUTO: 23.7 %
LYMPHOCYTES NFR BLD: 23.7 %
MCH RBC QN AUTO: 34 PG (ref 26–34)
MCH RBC QN AUTO: 34 PG (ref 26–34)
MCHC RBC AUTO-ENTMCNC: 32.8 G/DL (ref 31–37)
MCHC RBC AUTO-ENTMCNC: 32.8 G/DL (ref 31–37)
MCV RBC AUTO: 103.8 FL
MCV RBC AUTO: 103.8 FL (ref 80–100)
MONOCYTES # BLD AUTO: 0.69 X10(3) UL (ref 0.1–1)
MONOCYTES # BLD: 0.69 X10(3)UL (ref 0.1–1)
MONOCYTES NFR BLD AUTO: 10 %
MONOCYTES NFR BLD: 10 %
NEUTROPHILS # BLD AUTO: 4.26 X10 (3) UL (ref 1.5–7.7)
NEUTROPHILS # BLD AUTO: 4.26 X10(3) UL (ref 1.5–7.7)
NEUTROPHILS # BLD: 4.26 X10(3)UL (ref 1.5–7.7)
NEUTROPHILS NFR BLD AUTO: 61.9 %
NEUTROPHILS NFR BLD: 61.9 %
PLATELET # BLD AUTO: 228 10(3)UL (ref 150–450)
PLATELET # BLD: 228 10(3)UL (ref 150–450)
RBC # BLD AUTO: 4.7 X10(6)UL
RBC # BLD: 4.7 X10(6)UL (ref 3.8–5.3)
TIBC SERPL-MCNC: 335 UG/DL (ref 240–450)
TIBC SERPL-MCNC: 335 UG/DL (ref 240–450)
TRANSFERRIN SERPL-MCNC: 225 MG/DL (ref 200–360)
TRANSFERRIN SERPL-MCNC: 225 MG/DL (ref 200–360)
WBC # BLD AUTO: 6.9 X10(3) UL (ref 4–11)
WBC # BLD: 6.9 X10(3)UL (ref 4–11)

## 2024-02-14 PROCEDURE — 83550 IRON BINDING TEST: CPT | Performed by: STUDENT IN AN ORGANIZED HEALTH CARE EDUCATION/TRAINING PROGRAM

## 2024-02-14 PROCEDURE — 82947 ASSAY GLUCOSE BLOOD QUANT: CPT | Performed by: STUDENT IN AN ORGANIZED HEALTH CARE EDUCATION/TRAINING PROGRAM

## 2024-02-14 PROCEDURE — 85025 COMPLETE CBC W/AUTO DIFF WBC: CPT | Performed by: STUDENT IN AN ORGANIZED HEALTH CARE EDUCATION/TRAINING PROGRAM

## 2024-02-14 PROCEDURE — 82728 ASSAY OF FERRITIN: CPT | Performed by: STUDENT IN AN ORGANIZED HEALTH CARE EDUCATION/TRAINING PROGRAM

## 2024-02-14 PROCEDURE — 83540 ASSAY OF IRON: CPT | Performed by: STUDENT IN AN ORGANIZED HEALTH CARE EDUCATION/TRAINING PROGRAM

## 2024-02-16 ENCOUNTER — OFFICE VISIT (OUTPATIENT)
Dept: HEMATOLOGY/ONCOLOGY | Age: 79
End: 2024-02-16
Attending: STUDENT IN AN ORGANIZED HEALTH CARE EDUCATION/TRAINING PROGRAM

## 2024-02-16 ENCOUNTER — APPOINTMENT (OUTPATIENT)
Dept: LAB | Age: 79
End: 2024-02-16
Attending: STUDENT IN AN ORGANIZED HEALTH CARE EDUCATION/TRAINING PROGRAM

## 2024-02-16 VITALS
SYSTOLIC BLOOD PRESSURE: 94 MMHG | HEIGHT: 62 IN | TEMPERATURE: 96.3 F | RESPIRATION RATE: 14 BRPM | OXYGEN SATURATION: 93 % | DIASTOLIC BLOOD PRESSURE: 63 MMHG | WEIGHT: 183.5 LBS | HEART RATE: 96 BPM | BODY MASS INDEX: 33.77 KG/M2

## 2024-02-16 DIAGNOSIS — M80.00XD AGE-RELATED OSTEOPOROSIS WITH CURRENT PATHOLOGICAL FRACTURE WITH ROUTINE HEALING: Primary | ICD-10-CM

## 2024-02-16 PROCEDURE — 99211 OFF/OP EST MAY X REQ PHY/QHP: CPT

## 2024-02-16 ASSESSMENT — PATIENT HEALTH QUESTIONNAIRE - PHQ9
CLINICAL INTERPRETATION OF PHQ2 SCORE: NO FURTHER SCREENING NEEDED
SUM OF ALL RESPONSES TO PHQ9 QUESTIONS 1 AND 2: 0
1. LITTLE INTEREST OR PLEASURE IN DOING THINGS: NOT AT ALL
2. FEELING DOWN, DEPRESSED OR HOPELESS: NOT AT ALL
SUM OF ALL RESPONSES TO PHQ9 QUESTIONS 1 AND 2: 0

## 2024-02-16 ASSESSMENT — PAIN SCALES - GENERAL: PAINLEVEL: 0

## 2024-02-19 ENCOUNTER — APPOINTMENT (OUTPATIENT)
Dept: CT IMAGING | Age: 79
End: 2024-02-19
Attending: STUDENT IN AN ORGANIZED HEALTH CARE EDUCATION/TRAINING PROGRAM

## 2024-02-20 ENCOUNTER — TELEPHONE (OUTPATIENT)
Dept: INTERNAL MEDICINE | Age: 79
End: 2024-02-20

## 2024-02-21 ENCOUNTER — LAB REQUISITION (OUTPATIENT)
Dept: LAB | Facility: HOSPITAL | Age: 79
End: 2024-02-21
Payer: MEDICARE

## 2024-02-21 DIAGNOSIS — E72.20 DISORDER OF UREA CYCLE METABOLISM, UNSPECIFIED (HCC): ICD-10-CM

## 2024-02-21 DIAGNOSIS — I95.89 OTHER HYPOTENSION: ICD-10-CM

## 2024-02-21 LAB
TSI SER-ACNC: 3.46 MIU/ML (ref 0.36–3.74)
VIT D+METAB SERPL-MCNC: 29 NG/ML (ref 30–100)

## 2024-02-21 PROCEDURE — 82306 VITAMIN D 25 HYDROXY: CPT | Performed by: INTERNAL MEDICINE

## 2024-02-21 PROCEDURE — 84443 ASSAY THYROID STIM HORMONE: CPT | Performed by: INTERNAL MEDICINE

## 2024-02-27 ENCOUNTER — HOSPITAL ENCOUNTER (OUTPATIENT)
Dept: CT IMAGING | Age: 79
Discharge: HOME | End: 2024-02-27
Attending: STUDENT IN AN ORGANIZED HEALTH CARE EDUCATION/TRAINING PROGRAM

## 2024-02-27 ENCOUNTER — HOSPITAL ENCOUNTER (OUTPATIENT)
Dept: GENERAL RADIOLOGY | Age: 79
Discharge: HOME | End: 2024-02-27
Attending: STUDENT IN AN ORGANIZED HEALTH CARE EDUCATION/TRAINING PROGRAM

## 2024-02-27 DIAGNOSIS — Z17.0 MALIGNANT NEOPLASM OF UPPER-OUTER QUADRANT OF RIGHT BREAST IN FEMALE, ESTROGEN RECEPTOR POSITIVE (CMD): ICD-10-CM

## 2024-02-27 DIAGNOSIS — C50.411 MALIGNANT NEOPLASM OF UPPER-OUTER QUADRANT OF RIGHT BREAST IN FEMALE, ESTROGEN RECEPTOR POSITIVE (CMD): ICD-10-CM

## 2024-02-27 DIAGNOSIS — M80.00XD AGE-RELATED OSTEOPOROSIS WITH CURRENT PATHOLOGICAL FRACTURE WITH ROUTINE HEALING: ICD-10-CM

## 2024-02-27 DIAGNOSIS — M81.0 AGE-RELATED OSTEOPOROSIS WITHOUT CURRENT PATHOLOGICAL FRACTURE: ICD-10-CM

## 2024-02-27 PROCEDURE — G0279 TOMOSYNTHESIS, MAMMO: HCPCS

## 2024-02-27 PROCEDURE — 77080 DXA BONE DENSITY AXIAL: CPT

## 2024-02-28 ENCOUNTER — TELEPHONE (OUTPATIENT)
Dept: HEMATOLOGY/ONCOLOGY | Age: 79
End: 2024-02-28

## 2024-04-10 ENCOUNTER — TELEPHONE (OUTPATIENT)
Dept: INTERNAL MEDICINE | Age: 79
End: 2024-04-10

## 2024-04-11 ENCOUNTER — APPOINTMENT (OUTPATIENT)
Dept: INTERNAL MEDICINE | Age: 79
End: 2024-04-11

## 2024-04-11 ENCOUNTER — LAB SERVICES (OUTPATIENT)
Dept: LAB | Age: 79
End: 2024-04-11

## 2024-04-11 VITALS
HEIGHT: 62 IN | OXYGEN SATURATION: 93 % | TEMPERATURE: 97 F | SYSTOLIC BLOOD PRESSURE: 94 MMHG | WEIGHT: 188.93 LBS | BODY MASS INDEX: 34.77 KG/M2 | DIASTOLIC BLOOD PRESSURE: 70 MMHG | HEART RATE: 77 BPM

## 2024-04-11 DIAGNOSIS — F25.1 SCHIZOAFFECTIVE DISORDER, DEPRESSIVE TYPE (CMD): ICD-10-CM

## 2024-04-11 DIAGNOSIS — R53.1 WEAKNESS: ICD-10-CM

## 2024-04-11 DIAGNOSIS — D75.89 MACROCYTOSIS: ICD-10-CM

## 2024-04-11 DIAGNOSIS — I26.94 MULTIPLE SUBSEGMENTAL PULMONARY EMBOLI WITHOUT ACUTE COR PULMONALE (CMD): ICD-10-CM

## 2024-04-11 DIAGNOSIS — E72.20 HYPERAMMONEMIA (CMD): ICD-10-CM

## 2024-04-11 DIAGNOSIS — I95.89 CHRONIC HYPOTENSION: ICD-10-CM

## 2024-04-11 DIAGNOSIS — I95.89 CHRONIC HYPOTENSION: Primary | ICD-10-CM

## 2024-04-11 DIAGNOSIS — E72.20 HYPERAMMONEMIA  (CMD): ICD-10-CM

## 2024-04-11 DIAGNOSIS — D50.0 IRON DEFICIENCY ANEMIA DUE TO CHRONIC BLOOD LOSS: ICD-10-CM

## 2024-04-11 DIAGNOSIS — Z86.59 H/O: DEPRESSION: ICD-10-CM

## 2024-04-11 LAB
ALBUMIN SERPL-MCNC: 2.9 G/DL (ref 3.6–5.1)
ALBUMIN/GLOB SERPL: 0.9 {RATIO} (ref 1–2.4)
ALP SERPL-CCNC: 53 UNITS/L (ref 45–117)
ALT SERPL-CCNC: 26 UNITS/L
AMMONIA PLAS-SCNC: 54 MCMOL/L
ANION GAP SERPL CALC-SCNC: 11 MMOL/L (ref 7–19)
APPEARANCE UR: CLEAR
AST SERPL-CCNC: 27 UNITS/L
BACTERIA #/AREA URNS HPF: ABNORMAL /HPF
BASOPHILS # BLD: 0.1 K/MCL (ref 0–0.3)
BASOPHILS NFR BLD: 1 %
BILIRUB SERPL-MCNC: 0.5 MG/DL (ref 0.2–1)
BILIRUB UR QL STRIP: NEGATIVE
BUN SERPL-MCNC: 15 MG/DL (ref 6–20)
BUN/CREAT SERPL: 18 (ref 7–25)
CALCIUM SERPL-MCNC: 9 MG/DL (ref 8.4–10.2)
CHLORIDE SERPL-SCNC: 108 MMOL/L (ref 97–110)
CO2 SERPL-SCNC: 24 MMOL/L (ref 21–32)
COLOR UR: YELLOW
CREAT SERPL-MCNC: 0.85 MG/DL (ref 0.51–0.95)
DEPRECATED RDW RBC: 56 FL (ref 39–50)
EGFRCR SERPLBLD CKD-EPI 2021: 71 ML/MIN/{1.73_M2}
EOSINOPHIL # BLD: 0.2 K/MCL (ref 0–0.5)
EOSINOPHIL NFR BLD: 2 %
ERYTHROCYTE [DISTWIDTH] IN BLOOD: 14.1 % (ref 11–15)
ERYTHROCYTE [SEDIMENTATION RATE] IN BLOOD BY WESTERGREN METHOD: 47 MM/HR (ref 0–20)
FASTING DURATION TIME PATIENT: ABNORMAL H
GLOBULIN SER-MCNC: 3.4 G/DL (ref 2–4)
GLUCOSE SERPL-MCNC: 114 MG/DL (ref 70–99)
GLUCOSE UR STRIP-MCNC: ABNORMAL MG/DL
HCT VFR BLD CALC: 51.6 % (ref 36–46.5)
HGB BLD-MCNC: 16.8 G/DL (ref 12–15.5)
HGB UR QL STRIP: NEGATIVE
HYALINE CASTS #/AREA URNS LPF: ABNORMAL /LPF
IMM GRANULOCYTES # BLD AUTO: 0.2 K/MCL (ref 0–0.2)
IMM GRANULOCYTES # BLD: 2 %
KETONES UR STRIP-MCNC: NEGATIVE MG/DL
LEUKOCYTE ESTERASE UR QL STRIP: ABNORMAL
LYMPHOCYTES # BLD: 1.8 K/MCL (ref 1–4)
LYMPHOCYTES NFR BLD: 20 %
MCH RBC QN AUTO: 34.4 PG (ref 26–34)
MCHC RBC AUTO-ENTMCNC: 32.6 G/DL (ref 32–36.5)
MCV RBC AUTO: 105.5 FL (ref 78–100)
MONOCYTES # BLD: 1 K/MCL (ref 0.3–0.9)
MONOCYTES NFR BLD: 12 %
MUCOUS THREADS URNS QL MICRO: PRESENT
NEUTROPHILS # BLD: 5.8 K/MCL (ref 1.8–7.7)
NEUTROPHILS NFR BLD: 63 %
NITRITE UR QL STRIP: NEGATIVE
NRBC BLD MANUAL-RTO: 0 /100 WBC
PH UR STRIP: 6 [PH] (ref 5–7)
PLATELET # BLD AUTO: 234 K/MCL (ref 140–450)
POTASSIUM SERPL-SCNC: 4.1 MMOL/L (ref 3.4–5.1)
PROT SERPL-MCNC: 6.3 G/DL (ref 6.4–8.2)
PROT UR STRIP-MCNC: ABNORMAL MG/DL
RBC # BLD: 4.89 MIL/MCL (ref 4–5.2)
RBC #/AREA URNS HPF: ABNORMAL /HPF
SODIUM SERPL-SCNC: 139 MMOL/L (ref 135–145)
SP GR UR STRIP: 1.02 (ref 1–1.03)
SQUAMOUS #/AREA URNS HPF: ABNORMAL /HPF
UROBILINOGEN UR STRIP-MCNC: 4 MG/DL
WBC # BLD: 9.1 K/MCL (ref 4.2–11)
WBC #/AREA URNS HPF: ABNORMAL /HPF

## 2024-04-11 PROCEDURE — 80053 COMPREHEN METABOLIC PANEL: CPT | Performed by: CLINICAL MEDICAL LABORATORY

## 2024-04-11 PROCEDURE — 85652 RBC SED RATE AUTOMATED: CPT | Performed by: CLINICAL MEDICAL LABORATORY

## 2024-04-11 PROCEDURE — 85025 COMPLETE CBC W/AUTO DIFF WBC: CPT | Performed by: CLINICAL MEDICAL LABORATORY

## 2024-04-11 PROCEDURE — 82533 TOTAL CORTISOL: CPT | Performed by: CLINICAL MEDICAL LABORATORY

## 2024-04-11 ASSESSMENT — COGNITIVE AND FUNCTIONAL STATUS - GENERAL
DO YOU HAVE SERIOUS DIFFICULTY WALKING OR CLIMBING STAIRS: NO
BECAUSE OF A PHYSICAL, MENTAL, OR EMOTIONAL CONDITION, DO YOU HAVE SERIOUS DIFFICULTY CONCENTRATING, REMEMBERING OR MAKING DECISIONS: NO
BECAUSE OF A PHYSICAL, MENTAL, OR EMOTIONAL CONDITION, DO YOU HAVE DIFFICULTY DOING ERRANDS ALONE: NO
DO YOU HAVE DIFFICULTY DRESSING OR BATHING: NO

## 2024-04-11 ASSESSMENT — PATIENT HEALTH QUESTIONNAIRE - PHQ9
CLINICAL INTERPRETATION OF PHQ2 SCORE: NO FURTHER SCREENING NEEDED
2. FEELING DOWN, DEPRESSED OR HOPELESS: NOT AT ALL
SUM OF ALL RESPONSES TO PHQ9 QUESTIONS 1 AND 2: 0
1. LITTLE INTEREST OR PLEASURE IN DOING THINGS: NOT AT ALL
SUM OF ALL RESPONSES TO PHQ9 QUESTIONS 1 AND 2: 0

## 2024-04-11 ASSESSMENT — PAIN SCALES - GENERAL: PAINLEVEL: 0

## 2024-04-12 LAB — CORTIS SERPL-MCNC: 20.2 MCG/DL (ref 3.4–22.5)

## 2024-04-16 ENCOUNTER — TELEPHONE (OUTPATIENT)
Dept: INTERNAL MEDICINE | Age: 79
End: 2024-04-16

## 2024-04-24 ENCOUNTER — EXTERNAL LAB (OUTPATIENT)
Dept: HEALTH INFORMATION MANAGEMENT | Facility: OTHER | Age: 79
End: 2024-04-24

## 2024-04-24 ENCOUNTER — LAB REQUISITION (OUTPATIENT)
Dept: LAB | Facility: HOSPITAL | Age: 79
End: 2024-04-24
Payer: MEDICARE

## 2024-04-24 DIAGNOSIS — F25.1 SCHIZOAFFECTIVE DISORDER, DEPRESSIVE TYPE (HCC): ICD-10-CM

## 2024-04-24 LAB
BASOPHILS # BLD AUTO: 0.08 X10(3) UL (ref 0–0.2)
BASOPHILS # BLD: 0.08 X10(3)UL (ref 0–0.2)
BASOPHILS NFR BLD AUTO: 0.8 %
BASOPHILS NFR BLD: 0.8 %
EOSINOPHIL # BLD AUTO: 0.3 X10(3) UL (ref 0–0.7)
EOSINOPHIL # BLD: 0.3 X10(3)UL (ref 0–0.7)
EOSINOPHIL NFR BLD AUTO: 3.1 %
EOSINOPHIL NFR BLD: 3.1 %
ERYTHROCYTE [DISTWIDTH] IN BLOOD BY AUTOMATED COUNT: 14 %
EST. AVERAGE GLUCOSE BLD GHB EST-MCNC: 117 MG/DL (ref 68–126)
EST. AVERAGE GLUCOSE BLD GHB EST-MCNC: 117 MG/DL (ref 68–126)
HBA1C MFR BLD: 5.7 %
HBA1C MFR BLD: 5.7 % (ref ?–5.7)
HCT VFR BLD AUTO: 51.2 %
HCT VFR BLD CALC: 61.2 % (ref 35–48)
HGB BLD-MCNC: 17.6 G/DL
HGB BLD-MCNC: 17.6 G/DL (ref 12–16)
IMM GRANULOCYTES # BLD AUTO: 0.17 X10(3) UL (ref 0–1)
IMM GRANULOCYTES # BLD: 0.17 X10(3)UL (ref 0–1)
IMM GRANULOCYTES NFR BLD: 1.7 %
IMM GRANULOCYTES NFR BLD: 1.7 %
LAB RESULT: NORMAL
LYMPHOCYTES # BLD AUTO: 2.32 X10(3) UL (ref 1–4)
LYMPHOCYTES # BLD: 2.32 X10(3)UL (ref 1–4)
LYMPHOCYTES NFR BLD AUTO: 23.8 %
LYMPHOCYTES NFR BLD: 23.8 %
MCH RBC QN AUTO: 34.8 PG (ref 26–34)
MCH RBC QN AUTO: 34.8 PG (ref 26–34)
MCHC RBC AUTO-ENTMCNC: 34.4 G/DL (ref 31–37)
MCHC RBC AUTO-ENTMCNC: 34.4 G/DL (ref 31–37)
MCV RBC AUTO: 101.2 FL
MCV RBC AUTO: 101.2 FL (ref 80–100)
MONOCYTES # BLD AUTO: 0.97 X10(3) UL (ref 0.1–1)
MONOCYTES # BLD: 0.97 X10(3)UL (ref 0.1–1)
MONOCYTES NFR BLD AUTO: 10 %
MONOCYTES NFR BLD: 10 %
NEUTROPHILS # BLD AUTO: 5.9 X10 (3) UL (ref 1.5–7.7)
NEUTROPHILS # BLD AUTO: 5.9 X10(3) UL (ref 1.5–7.7)
NEUTROPHILS # BLD: 5.9 X10(3)UL (ref 1.5–7.7)
NEUTROPHILS NFR BLD AUTO: 60.6 %
NEUTROPHILS NFR BLD: 60.6 %
PLATELET # BLD AUTO: 256 10(3)UL (ref 150–450)
PLATELET # BLD: 256 X10(3)UL (ref 150–450)
RBC # BLD AUTO: 5.06 X10(6)UL
RBC # BLD: 5.06 X10(6)UL (ref 3.8–5.3)
RHEUMATOID FACT SERPL-ACNC: <10 IU/ML (ref ?–15)
TSH SERPL-ACNC: 3.52 MIU/ML (ref 0.36–3.74)
TSI SER-ACNC: 3.52 MIU/ML (ref 0.36–3.74)
WBC # BLD AUTO: 9.7 X10(3) UL (ref 4–11)
WBC # BLD: 9.7 X10(3)UL (ref 4–11)

## 2024-04-24 PROCEDURE — 86235 NUCLEAR ANTIGEN ANTIBODY: CPT | Performed by: INTERNAL MEDICINE

## 2024-04-24 PROCEDURE — 84443 ASSAY THYROID STIM HORMONE: CPT | Performed by: INTERNAL MEDICINE

## 2024-04-24 PROCEDURE — 86225 DNA ANTIBODY NATIVE: CPT | Performed by: INTERNAL MEDICINE

## 2024-04-24 PROCEDURE — 86038 ANTINUCLEAR ANTIBODIES: CPT | Performed by: INTERNAL MEDICINE

## 2024-04-24 PROCEDURE — 86039 ANTINUCLEAR ANTIBODIES (ANA): CPT | Performed by: INTERNAL MEDICINE

## 2024-04-24 PROCEDURE — 85025 COMPLETE CBC W/AUTO DIFF WBC: CPT | Performed by: INTERNAL MEDICINE

## 2024-04-24 PROCEDURE — 83036 HEMOGLOBIN GLYCOSYLATED A1C: CPT | Performed by: INTERNAL MEDICINE

## 2024-04-24 PROCEDURE — 86431 RHEUMATOID FACTOR QUANT: CPT | Performed by: INTERNAL MEDICINE

## 2024-04-25 LAB
DSDNA AB TITR SER: <10 {TITER}
NUCLEAR IGG TITR SER IF: POSITIVE {TITER}

## 2024-04-26 LAB — ANA NUCLEOLAR TITR SER IF: 2560 {TITER}

## 2024-04-27 ENCOUNTER — TELEPHONE (OUTPATIENT)
Dept: INTERNAL MEDICINE | Age: 79
End: 2024-04-27

## 2024-04-27 ENCOUNTER — TELEPHONE (OUTPATIENT)
Dept: CARDIOLOGY | Age: 79
End: 2024-04-27

## 2024-04-29 LAB
CENTROMERE IGG SER-ACNC: 0.5 U/ML
ENA JO1 AB SER IA-ACNC: 0.5 U/ML
ENA RNP IGG SER IA-ACNC: 1.2 U/ML
ENA SCL70 IGG SER IA-ACNC: 1 U/ML
ENA SM IGG SER IA-ACNC: 0.8 U/ML
ENA SS-A IGG SER IA-ACNC: 0.7 U/ML
ENA SS-B IGG SER IA-ACNC: 0.8 U/ML
U1 SNRNP IGG SER IA-ACNC: 1.1 U/ML

## 2024-05-07 ENCOUNTER — E-ADVICE (OUTPATIENT)
Dept: HEMATOLOGY/ONCOLOGY | Age: 79
End: 2024-05-07

## 2024-05-10 ENCOUNTER — TELEPHONE (OUTPATIENT)
Dept: INTERNAL MEDICINE | Age: 79
End: 2024-05-10

## 2024-05-14 ENCOUNTER — TELEPHONE (OUTPATIENT)
Dept: INTERNAL MEDICINE | Age: 79
End: 2024-05-14

## 2024-05-16 ENCOUNTER — APPOINTMENT (OUTPATIENT)
Dept: INTERNAL MEDICINE | Age: 79
End: 2024-05-16

## 2024-05-16 ENCOUNTER — APPOINTMENT (OUTPATIENT)
Dept: GENERAL RADIOLOGY | Age: 79
DRG: 642 | End: 2024-05-16
Attending: EMERGENCY MEDICINE

## 2024-05-16 ENCOUNTER — HOSPITAL ENCOUNTER (INPATIENT)
Age: 79
Discharge: NURSING, REHAB OR LONG-TERM CARE | DRG: 642 | End: 2024-05-16
Attending: EMERGENCY MEDICINE | Admitting: INTERNAL MEDICINE

## 2024-05-16 ENCOUNTER — APPOINTMENT (OUTPATIENT)
Dept: CT IMAGING | Age: 79
DRG: 642 | End: 2024-05-16
Attending: INTERNAL MEDICINE

## 2024-05-16 ENCOUNTER — TELEPHONE (OUTPATIENT)
Dept: INTERNAL MEDICINE | Age: 79
End: 2024-05-16

## 2024-05-16 DIAGNOSIS — R53.1 GENERALIZED WEAKNESS: Primary | ICD-10-CM

## 2024-05-16 DIAGNOSIS — E72.20 HYPERAMMONEMIA  (CMD): ICD-10-CM

## 2024-05-16 LAB
ALBUMIN SERPL-MCNC: 2.6 G/DL (ref 3.6–5.1)
ALBUMIN/GLOB SERPL: 0.7 {RATIO} (ref 1–2.4)
ALP SERPL-CCNC: 60 UNITS/L (ref 45–117)
ALT SERPL-CCNC: 77 UNITS/L
AMMONIA PLAS-SCNC: 78 MCMOL/L
ANION GAP SERPL CALC-SCNC: 10 MMOL/L (ref 7–19)
AST SERPL-CCNC: 50 UNITS/L
ATRIAL RATE (BPM): 93
BASOPHILS # BLD: 0.1 K/MCL (ref 0–0.3)
BASOPHILS NFR BLD: 1 %
BILIRUB SERPL-MCNC: 0.8 MG/DL (ref 0.2–1)
BUN SERPL-MCNC: 21 MG/DL (ref 6–20)
BUN/CREAT SERPL: 22 (ref 7–25)
CALCIUM SERPL-MCNC: 7.6 MG/DL (ref 8.4–10.2)
CHLORIDE SERPL-SCNC: 109 MMOL/L (ref 97–110)
CK SERPL-CCNC: 91 UNITS/L (ref 26–192)
CO2 SERPL-SCNC: 23 MMOL/L (ref 21–32)
CREAT SERPL-MCNC: 0.95 MG/DL (ref 0.51–0.95)
DEPRECATED RDW RBC: 57.4 FL (ref 39–50)
EGFRCR SERPLBLD CKD-EPI 2021: 62 ML/MIN/{1.73_M2}
EOSINOPHIL # BLD: 0.1 K/MCL (ref 0–0.5)
EOSINOPHIL NFR BLD: 1 %
ERYTHROCYTE [DISTWIDTH] IN BLOOD: 14.7 % (ref 11–15)
FASTING DURATION TIME PATIENT: ABNORMAL H
FLUAV RNA RESP QL NAA+PROBE: NOT DETECTED
FLUBV RNA RESP QL NAA+PROBE: NOT DETECTED
GLOBULIN SER-MCNC: 3.7 G/DL (ref 2–4)
GLUCOSE SERPL-MCNC: 92 MG/DL (ref 70–99)
HCT VFR BLD CALC: 50.7 % (ref 36–46.5)
HGB BLD-MCNC: 16.7 G/DL (ref 12–15.5)
IMM GRANULOCYTES # BLD AUTO: 0.4 K/MCL (ref 0–0.2)
IMM GRANULOCYTES # BLD: 3 %
LYMPHOCYTES # BLD: 1.1 K/MCL (ref 1–4)
LYMPHOCYTES NFR BLD: 9 %
MAGNESIUM SERPL-MCNC: 2.3 MG/DL (ref 1.7–2.4)
MCH RBC QN AUTO: 34.8 PG (ref 26–34)
MCHC RBC AUTO-ENTMCNC: 32.9 G/DL (ref 32–36.5)
MCV RBC AUTO: 105.6 FL (ref 78–100)
MONOCYTES # BLD: 1.1 K/MCL (ref 0.3–0.9)
MONOCYTES NFR BLD: 8 %
NEUTROPHILS # BLD: 10.1 K/MCL (ref 1.8–7.7)
NEUTROPHILS NFR BLD: 78 %
NRBC BLD MANUAL-RTO: 0 /100 WBC
P AXIS (DEGREES): 72
PLATELET # BLD AUTO: 203 K/MCL (ref 140–450)
POTASSIUM SERPL-SCNC: 3.6 MMOL/L (ref 3.4–5.1)
PR-INTERVAL (MSEC): 186
PROCALCITONIN SERPL IA-MCNC: <0.05 NG/ML
PROT SERPL-MCNC: 6.3 G/DL (ref 6.4–8.2)
QRS-INTERVAL (MSEC): 70
QT-INTERVAL (MSEC): 400
QTC: 497
R AXIS (DEGREES): -67
RAINBOW EXTRA TUBES HOLD SPECIMEN: NORMAL
RBC # BLD: 4.8 MIL/MCL (ref 4–5.2)
REPORT TEXT: NORMAL
RSV AG NPH QL IA.RAPID: NOT DETECTED
SARS-COV-2 RNA RESP QL NAA+PROBE: NOT DETECTED
SERVICE CMNT-IMP: NORMAL
SERVICE CMNT-IMP: NORMAL
SODIUM SERPL-SCNC: 138 MMOL/L (ref 135–145)
T AXIS (DEGREES): 56
T4 FREE SERPL-MCNC: 0.7 NG/DL (ref 0.8–1.5)
TROPONIN I SERPL DL<=0.01 NG/ML-MCNC: 39 NG/L
TSH SERPL-ACNC: 1.83 MCUNITS/ML (ref 0.35–5)
VENTRICULAR RATE EKG/MIN (BPM): 93
WBC # BLD: 12.8 K/MCL (ref 4.2–11)

## 2024-05-16 PROCEDURE — 99223 1ST HOSP IP/OBS HIGH 75: CPT | Performed by: INTERNAL MEDICINE

## 2024-05-16 PROCEDURE — 0241U COVID/FLU/RSV PANEL: CPT | Performed by: EMERGENCY MEDICINE

## 2024-05-16 PROCEDURE — 51798 US URINE CAPACITY MEASURE: CPT | Performed by: INTERNAL MEDICINE

## 2024-05-16 PROCEDURE — 82550 ASSAY OF CK (CPK): CPT | Performed by: EMERGENCY MEDICINE

## 2024-05-16 PROCEDURE — 84484 ASSAY OF TROPONIN QUANT: CPT | Performed by: EMERGENCY MEDICINE

## 2024-05-16 PROCEDURE — 82140 ASSAY OF AMMONIA: CPT | Performed by: EMERGENCY MEDICINE

## 2024-05-16 PROCEDURE — 10002807 HB RX 258: Performed by: EMERGENCY MEDICINE

## 2024-05-16 PROCEDURE — 85025 COMPLETE CBC W/AUTO DIFF WBC: CPT | Performed by: EMERGENCY MEDICINE

## 2024-05-16 PROCEDURE — 84443 ASSAY THYROID STIM HORMONE: CPT | Performed by: EMERGENCY MEDICINE

## 2024-05-16 PROCEDURE — 83735 ASSAY OF MAGNESIUM: CPT | Performed by: EMERGENCY MEDICINE

## 2024-05-16 PROCEDURE — 80053 COMPREHEN METABOLIC PANEL: CPT | Performed by: EMERGENCY MEDICINE

## 2024-05-16 PROCEDURE — 93010 ELECTROCARDIOGRAM REPORT: CPT | Performed by: INTERNAL MEDICINE

## 2024-05-16 PROCEDURE — 99285 EMERGENCY DEPT VISIT HI MDM: CPT

## 2024-05-16 PROCEDURE — 84145 PROCALCITONIN (PCT): CPT | Performed by: EMERGENCY MEDICINE

## 2024-05-16 PROCEDURE — 84439 ASSAY OF FREE THYROXINE: CPT | Performed by: INTERNAL MEDICINE

## 2024-05-16 PROCEDURE — 10002803 HB RX 637: Performed by: INTERNAL MEDICINE

## 2024-05-16 PROCEDURE — G0378 HOSPITAL OBSERVATION PER HR: HCPCS

## 2024-05-16 PROCEDURE — 93005 ELECTROCARDIOGRAM TRACING: CPT | Performed by: STUDENT IN AN ORGANIZED HEALTH CARE EDUCATION/TRAINING PROGRAM

## 2024-05-16 PROCEDURE — 70450 CT HEAD/BRAIN W/O DYE: CPT

## 2024-05-16 PROCEDURE — 10004651 HB RX, NO CHARGE ITEM: Performed by: INTERNAL MEDICINE

## 2024-05-16 PROCEDURE — 96360 HYDRATION IV INFUSION INIT: CPT

## 2024-05-16 PROCEDURE — 71045 X-RAY EXAM CHEST 1 VIEW: CPT

## 2024-05-16 PROCEDURE — 10006031 HB ROOM CHARGE TELEMETRY

## 2024-05-16 RX ORDER — MIDODRINE HYDROCHLORIDE 5 MG/1
5 TABLET ORAL 2 TIMES DAILY
Status: ON HOLD | COMMUNITY

## 2024-05-16 RX ORDER — ARIPIPRAZOLE 5 MG/1
5 TABLET ORAL DAILY
Status: ON HOLD | COMMUNITY

## 2024-05-16 RX ORDER — ACETAMINOPHEN 500 MG
1-2 TABLET ORAL EVERY 6 HOURS PRN
Status: ON HOLD | COMMUNITY

## 2024-05-16 RX ORDER — LACTULOSE 10 G/15ML
30 SOLUTION ORAL ONCE
Status: COMPLETED | OUTPATIENT
Start: 2024-05-16 | End: 2024-05-16

## 2024-05-16 RX ORDER — POLYETHYLENE GLYCOL 3350 17 G/17G
17 POWDER, FOR SOLUTION ORAL PRN
Status: ON HOLD | COMMUNITY
End: 2024-05-20 | Stop reason: HOSPADM

## 2024-05-16 RX ORDER — LEVOTHYROXINE SODIUM 0.03 MG/1
25 TABLET ORAL
Status: DISCONTINUED | OUTPATIENT
Start: 2024-05-17 | End: 2024-05-20 | Stop reason: HOSPADM

## 2024-05-16 RX ORDER — PREDNISONE 20 MG/1
20 TABLET ORAL 2 TIMES DAILY
Status: ON HOLD | COMMUNITY
End: 2024-05-20 | Stop reason: HOSPADM

## 2024-05-16 RX ORDER — BISACODYL 10 MG
10 SUPPOSITORY, RECTAL RECTAL DAILY PRN
Status: DISCONTINUED | OUTPATIENT
Start: 2024-05-16 | End: 2024-05-20 | Stop reason: HOSPADM

## 2024-05-16 RX ORDER — ACETAMINOPHEN 650 MG/1
650 SUPPOSITORY RECTAL EVERY 4 HOURS PRN
Status: DISCONTINUED | OUTPATIENT
Start: 2024-05-16 | End: 2024-05-20 | Stop reason: HOSPADM

## 2024-05-16 RX ORDER — ENOXAPARIN SODIUM 100 MG/ML
40 INJECTION SUBCUTANEOUS DAILY
Status: DISCONTINUED | OUTPATIENT
Start: 2024-05-16 | End: 2024-05-20 | Stop reason: HOSPADM

## 2024-05-16 RX ORDER — POLYETHYLENE GLYCOL 3350 17 G/17G
17 POWDER, FOR SOLUTION ORAL DAILY PRN
Status: DISCONTINUED | OUTPATIENT
Start: 2024-05-16 | End: 2024-05-20 | Stop reason: HOSPADM

## 2024-05-16 RX ORDER — PANTOPRAZOLE SODIUM 20 MG/1
20 TABLET, DELAYED RELEASE ORAL 2 TIMES DAILY
Status: ON HOLD | COMMUNITY

## 2024-05-16 RX ORDER — ONDANSETRON 2 MG/ML
4 INJECTION INTRAMUSCULAR; INTRAVENOUS EVERY 12 HOURS PRN
Status: DISCONTINUED | OUTPATIENT
Start: 2024-05-16 | End: 2024-05-20 | Stop reason: HOSPADM

## 2024-05-16 RX ORDER — ACETAMINOPHEN 325 MG/1
650 TABLET ORAL EVERY 4 HOURS PRN
Status: DISCONTINUED | OUTPATIENT
Start: 2024-05-16 | End: 2024-05-20 | Stop reason: HOSPADM

## 2024-05-16 RX ORDER — SODIUM CHLORIDE 9 MG/ML
INJECTION, SOLUTION INTRAVENOUS CONTINUOUS
Status: DISCONTINUED | OUTPATIENT
Start: 2024-05-16 | End: 2024-05-17

## 2024-05-16 RX ORDER — LACTULOSE 20 G/30ML
20 SOLUTION ORAL DAILY
Status: ON HOLD | COMMUNITY
End: 2024-05-20 | Stop reason: HOSPADM

## 2024-05-16 RX ORDER — AMOXICILLIN 250 MG
2 CAPSULE ORAL 2 TIMES DAILY PRN
Status: DISCONTINUED | OUTPATIENT
Start: 2024-05-16 | End: 2024-05-20 | Stop reason: HOSPADM

## 2024-05-16 RX ORDER — LACTULOSE 10 G/15ML
20 SOLUTION ORAL ONCE
Status: DISCONTINUED | OUTPATIENT
Start: 2024-05-16 | End: 2024-05-16

## 2024-05-16 RX ORDER — LACTULOSE 10 G/15ML
20 SOLUTION ORAL 3 TIMES DAILY
Status: DISCONTINUED | OUTPATIENT
Start: 2024-05-16 | End: 2024-05-20

## 2024-05-16 RX ORDER — ONDANSETRON 4 MG/1
4 TABLET, ORALLY DISINTEGRATING ORAL EVERY 12 HOURS PRN
Status: DISCONTINUED | OUTPATIENT
Start: 2024-05-16 | End: 2024-05-20 | Stop reason: HOSPADM

## 2024-05-16 RX ORDER — 0.9 % SODIUM CHLORIDE 0.9 %
2 VIAL (ML) INJECTION EVERY 12 HOURS SCHEDULED
Status: DISCONTINUED | OUTPATIENT
Start: 2024-05-16 | End: 2024-05-20 | Stop reason: HOSPADM

## 2024-05-16 RX ORDER — POTASSIUM CHLORIDE 20 MEQ/1
40 TABLET, EXTENDED RELEASE ORAL ONCE
Qty: 2 TABLET | Refills: 0 | Status: COMPLETED | OUTPATIENT
Start: 2024-05-16 | End: 2024-05-16

## 2024-05-16 RX ORDER — LANOLIN ALCOHOL/MO/W.PET/CERES
3 CREAM (GRAM) TOPICAL NIGHTLY PRN
Status: DISCONTINUED | OUTPATIENT
Start: 2024-05-16 | End: 2024-05-20 | Stop reason: HOSPADM

## 2024-05-16 RX ADMIN — SODIUM CHLORIDE 500 ML: 9 INJECTION, SOLUTION INTRAVENOUS at 11:57

## 2024-05-16 RX ADMIN — LACTULOSE 30 G: 10 SOLUTION ORAL at 21:43

## 2024-05-16 RX ADMIN — LACTULOSE 20 G: 10 SOLUTION ORAL at 22:10

## 2024-05-16 RX ADMIN — POTASSIUM CHLORIDE 40 MEQ: 1500 TABLET, EXTENDED RELEASE ORAL at 21:42

## 2024-05-16 RX ADMIN — SODIUM CHLORIDE, PRESERVATIVE FREE 2 ML: 5 INJECTION INTRAVENOUS at 22:10

## 2024-05-16 SDOH — ECONOMIC STABILITY: FOOD INSECURITY: WITHIN THE PAST 12 MONTHS, THE FOOD YOU BOUGHT JUST DIDN'T LAST AND YOU DIDN'T HAVE MONEY TO GET MORE.: NEVER TRUE

## 2024-05-16 SDOH — ECONOMIC STABILITY: HOUSING INSECURITY: WHAT IS YOUR LIVING SITUATION TODAY?: ASSISTED LIVING

## 2024-05-16 SDOH — HEALTH STABILITY: PHYSICAL HEALTH: DO YOU HAVE DIFFICULTY DRESSING OR BATHING?: YES

## 2024-05-16 SDOH — ECONOMIC STABILITY: GENERAL

## 2024-05-16 SDOH — SOCIAL STABILITY: SOCIAL INSECURITY: HOW OFTEN DOES ANYONE, INCLUDING FAMILY AND FRIENDS, INSULT OR TALK DOWN TO YOU?: NEVER

## 2024-05-16 SDOH — ECONOMIC STABILITY: HOUSING INSECURITY: WHAT IS YOUR LIVING SITUATION TODAY?: OTHER FACILITY RESIDENTS

## 2024-05-16 SDOH — SOCIAL STABILITY: SOCIAL INSECURITY: HOW OFTEN DOES ANYONE, INCLUDING FAMILY AND FRIENDS, THREATEN YOU WITH HARM?: NEVER

## 2024-05-16 SDOH — SOCIAL STABILITY: SOCIAL NETWORK: SUPPORT SYSTEMS: SIBLINGS

## 2024-05-16 SDOH — ECONOMIC STABILITY: INCOME INSECURITY: IN THE PAST 12 MONTHS, HAS THE ELECTRIC, GAS, OIL, OR WATER COMPANY THREATENED TO SHUT OFF SERVICE IN YOUR HOME?: NO

## 2024-05-16 SDOH — SOCIAL STABILITY: SOCIAL INSECURITY: HOW OFTEN DOES ANYONE, INCLUDING FAMILY AND FRIENDS, SCREAM OR CURSE AT YOU?: NEVER

## 2024-05-16 SDOH — ECONOMIC STABILITY: GENERAL: WOULD YOU LIKE HELP WITH ANY OF THE FOLLOWING NEEDS?: I DON'T WANT HELP WITH ANY OF THESE

## 2024-05-16 SDOH — HEALTH STABILITY: PHYSICAL HEALTH: DO YOU HAVE SERIOUS DIFFICULTY WALKING OR CLIMBING STAIRS?: YES

## 2024-05-16 SDOH — ECONOMIC STABILITY: TRANSPORTATION INSECURITY
IN THE PAST 12 MONTHS, HAS LACK OF RELIABLE TRANSPORTATION KEPT YOU FROM MEDICAL APPOINTMENTS, MEETINGS, WORK OR FROM GETTING THINGS NEEDED FOR DAILY LIVING?: NO

## 2024-05-16 SDOH — ECONOMIC STABILITY: HOUSING INSECURITY: DO YOU HAVE PROBLEMS WITH ANY OF THE FOLLOWING?: NONE OF THE ABOVE

## 2024-05-16 SDOH — HEALTH STABILITY: GENERAL
BECAUSE OF A PHYSICAL, MENTAL, OR EMOTIONAL CONDITION, DO YOU HAVE SERIOUS DIFFICULTY CONCENTRATING, REMEMBERING OR MAKING DECISIONS?: YES

## 2024-05-16 SDOH — ECONOMIC STABILITY: HOUSING INSECURITY: WHAT IS YOUR LIVING SITUATION TODAY?: I HAVE A STEADY PLACE TO LIVE

## 2024-05-16 SDOH — SOCIAL STABILITY: SOCIAL INSECURITY: HOW OFTEN DOES ANYONE, INCLUDING FAMILY AND FRIENDS, PHYSICALLY HURT YOU?: NEVER

## 2024-05-16 SDOH — HEALTH STABILITY: GENERAL: BECAUSE OF A PHYSICAL, MENTAL, OR EMOTIONAL CONDITION, DO YOU HAVE DIFFICULTY DOING ERRANDS ALONE?: YES

## 2024-05-16 ASSESSMENT — ENCOUNTER SYMPTOMS
FEVER: 0
HEMATOLOGIC/LYMPHATIC NEGATIVE: 1
SORE THROAT: 0
SHORTNESS OF BREATH: 0
ENDOCRINE NEGATIVE: 1
ALLERGIC/IMMUNOLOGIC NEGATIVE: 1
NEUROLOGICAL NEGATIVE: 1
RESPIRATORY NEGATIVE: 1
EYES NEGATIVE: 1
PSYCHIATRIC NEGATIVE: 1
GASTROINTESTINAL NEGATIVE: 1
CONSTITUTIONAL NEGATIVE: 1

## 2024-05-16 ASSESSMENT — ACTIVITIES OF DAILY LIVING (ADL)
RECENT_DECLINE_ADL: YES, DECLINE IN BATHING/DRESSING/FEEDING, COLLABORATE WITH PROVIDER (T);YES, DECLINE IN AMBULATION/TRANSFERRING, COLLABORATE WITH PROVIDER (T)
DRESSING: NEEDS ASSISTANCE
FEEDING: NEEDS ASSISTANCE
TOILETING: NEEDS ASSISTANCE
BATHING: NEEDS ASSISTANCE
ADL_SHORT_OF_BREATH: YES
ADL_BEFORE_ADMISSION: NEEDS/REQUIRES ASSISTANCE
ADL_SCORE: 6

## 2024-05-16 ASSESSMENT — COLUMBIA-SUICIDE SEVERITY RATING SCALE - C-SSRS
2. HAVE YOU ACTUALLY HAD ANY THOUGHTS OF KILLING YOURSELF?: NO
1. WITHIN THE PAST MONTH, HAVE YOU WISHED YOU WERE DEAD OR WISHED YOU COULD GO TO SLEEP AND NOT WAKE UP?: NO
6. HAVE YOU EVER DONE ANYTHING, STARTED TO DO ANYTHING, OR PREPARED TO DO ANYTHING TO END YOUR LIFE?: NO
IS THE PATIENT ABLE TO COMPLETE C-SSRS: YES

## 2024-05-16 ASSESSMENT — ORIENTATION MEMORY CONCENTRATION TEST (OMCT)
SAY THE MONTHS IN REVERSE ORDER STARTING WITH LAST MONTH: CORRECT
COUNT BACKWARDS FROM 20 TO 1: CORRECT
REPEAT THE NAME AND ADDRESS I ASKED YOU TO REMEMBER: CORRECT
WHAT MONTH IS IT NOW: CORRECT
WHAT YEAR IS IT NOW (MUST BE EXACT): CORRECT
WHAT TIME IS IT (NO WATCH OR CLOCK): CORRECT
OMCT INTERPRETATION: 0-6: NO SIGNIFICANT IMPAIRMENT
OMCT SCORE: 0

## 2024-05-16 ASSESSMENT — PAIN SCALES - GENERAL
PAINLEVEL_OUTOF10: 0
PAINLEVEL_OUTOF10: 0

## 2024-05-16 ASSESSMENT — LIFESTYLE VARIABLES
ALCOHOL_USE_STATUS: NO OR LOW RISK WITH VALIDATED TOOL
HOW OFTEN DO YOU HAVE A DRINK CONTAINING ALCOHOL: NEVER
AUDIT-C TOTAL SCORE: 0
HOW MANY STANDARD DRINKS CONTAINING ALCOHOL DO YOU HAVE ON A TYPICAL DAY: 0,1 OR 2
HOW OFTEN DO YOU HAVE 6 OR MORE DRINKS ON ONE OCCASION: NEVER

## 2024-05-16 ASSESSMENT — PATIENT HEALTH QUESTIONNAIRE - PHQ9
CLINICAL INTERPRETATION OF PHQ2 SCORE: NO FURTHER SCREENING NEEDED
1. LITTLE INTEREST OR PLEASURE IN DOING THINGS: SEVERAL DAYS
SUM OF ALL RESPONSES TO PHQ9 QUESTIONS 1 AND 2: 2
IS PATIENT ABLE TO COMPLETE PHQ2 OR PHQ9: YES
2. FEELING DOWN, DEPRESSED OR HOPELESS: SEVERAL DAYS
SUM OF ALL RESPONSES TO PHQ9 QUESTIONS 1 AND 2: 2

## 2024-05-17 LAB
A1AT SERPL-MCNC: 133 MG/DL (ref 90–200)
ALBUMIN SERPL-MCNC: 2.4 G/DL (ref 3.6–5.1)
ALBUMIN/GLOB SERPL: 0.8 {RATIO} (ref 1–2.4)
ALP SERPL-CCNC: 57 UNITS/L (ref 45–117)
ALT SERPL-CCNC: 64 UNITS/L
AMMONIA PLAS-SCNC: 135 MCMOL/L
ANION GAP SERPL CALC-SCNC: 6 MMOL/L (ref 7–19)
ANNOTATION COMMENT IMP: NORMAL
APPEARANCE UR: ABNORMAL
AST SERPL-CCNC: 24 UNITS/L
BACTERIA #/AREA URNS HPF: ABNORMAL /HPF
BASOPHILS # BLD: 0.1 K/MCL (ref 0–0.3)
BASOPHILS NFR BLD: 1 %
BILIRUB SERPL-MCNC: 0.7 MG/DL (ref 0.2–1)
BILIRUB UR QL STRIP: NEGATIVE
BUN SERPL-MCNC: 27 MG/DL (ref 6–20)
BUN/CREAT SERPL: 39 (ref 7–25)
CALCIUM SERPL-MCNC: 7.6 MG/DL (ref 8.4–10.2)
CERULOPLASMIN SERPL-MCNC: 23.5 MG/DL (ref 20–60)
CHLORIDE SERPL-SCNC: 112 MMOL/L (ref 97–110)
CO2 SERPL-SCNC: 27 MMOL/L (ref 21–32)
COLOR UR: YELLOW
CREAT SERPL-MCNC: 0.7 MG/DL (ref 0.51–0.95)
DEPRECATED RDW RBC: 58.2 FL (ref 39–50)
EGFRCR SERPLBLD CKD-EPI 2021: 89 ML/MIN/{1.73_M2}
EOSINOPHIL # BLD: 0.2 K/MCL (ref 0–0.5)
EOSINOPHIL NFR BLD: 3 %
ERYTHROCYTE [DISTWIDTH] IN BLOOD: 14.9 % (ref 11–15)
FASTING DURATION TIME PATIENT: ABNORMAL H
FERRITIN SERPL-MCNC: 182 NG/ML (ref 8–252)
GLOBULIN SER-MCNC: 3.1 G/DL (ref 2–4)
GLUCOSE SERPL-MCNC: 82 MG/DL (ref 70–99)
GLUCOSE UR STRIP-MCNC: ABNORMAL MG/DL
HAV IGM SER QL: NEGATIVE
HBV CORE IGM SER QL: NEGATIVE
HBV SURFACE AG SER QL: NEGATIVE
HCT VFR BLD CALC: 48.7 % (ref 36–46.5)
HCV AB SER QL: NEGATIVE
HGB BLD-MCNC: 15.8 G/DL (ref 12–15.5)
HGB UR QL STRIP: NEGATIVE
HYALINE CASTS #/AREA URNS LPF: ABNORMAL /LPF
IMM GRANULOCYTES # BLD AUTO: 0.2 K/MCL (ref 0–0.2)
IMM GRANULOCYTES # BLD: 3 %
IMP & REVIEW OF LAB RESULTS: NORMAL
IRON SATN MFR SERPL: 52 % (ref 15–45)
IRON SERPL-MCNC: 116 MCG/DL (ref 50–170)
KETONES UR STRIP-MCNC: ABNORMAL MG/DL
LEUKOCYTE ESTERASE UR QL STRIP: ABNORMAL
LYMPHOCYTES # BLD: 1.6 K/MCL (ref 1–4)
LYMPHOCYTES NFR BLD: 20 %
MAGNESIUM SERPL-MCNC: 2.8 MG/DL (ref 1.7–2.4)
MCH RBC QN AUTO: 33.9 PG (ref 26–34)
MCHC RBC AUTO-ENTMCNC: 32.4 G/DL (ref 32–36.5)
MCV RBC AUTO: 104.5 FL (ref 78–100)
MONOCYTES # BLD: 0.9 K/MCL (ref 0.3–0.9)
MONOCYTES NFR BLD: 12 %
NEUTROPHILS # BLD: 4.9 K/MCL (ref 1.8–7.7)
NEUTROPHILS NFR BLD: 61 %
NITRITE UR QL STRIP: NEGATIVE
NRBC BLD MANUAL-RTO: 0 /100 WBC
PH UR STRIP: 6.5 [PH] (ref 5–7)
PLATELET # BLD AUTO: 173 K/MCL (ref 140–450)
POTASSIUM SERPL-SCNC: 4.2 MMOL/L (ref 3.4–5.1)
PROT SERPL-MCNC: 5.5 G/DL (ref 6.4–8.2)
PROT UR STRIP-MCNC: ABNORMAL MG/DL
RBC # BLD: 4.66 MIL/MCL (ref 4–5.2)
RBC #/AREA URNS HPF: ABNORMAL /HPF
SODIUM SERPL-SCNC: 141 MMOL/L (ref 135–145)
SP GR UR STRIP: >1.03 (ref 1–1.03)
SQUAMOUS #/AREA URNS HPF: ABNORMAL /HPF
TIBC SERPL-MCNC: 225 MCG/DL (ref 250–450)
TRANSFERRIN SERPL-MCNC: 181 MG/DL (ref 200–360)
UROBILINOGEN UR STRIP-MCNC: 8 MG/DL
WBC # BLD: 7.9 K/MCL (ref 4.2–11)
WBC #/AREA URNS HPF: ABNORMAL /HPF

## 2024-05-17 PROCEDURE — 84466 ASSAY OF TRANSFERRIN: CPT

## 2024-05-17 PROCEDURE — 81001 URINALYSIS AUTO W/SCOPE: CPT | Performed by: INTERNAL MEDICINE

## 2024-05-17 PROCEDURE — 82728 ASSAY OF FERRITIN: CPT

## 2024-05-17 PROCEDURE — 10002807 HB RX 258: Performed by: INTERNAL MEDICINE

## 2024-05-17 PROCEDURE — 10004651 HB RX, NO CHARGE ITEM: Performed by: INTERNAL MEDICINE

## 2024-05-17 PROCEDURE — 92610 EVALUATE SWALLOWING FUNCTION: CPT | Performed by: SPEECH-LANGUAGE PATHOLOGIST

## 2024-05-17 PROCEDURE — 36415 COLL VENOUS BLD VENIPUNCTURE: CPT | Performed by: INTERNAL MEDICINE

## 2024-05-17 PROCEDURE — 86256 FLUORESCENT ANTIBODY TITER: CPT | Performed by: INTERNAL MEDICINE

## 2024-05-17 PROCEDURE — 83735 ASSAY OF MAGNESIUM: CPT | Performed by: INTERNAL MEDICINE

## 2024-05-17 PROCEDURE — 82390 ASSAY OF CERULOPLASMIN: CPT

## 2024-05-17 PROCEDURE — 99233 SBSQ HOSP IP/OBS HIGH 50: CPT | Performed by: INTERNAL MEDICINE

## 2024-05-17 PROCEDURE — 82103 ALPHA-1-ANTITRYPSIN TOTAL: CPT

## 2024-05-17 PROCEDURE — 86381 MITOCHONDRIAL ANTIBODY EACH: CPT | Performed by: INTERNAL MEDICINE

## 2024-05-17 PROCEDURE — 10006031 HB ROOM CHARGE TELEMETRY

## 2024-05-17 PROCEDURE — 10002803 HB RX 637: Performed by: INTERNAL MEDICINE

## 2024-05-17 PROCEDURE — 80074 ACUTE HEPATITIS PANEL: CPT | Performed by: INTERNAL MEDICINE

## 2024-05-17 PROCEDURE — 83540 ASSAY OF IRON: CPT

## 2024-05-17 PROCEDURE — 97535 SELF CARE MNGMENT TRAINING: CPT

## 2024-05-17 PROCEDURE — 97166 OT EVAL MOD COMPLEX 45 MIN: CPT

## 2024-05-17 PROCEDURE — 97530 THERAPEUTIC ACTIVITIES: CPT

## 2024-05-17 PROCEDURE — 99223 1ST HOSP IP/OBS HIGH 75: CPT

## 2024-05-17 PROCEDURE — 80053 COMPREHEN METABOLIC PANEL: CPT | Performed by: INTERNAL MEDICINE

## 2024-05-17 PROCEDURE — 85025 COMPLETE CBC W/AUTO DIFF WBC: CPT | Performed by: INTERNAL MEDICINE

## 2024-05-17 PROCEDURE — 97162 PT EVAL MOD COMPLEX 30 MIN: CPT

## 2024-05-17 PROCEDURE — 87186 SC STD MICRODIL/AGAR DIL: CPT | Performed by: INTERNAL MEDICINE

## 2024-05-17 PROCEDURE — 10002800 HB RX 250 W HCPCS: Performed by: INTERNAL MEDICINE

## 2024-05-17 PROCEDURE — 86015 ACTIN ANTIBODY EACH: CPT | Performed by: INTERNAL MEDICINE

## 2024-05-17 PROCEDURE — 82140 ASSAY OF AMMONIA: CPT | Performed by: INTERNAL MEDICINE

## 2024-05-17 RX ORDER — CEFAZOLIN SODIUM/WATER 1 G/10 ML
1000 SYRINGE (ML) INTRAVENOUS DAILY
Status: DISCONTINUED | OUTPATIENT
Start: 2024-05-17 | End: 2024-05-17

## 2024-05-17 RX ADMIN — ACETAMINOPHEN 650 MG: 325 TABLET ORAL at 02:22

## 2024-05-17 RX ADMIN — CEFTRIAXONE SODIUM 1000 MG: 10 INJECTION, POWDER, FOR SOLUTION INTRAVENOUS at 14:00

## 2024-05-17 RX ADMIN — LEVOTHYROXINE SODIUM 25 MCG: 0.03 TABLET ORAL at 05:43

## 2024-05-17 RX ADMIN — SODIUM CHLORIDE, PRESERVATIVE FREE 2 ML: 5 INJECTION INTRAVENOUS at 21:22

## 2024-05-17 RX ADMIN — SODIUM CHLORIDE, PRESERVATIVE FREE 2 ML: 5 INJECTION INTRAVENOUS at 09:38

## 2024-05-17 RX ADMIN — MEROPENEM 500 MG: 500 INJECTION INTRAVENOUS at 19:39

## 2024-05-17 RX ADMIN — LACTULOSE 20 G: 10 SOLUTION ORAL at 09:37

## 2024-05-17 RX ADMIN — SODIUM CHLORIDE: 9 INJECTION, SOLUTION INTRAVENOUS at 00:00

## 2024-05-17 RX ADMIN — LACTULOSE 20 G: 10 SOLUTION ORAL at 21:22

## 2024-05-17 RX ADMIN — LACTULOSE 20 G: 10 SOLUTION ORAL at 14:00

## 2024-05-17 ASSESSMENT — COGNITIVE AND FUNCTIONAL STATUS - GENERAL
HELP NEEDED FOR PERSONAL GROOMING: A LOT
HELP NEEDED DRESSING REGULAR LOWER BODY CLOTHING: TOTAL
TAKING CARE OF COMPLICATED TASKS: UNABLE
REMEMBERING WHERE THINGS ARE: A LITTLE
REMEMBERING TO TAKE MEDICATION: A LITTLE
REMEMBERING 5 ERRANDS WITH NO LIST: A LOT
BASIC_MOBILITY_RAW_SCORE: 8
DAILY_ACTIVITY_RAW_SCORE: 9
BASIC_MOBILITY_CONVERTED_SCORE: 22.61
APPLIED_COGNITIVE_CONVERTED_SCORE: 33.54
DAILY_ACTIVITY_CONVERTED_SCORE: 25.33
UNDERSTANDING 10 TO 15 MIN SPEECH: A LOT
HELP NEEDED FOR BATHING: TOTAL
HELP NEEDED FOR TOILETING: TOTAL
APPLIED_COGNITIVE_RAW_SCORE: 15
HELP NEEDED DRESSING REGULAR UPPER BODY CLOTHING: A LOT

## 2024-05-17 ASSESSMENT — ACTIVITIES OF DAILY LIVING (ADL)
PRIOR_ADL_BATHING: MINIMAL ASSIST (MIN)
HOME_MANAGEMENT_TIME_ENTRY: 15
PRIOR_ADL_TOILETING: MINIMAL ASSIST (MIN)
GROOMING: MINIMAL ASSIST (MIN)
EATING: MODERATE ASSIST (MOD)

## 2024-05-17 ASSESSMENT — ENCOUNTER SYMPTOMS
COUGH: 0
CONSTIPATION: 1
DIARRHEA: 0
FEVER: 0
ABDOMINAL DISTENTION: 0
BLOOD IN STOOL: 0
VOMITING: 0
CHILLS: 0
NAUSEA: 0
PAIN SEVERITY NOW: 0
SHORTNESS OF BREATH: 0
ABDOMINAL PAIN: 0
WEAKNESS: 1

## 2024-05-17 ASSESSMENT — PAIN SCALES - GENERAL
PAINLEVEL_OUTOF10: 6
PAINLEVEL_OUTOF10: 0
PAINLEVEL_OUTOF10: 8
PAINLEVEL_OUTOF10: 0

## 2024-05-18 ENCOUNTER — APPOINTMENT (OUTPATIENT)
Dept: ULTRASOUND IMAGING | Age: 79
DRG: 642 | End: 2024-05-18

## 2024-05-18 VITALS
RESPIRATION RATE: 16 BRPM | WEIGHT: 197.97 LBS | HEIGHT: 62 IN | DIASTOLIC BLOOD PRESSURE: 83 MMHG | HEART RATE: 88 BPM | OXYGEN SATURATION: 91 % | TEMPERATURE: 97.9 F | SYSTOLIC BLOOD PRESSURE: 131 MMHG | BODY MASS INDEX: 36.43 KG/M2

## 2024-05-18 LAB
ALBUMIN SERPL-MCNC: 2.7 G/DL (ref 3.6–5.1)
ALBUMIN/GLOB SERPL: 0.8 {RATIO} (ref 1–2.4)
ALP SERPL-CCNC: 63 UNITS/L (ref 45–117)
ALT SERPL-CCNC: 72 UNITS/L
AMMONIA PLAS-SCNC: 49 MCMOL/L
ANION GAP SERPL CALC-SCNC: 8 MMOL/L (ref 7–19)
AST SERPL-CCNC: 34 UNITS/L
BACTERIA UR CULT: ABNORMAL
BASOPHILS # BLD: 0.1 K/MCL (ref 0–0.3)
BASOPHILS NFR BLD: 1 %
BILIRUB SERPL-MCNC: 0.8 MG/DL (ref 0.2–1)
BUN SERPL-MCNC: 19 MG/DL (ref 6–20)
BUN/CREAT SERPL: 26 (ref 7–25)
BURR CELLS BLD QL SMEAR: NORMAL
CALCIUM SERPL-MCNC: 7.9 MG/DL (ref 8.4–10.2)
CHLORIDE SERPL-SCNC: 110 MMOL/L (ref 97–110)
CO2 SERPL-SCNC: 26 MMOL/L (ref 21–32)
CREAT SERPL-MCNC: 0.72 MG/DL (ref 0.51–0.95)
DEPRECATED RDW RBC: 58.5 FL (ref 39–50)
EGFRCR SERPLBLD CKD-EPI 2021: 86 ML/MIN/{1.73_M2}
EOSINOPHIL # BLD: 0.2 K/MCL (ref 0–0.5)
EOSINOPHIL NFR BLD: 3 %
ERYTHROCYTE [DISTWIDTH] IN BLOOD: 14.8 % (ref 11–15)
FASTING DURATION TIME PATIENT: ABNORMAL H
GLOBULIN SER-MCNC: 3.4 G/DL (ref 2–4)
GLUCOSE BLDC GLUCOMTR-MCNC: 90 MG/DL (ref 70–99)
GLUCOSE SERPL-MCNC: 77 MG/DL (ref 70–99)
HCT VFR BLD CALC: 53 % (ref 36–46.5)
HGB BLD-MCNC: 17.3 G/DL (ref 12–15.5)
LYMPHOCYTES # BLD: 1 K/MCL (ref 1–4)
LYMPHOCYTES NFR BLD: 14 %
MACROCYTES BLD QL SMEAR: NORMAL
MCH RBC QN AUTO: 34.5 PG (ref 26–34)
MCHC RBC AUTO-ENTMCNC: 32.6 G/DL (ref 32–36.5)
MCV RBC AUTO: 105.8 FL (ref 78–100)
MONOCYTES # BLD: 0.5 K/MCL (ref 0.3–0.9)
MONOCYTES NFR BLD: 7 %
NEUTROPHILS # BLD: 5.3 K/MCL (ref 1.8–7.7)
NEUTS BAND NFR BLD: 6 % (ref 0–10)
NEUTS SEG NFR BLD: 69 %
NRBC BLD MANUAL-RTO: 0 /100 WBC
PLAT MORPH BLD: NORMAL
PLATELET # BLD AUTO: 163 K/MCL (ref 140–450)
POTASSIUM SERPL-SCNC: 4.1 MMOL/L (ref 3.4–5.1)
PROT SERPL-MCNC: 6.1 G/DL (ref 6.4–8.2)
RBC # BLD: 5.01 MIL/MCL (ref 4–5.2)
SODIUM SERPL-SCNC: 140 MMOL/L (ref 135–145)
WBC # BLD: 7.1 K/MCL (ref 4.2–11)
WBC TOXIC VACUOLES BLD QL SMEAR: PRESENT

## 2024-05-18 PROCEDURE — 10002803 HB RX 637: Performed by: INTERNAL MEDICINE

## 2024-05-18 PROCEDURE — 10002807 HB RX 258: Performed by: INTERNAL MEDICINE

## 2024-05-18 PROCEDURE — 85027 COMPLETE CBC AUTOMATED: CPT | Performed by: INTERNAL MEDICINE

## 2024-05-18 PROCEDURE — 99233 SBSQ HOSP IP/OBS HIGH 50: CPT | Performed by: INTERNAL MEDICINE

## 2024-05-18 PROCEDURE — 82140 ASSAY OF AMMONIA: CPT | Performed by: INTERNAL MEDICINE

## 2024-05-18 PROCEDURE — 76705 ECHO EXAM OF ABDOMEN: CPT

## 2024-05-18 PROCEDURE — 10004651 HB RX, NO CHARGE ITEM: Performed by: INTERNAL MEDICINE

## 2024-05-18 PROCEDURE — 10002800 HB RX 250 W HCPCS: Performed by: INTERNAL MEDICINE

## 2024-05-18 PROCEDURE — 81256 HFE GENE: CPT | Performed by: INTERNAL MEDICINE

## 2024-05-18 PROCEDURE — 10006031 HB ROOM CHARGE TELEMETRY

## 2024-05-18 PROCEDURE — 92526 ORAL FUNCTION THERAPY: CPT

## 2024-05-18 PROCEDURE — 36415 COLL VENOUS BLD VENIPUNCTURE: CPT | Performed by: INTERNAL MEDICINE

## 2024-05-18 PROCEDURE — 80053 COMPREHEN METABOLIC PANEL: CPT | Performed by: INTERNAL MEDICINE

## 2024-05-18 PROCEDURE — 82787 IGG 1 2 3 OR 4 EACH: CPT

## 2024-05-18 RX ADMIN — LACTULOSE 20 G: 10 SOLUTION ORAL at 13:53

## 2024-05-18 RX ADMIN — SODIUM CHLORIDE, PRESERVATIVE FREE 2 ML: 5 INJECTION INTRAVENOUS at 09:33

## 2024-05-18 RX ADMIN — MEROPENEM 500 MG: 500 INJECTION INTRAVENOUS at 00:41

## 2024-05-18 RX ADMIN — MEROPENEM 500 MG: 500 INJECTION INTRAVENOUS at 06:24

## 2024-05-18 RX ADMIN — LACTULOSE 20 G: 10 SOLUTION ORAL at 09:33

## 2024-05-18 RX ADMIN — MEROPENEM 500 MG: 500 INJECTION INTRAVENOUS at 23:00

## 2024-05-18 RX ADMIN — MEROPENEM 500 MG: 500 INJECTION INTRAVENOUS at 18:31

## 2024-05-18 RX ADMIN — LEVOTHYROXINE SODIUM 25 MCG: 0.03 TABLET ORAL at 06:24

## 2024-05-18 RX ADMIN — Medication 3 MG: at 22:57

## 2024-05-18 RX ADMIN — LACTULOSE 20 G: 10 SOLUTION ORAL at 20:00

## 2024-05-18 RX ADMIN — MEROPENEM 500 MG: 500 INJECTION INTRAVENOUS at 12:33

## 2024-05-18 ASSESSMENT — PAIN SCALES - GENERAL
PAINLEVEL_OUTOF10: 0
PAINLEVEL_OUTOF10: 0

## 2024-05-19 LAB
AMMONIA PLAS-SCNC: 41 MCMOL/L
ANION GAP SERPL CALC-SCNC: 6 MMOL/L (ref 7–19)
BACTERIA UR CULT: ABNORMAL
BUN SERPL-MCNC: 14 MG/DL (ref 6–20)
BUN/CREAT SERPL: 21 (ref 7–25)
CALCIUM SERPL-MCNC: 8.6 MG/DL (ref 8.4–10.2)
CHLORIDE SERPL-SCNC: 107 MMOL/L (ref 97–110)
CO2 SERPL-SCNC: 29 MMOL/L (ref 21–32)
CREAT SERPL-MCNC: 0.67 MG/DL (ref 0.51–0.95)
EGFRCR SERPLBLD CKD-EPI 2021: 90 ML/MIN/{1.73_M2}
FASTING DURATION TIME PATIENT: ABNORMAL H
GLUCOSE SERPL-MCNC: 97 MG/DL (ref 70–99)
MITOCHONDRIA M2 IGG SER-ACNC: 4.5 UNITS
POTASSIUM SERPL-SCNC: 3.9 MMOL/L (ref 3.4–5.1)
SMA AB SER QL IA: POSITIVE
SODIUM SERPL-SCNC: 138 MMOL/L (ref 135–145)

## 2024-05-19 PROCEDURE — 10004651 HB RX, NO CHARGE ITEM: Performed by: INTERNAL MEDICINE

## 2024-05-19 PROCEDURE — 10002807 HB RX 258: Performed by: INTERNAL MEDICINE

## 2024-05-19 PROCEDURE — 10002800 HB RX 250 W HCPCS: Performed by: INTERNAL MEDICINE

## 2024-05-19 PROCEDURE — 82140 ASSAY OF AMMONIA: CPT | Performed by: INTERNAL MEDICINE

## 2024-05-19 PROCEDURE — 99233 SBSQ HOSP IP/OBS HIGH 50: CPT | Performed by: INTERNAL MEDICINE

## 2024-05-19 PROCEDURE — 10002803 HB RX 637: Performed by: INTERNAL MEDICINE

## 2024-05-19 PROCEDURE — 36415 COLL VENOUS BLD VENIPUNCTURE: CPT | Performed by: INTERNAL MEDICINE

## 2024-05-19 PROCEDURE — 80048 BASIC METABOLIC PNL TOTAL CA: CPT | Performed by: INTERNAL MEDICINE

## 2024-05-19 PROCEDURE — 99232 SBSQ HOSP IP/OBS MODERATE 35: CPT | Performed by: INTERNAL MEDICINE

## 2024-05-19 PROCEDURE — 10006031 HB ROOM CHARGE TELEMETRY

## 2024-05-19 RX ORDER — POTASSIUM CHLORIDE 20 MEQ/1
40 TABLET, EXTENDED RELEASE ORAL ONCE
Qty: 2 TABLET | Refills: 0 | Status: COMPLETED | OUTPATIENT
Start: 2024-05-19 | End: 2024-05-19

## 2024-05-19 RX ADMIN — LACTULOSE 20 G: 10 SOLUTION ORAL at 21:12

## 2024-05-19 RX ADMIN — MEROPENEM 500 MG: 500 INJECTION INTRAVENOUS at 11:11

## 2024-05-19 RX ADMIN — Medication 3 MG: at 21:11

## 2024-05-19 RX ADMIN — MEROPENEM 500 MG: 500 INJECTION INTRAVENOUS at 17:27

## 2024-05-19 RX ADMIN — MEROPENEM 500 MG: 500 INJECTION INTRAVENOUS at 23:47

## 2024-05-19 RX ADMIN — LEVOTHYROXINE SODIUM 25 MCG: 0.03 TABLET ORAL at 05:36

## 2024-05-19 RX ADMIN — POTASSIUM CHLORIDE 40 MEQ: 1500 TABLET, EXTENDED RELEASE ORAL at 11:09

## 2024-05-19 RX ADMIN — MEROPENEM 500 MG: 500 INJECTION INTRAVENOUS at 05:35

## 2024-05-19 RX ADMIN — LACTULOSE 20 G: 10 SOLUTION ORAL at 09:13

## 2024-05-19 RX ADMIN — ACETAMINOPHEN 650 MG: 325 TABLET ORAL at 21:11

## 2024-05-19 RX ADMIN — SODIUM CHLORIDE, PRESERVATIVE FREE 2 ML: 5 INJECTION INTRAVENOUS at 09:13

## 2024-05-19 RX ADMIN — LACTULOSE 20 G: 10 SOLUTION ORAL at 14:26

## 2024-05-19 RX ADMIN — SODIUM CHLORIDE, PRESERVATIVE FREE 2 ML: 5 INJECTION INTRAVENOUS at 21:12

## 2024-05-19 RX ADMIN — SODIUM CHLORIDE 25 ML: 9 INJECTION, SOLUTION INTRAVENOUS at 23:46

## 2024-05-19 ASSESSMENT — PAIN SCALES - GENERAL
PAINLEVEL_OUTOF10: 5
PAINLEVEL_OUTOF10: 2
PAINLEVEL_OUTOF10: 0

## 2024-05-20 VITALS
HEIGHT: 62 IN | WEIGHT: 197.97 LBS | RESPIRATION RATE: 16 BRPM | BODY MASS INDEX: 36.43 KG/M2 | DIASTOLIC BLOOD PRESSURE: 70 MMHG | OXYGEN SATURATION: 96 % | TEMPERATURE: 97.5 F | SYSTOLIC BLOOD PRESSURE: 104 MMHG | HEART RATE: 65 BPM

## 2024-05-20 LAB
AMMONIA PLAS-SCNC: 24 MCMOL/L
ANION GAP SERPL CALC-SCNC: 7 MMOL/L (ref 7–19)
BUN SERPL-MCNC: 19 MG/DL (ref 6–20)
BUN/CREAT SERPL: 27 (ref 7–25)
CALCIUM SERPL-MCNC: 9.1 MG/DL (ref 8.4–10.2)
CHLORIDE SERPL-SCNC: 108 MMOL/L (ref 97–110)
CO2 SERPL-SCNC: 26 MMOL/L (ref 21–32)
CREAT SERPL-MCNC: 0.71 MG/DL (ref 0.51–0.95)
DEPRECATED RDW RBC: 61.1 FL (ref 39–50)
EGFRCR SERPLBLD CKD-EPI 2021: 88 ML/MIN/{1.73_M2}
EOSINOPHIL # BLD: 0.2 K/MCL (ref 0–0.5)
EOSINOPHIL NFR BLD: 2 %
ERYTHROCYTE [DISTWIDTH] IN BLOOD: 15.2 % (ref 11–15)
FASTING DURATION TIME PATIENT: ABNORMAL H
GLUCOSE SERPL-MCNC: 95 MG/DL (ref 70–99)
HCT VFR BLD CALC: 53.3 % (ref 36–46.5)
HGB BLD-MCNC: 17.3 G/DL (ref 12–15.5)
LYMPHOCYTES # BLD: 1.8 K/MCL (ref 1–4)
LYMPHOCYTES NFR BLD: 21 %
MACROCYTES BLD QL SMEAR: NORMAL
MCH RBC QN AUTO: 34.9 PG (ref 26–34)
MCHC RBC AUTO-ENTMCNC: 32.5 G/DL (ref 32–36.5)
MCV RBC AUTO: 107.5 FL (ref 78–100)
METAMYELOCYTES NFR BLD: 1 % (ref 0–2)
MONOCYTES # BLD: 0.4 K/MCL (ref 0.3–0.9)
MONOCYTES NFR BLD: 5 %
NEUTROPHILS # BLD: 6.2 K/MCL (ref 1.8–7.7)
NEUTS BAND NFR BLD: 3 % (ref 0–10)
NEUTS SEG NFR BLD: 68 %
NRBC BLD MANUAL-RTO: 0 /100 WBC
PLAT MORPH BLD: NORMAL
PLATELET # BLD AUTO: 193 K/MCL (ref 140–450)
POTASSIUM SERPL-SCNC: 4.6 MMOL/L (ref 3.4–5.1)
RBC # BLD: 4.96 MIL/MCL (ref 4–5.2)
SMOOTH MUSCLE AB TITR SER IF: ABNORMAL {TITER}
SODIUM SERPL-SCNC: 136 MMOL/L (ref 135–145)
WBC # BLD: 8.8 K/MCL (ref 4.2–11)
WBC TOXIC VACUOLES BLD QL SMEAR: PRESENT

## 2024-05-20 PROCEDURE — 99239 HOSP IP/OBS DSCHRG MGMT >30: CPT | Performed by: INTERNAL MEDICINE

## 2024-05-20 PROCEDURE — 10002807 HB RX 258: Performed by: INTERNAL MEDICINE

## 2024-05-20 PROCEDURE — 10002803 HB RX 637: Performed by: INTERNAL MEDICINE

## 2024-05-20 PROCEDURE — 82140 ASSAY OF AMMONIA: CPT | Performed by: INTERNAL MEDICINE

## 2024-05-20 PROCEDURE — 80048 BASIC METABOLIC PNL TOTAL CA: CPT | Performed by: INTERNAL MEDICINE

## 2024-05-20 PROCEDURE — 99233 SBSQ HOSP IP/OBS HIGH 50: CPT | Performed by: INTERNAL MEDICINE

## 2024-05-20 PROCEDURE — 85027 COMPLETE CBC AUTOMATED: CPT | Performed by: INTERNAL MEDICINE

## 2024-05-20 PROCEDURE — 10002800 HB RX 250 W HCPCS: Performed by: INTERNAL MEDICINE

## 2024-05-20 PROCEDURE — 10004651 HB RX, NO CHARGE ITEM: Performed by: INTERNAL MEDICINE

## 2024-05-20 PROCEDURE — 36415 COLL VENOUS BLD VENIPUNCTURE: CPT | Performed by: INTERNAL MEDICINE

## 2024-05-20 RX ORDER — LACTULOSE 10 G/15ML
20 SOLUTION ORAL 2 TIMES DAILY
Status: DISCONTINUED | OUTPATIENT
Start: 2024-05-20 | End: 2024-05-20 | Stop reason: HOSPADM

## 2024-05-20 RX ORDER — LACTULOSE 10 G/15ML
45 SOLUTION ORAL 2 TIMES DAILY
Status: ON HOLD | COMMUNITY
Start: 2024-05-20

## 2024-05-20 RX ORDER — SULFAMETHOXAZOLE AND TRIMETHOPRIM 800; 160 MG/1; MG/1
1 TABLET ORAL 2 TIMES DAILY
Qty: 8 TABLET | Refills: 0 | Status: SHIPPED | OUTPATIENT
Start: 2024-05-20 | End: 2024-05-24

## 2024-05-20 RX ADMIN — MEROPENEM 500 MG: 500 INJECTION INTRAVENOUS at 12:02

## 2024-05-20 RX ADMIN — LEVOTHYROXINE SODIUM 25 MCG: 0.03 TABLET ORAL at 05:40

## 2024-05-20 RX ADMIN — SODIUM CHLORIDE, PRESERVATIVE FREE 2 ML: 5 INJECTION INTRAVENOUS at 08:39

## 2024-05-20 RX ADMIN — LACTULOSE 20 G: 10 SOLUTION ORAL at 08:39

## 2024-05-20 RX ADMIN — MEROPENEM 500 MG: 500 INJECTION INTRAVENOUS at 05:42

## 2024-05-20 ASSESSMENT — PAIN SCALES - GENERAL: PAINLEVEL_OUTOF10: 0

## 2024-05-21 ENCOUNTER — TELEPHONE (OUTPATIENT)
Dept: INTERNAL MEDICINE | Age: 79
End: 2024-05-21

## 2024-05-21 ENCOUNTER — EXTERNAL LAB (OUTPATIENT)
Dept: HEALTH INFORMATION MANAGEMENT | Facility: OTHER | Age: 79
End: 2024-05-21

## 2024-05-21 LAB
ALBUMIN SERPL-MCNC: 2.9 G/DL (ref 3.2–4.9)
ALP SERPL-CCNC: 56 U/L (ref 34–143)
ALT SERPL-CCNC: 43 U/L (ref 0–42)
AST SERPL-CCNC: 31 U/L (ref 9–35)
BASOPHILS # BLD: 0.12 THO/MM3 (ref 0–0.1)
BASOPHILS NFR BLD: 1.3 %
BILIRUB SERPL-MCNC: 0.73 MG/DL (ref 0.16–1.3)
BUN SERPL-MCNC: 27 MG/DL (ref 7–28)
CALCIUM SERPL-MCNC: 8.9 MG/DL (ref 8.7–10.5)
CHLORIDE SERPL-SCNC: 107 MEQ/L (ref 99–110)
CO2 SERPL-SCNC: 27 MMOL/L (ref 18–30)
CREAT SERPL-MCNC: 0.71 MG/DL (ref 0.44–1.32)
EOSINOPHIL # BLD: 0.17 THO/MM3 (ref 0–0.5)
EOSINOPHIL NFR BLD: 1.8 %
ERYTHROCYTE [DISTWIDTH] IN BLOOD: 15.4 % (ref 11.5–15.2)
GLUCOSE SERPL-MCNC: 89 MG/DL (ref 70–110)
HCT VFR BLD CALC: 47.9 % (ref 37–47)
HGB BLD-MCNC: 15.7 G/DL (ref 12–16)
IGG4 SER-MCNC: 25 MG/DL (ref 1–123)
IMM GRANULOCYTES # BLD: 0.46 THO/MM3 (ref 0–0.1)
IMM GRANULOCYTES NFR BLD: 4.9 %
LAB RESULT: NORMAL
LENGTH OF FAST TIME PATIENT: ABNORMAL H
LYMPHOCYTES # BLD: 1.86 THO/MM3 (ref 0.8–3)
LYMPHOCYTES NFR BLD: 19.9 %
MCH RBC QN AUTO: 34.7 PG (ref 27–33)
MCHC RBC AUTO-ENTMCNC: 32.8 G/DL (ref 32–36)
MCV RBC AUTO: 105.7 FL (ref 81–99)
MONOCYTES # BLD: 0.85 THO/MM3 (ref 0.2–1)
MONOCYTES NFR BLD: 9.1 %
NEUTROPHILS # BLD: 5.9 THO/MM3 (ref 1.4–6.8)
NEUTROPHILS NFR BLD: 63 %
PLATELET # BLD: 182 THO/MM3 (ref 150–400)
PMV BLD AUTO: 10.3 FL (ref 9.5–13.1)
POTASSIUM SERPL-SCNC: 4.3 MEQ/L (ref 3.6–5)
PROT SERPL-MCNC: 5 G/DL (ref 5.6–8.2)
RBC # BLD: 4.53 MIL/MM3 (ref 4.2–5.4)
SODIUM SERPL-SCNC: 141 MEQ/L (ref 138–147)
WBC # BLD: 9.36 THO/MM3 (ref 4.8–10.8)

## 2024-05-23 ENCOUNTER — TELEPHONE (OUTPATIENT)
Dept: INTERNAL MEDICINE | Age: 79
End: 2024-05-23

## 2024-05-23 DIAGNOSIS — D89.9: Primary | ICD-10-CM

## 2024-05-23 DIAGNOSIS — G90.9: Primary | ICD-10-CM

## 2024-05-23 RX ORDER — PREDNISONE 20 MG/1
20 TABLET ORAL 2 TIMES DAILY
Qty: 90 TABLET | Refills: 0 | Status: ON HOLD | OUTPATIENT
Start: 2024-05-23

## 2024-05-24 ENCOUNTER — APPOINTMENT (OUTPATIENT)
Dept: CT IMAGING | Age: 79
DRG: 176 | End: 2024-05-24
Attending: STUDENT IN AN ORGANIZED HEALTH CARE EDUCATION/TRAINING PROGRAM

## 2024-05-24 ENCOUNTER — HOSPITAL ENCOUNTER (INPATIENT)
Age: 79
DRG: 176 | End: 2024-05-24
Attending: STUDENT IN AN ORGANIZED HEALTH CARE EDUCATION/TRAINING PROGRAM | Admitting: INTERNAL MEDICINE

## 2024-05-24 ENCOUNTER — APPOINTMENT (OUTPATIENT)
Dept: GENERAL RADIOLOGY | Age: 79
DRG: 176 | End: 2024-05-24
Attending: STUDENT IN AN ORGANIZED HEALTH CARE EDUCATION/TRAINING PROGRAM

## 2024-05-24 DIAGNOSIS — R79.89 ELEVATED TROPONIN: Primary | ICD-10-CM

## 2024-05-24 DIAGNOSIS — I26.99 OTHER ACUTE PULMONARY EMBOLISM, UNSPECIFIED WHETHER ACUTE COR PULMONALE PRESENT  (CMD): ICD-10-CM

## 2024-05-24 DIAGNOSIS — R94.31 ABNORMAL EKG: ICD-10-CM

## 2024-05-24 LAB
ALBUMIN SERPL-MCNC: 2.4 G/DL (ref 3.6–5.1)
ALBUMIN/GLOB SERPL: 0.7 {RATIO} (ref 1–2.4)
ALP SERPL-CCNC: 58 UNITS/L (ref 45–117)
ALT SERPL-CCNC: 41 UNITS/L
AMMONIA PLAS-SCNC: 27 MCMOL/L
ANION GAP SERPL CALC-SCNC: 6 MMOL/L (ref 7–19)
APPEARANCE UR: CLEAR
APTT PPP: 28 SEC (ref 22–32)
AST SERPL-CCNC: 30 UNITS/L
BASOPHILS # BLD: 0 K/MCL (ref 0–0.3)
BASOPHILS NFR BLD: 0 %
BILIRUB SERPL-MCNC: 0.4 MG/DL (ref 0.2–1)
BILIRUB UR QL STRIP: NEGATIVE
BUN SERPL-MCNC: 23 MG/DL (ref 6–20)
BUN/CREAT SERPL: 24 (ref 7–25)
CALCIUM SERPL-MCNC: 8.2 MG/DL (ref 8.4–10.2)
CHLORIDE SERPL-SCNC: 108 MMOL/L (ref 97–110)
CO2 SERPL-SCNC: 27 MMOL/L (ref 21–32)
COLOR UR: YELLOW
CREAT SERPL-MCNC: 0.95 MG/DL (ref 0.51–0.95)
DEPRECATED RDW RBC: 57.2 FL (ref 39–50)
EGFRCR SERPLBLD CKD-EPI 2021: 62 ML/MIN/{1.73_M2}
EOSINOPHIL # BLD: 0.1 K/MCL (ref 0–0.5)
EOSINOPHIL NFR BLD: 1 %
ERYTHROCYTE [DISTWIDTH] IN BLOOD: 15.1 % (ref 11–15)
FASTING DURATION TIME PATIENT: ABNORMAL H
GLOBULIN SER-MCNC: 3.5 G/DL (ref 2–4)
GLUCOSE SERPL-MCNC: 128 MG/DL (ref 70–99)
GLUCOSE UR STRIP-MCNC: 100 MG/DL
HCT VFR BLD CALC: 40.7 % (ref 36–46.5)
HFE GENE MUT ANL BLD/T: NORMAL
HFE P.C282Y BLD/T QL: NORMAL
HFE P.H63D BLD/T QL: NOT DETECTED
HGB BLD-MCNC: 13.5 G/DL (ref 12–15.5)
HGB UR QL STRIP: NEGATIVE
IMM GRANULOCYTES # BLD AUTO: 0.2 K/MCL (ref 0–0.2)
IMM GRANULOCYTES # BLD: 2 %
INR PPP: 1
KETONES UR STRIP-MCNC: NEGATIVE MG/DL
LEUKOCYTE ESTERASE UR QL STRIP: NEGATIVE
LIPASE SERPL-CCNC: 36 UNITS/L (ref 15–77)
LYMPHOCYTES # BLD: 0.9 K/MCL (ref 1–4)
LYMPHOCYTES NFR BLD: 9 %
MCH RBC QN AUTO: 34.6 PG (ref 26–34)
MCHC RBC AUTO-ENTMCNC: 33.2 G/DL (ref 32–36.5)
MCV RBC AUTO: 104.4 FL (ref 78–100)
MONOCYTES # BLD: 0.7 K/MCL (ref 0.3–0.9)
MONOCYTES NFR BLD: 7 %
NEUTROPHILS # BLD: 8.2 K/MCL (ref 1.8–7.7)
NEUTROPHILS NFR BLD: 81 %
NITRITE UR QL STRIP: NEGATIVE
NRBC BLD MANUAL-RTO: 0 /100 WBC
NT-PROBNP SERPL-MCNC: 280 PG/ML
PH UR STRIP: 6 [PH] (ref 5–7)
PLATELET # BLD AUTO: 190 K/MCL (ref 140–450)
POTASSIUM SERPL-SCNC: 4 MMOL/L (ref 3.4–5.1)
PROT SERPL-MCNC: 5.9 G/DL (ref 6.4–8.2)
PROT UR STRIP-MCNC: ABNORMAL MG/DL
PROTHROMBIN TIME: 10.9 SEC (ref 9.7–11.8)
RBC # BLD: 3.9 MIL/MCL (ref 4–5.2)
SERVICE CMNT-IMP: NORMAL
SODIUM SERPL-SCNC: 137 MMOL/L (ref 135–145)
SP GR UR STRIP: >1.03 (ref 1–1.03)
TROPONIN I SERPL DL<=0.01 NG/ML-MCNC: 110 NG/L
TROPONIN I SERPL DL<=0.01 NG/ML-MCNC: 116 NG/L
UROBILINOGEN UR STRIP-MCNC: 2 MG/DL
WBC # BLD: 10 K/MCL (ref 4.2–11)

## 2024-05-24 PROCEDURE — 36415 COLL VENOUS BLD VENIPUNCTURE: CPT

## 2024-05-24 PROCEDURE — 10002805 HB CONTRAST AGENT: Performed by: STUDENT IN AN ORGANIZED HEALTH CARE EDUCATION/TRAINING PROGRAM

## 2024-05-24 PROCEDURE — 99292 CRITICAL CARE ADDL 30 MIN: CPT | Performed by: INTERNAL MEDICINE

## 2024-05-24 PROCEDURE — 81003 URINALYSIS AUTO W/O SCOPE: CPT | Performed by: STUDENT IN AN ORGANIZED HEALTH CARE EDUCATION/TRAINING PROGRAM

## 2024-05-24 PROCEDURE — 82140 ASSAY OF AMMONIA: CPT | Performed by: EMERGENCY MEDICINE

## 2024-05-24 PROCEDURE — 85730 THROMBOPLASTIN TIME PARTIAL: CPT | Performed by: STUDENT IN AN ORGANIZED HEALTH CARE EDUCATION/TRAINING PROGRAM

## 2024-05-24 PROCEDURE — 74177 CT ABD & PELVIS W/CONTRAST: CPT

## 2024-05-24 PROCEDURE — 84484 ASSAY OF TROPONIN QUANT: CPT | Performed by: STUDENT IN AN ORGANIZED HEALTH CARE EDUCATION/TRAINING PROGRAM

## 2024-05-24 PROCEDURE — 80053 COMPREHEN METABOLIC PANEL: CPT | Performed by: EMERGENCY MEDICINE

## 2024-05-24 PROCEDURE — P9612 CATHETERIZE FOR URINE SPEC: HCPCS

## 2024-05-24 PROCEDURE — 10002800 HB RX 250 W HCPCS: Performed by: STUDENT IN AN ORGANIZED HEALTH CARE EDUCATION/TRAINING PROGRAM

## 2024-05-24 PROCEDURE — 10006031 HB ROOM CHARGE TELEMETRY

## 2024-05-24 PROCEDURE — 99291 CRITICAL CARE FIRST HOUR: CPT

## 2024-05-24 PROCEDURE — 71045 X-RAY EXAM CHEST 1 VIEW: CPT

## 2024-05-24 PROCEDURE — 93005 ELECTROCARDIOGRAM TRACING: CPT | Performed by: STUDENT IN AN ORGANIZED HEALTH CARE EDUCATION/TRAINING PROGRAM

## 2024-05-24 PROCEDURE — 83880 ASSAY OF NATRIURETIC PEPTIDE: CPT | Performed by: STUDENT IN AN ORGANIZED HEALTH CARE EDUCATION/TRAINING PROGRAM

## 2024-05-24 PROCEDURE — 93010 ELECTROCARDIOGRAM REPORT: CPT | Performed by: INTERNAL MEDICINE

## 2024-05-24 PROCEDURE — 93005 ELECTROCARDIOGRAM TRACING: CPT | Performed by: EMERGENCY MEDICINE

## 2024-05-24 PROCEDURE — 70450 CT HEAD/BRAIN W/O DYE: CPT

## 2024-05-24 PROCEDURE — 83690 ASSAY OF LIPASE: CPT | Performed by: STUDENT IN AN ORGANIZED HEALTH CARE EDUCATION/TRAINING PROGRAM

## 2024-05-24 PROCEDURE — 85610 PROTHROMBIN TIME: CPT | Performed by: STUDENT IN AN ORGANIZED HEALTH CARE EDUCATION/TRAINING PROGRAM

## 2024-05-24 PROCEDURE — 10004651 HB RX, NO CHARGE ITEM: Performed by: INTERNAL MEDICINE

## 2024-05-24 PROCEDURE — 71275 CT ANGIOGRAPHY CHEST: CPT

## 2024-05-24 PROCEDURE — 85025 COMPLETE CBC W/AUTO DIFF WBC: CPT | Performed by: EMERGENCY MEDICINE

## 2024-05-24 RX ORDER — TRAZODONE HYDROCHLORIDE 50 MG/1
25 TABLET ORAL NIGHTLY
Status: ON HOLD | COMMUNITY
End: 2024-05-26

## 2024-05-24 RX ORDER — ACETAMINOPHEN 325 MG/1
650 TABLET ORAL EVERY 4 HOURS PRN
Status: DISCONTINUED | OUTPATIENT
Start: 2024-05-24 | End: 2024-05-26 | Stop reason: HOSPADM

## 2024-05-24 RX ORDER — TRIAMCINOLONE ACETONIDE 1 MG/G
1 CREAM TOPICAL 2 TIMES DAILY
Status: ON HOLD | COMMUNITY
End: 2024-05-26 | Stop reason: HOSPADM

## 2024-05-24 RX ORDER — POLYETHYLENE GLYCOL 3350 17 G/17G
17 POWDER, FOR SOLUTION ORAL DAILY PRN
Status: DISCONTINUED | OUTPATIENT
Start: 2024-05-24 | End: 2024-05-26 | Stop reason: HOSPADM

## 2024-05-24 RX ORDER — AMOXICILLIN 250 MG
2 CAPSULE ORAL 2 TIMES DAILY PRN
Status: DISCONTINUED | OUTPATIENT
Start: 2024-05-24 | End: 2024-05-26 | Stop reason: HOSPADM

## 2024-05-24 RX ORDER — BISACODYL 10 MG
10 SUPPOSITORY, RECTAL RECTAL DAILY PRN
Status: DISCONTINUED | OUTPATIENT
Start: 2024-05-24 | End: 2024-05-26 | Stop reason: HOSPADM

## 2024-05-24 RX ORDER — 0.9 % SODIUM CHLORIDE 0.9 %
2 VIAL (ML) INJECTION EVERY 12 HOURS SCHEDULED
Status: DISCONTINUED | OUTPATIENT
Start: 2024-05-24 | End: 2024-05-26 | Stop reason: HOSPADM

## 2024-05-24 RX ORDER — CALCIUM CARBONATE 500 MG/1
500 TABLET, CHEWABLE ORAL EVERY 4 HOURS PRN
Status: DISCONTINUED | OUTPATIENT
Start: 2024-05-24 | End: 2024-05-26 | Stop reason: HOSPADM

## 2024-05-24 RX ORDER — ACETAMINOPHEN 650 MG/1
650 SUPPOSITORY RECTAL EVERY 4 HOURS PRN
Status: DISCONTINUED | OUTPATIENT
Start: 2024-05-24 | End: 2024-05-26 | Stop reason: HOSPADM

## 2024-05-24 RX ORDER — LANOLIN ALCOHOL/MO/W.PET/CERES
3 CREAM (GRAM) TOPICAL NIGHTLY PRN
Status: DISCONTINUED | OUTPATIENT
Start: 2024-05-24 | End: 2024-05-26 | Stop reason: HOSPADM

## 2024-05-24 RX ORDER — ENEMA 19; 7 G/133ML; G/133ML
1 ENEMA RECTAL DAILY PRN
Status: ON HOLD | COMMUNITY
End: 2024-05-26 | Stop reason: HOSPADM

## 2024-05-24 RX ORDER — ONDANSETRON 4 MG/1
4 TABLET, ORALLY DISINTEGRATING ORAL EVERY 12 HOURS PRN
Status: DISCONTINUED | OUTPATIENT
Start: 2024-05-24 | End: 2024-05-26 | Stop reason: HOSPADM

## 2024-05-24 RX ORDER — VALACYCLOVIR HYDROCHLORIDE 1 G/1
1 TABLET, FILM COATED ORAL 3 TIMES DAILY
Status: ON HOLD | COMMUNITY
End: 2024-05-26 | Stop reason: HOSPADM

## 2024-05-24 RX ORDER — ONDANSETRON 2 MG/ML
4 INJECTION INTRAMUSCULAR; INTRAVENOUS EVERY 6 HOURS PRN
Status: DISCONTINUED | OUTPATIENT
Start: 2024-05-24 | End: 2024-05-26 | Stop reason: HOSPADM

## 2024-05-24 RX ORDER — HEPARIN SODIUM 10000 [USP'U]/100ML
1-40 INJECTION, SOLUTION INTRAVENOUS CONTINUOUS
Status: DISCONTINUED | OUTPATIENT
Start: 2024-05-24 | End: 2024-05-25

## 2024-05-24 RX ADMIN — IOHEXOL 80 ML: 350 INJECTION, SOLUTION INTRAVENOUS at 20:42

## 2024-05-24 RX ADMIN — SODIUM CHLORIDE, PRESERVATIVE FREE 2 ML: 5 INJECTION INTRAVENOUS at 22:45

## 2024-05-24 RX ADMIN — HEPARIN SODIUM 7300 UNITS: 1000 INJECTION INTRAVENOUS; SUBCUTANEOUS at 21:47

## 2024-05-24 RX ADMIN — HEPARIN SODIUM AND DEXTROSE 18 UNITS/KG/HR: 10000; 5 INJECTION INTRAVENOUS at 21:47

## 2024-05-24 SDOH — ECONOMIC STABILITY: HOUSING INSECURITY: DO YOU HAVE PROBLEMS WITH ANY OF THE FOLLOWING?: NONE OF THE ABOVE

## 2024-05-24 SDOH — ECONOMIC STABILITY: INCOME INSECURITY: IN THE PAST 12 MONTHS, HAS THE ELECTRIC, GAS, OIL, OR WATER COMPANY THREATENED TO SHUT OFF SERVICE IN YOUR HOME?: NO

## 2024-05-24 SDOH — SOCIAL STABILITY: SOCIAL NETWORK: SUPPORT SYSTEMS: SIBLINGS

## 2024-05-24 SDOH — SOCIAL STABILITY: SOCIAL INSECURITY: HOW OFTEN DOES ANYONE, INCLUDING FAMILY AND FRIENDS, SCREAM OR CURSE AT YOU?: NEVER

## 2024-05-24 SDOH — SOCIAL STABILITY: SOCIAL INSECURITY: HOW OFTEN DOES ANYONE, INCLUDING FAMILY AND FRIENDS, PHYSICALLY HURT YOU?: NEVER

## 2024-05-24 SDOH — ECONOMIC STABILITY: GENERAL: WOULD YOU LIKE HELP WITH ANY OF THE FOLLOWING NEEDS?: I DON'T WANT HELP WITH ANY OF THESE

## 2024-05-24 SDOH — HEALTH STABILITY: GENERAL: BECAUSE OF A PHYSICAL, MENTAL, OR EMOTIONAL CONDITION, DO YOU HAVE DIFFICULTY DOING ERRANDS ALONE?: YES

## 2024-05-24 SDOH — ECONOMIC STABILITY: FOOD INSECURITY: WITHIN THE PAST 12 MONTHS, THE FOOD YOU BOUGHT JUST DIDN'T LAST AND YOU DIDN'T HAVE MONEY TO GET MORE.: NEVER TRUE

## 2024-05-24 SDOH — HEALTH STABILITY: PHYSICAL HEALTH: DO YOU HAVE SERIOUS DIFFICULTY WALKING OR CLIMBING STAIRS?: YES

## 2024-05-24 SDOH — SOCIAL STABILITY: SOCIAL INSECURITY: HOW OFTEN DOES ANYONE, INCLUDING FAMILY AND FRIENDS, THREATEN YOU WITH HARM?: NEVER

## 2024-05-24 SDOH — SOCIAL STABILITY: SOCIAL INSECURITY: HOW OFTEN DOES ANYONE, INCLUDING FAMILY AND FRIENDS, INSULT OR TALK DOWN TO YOU?: NEVER

## 2024-05-24 SDOH — ECONOMIC STABILITY: HOUSING INSECURITY: WHAT IS YOUR LIVING SITUATION TODAY?: I HAVE A STEADY PLACE TO LIVE

## 2024-05-24 SDOH — ECONOMIC STABILITY: GENERAL

## 2024-05-24 SDOH — HEALTH STABILITY: PHYSICAL HEALTH: DO YOU HAVE DIFFICULTY DRESSING OR BATHING?: YES

## 2024-05-24 SDOH — ECONOMIC STABILITY: HOUSING INSECURITY: WHAT IS YOUR LIVING SITUATION TODAY?: LONG TERM CARE FACILITY

## 2024-05-24 SDOH — ECONOMIC STABILITY: HOUSING INSECURITY: WHAT IS YOUR LIVING SITUATION TODAY?: OTHER FACILITY RESIDENTS

## 2024-05-24 ASSESSMENT — ACTIVITIES OF DAILY LIVING (ADL)
RECENT_DECLINE_ADL: YES, DECLINE IN BATHING/DRESSING/FEEDING, COLLABORATE WITH PROVIDER (T)
ADL_SCORE: 6
FEEDING: NEEDS ASSISTANCE
BATHING: NEEDS ASSISTANCE
ADL_SHORT_OF_BREATH: YES
DRESSING: NEEDS ASSISTANCE
TOILETING: NEEDS ASSISTANCE
ADL_BEFORE_ADMISSION: NEEDS/REQUIRES ASSISTANCE

## 2024-05-24 ASSESSMENT — HEART SCORE
HISTORY: SLIGHTLY SUSPICIOUS
HEART SCORE: 6
TROPONIN: 1-3X NORMAL LIMIT
EKG: NON SPECIFIC REPOLARIZATION DISTURBANCE
RISK FACTORS: EQUAL OR GREATER  THAN 3 RISK FACTORS OR HISTORY OF ATHEROSCLEROTIC DISEASE
AGE: EQUAL OR GREATER THAN 65

## 2024-05-24 ASSESSMENT — COLUMBIA-SUICIDE SEVERITY RATING SCALE - C-SSRS
IS THE PATIENT ABLE TO COMPLETE C-SSRS: YES
6. HAVE YOU EVER DONE ANYTHING, STARTED TO DO ANYTHING, OR PREPARED TO DO ANYTHING TO END YOUR LIFE?: NO
1. WITHIN THE PAST MONTH, HAVE YOU WISHED YOU WERE DEAD OR WISHED YOU COULD GO TO SLEEP AND NOT WAKE UP?: NO
2. HAVE YOU ACTUALLY HAD ANY THOUGHTS OF KILLING YOURSELF?: NO

## 2024-05-24 ASSESSMENT — LIFESTYLE VARIABLES
ALCOHOL_USE_STATUS: NO OR LOW RISK WITH VALIDATED TOOL
AUDIT-C TOTAL SCORE: 0
HOW OFTEN DO YOU HAVE A DRINK CONTAINING ALCOHOL: NEVER
HOW OFTEN DO YOU HAVE 6 OR MORE DRINKS ON ONE OCCASION: NEVER
HOW MANY STANDARD DRINKS CONTAINING ALCOHOL DO YOU HAVE ON A TYPICAL DAY: 0,1 OR 2

## 2024-05-24 ASSESSMENT — ORIENTATION MEMORY CONCENTRATION TEST (OMCT)
COUNT BACKWARDS FROM 20 TO 1: CORRECT
WHAT MONTH IS IT NOW: CORRECT
SAY THE MONTHS IN REVERSE ORDER STARTING WITH LAST MONTH: 1 ERROR
OMCT SCORE: 5
WHAT TIME IS IT (NO WATCH OR CLOCK): INCORRECT
REPEAT THE NAME AND ADDRESS I ASKED YOU TO REMEMBER: CORRECT
WHAT YEAR IS IT NOW (MUST BE EXACT): CORRECT
OMCT INTERPRETATION: 0-6: NO SIGNIFICANT IMPAIRMENT

## 2024-05-24 ASSESSMENT — PAIN SCALES - GENERAL: PAINLEVEL_OUTOF10: 0

## 2024-05-24 ASSESSMENT — PATIENT HEALTH QUESTIONNAIRE - PHQ9
SUM OF ALL RESPONSES TO PHQ9 QUESTIONS 1 AND 2: 2
CLINICAL INTERPRETATION OF PHQ2 SCORE: NO FURTHER SCREENING NEEDED
2. FEELING DOWN, DEPRESSED OR HOPELESS: SEVERAL DAYS
1. LITTLE INTEREST OR PLEASURE IN DOING THINGS: SEVERAL DAYS
IS PATIENT ABLE TO COMPLETE PHQ2 OR PHQ9: YES
SUM OF ALL RESPONSES TO PHQ9 QUESTIONS 1 AND 2: 2

## 2024-05-25 ENCOUNTER — APPOINTMENT (OUTPATIENT)
Dept: ULTRASOUND IMAGING | Age: 79
DRG: 176 | End: 2024-05-25
Attending: INTERNAL MEDICINE

## 2024-05-25 VITALS
BODY MASS INDEX: 35.54 KG/M2 | WEIGHT: 193.12 LBS | HEART RATE: 85 BPM | SYSTOLIC BLOOD PRESSURE: 116 MMHG | HEIGHT: 62 IN | OXYGEN SATURATION: 94 % | TEMPERATURE: 97.7 F | DIASTOLIC BLOOD PRESSURE: 75 MMHG | RESPIRATION RATE: 18 BRPM

## 2024-05-25 PROBLEM — I50.32 CHRONIC DIASTOLIC HEART FAILURE  (CMD): Chronic | Status: ACTIVE | Noted: 2024-05-25

## 2024-05-25 PROBLEM — I24.89 DEMAND ISCHEMIA  (CMD): Status: ACTIVE | Noted: 2024-05-25

## 2024-05-25 PROBLEM — B02.9 HERPES ZOSTER WITHOUT COMPLICATION: Status: ACTIVE | Noted: 2024-05-25

## 2024-05-25 LAB
ALBUMIN SERPL-MCNC: 2.3 G/DL (ref 3.6–5.1)
ALBUMIN/GLOB SERPL: 0.7 {RATIO} (ref 1–2.4)
ALP SERPL-CCNC: 48 UNITS/L (ref 45–117)
ALT SERPL-CCNC: 34 UNITS/L
ANION GAP SERPL CALC-SCNC: 8 MMOL/L (ref 7–19)
APTT PPP: 61 SEC (ref 22–32)
APTT PPP: >200 SEC (ref 22–32)
APTT PPP: >200 SEC (ref 22–32)
AST SERPL-CCNC: 23 UNITS/L
ATRIAL RATE (BPM): 73
ATRIAL RATE (BPM): 76
BASOPHILS # BLD: 0.1 K/MCL (ref 0–0.3)
BASOPHILS NFR BLD: 1 %
BILIRUB SERPL-MCNC: 0.4 MG/DL (ref 0.2–1)
BUN SERPL-MCNC: 23 MG/DL (ref 6–20)
BUN/CREAT SERPL: 27 (ref 7–25)
CALCIUM SERPL-MCNC: 8.2 MG/DL (ref 8.4–10.2)
CHLORIDE SERPL-SCNC: 109 MMOL/L (ref 97–110)
CO2 SERPL-SCNC: 26 MMOL/L (ref 21–32)
CREAT SERPL-MCNC: 0.84 MG/DL (ref 0.51–0.95)
DEPRECATED RDW RBC: 57.1 FL (ref 39–50)
DEPRECATED RDW RBC: 58.6 FL (ref 39–50)
EGFRCR SERPLBLD CKD-EPI 2021: 72 ML/MIN/{1.73_M2}
EOSINOPHIL # BLD: 0.2 K/MCL (ref 0–0.5)
EOSINOPHIL NFR BLD: 2 %
ERYTHROCYTE [DISTWIDTH] IN BLOOD: 15.1 % (ref 11–15)
ERYTHROCYTE [DISTWIDTH] IN BLOOD: 15.6 % (ref 11–15)
FASTING DURATION TIME PATIENT: ABNORMAL H
GLOBULIN SER-MCNC: 3.1 G/DL (ref 2–4)
GLUCOSE SERPL-MCNC: 83 MG/DL (ref 70–99)
HCT VFR BLD CALC: 39.5 % (ref 36–46.5)
HCT VFR BLD CALC: 44.5 % (ref 36–46.5)
HGB BLD-MCNC: 13.1 G/DL (ref 12–15.5)
HGB BLD-MCNC: 14.8 G/DL (ref 12–15.5)
IMM GRANULOCYTES # BLD AUTO: 0.2 K/MCL (ref 0–0.2)
IMM GRANULOCYTES # BLD: 2 %
LYMPHOCYTES # BLD: 1.2 K/MCL (ref 1–4)
LYMPHOCYTES NFR BLD: 13 %
MAGNESIUM SERPL-MCNC: 2.3 MG/DL (ref 1.7–2.4)
MCH RBC QN AUTO: 34.7 PG (ref 26–34)
MCH RBC QN AUTO: 34.7 PG (ref 26–34)
MCHC RBC AUTO-ENTMCNC: 33.2 G/DL (ref 32–36.5)
MCHC RBC AUTO-ENTMCNC: 33.3 G/DL (ref 32–36.5)
MCV RBC AUTO: 104.5 FL (ref 78–100)
MCV RBC AUTO: 104.8 FL (ref 78–100)
MONOCYTES # BLD: 0.7 K/MCL (ref 0.3–0.9)
MONOCYTES NFR BLD: 8 %
NEUTROPHILS # BLD: 6.9 K/MCL (ref 1.8–7.7)
NEUTROPHILS NFR BLD: 74 %
NRBC BLD MANUAL-RTO: 0 /100 WBC
NRBC BLD MANUAL-RTO: 0 /100 WBC
P AXIS (DEGREES): 64
P AXIS (DEGREES): 68
PLATELET # BLD AUTO: 186 K/MCL (ref 140–450)
PLATELET # BLD AUTO: 192 K/MCL (ref 140–450)
POTASSIUM SERPL-SCNC: 3.6 MMOL/L (ref 3.4–5.1)
PR-INTERVAL (MSEC): 184
PR-INTERVAL (MSEC): 186
PROT SERPL-MCNC: 5.4 G/DL (ref 6.4–8.2)
QRS-INTERVAL (MSEC): 76
QRS-INTERVAL (MSEC): 78
QT-INTERVAL (MSEC): 432
QT-INTERVAL (MSEC): 448
QTC: 486
QTC: 494
R AXIS (DEGREES): -21
R AXIS (DEGREES): -45
RAINBOW EXTRA TUBES HOLD SPECIMEN: NORMAL
RAINBOW EXTRA TUBES HOLD SPECIMEN: NORMAL
RBC # BLD: 3.77 MIL/MCL (ref 4–5.2)
RBC # BLD: 4.26 MIL/MCL (ref 4–5.2)
REPORT TEXT: NORMAL
REPORT TEXT: NORMAL
SODIUM SERPL-SCNC: 139 MMOL/L (ref 135–145)
T AXIS (DEGREES): -168
T AXIS (DEGREES): -168
TROPONIN I SERPL DL<=0.01 NG/ML-MCNC: 103 NG/L
VENTRICULAR RATE EKG/MIN (BPM): 73
VENTRICULAR RATE EKG/MIN (BPM): 76
WBC # BLD: 11.5 K/MCL (ref 4.2–11)
WBC # BLD: 9.4 K/MCL (ref 4.2–11)

## 2024-05-25 PROCEDURE — 83735 ASSAY OF MAGNESIUM: CPT | Performed by: INTERNAL MEDICINE

## 2024-05-25 PROCEDURE — 80053 COMPREHEN METABOLIC PANEL: CPT | Performed by: INTERNAL MEDICINE

## 2024-05-25 PROCEDURE — 85027 COMPLETE CBC AUTOMATED: CPT | Performed by: STUDENT IN AN ORGANIZED HEALTH CARE EDUCATION/TRAINING PROGRAM

## 2024-05-25 PROCEDURE — 10006031 HB ROOM CHARGE TELEMETRY

## 2024-05-25 PROCEDURE — 85730 THROMBOPLASTIN TIME PARTIAL: CPT | Performed by: INTERNAL MEDICINE

## 2024-05-25 PROCEDURE — 84484 ASSAY OF TROPONIN QUANT: CPT | Performed by: INTERNAL MEDICINE

## 2024-05-25 PROCEDURE — 85025 COMPLETE CBC W/AUTO DIFF WBC: CPT | Performed by: INTERNAL MEDICINE

## 2024-05-25 PROCEDURE — 93970 EXTREMITY STUDY: CPT

## 2024-05-25 PROCEDURE — 85730 THROMBOPLASTIN TIME PARTIAL: CPT | Performed by: NURSE PRACTITIONER

## 2024-05-25 PROCEDURE — 99222 1ST HOSP IP/OBS MODERATE 55: CPT | Performed by: INTERNAL MEDICINE

## 2024-05-25 PROCEDURE — 10002803 HB RX 637: Performed by: INTERNAL MEDICINE

## 2024-05-25 PROCEDURE — 36415 COLL VENOUS BLD VENIPUNCTURE: CPT | Performed by: INTERNAL MEDICINE

## 2024-05-25 PROCEDURE — 99223 1ST HOSP IP/OBS HIGH 75: CPT | Performed by: INTERNAL MEDICINE

## 2024-05-25 PROCEDURE — 92610 EVALUATE SWALLOWING FUNCTION: CPT

## 2024-05-25 PROCEDURE — 10004651 HB RX, NO CHARGE ITEM: Performed by: INTERNAL MEDICINE

## 2024-05-25 RX ORDER — PANTOPRAZOLE SODIUM 40 MG/1
40 TABLET, DELAYED RELEASE ORAL DAILY
Status: DISCONTINUED | OUTPATIENT
Start: 2024-05-25 | End: 2024-05-26 | Stop reason: HOSPADM

## 2024-05-25 RX ORDER — PANTOPRAZOLE SODIUM 20 MG/1
20 TABLET, DELAYED RELEASE ORAL DAILY
Status: DISCONTINUED | OUTPATIENT
Start: 2024-05-25 | End: 2024-05-25

## 2024-05-25 RX ORDER — MIDODRINE HYDROCHLORIDE 5 MG/1
5 TABLET ORAL
Status: DISCONTINUED | OUTPATIENT
Start: 2024-05-25 | End: 2024-05-26 | Stop reason: HOSPADM

## 2024-05-25 RX ORDER — LACTULOSE 10 G/15ML
20 SOLUTION ORAL 2 TIMES DAILY
Status: DISCONTINUED | OUTPATIENT
Start: 2024-05-25 | End: 2024-05-26 | Stop reason: HOSPADM

## 2024-05-25 RX ORDER — POTASSIUM CHLORIDE 20 MEQ/1
40 TABLET, EXTENDED RELEASE ORAL ONCE
Status: COMPLETED | OUTPATIENT
Start: 2024-05-25 | End: 2024-05-25

## 2024-05-25 RX ORDER — TRAZODONE HYDROCHLORIDE 50 MG/1
25 TABLET ORAL NIGHTLY
Status: DISCONTINUED | OUTPATIENT
Start: 2024-05-25 | End: 2024-05-26 | Stop reason: HOSPADM

## 2024-05-25 RX ORDER — PREDNISONE 5 MG/1
5 TABLET ORAL
Status: DISCONTINUED | OUTPATIENT
Start: 2024-05-25 | End: 2024-05-26 | Stop reason: HOSPADM

## 2024-05-25 RX ORDER — VENLAFAXINE HYDROCHLORIDE 150 MG/1
150 CAPSULE, EXTENDED RELEASE ORAL DAILY
Status: DISCONTINUED | OUTPATIENT
Start: 2024-05-25 | End: 2024-05-26 | Stop reason: HOSPADM

## 2024-05-25 RX ORDER — ARIPIPRAZOLE 5 MG/1
5 TABLET ORAL DAILY
Status: DISCONTINUED | OUTPATIENT
Start: 2024-05-25 | End: 2024-05-25

## 2024-05-25 RX ORDER — LEVOTHYROXINE SODIUM 0.03 MG/1
25 TABLET ORAL EVERY MORNING
Status: DISCONTINUED | OUTPATIENT
Start: 2024-05-25 | End: 2024-05-26 | Stop reason: HOSPADM

## 2024-05-25 RX ADMIN — LACTULOSE 20 G: 10 SOLUTION ORAL at 20:49

## 2024-05-25 RX ADMIN — PREDNISONE 5 MG: 5 TABLET ORAL at 11:29

## 2024-05-25 RX ADMIN — VENLAFAXINE HYDROCHLORIDE 150 MG: 150 CAPSULE, EXTENDED RELEASE ORAL at 14:01

## 2024-05-25 RX ADMIN — SODIUM CHLORIDE, PRESERVATIVE FREE 2 ML: 5 INJECTION INTRAVENOUS at 08:38

## 2024-05-25 RX ADMIN — PANTOPRAZOLE SODIUM 40 MG: 40 TABLET, DELAYED RELEASE ORAL at 11:28

## 2024-05-25 RX ADMIN — POTASSIUM CHLORIDE 40 MEQ: 1500 TABLET, EXTENDED RELEASE ORAL at 11:28

## 2024-05-25 RX ADMIN — APIXABAN 5 MG: 5 TABLET, FILM COATED ORAL at 18:23

## 2024-05-25 RX ADMIN — LACTULOSE 20 G: 10 SOLUTION ORAL at 11:29

## 2024-05-25 RX ADMIN — SODIUM CHLORIDE, PRESERVATIVE FREE 2 ML: 5 INJECTION INTRAVENOUS at 20:52

## 2024-05-25 RX ADMIN — LEVOTHYROXINE SODIUM 25 MCG: 0.03 TABLET ORAL at 11:28

## 2024-05-25 RX ADMIN — TRAZODONE HYDROCHLORIDE 25 MG: 50 TABLET ORAL at 20:49

## 2024-05-25 RX ADMIN — MIDODRINE HYDROCHLORIDE 5 MG: 5 TABLET ORAL at 15:43

## 2024-05-25 ASSESSMENT — ENCOUNTER SYMPTOMS
VOMITING: 0
RESPIRATORY NEGATIVE: 1
FEVER: 0
APPETITE CHANGE: 0
CONSTIPATION: 0
NUMBNESS: 0
ADENOPATHY: 0
BRUISES/BLEEDS EASILY: 0
HEADACHES: 0
ALLERGIC/IMMUNOLOGIC NEGATIVE: 1
DIARRHEA: 0
WEAKNESS: 0
ACTIVITY CHANGE: 0
CHEST TIGHTNESS: 0
TROUBLE SWALLOWING: 0
EYES NEGATIVE: 1
STRIDOR: 0
NAUSEA: 0
FATIGUE: 1
ABDOMINAL DISTENTION: 0
TREMORS: 0
GASTROINTESTINAL NEGATIVE: 1
CHILLS: 0
SORE THROAT: 0
FATIGUE: 0
WHEEZING: 0
NEUROLOGICAL NEGATIVE: 1
SHORTNESS OF BREATH: 0
VOICE CHANGE: 0
COUGH: 0
EYE PAIN: 0
ABDOMINAL PAIN: 0
ENDOCRINE NEGATIVE: 1
HEMATOLOGIC/LYMPHATIC NEGATIVE: 1
EYE REDNESS: 0

## 2024-05-25 ASSESSMENT — PAIN SCALES - GENERAL
PAINLEVEL_OUTOF10: 0

## 2024-05-26 ENCOUNTER — APPOINTMENT (OUTPATIENT)
Dept: CARDIOLOGY | Age: 79
DRG: 176 | End: 2024-05-26
Attending: INTERNAL MEDICINE

## 2024-05-26 VITALS
TEMPERATURE: 97.3 F | DIASTOLIC BLOOD PRESSURE: 71 MMHG | RESPIRATION RATE: 16 BRPM | WEIGHT: 187.17 LBS | OXYGEN SATURATION: 93 % | SYSTOLIC BLOOD PRESSURE: 100 MMHG | BODY MASS INDEX: 34.44 KG/M2 | HEIGHT: 62 IN | HEART RATE: 94 BPM

## 2024-05-26 LAB
AMMONIA PLAS-SCNC: 16 MCMOL/L
ANION GAP SERPL CALC-SCNC: 6 MMOL/L (ref 7–19)
AORTIC VALVE AREA (AVA): 0.68
APTT PPP: 29 SEC (ref 22–32)
ASCENDING AORTA (AAD): 3
AV MEAN GRADIENT (AVMG): 4
AV MEAN VELOCITY (AVMV): 0.98
AV PEAK GRADIENT (AVPG): 7
AV PEAK VELOCITY (AVPV): 1.32
AV STENOSIS SEVERITY TEXT: NORMAL
AVI LVOT PEAK GRADIENT (LVOTMG): 0.9
BASOPHILS # BLD: 0.1 K/MCL (ref 0–0.3)
BASOPHILS NFR BLD: 1 %
BUN SERPL-MCNC: 24 MG/DL (ref 6–20)
BUN/CREAT SERPL: 31 (ref 7–25)
CALCIUM SERPL-MCNC: 9.2 MG/DL (ref 8.4–10.2)
CHLORIDE SERPL-SCNC: 112 MMOL/L (ref 97–110)
CO2 SERPL-SCNC: 26 MMOL/L (ref 21–32)
CREAT SERPL-MCNC: 0.77 MG/DL (ref 0.51–0.95)
DEPRECATED RDW RBC: 61.5 FL (ref 39–50)
E WAVE DECELARATION TIME (MDT): 13.62
EGFRCR SERPLBLD CKD-EPI 2021: 79 ML/MIN/{1.73_M2}
EOSINOPHIL # BLD: 0.3 K/MCL (ref 0–0.5)
EOSINOPHIL NFR BLD: 3 %
ERYTHROCYTE [DISTWIDTH] IN BLOOD: 15.7 % (ref 11–15)
FASTING DURATION TIME PATIENT: ABNORMAL H
GLUCOSE SERPL-MCNC: 85 MG/DL (ref 70–99)
HCT VFR BLD CALC: 43.7 % (ref 36–46.5)
HGB BLD-MCNC: 14 G/DL (ref 12–15.5)
IMM GRANULOCYTES # BLD AUTO: 0.3 K/MCL (ref 0–0.2)
IMM GRANULOCYTES # BLD: 3 %
INTERVENTRICULAR SEPTUM IN END DIASTOLE (IVSD): 2.53
LEFT INTERNAL DIMENSION IN SYSTOLE (LVSD): 1.4
LEFT VENTRICULAR INTERNAL DIMENSION IN DIASTOLE (LVDD): 2.2
LEFT VENTRICULAR POSTERIOR WALL IN END DIASTOLE (LVPW): 3.2
LV EF: NORMAL %
LVOT 2D (LVOTD): 29.3
LVOT VTI (LVOTVTI): 1.33
LYMPHOCYTES # BLD: 1.4 K/MCL (ref 1–4)
LYMPHOCYTES NFR BLD: 16 %
MCH RBC QN AUTO: 34.5 PG (ref 26–34)
MCHC RBC AUTO-ENTMCNC: 32 G/DL (ref 32–36.5)
MCV RBC AUTO: 107.6 FL (ref 78–100)
MONOCYTES # BLD: 0.8 K/MCL (ref 0.3–0.9)
MONOCYTES NFR BLD: 9 %
MV E TISSUE VEL MED (MESV): 5.44
MV E WAVE VEL/E TISSUE VEL MED(MSR): 5
MV PEAK A VELOCITY (MVPAV): 185
MV PEAK E VELOCITY (MVPEV): 0.85
NEUTROPHILS # BLD: 6.1 K/MCL (ref 1.8–7.7)
NEUTROPHILS NFR BLD: 68 %
NRBC BLD MANUAL-RTO: 0 /100 WBC
PLATELET # BLD AUTO: 206 K/MCL (ref 140–450)
POTASSIUM SERPL-SCNC: 4.7 MMOL/L (ref 3.4–5.1)
RAINBOW EXTRA TUBES HOLD SPECIMEN: NORMAL
RBC # BLD: 4.06 MIL/MCL (ref 4–5.2)
SODIUM SERPL-SCNC: 139 MMOL/L (ref 135–145)
TRICUSPID VALVE ANNULAR PEAK VELOCITY (TVAPV): 30
TRICUSPID VALVE PEAK REGURGITATION VELOCITY (TRPV): 2.6
WBC # BLD: 8.9 K/MCL (ref 4.2–11)

## 2024-05-26 PROCEDURE — 85730 THROMBOPLASTIN TIME PARTIAL: CPT | Performed by: STUDENT IN AN ORGANIZED HEALTH CARE EDUCATION/TRAINING PROGRAM

## 2024-05-26 PROCEDURE — 10002803 HB RX 637: Performed by: INTERNAL MEDICINE

## 2024-05-26 PROCEDURE — 10004651 HB RX, NO CHARGE ITEM: Performed by: INTERNAL MEDICINE

## 2024-05-26 PROCEDURE — 80048 BASIC METABOLIC PNL TOTAL CA: CPT | Performed by: INTERNAL MEDICINE

## 2024-05-26 PROCEDURE — 93306 TTE W/DOPPLER COMPLETE: CPT | Performed by: INTERNAL MEDICINE

## 2024-05-26 PROCEDURE — 99232 SBSQ HOSP IP/OBS MODERATE 35: CPT | Performed by: INTERNAL MEDICINE

## 2024-05-26 PROCEDURE — 82140 ASSAY OF AMMONIA: CPT | Performed by: INTERNAL MEDICINE

## 2024-05-26 PROCEDURE — 93306 TTE W/DOPPLER COMPLETE: CPT

## 2024-05-26 PROCEDURE — 97530 THERAPEUTIC ACTIVITIES: CPT

## 2024-05-26 PROCEDURE — 97162 PT EVAL MOD COMPLEX 30 MIN: CPT

## 2024-05-26 PROCEDURE — 85025 COMPLETE CBC W/AUTO DIFF WBC: CPT | Performed by: INTERNAL MEDICINE

## 2024-05-26 PROCEDURE — 99239 HOSP IP/OBS DSCHRG MGMT >30: CPT | Performed by: INTERNAL MEDICINE

## 2024-05-26 PROCEDURE — 36415 COLL VENOUS BLD VENIPUNCTURE: CPT | Performed by: INTERNAL MEDICINE

## 2024-05-26 PROCEDURE — 99233 SBSQ HOSP IP/OBS HIGH 50: CPT | Performed by: INTERNAL MEDICINE

## 2024-05-26 RX ORDER — TRAZODONE HYDROCHLORIDE 50 MG/1
25 TABLET ORAL NIGHTLY PRN
Status: SHIPPED | COMMUNITY
Start: 2024-05-26

## 2024-05-26 RX ORDER — VALACYCLOVIR HYDROCHLORIDE 500 MG/1
1000 TABLET, FILM COATED ORAL EVERY 8 HOURS SCHEDULED
Status: DISCONTINUED | OUTPATIENT
Start: 2024-05-26 | End: 2024-05-26 | Stop reason: HOSPADM

## 2024-05-26 RX ORDER — PANTOPRAZOLE SODIUM 40 MG/1
40 TABLET, DELAYED RELEASE ORAL DAILY
Status: SHIPPED | COMMUNITY
Start: 2024-05-27

## 2024-05-26 RX ORDER — ANASTROZOLE 1 MG/1
1 TABLET ORAL DAILY
Status: SHIPPED | COMMUNITY
Start: 2024-05-26

## 2024-05-26 RX ORDER — LACTULOSE 10 G/15ML
20 SOLUTION ORAL DAILY
Status: SHIPPED | COMMUNITY
Start: 2024-05-26

## 2024-05-26 RX ORDER — VALACYCLOVIR HYDROCHLORIDE 1 G/1
1000 TABLET, FILM COATED ORAL EVERY 8 HOURS SCHEDULED
Status: SHIPPED | COMMUNITY
Start: 2024-05-26 | End: 2024-05-29

## 2024-05-26 RX ADMIN — PANTOPRAZOLE SODIUM 40 MG: 40 TABLET, DELAYED RELEASE ORAL at 08:54

## 2024-05-26 RX ADMIN — VENLAFAXINE HYDROCHLORIDE 150 MG: 150 CAPSULE, EXTENDED RELEASE ORAL at 08:54

## 2024-05-26 RX ADMIN — APIXABAN 5 MG: 5 TABLET, FILM COATED ORAL at 08:54

## 2024-05-26 RX ADMIN — SODIUM CHLORIDE, PRESERVATIVE FREE 2 ML: 5 INJECTION INTRAVENOUS at 08:56

## 2024-05-26 RX ADMIN — PREDNISONE 5 MG: 5 TABLET ORAL at 08:54

## 2024-05-26 RX ADMIN — VALACYCLOVIR HYDROCHLORIDE 1000 MG: 500 TABLET, FILM COATED ORAL at 15:00

## 2024-05-26 RX ADMIN — MIDODRINE HYDROCHLORIDE 5 MG: 5 TABLET ORAL at 16:27

## 2024-05-26 RX ADMIN — LACTULOSE 20 G: 10 SOLUTION ORAL at 08:54

## 2024-05-26 RX ADMIN — LEVOTHYROXINE SODIUM 25 MCG: 0.03 TABLET ORAL at 08:54

## 2024-05-26 SDOH — ECONOMIC STABILITY: HOUSING INSECURITY: WHAT IS YOUR LIVING SITUATION TODAY?: I HAVE A STEADY PLACE TO LIVE

## 2024-05-26 ASSESSMENT — COGNITIVE AND FUNCTIONAL STATUS - GENERAL
BASIC_MOBILITY_CONVERTED_SCORE: 30.25
BECAUSE OF A PHYSICAL, MENTAL, OR EMOTIONAL CONDITION, DO YOU HAVE DIFFICULTY DOING ERRANDS ALONE: YES
BECAUSE OF A PHYSICAL, MENTAL, OR EMOTIONAL CONDITION, DO YOU HAVE SERIOUS DIFFICULTY CONCENTRATING, REMEMBERING OR MAKING DECISIONS: YES
BASIC_MOBILITY_RAW_SCORE: 11

## 2024-05-26 ASSESSMENT — PAIN SCALES - GENERAL: PAINLEVEL_OUTOF10: 0

## 2024-05-26 ASSESSMENT — ACTIVITIES OF DAILY LIVING (ADL): PRIOR_ADL: INDEPENDENT

## 2024-06-06 ENCOUNTER — TRANSCRIBE ORDERS (OUTPATIENT)
Dept: POST ACUTE CARE | Age: 79
End: 2024-06-06

## 2024-06-06 ENCOUNTER — APPOINTMENT (OUTPATIENT)
Dept: HEMATOLOGY/ONCOLOGY | Age: 79
End: 2024-06-06
Attending: STUDENT IN AN ORGANIZED HEALTH CARE EDUCATION/TRAINING PROGRAM

## 2024-06-06 DIAGNOSIS — I50.9 CONGESTIVE HEART FAILURE, UNSPECIFIED HF CHRONICITY, UNSPECIFIED HEART FAILURE TYPE  (CMD): ICD-10-CM

## 2024-06-06 DIAGNOSIS — Z74.09 IMPAIRED MOBILITY AND ADLS: ICD-10-CM

## 2024-06-06 DIAGNOSIS — I95.9 HYPOTENSION, UNSPECIFIED HYPOTENSION TYPE: ICD-10-CM

## 2024-06-06 DIAGNOSIS — R29.898 MUSCULAR DECONDITIONING: ICD-10-CM

## 2024-06-06 DIAGNOSIS — Z78.9 IMPAIRED MOBILITY AND ADLS: ICD-10-CM

## 2024-06-06 DIAGNOSIS — G93.41 METABOLIC ENCEPHALOPATHY: Primary | ICD-10-CM

## 2024-06-06 DIAGNOSIS — B02.8 HERPES ZOSTER WITH COMPLICATION: ICD-10-CM

## 2024-06-06 DIAGNOSIS — E46 MALNUTRITION, UNSPECIFIED TYPE  (CMD): ICD-10-CM

## 2024-06-06 DIAGNOSIS — R26.81 GAIT INSTABILITY: ICD-10-CM

## 2024-06-11 ENCOUNTER — OFFICE VISIT (OUTPATIENT)
Dept: CARDIOLOGY | Age: 79
End: 2024-06-11

## 2024-06-11 VITALS
DIASTOLIC BLOOD PRESSURE: 63 MMHG | SYSTOLIC BLOOD PRESSURE: 91 MMHG | HEART RATE: 94 BPM | HEIGHT: 62 IN | BODY MASS INDEX: 34.23 KG/M2

## 2024-06-11 DIAGNOSIS — I27.82 OTHER CHRONIC PULMONARY EMBOLISM WITHOUT ACUTE COR PULMONALE  (CMD): Primary | ICD-10-CM

## 2024-06-11 PROCEDURE — 99214 OFFICE O/P EST MOD 30 MIN: CPT | Performed by: INTERNAL MEDICINE

## 2024-06-11 ASSESSMENT — PATIENT HEALTH QUESTIONNAIRE - PHQ9
SUM OF ALL RESPONSES TO PHQ9 QUESTIONS 1 AND 2: 0
2. FEELING DOWN, DEPRESSED OR HOPELESS: NOT AT ALL
1. LITTLE INTEREST OR PLEASURE IN DOING THINGS: NOT AT ALL
SUM OF ALL RESPONSES TO PHQ9 QUESTIONS 1 AND 2: 0
CLINICAL INTERPRETATION OF PHQ2 SCORE: NO FURTHER SCREENING NEEDED

## 2024-06-13 ENCOUNTER — APPOINTMENT (OUTPATIENT)
Dept: INTERNAL MEDICINE | Age: 79
End: 2024-06-13

## 2024-06-19 ENCOUNTER — CLINICAL ABSTRACT (OUTPATIENT)
Dept: HEMATOLOGY/ONCOLOGY | Age: 79
End: 2024-06-19

## 2024-06-19 DIAGNOSIS — M80.00XD AGE-RELATED OSTEOPOROSIS WITH CURRENT PATHOLOGICAL FRACTURE WITH ROUTINE HEALING: Primary | ICD-10-CM

## 2024-06-19 RX ORDER — DIPHENHYDRAMINE HYDROCHLORIDE 50 MG/ML
50 INJECTION INTRAMUSCULAR; INTRAVENOUS
Status: CANCELLED
Start: 2024-06-21

## 2024-06-19 RX ORDER — ALBUTEROL SULFATE 90 UG/1
2 AEROSOL, METERED RESPIRATORY (INHALATION)
Status: CANCELLED | OUTPATIENT
Start: 2024-06-21

## 2024-06-19 RX ORDER — FAMOTIDINE 10 MG/ML
20 INJECTION, SOLUTION INTRAVENOUS
Status: CANCELLED | OUTPATIENT
Start: 2024-06-21

## 2024-06-21 ENCOUNTER — LAB SERVICES (OUTPATIENT)
Dept: LAB | Age: 79
End: 2024-06-21
Attending: STUDENT IN AN ORGANIZED HEALTH CARE EDUCATION/TRAINING PROGRAM

## 2024-06-21 ENCOUNTER — TELEPHONE (OUTPATIENT)
Dept: HEMATOLOGY/ONCOLOGY | Age: 79
End: 2024-06-21

## 2024-06-21 ENCOUNTER — NURSE ONLY (OUTPATIENT)
Dept: HEMATOLOGY/ONCOLOGY | Age: 79
End: 2024-06-21
Attending: STUDENT IN AN ORGANIZED HEALTH CARE EDUCATION/TRAINING PROGRAM

## 2024-06-21 VITALS
RESPIRATION RATE: 16 BRPM | TEMPERATURE: 97.6 F | DIASTOLIC BLOOD PRESSURE: 67 MMHG | OXYGEN SATURATION: 97 % | HEART RATE: 88 BPM | BODY MASS INDEX: 34.04 KG/M2 | WEIGHT: 185 LBS | SYSTOLIC BLOOD PRESSURE: 112 MMHG | HEIGHT: 62 IN

## 2024-06-21 DIAGNOSIS — M80.00XD AGE-RELATED OSTEOPOROSIS WITH CURRENT PATHOLOGICAL FRACTURE WITH ROUTINE HEALING: ICD-10-CM

## 2024-06-21 DIAGNOSIS — D50.0 IRON DEFICIENCY ANEMIA DUE TO CHRONIC BLOOD LOSS: Primary | ICD-10-CM

## 2024-06-21 DIAGNOSIS — D50.0 IRON DEFICIENCY ANEMIA DUE TO CHRONIC BLOOD LOSS: ICD-10-CM

## 2024-06-21 DIAGNOSIS — M80.00XD AGE-RELATED OSTEOPOROSIS WITH CURRENT PATHOLOGICAL FRACTURE WITH ROUTINE HEALING: Primary | ICD-10-CM

## 2024-06-21 PROBLEM — I26.99 PULMONARY EMBOLISM (CMD): Status: RESOLVED | Noted: 2023-05-23 | Resolved: 2024-06-21

## 2024-06-21 LAB
ALBUMIN SERPL-MCNC: 2.7 G/DL (ref 3.6–5.1)
ALBUMIN/GLOB SERPL: 0.8 {RATIO} (ref 1–2.4)
ALP SERPL-CCNC: 60 UNITS/L (ref 45–117)
ALT SERPL-CCNC: 37 UNITS/L
ANION GAP SERPL CALC-SCNC: 8 MMOL/L (ref 7–19)
AST SERPL-CCNC: 24 UNITS/L
BASOPHILS # BLD: 0.1 K/MCL (ref 0–0.3)
BASOPHILS NFR BLD: 1 %
BILIRUB SERPL-MCNC: 0.6 MG/DL (ref 0.2–1)
BUN SERPL-MCNC: 11 MG/DL (ref 6–20)
BUN/CREAT SERPL: 13 (ref 7–25)
CALCIUM SERPL-MCNC: 8.7 MG/DL (ref 8.4–10.2)
CHLORIDE SERPL-SCNC: 110 MMOL/L (ref 97–110)
CO2 SERPL-SCNC: 25 MMOL/L (ref 21–32)
CREAT SERPL-MCNC: 0.83 MG/DL (ref 0.51–0.95)
DEPRECATED RDW RBC: 65.1 FL (ref 39–50)
EGFRCR SERPLBLD CKD-EPI 2021: 73 ML/MIN/{1.73_M2}
EOSINOPHIL # BLD: 0.2 K/MCL (ref 0–0.5)
EOSINOPHIL NFR BLD: 2 %
ERYTHROCYTE [DISTWIDTH] IN BLOOD: 16.3 % (ref 11–15)
FASTING DURATION TIME PATIENT: ABNORMAL H
FERRITIN SERPL-MCNC: 168 NG/ML (ref 8–252)
GLOBULIN SER-MCNC: 3.6 G/DL (ref 2–4)
GLUCOSE SERPL-MCNC: 135 MG/DL (ref 70–99)
HCT VFR BLD CALC: 46.4 % (ref 36–46.5)
HGB BLD-MCNC: 15.1 G/DL (ref 12–15.5)
IMM GRANULOCYTES # BLD AUTO: 0.2 K/MCL (ref 0–0.2)
IMM GRANULOCYTES # BLD: 2 %
IRON SATN MFR SERPL: 41 % (ref 15–45)
IRON SERPL-MCNC: 135 MCG/DL (ref 50–170)
LYMPHOCYTES # BLD: 1.3 K/MCL (ref 1–4)
LYMPHOCYTES NFR BLD: 14 %
MCH RBC QN AUTO: 35 PG (ref 26–34)
MCHC RBC AUTO-ENTMCNC: 32.5 G/DL (ref 32–36.5)
MCV RBC AUTO: 107.4 FL (ref 78–100)
MONOCYTES # BLD: 0.8 K/MCL (ref 0.3–0.9)
MONOCYTES NFR BLD: 9 %
NEUTROPHILS # BLD: 6.7 K/MCL (ref 1.8–7.7)
NEUTROPHILS NFR BLD: 72 %
NRBC BLD MANUAL-RTO: 0 /100 WBC
PLATELET # BLD AUTO: 235 K/MCL (ref 140–450)
POTASSIUM SERPL-SCNC: 3.7 MMOL/L (ref 3.4–5.1)
PROT SERPL-MCNC: 6.3 G/DL (ref 6.4–8.2)
RAINBOW EXTRA TUBES HOLD SPECIMEN: NORMAL
RBC # BLD: 4.32 MIL/MCL (ref 4–5.2)
SODIUM SERPL-SCNC: 139 MMOL/L (ref 135–145)
TIBC SERPL-MCNC: 332 MCG/DL (ref 250–450)
WBC # BLD: 9.4 K/MCL (ref 4.2–11)

## 2024-06-21 PROCEDURE — 96372 THER/PROPH/DIAG INJ SC/IM: CPT | Performed by: STUDENT IN AN ORGANIZED HEALTH CARE EDUCATION/TRAINING PROGRAM

## 2024-06-21 PROCEDURE — 83550 IRON BINDING TEST: CPT

## 2024-06-21 PROCEDURE — 82728 ASSAY OF FERRITIN: CPT

## 2024-06-21 PROCEDURE — 36415 COLL VENOUS BLD VENIPUNCTURE: CPT

## 2024-06-21 PROCEDURE — 10002800 HB RX 250 W HCPCS: Performed by: STUDENT IN AN ORGANIZED HEALTH CARE EDUCATION/TRAINING PROGRAM

## 2024-06-21 PROCEDURE — 85025 COMPLETE CBC W/AUTO DIFF WBC: CPT

## 2024-06-21 PROCEDURE — 80053 COMPREHEN METABOLIC PANEL: CPT

## 2024-06-21 RX ADMIN — DENOSUMAB 60 MG: 60 INJECTION SUBCUTANEOUS at 13:34

## 2024-06-21 ASSESSMENT — PATIENT HEALTH QUESTIONNAIRE - PHQ9
SUM OF ALL RESPONSES TO PHQ9 QUESTIONS 1 AND 2: 0
SUM OF ALL RESPONSES TO PHQ9 QUESTIONS 1 AND 2: 0
CLINICAL INTERPRETATION OF PHQ2 SCORE: NO FURTHER SCREENING NEEDED
2. FEELING DOWN, DEPRESSED OR HOPELESS: NOT AT ALL
1. LITTLE INTEREST OR PLEASURE IN DOING THINGS: NOT AT ALL

## 2024-06-21 ASSESSMENT — PAIN SCALES - GENERAL
PAINLEVEL_OUTOF10: 0
PAINLEVEL: 0

## 2024-07-08 ENCOUNTER — TELEPHONE (OUTPATIENT)
Dept: INTERNAL MEDICINE | Age: 79
End: 2024-07-08

## 2024-07-09 ENCOUNTER — TELEPHONE (OUTPATIENT)
Dept: INTERNAL MEDICINE | Age: 79
End: 2024-07-09

## 2024-07-10 ENCOUNTER — HOSPITAL ENCOUNTER (OUTPATIENT)
Dept: LAB | Age: 79
Discharge: HOME | End: 2024-07-10
Attending: INTERNAL MEDICINE

## 2024-07-10 DIAGNOSIS — G72.9 MYOPATHY, UNSPECIFIED: ICD-10-CM

## 2024-07-10 DIAGNOSIS — R76.8 OTHER SPECIFIED ABNORMAL IMMUNOLOGICAL FINDINGS IN SERUM: ICD-10-CM

## 2024-07-10 DIAGNOSIS — R53.1 WEAKNESS: ICD-10-CM

## 2024-07-10 PROCEDURE — 83516 IMMUNOASSAY NONANTIBODY: CPT | Performed by: INTERNAL MEDICINE

## 2024-07-10 PROCEDURE — 86160 COMPLEMENT ANTIGEN: CPT | Performed by: INTERNAL MEDICINE

## 2024-07-10 PROCEDURE — 86140 C-REACTIVE PROTEIN: CPT | Performed by: INTERNAL MEDICINE

## 2024-07-10 PROCEDURE — 86592 SYPHILIS TEST NON-TREP QUAL: CPT | Performed by: INTERNAL MEDICINE

## 2024-07-10 PROCEDURE — 85613 RUSSELL VIPER VENOM DILUTED: CPT | Performed by: INTERNAL MEDICINE

## 2024-07-10 PROCEDURE — 85652 RBC SED RATE AUTOMATED: CPT | Performed by: INTERNAL MEDICINE

## 2024-07-10 PROCEDURE — 82550 ASSAY OF CK (CPK): CPT | Performed by: INTERNAL MEDICINE

## 2024-07-10 PROCEDURE — 86038 ANTINUCLEAR ANTIBODIES: CPT | Performed by: INTERNAL MEDICINE

## 2024-07-10 PROCEDURE — 82085 ASSAY OF ALDOLASE: CPT | Performed by: INTERNAL MEDICINE

## 2024-07-10 PROCEDURE — 36415 COLL VENOUS BLD VENIPUNCTURE: CPT | Performed by: INTERNAL MEDICINE

## 2024-07-10 PROCEDURE — 86376 MICROSOMAL ANTIBODY EACH: CPT | Performed by: INTERNAL MEDICINE

## 2024-07-11 LAB
ANA SER QL IA: NEGATIVE
APTT HEX PL PPP: 3.1 DELTA SEC
C3 SERPL-MCNC: 121 MG/DL (ref 90–180)
C4 SERPL-MCNC: 24.2 MG/DL (ref 10–40)
CK SERPL-CCNC: 73 UNITS/L (ref 26–192)
CONFIRM DRVVT: 1.29 RATIO
CRP SERPL-MCNC: <3 MG/L
ERYTHROCYTE [SEDIMENTATION RATE] IN BLOOD BY WESTERGREN METHOD: 41 MM/HR (ref 0–20)
MIXING APTT: 27.4 SEC (ref 22–32)
MIXING DRVVT: 1.02 RATIO
MIXING DRVVT: 46.7 SEC
PATH REV: ABNORMAL
SCREEN APTT: 35 SEC (ref 22–32)
SCREEN DRVVT: 72.2 SEC
THROMBIN TIME: 19.8 SEC (ref 15.3–21.1)
THYROGLOB AB SERPL-ACNC: <0.9 IU/ML (ref 0–4)
THYROPEROXIDASE AB SERPL-ACNC: 43 UNITS/ML

## 2024-07-12 LAB
ALDOLASE SERPL-CCNC: 3 U/L (ref 1.2–7.6)
RPR SER QL: NONREACTIVE

## 2024-07-14 LAB
MITOCHONDRIA M2 IGG SER-ACNC: 5.1 UNITS
SMA AB SER QL IA: POSITIVE

## 2024-07-15 LAB
B2 GLYCOPROT1 IGA SERPL IA-ACNC: <20 SAU
B2 GLYCOPROT1 IGG SERPL IA-ACNC: <20 SGU
B2 GLYCOPROT1 IGM SERPL IA-ACNC: <20 SMU
SMOOTH MUSCLE AB TITR SER IF: ABNORMAL {TITER}

## 2024-07-16 LAB
CARDIOLIPIN IGA SER IA-ACNC: 67 APL
CARDIOLIPIN IGG SER IA-ACNC: <20 GPL
CARDIOLIPIN IGM SER IA-ACNC: <20 MPL

## 2024-07-19 LAB
ANA SER QL: NEGATIVE
ANNOTATION COMMENT IMP: NORMAL
EJ AB SER QL: NEGATIVE
ENA JO1 IGG SER-ACNC: 0 AU/ML (ref 0–40)
ENA SS-A 60KD AB SER-ACNC: 0 AU/ML (ref 0–40)
ENA SS-A IGG SER QL: 2 AU/ML (ref 0–40)
FIBRILLARIN AB SER QL: NEGATIVE
KU AB SER QL: NEGATIVE
MDA5 AB SER QL LINE BLOT: NEGATIVE
MI2 AB SER QL: NEGATIVE
MJ AB SER QL LINE BLOT: NEGATIVE
OJ AB SER QL: NEGATIVE
PL12 AB SER QL: NEGATIVE
PL7 AB SER QL: NEGATIVE
PM/SCL-100 AB SER QL LINE BLOT: NEGATIVE
SAE1 AB SER QL LINE BLOT: NEGATIVE
SRP AB SERPL QL: NEGATIVE
TIF1-GAMMA AB SER QL LINE BLOT: NEGATIVE
U1 SNRNP IGG SER-ACNC: 2 UNITS (ref 0–19)

## 2024-08-01 ENCOUNTER — APPOINTMENT (OUTPATIENT)
Dept: FAMILY MEDICINE | Age: 79
End: 2024-08-01

## 2024-08-01 ENCOUNTER — NURSE TRIAGE (OUTPATIENT)
Dept: TELEHEALTH | Age: 79
End: 2024-08-01

## 2024-08-07 ENCOUNTER — HOSPITAL ENCOUNTER (INPATIENT)
Age: 79
Discharge: NURSING, REHAB OR LONG-TERM CARE | DRG: 312 | End: 2024-08-07
Attending: EMERGENCY MEDICINE | Admitting: INTERNAL MEDICINE

## 2024-08-07 ENCOUNTER — APPOINTMENT (OUTPATIENT)
Dept: GENERAL RADIOLOGY | Age: 79
DRG: 312 | End: 2024-08-07
Attending: EMERGENCY MEDICINE

## 2024-08-07 ENCOUNTER — APPOINTMENT (OUTPATIENT)
Dept: CT IMAGING | Age: 79
DRG: 312 | End: 2024-08-07
Attending: EMERGENCY MEDICINE

## 2024-08-07 DIAGNOSIS — S69.92XA INJURY OF LEFT HAND, INITIAL ENCOUNTER: ICD-10-CM
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
## 2024-08-07 DIAGNOSIS — S09.90XA CLOSED HEAD INJURY, INITIAL ENCOUNTER: Primary | ICD-10-CM
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
## 2024-08-07 LAB
ALBUMIN SERPL-MCNC: 2.8 G/DL (ref 3.6–5.1)
ALBUMIN/GLOB SERPL: 0.8 {RATIO} (ref 1–2.4)
ALP SERPL-CCNC: 53 UNITS/L (ref 45–117)
ALT SERPL-CCNC: 31 UNITS/L
ANION GAP SERPL CALC-SCNC: 8 MMOL/L (ref 7–19)
AST SERPL-CCNC: 33 UNITS/L
BASOPHILS # BLD: 0.1 K/MCL (ref 0–0.3)
BASOPHILS NFR BLD: 1 %
BILIRUB SERPL-MCNC: 0.6 MG/DL (ref 0.2–1)
BUN SERPL-MCNC: 15 MG/DL (ref 6–20)
BUN/CREAT SERPL: 22 (ref 7–25)
CALCIUM SERPL-MCNC: 8.6 MG/DL (ref 8.4–10.2)
CHLORIDE SERPL-SCNC: 108 MMOL/L (ref 97–110)
CO2 SERPL-SCNC: 26 MMOL/L (ref 21–32)
CREAT SERPL-MCNC: 0.69 MG/DL (ref 0.51–0.95)
DEPRECATED RDW RBC: 53.9 FL (ref 39–50)
EGFRCR SERPLBLD CKD-EPI 2021: 89 ML/MIN/{1.73_M2}
EOSINOPHIL # BLD: 0.2 K/MCL (ref 0–0.5)
EOSINOPHIL NFR BLD: 2 %
ERYTHROCYTE [DISTWIDTH] IN BLOOD: 13.8 % (ref 11–15)
FASTING DURATION TIME PATIENT: ABNORMAL H
GLOBULIN SER-MCNC: 3.3 G/DL (ref 2–4)
GLUCOSE SERPL-MCNC: 110 MG/DL (ref 70–99)
HCT VFR BLD CALC: 46.7 % (ref 36–46.5)
HGB BLD-MCNC: 15.4 G/DL (ref 12–15.5)
IMM GRANULOCYTES # BLD AUTO: 0.1 K/MCL (ref 0–0.2)
IMM GRANULOCYTES # BLD: 1 %
LYMPHOCYTES # BLD: 2 K/MCL (ref 1–4)
LYMPHOCYTES NFR BLD: 22 %
MCH RBC QN AUTO: 34.4 PG (ref 26–34)
MCHC RBC AUTO-ENTMCNC: 33 G/DL (ref 32–36.5)
MCV RBC AUTO: 104.2 FL (ref 78–100)
MONOCYTES # BLD: 1.1 K/MCL (ref 0.3–0.9)
MONOCYTES NFR BLD: 12 %
NEUTROPHILS # BLD: 5.5 K/MCL (ref 1.8–7.7)
NEUTROPHILS NFR BLD: 62 %
NRBC BLD MANUAL-RTO: 0 /100 WBC
PLATELET # BLD AUTO: 248 K/MCL (ref 140–450)
POTASSIUM SERPL-SCNC: 3.7 MMOL/L (ref 3.4–5.1)
PROT SERPL-MCNC: 6.1 G/DL (ref 6.4–8.2)
RBC # BLD: 4.48 MIL/MCL (ref 4–5.2)
SODIUM SERPL-SCNC: 138 MMOL/L (ref 135–145)
WBC # BLD: 8.9 K/MCL (ref 4.2–11)

## 2024-08-07 PROCEDURE — 96360 HYDRATION IV INFUSION INIT: CPT

## 2024-08-07 PROCEDURE — 36415 COLL VENOUS BLD VENIPUNCTURE: CPT

## 2024-08-07 PROCEDURE — 85025 COMPLETE CBC W/AUTO DIFF WBC: CPT | Performed by: EMERGENCY MEDICINE

## 2024-08-07 PROCEDURE — 73130 X-RAY EXAM OF HAND: CPT

## 2024-08-07 PROCEDURE — 10002807 HB RX 258: Performed by: EMERGENCY MEDICINE

## 2024-08-07 PROCEDURE — 80053 COMPREHEN METABOLIC PANEL: CPT | Performed by: EMERGENCY MEDICINE

## 2024-08-07 PROCEDURE — G0378 HOSPITAL OBSERVATION PER HR: HCPCS

## 2024-08-07 PROCEDURE — 99285 EMERGENCY DEPT VISIT HI MDM: CPT

## 2024-08-07 PROCEDURE — 72125 CT NECK SPINE W/O DYE: CPT

## 2024-08-07 PROCEDURE — 70450 CT HEAD/BRAIN W/O DYE: CPT

## 2024-08-07 PROCEDURE — 10003579 HB TRAUMA W/O CRITICAL CARE

## 2024-08-07 PROCEDURE — 10002803 HB RX 637: Performed by: EMERGENCY MEDICINE

## 2024-08-07 PROCEDURE — 10004651 HB RX, NO CHARGE ITEM: Performed by: EMERGENCY MEDICINE

## 2024-08-07 RX ORDER — ACETAMINOPHEN 500 MG
1000 TABLET ORAL ONCE
Status: COMPLETED | OUTPATIENT
Start: 2024-08-07 | End: 2024-08-07

## 2024-08-07 RX ORDER — TRAZODONE HYDROCHLORIDE 50 MG/1
50 TABLET, FILM COATED ORAL ONCE
Status: COMPLETED | OUTPATIENT
Start: 2024-08-07 | End: 2024-08-07

## 2024-08-07 RX ADMIN — SODIUM CHLORIDE 500 ML: 9 INJECTION, SOLUTION INTRAVENOUS at 20:17

## 2024-08-07 RX ADMIN — ACETAMINOPHEN 1000 MG: 500 TABLET ORAL at 19:49

## 2024-08-07 RX ADMIN — TRAZODONE HYDROCHLORIDE 50 MG: 50 TABLET ORAL at 23:50

## 2024-08-07 SDOH — SOCIAL STABILITY: SOCIAL INSECURITY: HOW OFTEN DOES ANYONE, INCLUDING FAMILY AND FRIENDS, PHYSICALLY HURT YOU?: NEVER

## 2024-08-07 SDOH — SOCIAL STABILITY: SOCIAL INSECURITY: HOW OFTEN DOES ANYONE, INCLUDING FAMILY AND FRIENDS, SCREAM OR CURSE AT YOU?: NEVER

## 2024-08-07 SDOH — SOCIAL STABILITY: SOCIAL INSECURITY: HOW OFTEN DOES ANYONE, INCLUDING FAMILY AND FRIENDS, THREATEN YOU WITH HARM?: NEVER

## 2024-08-07 SDOH — SOCIAL STABILITY: SOCIAL INSECURITY: HOW OFTEN DOES ANYONE, INCLUDING FAMILY AND FRIENDS, INSULT OR TALK DOWN TO YOU?: NEVER

## 2024-08-07 ASSESSMENT — PAIN SCALES - GENERAL
PAINLEVEL_OUTOF10: 3
PAINLEVEL_OUTOF10: 4

## 2024-08-07 ASSESSMENT — PAIN DESCRIPTION - PAIN TYPE: TYPE: ACUTE PAIN

## 2024-08-08 ENCOUNTER — APPOINTMENT (OUTPATIENT)
Dept: CARDIOLOGY | Age: 79
DRG: 312 | End: 2024-08-08
Attending: INTERNAL MEDICINE

## 2024-08-08 LAB
AMMONIA PLAS-SCNC: 55 MCMOL/L
ANION GAP SERPL CALC-SCNC: 9 MMOL/L (ref 7–19)
ASCENDING AORTA (AAD): 8
AV STENOSIS SEVERITY TEXT: NORMAL
BASOPHILS # BLD: 0.1 K/MCL (ref 0–0.3)
BASOPHILS NFR BLD: 1 %
BUN SERPL-MCNC: 16 MG/DL (ref 6–20)
BUN/CREAT SERPL: 25 (ref 7–25)
CALCIUM SERPL-MCNC: 8.2 MG/DL (ref 8.4–10.2)
CHLORIDE SERPL-SCNC: 109 MMOL/L (ref 97–110)
CO2 SERPL-SCNC: 24 MMOL/L (ref 21–32)
CREAT SERPL-MCNC: 0.65 MG/DL (ref 0.51–0.95)
DEPRECATED RDW RBC: 55.3 FL (ref 39–50)
EGFRCR SERPLBLD CKD-EPI 2021: >90 ML/MIN/{1.73_M2}
EOSINOPHIL # BLD: 0.2 K/MCL (ref 0–0.5)
EOSINOPHIL NFR BLD: 3 %
ERYTHROCYTE [DISTWIDTH] IN BLOOD: 13.9 % (ref 11–15)
FASTING DURATION TIME PATIENT: ABNORMAL H
GLUCOSE BLDC GLUCOMTR-MCNC: 141 MG/DL (ref 70–99)
GLUCOSE SERPL-MCNC: 89 MG/DL (ref 70–99)
HCT VFR BLD CALC: 47.4 % (ref 36–46.5)
HGB BLD-MCNC: 15.3 G/DL (ref 12–15.5)
IMM GRANULOCYTES # BLD AUTO: 0.1 K/MCL (ref 0–0.2)
IMM GRANULOCYTES # BLD: 1 %
LV EF: NORMAL %
LYMPHOCYTES # BLD: 1.7 K/MCL (ref 1–4)
LYMPHOCYTES NFR BLD: 24 %
MCH RBC QN AUTO: 34.4 PG (ref 26–34)
MCHC RBC AUTO-ENTMCNC: 32.3 G/DL (ref 32–36.5)
MCV RBC AUTO: 106.5 FL (ref 78–100)
MONOCYTES # BLD: 0.9 K/MCL (ref 0.3–0.9)
MONOCYTES NFR BLD: 13 %
NEUTROPHILS # BLD: 4.2 K/MCL (ref 1.8–7.7)
NEUTROPHILS NFR BLD: 58 %
NRBC BLD MANUAL-RTO: 0 /100 WBC
PLATELET # BLD AUTO: 210 K/MCL (ref 140–450)
POTASSIUM SERPL-SCNC: 3.6 MMOL/L (ref 3.4–5.1)
RAINBOW EXTRA TUBES HOLD SPECIMEN: NORMAL
RBC # BLD: 4.45 MIL/MCL (ref 4–5.2)
SODIUM SERPL-SCNC: 138 MMOL/L (ref 135–145)
TRICUSPID VALVE ANNULAR PEAK VELOCITY (TVAPV): 35
WBC # BLD: 7.2 K/MCL (ref 4.2–11)

## 2024-08-08 PROCEDURE — 10002803 HB RX 637: Performed by: INTERNAL MEDICINE

## 2024-08-08 PROCEDURE — 10002807 HB RX 258: Performed by: INTERNAL MEDICINE

## 2024-08-08 PROCEDURE — 93321 DOPPLER ECHO F-UP/LMTD STD: CPT

## 2024-08-08 PROCEDURE — 93321 DOPPLER ECHO F-UP/LMTD STD: CPT | Performed by: INTERNAL MEDICINE

## 2024-08-08 PROCEDURE — 82140 ASSAY OF AMMONIA: CPT | Performed by: INTERNAL MEDICINE

## 2024-08-08 PROCEDURE — 85025 COMPLETE CBC W/AUTO DIFF WBC: CPT | Performed by: INTERNAL MEDICINE

## 2024-08-08 PROCEDURE — 10004651 HB RX, NO CHARGE ITEM: Performed by: INTERNAL MEDICINE

## 2024-08-08 PROCEDURE — 99222 1ST HOSP IP/OBS MODERATE 55: CPT | Performed by: STUDENT IN AN ORGANIZED HEALTH CARE EDUCATION/TRAINING PROGRAM

## 2024-08-08 PROCEDURE — G0378 HOSPITAL OBSERVATION PER HR: HCPCS

## 2024-08-08 PROCEDURE — 93308 TTE F-UP OR LMTD: CPT | Performed by: INTERNAL MEDICINE

## 2024-08-08 PROCEDURE — 99223 1ST HOSP IP/OBS HIGH 75: CPT | Performed by: INTERNAL MEDICINE

## 2024-08-08 PROCEDURE — 80048 BASIC METABOLIC PNL TOTAL CA: CPT | Performed by: INTERNAL MEDICINE

## 2024-08-08 PROCEDURE — 82962 GLUCOSE BLOOD TEST: CPT

## 2024-08-08 PROCEDURE — 93325 DOPPLER ECHO COLOR FLOW MAPG: CPT | Performed by: INTERNAL MEDICINE

## 2024-08-08 PROCEDURE — 10000002 HB ROOM CHARGE MED SURG

## 2024-08-08 PROCEDURE — 36415 COLL VENOUS BLD VENIPUNCTURE: CPT | Performed by: INTERNAL MEDICINE

## 2024-08-08 RX ORDER — LACTULOSE 10 G/15ML
20 SOLUTION ORAL 3 TIMES DAILY
Status: DISCONTINUED | OUTPATIENT
Start: 2024-08-08 | End: 2024-08-11 | Stop reason: HOSPADM

## 2024-08-08 RX ORDER — VENLAFAXINE HYDROCHLORIDE 75 MG/1
75 CAPSULE, EXTENDED RELEASE ORAL DAILY
COMMUNITY

## 2024-08-08 RX ORDER — MIDODRINE HYDROCHLORIDE 5 MG/1
5 TABLET ORAL
Status: DISCONTINUED | OUTPATIENT
Start: 2024-08-08 | End: 2024-08-11 | Stop reason: HOSPADM

## 2024-08-08 RX ORDER — ACETAMINOPHEN 325 MG/1
650 TABLET ORAL EVERY 4 HOURS PRN
Status: DISCONTINUED | OUTPATIENT
Start: 2024-08-08 | End: 2024-08-11 | Stop reason: HOSPADM

## 2024-08-08 RX ORDER — ARIPIPRAZOLE 2 MG/1
2 TABLET ORAL DAILY
Status: DISCONTINUED | OUTPATIENT
Start: 2024-08-08 | End: 2024-08-11 | Stop reason: HOSPADM

## 2024-08-08 RX ORDER — ACETAMINOPHEN 325 MG/1
650 TABLET ORAL EVERY 6 HOURS PRN
Status: ON HOLD | COMMUNITY
End: 2024-08-10 | Stop reason: HOSPADM

## 2024-08-08 RX ORDER — SODIUM CHLORIDE 9 MG/ML
INJECTION, SOLUTION INTRAVENOUS CONTINUOUS
Status: DISCONTINUED | OUTPATIENT
Start: 2024-08-08 | End: 2024-08-09

## 2024-08-08 RX ORDER — PANTOPRAZOLE SODIUM 40 MG/1
40 TABLET, DELAYED RELEASE ORAL
Status: DISCONTINUED | OUTPATIENT
Start: 2024-08-08 | End: 2024-08-11 | Stop reason: HOSPADM

## 2024-08-08 RX ORDER — PROCHLORPERAZINE EDISYLATE 5 MG/ML
5 INJECTION INTRAMUSCULAR; INTRAVENOUS EVERY 4 HOURS PRN
Status: DISCONTINUED | OUTPATIENT
Start: 2024-08-08 | End: 2024-08-11 | Stop reason: HOSPADM

## 2024-08-08 RX ORDER — PANTOPRAZOLE SODIUM 40 MG/1
40 TABLET, DELAYED RELEASE ORAL 2 TIMES DAILY
Status: DISCONTINUED | OUTPATIENT
Start: 2024-08-08 | End: 2024-08-08

## 2024-08-08 RX ORDER — LANOLIN ALCOHOL/MO/W.PET/CERES
3 CREAM (GRAM) TOPICAL NIGHTLY PRN
Status: DISCONTINUED | OUTPATIENT
Start: 2024-08-08 | End: 2024-08-11 | Stop reason: HOSPADM

## 2024-08-08 RX ORDER — LEVOTHYROXINE SODIUM 50 UG/1
25 TABLET ORAL
Status: DISCONTINUED | OUTPATIENT
Start: 2024-08-08 | End: 2024-08-11 | Stop reason: HOSPADM

## 2024-08-08 RX ORDER — PREDNISONE 10 MG/1
5 TABLET ORAL DAILY
Status: DISCONTINUED | OUTPATIENT
Start: 2024-08-08 | End: 2024-08-11 | Stop reason: HOSPADM

## 2024-08-08 RX ORDER — MAGNESIUM HYDROXIDE/ALUMINUM HYDROXICE/SIMETHICONE 120; 1200; 1200 MG/30ML; MG/30ML; MG/30ML
30 SUSPENSION ORAL EVERY 4 HOURS PRN
Status: DISCONTINUED | OUTPATIENT
Start: 2024-08-08 | End: 2024-08-11 | Stop reason: HOSPADM

## 2024-08-08 RX ORDER — LACTULOSE 10 G/15ML
20 SOLUTION ORAL EVERY 12 HOURS SCHEDULED
Status: DISCONTINUED | OUTPATIENT
Start: 2024-08-08 | End: 2024-08-08

## 2024-08-08 RX ORDER — ONDANSETRON 2 MG/ML
4 INJECTION INTRAMUSCULAR; INTRAVENOUS 2 TIMES DAILY PRN
Status: DISCONTINUED | OUTPATIENT
Start: 2024-08-08 | End: 2024-08-11 | Stop reason: HOSPADM

## 2024-08-08 RX ORDER — VENLAFAXINE HYDROCHLORIDE 75 MG/1
75 CAPSULE, EXTENDED RELEASE ORAL
Status: DISCONTINUED | OUTPATIENT
Start: 2024-08-08 | End: 2024-08-11 | Stop reason: HOSPADM

## 2024-08-08 RX ORDER — ANASTROZOLE 1 MG/1
1 TABLET ORAL DAILY
Status: DISCONTINUED | OUTPATIENT
Start: 2024-08-08 | End: 2024-08-11 | Stop reason: HOSPADM

## 2024-08-08 RX ORDER — ARIPIPRAZOLE 2 MG/1
2 TABLET ORAL DAILY
COMMUNITY

## 2024-08-08 RX ORDER — POLYETHYLENE GLYCOL 3350 17 G/17G
17 POWDER, FOR SOLUTION ORAL DAILY PRN
Status: DISCONTINUED | OUTPATIENT
Start: 2024-08-08 | End: 2024-08-11 | Stop reason: HOSPADM

## 2024-08-08 RX ORDER — SODIUM CHLORIDE 9 MG/ML
INJECTION, SOLUTION INTRAVENOUS CONTINUOUS
Status: DISCONTINUED | OUTPATIENT
Start: 2024-08-08 | End: 2024-08-08

## 2024-08-08 RX ORDER — POLYETHYLENE GLYCOL 3350 17 G/17G
17 POWDER, FOR SOLUTION ORAL DAILY PRN
COMMUNITY

## 2024-08-08 RX ORDER — 0.9 % SODIUM CHLORIDE 0.9 %
2 VIAL (ML) INJECTION EVERY 12 HOURS SCHEDULED
Status: DISCONTINUED | OUTPATIENT
Start: 2024-08-08 | End: 2024-08-11 | Stop reason: HOSPADM

## 2024-08-08 RX ORDER — AMOXICILLIN 250 MG
2 CAPSULE ORAL DAILY PRN
Status: DISCONTINUED | OUTPATIENT
Start: 2024-08-08 | End: 2024-08-11 | Stop reason: HOSPADM

## 2024-08-08 RX ORDER — TRAZODONE HYDROCHLORIDE 50 MG/1
25 TABLET, FILM COATED ORAL NIGHTLY PRN
Status: DISCONTINUED | OUTPATIENT
Start: 2024-08-08 | End: 2024-08-11 | Stop reason: HOSPADM

## 2024-08-08 RX ADMIN — SODIUM CHLORIDE, PRESERVATIVE FREE 2 ML: 5 INJECTION INTRAVENOUS at 10:41

## 2024-08-08 RX ADMIN — LEVOTHYROXINE SODIUM 25 MCG: 0.05 TABLET ORAL at 06:29

## 2024-08-08 RX ADMIN — SODIUM CHLORIDE: 9 INJECTION, SOLUTION INTRAVENOUS at 13:09

## 2024-08-08 RX ADMIN — LACTULOSE 20 G: 10 SOLUTION ORAL at 17:39

## 2024-08-08 RX ADMIN — MIDODRINE HYDROCHLORIDE 5 MG: 5 TABLET ORAL at 16:33

## 2024-08-08 RX ADMIN — SODIUM CHLORIDE: 9 INJECTION, SOLUTION INTRAVENOUS at 23:42

## 2024-08-08 RX ADMIN — Medication 3 MG: at 20:45

## 2024-08-08 RX ADMIN — MIDODRINE HYDROCHLORIDE 5 MG: 5 TABLET ORAL at 10:40

## 2024-08-08 RX ADMIN — ACETAMINOPHEN 650 MG: 325 TABLET ORAL at 12:31

## 2024-08-08 RX ADMIN — SODIUM CHLORIDE, PRESERVATIVE FREE 2 ML: 5 INJECTION INTRAVENOUS at 20:48

## 2024-08-08 RX ADMIN — ANASTROZOLE 1 MG: 1 TABLET, COATED ORAL at 10:40

## 2024-08-08 RX ADMIN — VENLAFAXINE HYDROCHLORIDE 75 MG: 75 CAPSULE, EXTENDED RELEASE ORAL at 16:33

## 2024-08-08 RX ADMIN — ARIPIPRAZOLE 2 MG: 2 TABLET ORAL at 10:40

## 2024-08-08 RX ADMIN — LACTULOSE 20 G: 10 SOLUTION ORAL at 12:31

## 2024-08-08 RX ADMIN — SODIUM CHLORIDE, PRESERVATIVE FREE 2 ML: 5 INJECTION INTRAVENOUS at 00:53

## 2024-08-08 RX ADMIN — APIXABAN 5 MG: 5 TABLET, FILM COATED ORAL at 20:45

## 2024-08-08 RX ADMIN — LACTULOSE 20 G: 10 SOLUTION ORAL at 20:45

## 2024-08-08 SDOH — ECONOMIC STABILITY: INCOME INSECURITY
IN THE PAST 12 MONTHS, HAS THE ELECTRIC, GAS, OIL, OR WATER COMPANY THREATENED TO SHUT OFF SERVICE IN YOUR HOME?: PATIENT DECLINED

## 2024-08-08 SDOH — ECONOMIC STABILITY: HOUSING INSECURITY: DO YOU HAVE PROBLEMS WITH ANY OF THE FOLLOWING?: NONE OF THE ABOVE

## 2024-08-08 SDOH — ECONOMIC STABILITY: GENERAL
IN THE PAST YEAR, HAVE YOU OR ANY FAMILY MEMBERS YOU LIVE WITH BEEN UNABLE TO GET ANY OF THE FOLLOWING WHEN IT WAS REALLY NEEDED? CHECK ALL THAT APPLY.: PATIENT DECLINED

## 2024-08-08 SDOH — ECONOMIC STABILITY: TRANSPORTATION INSECURITY
IN THE PAST 12 MONTHS, HAS LACK OF RELIABLE TRANSPORTATION KEPT YOU FROM MEDICAL APPOINTMENTS, MEETINGS, WORK OR FROM GETTING THINGS NEEDED FOR DAILY LIVING?: PATIENT DECLINED

## 2024-08-08 SDOH — SOCIAL STABILITY: SOCIAL INSECURITY: HOW OFTEN DOES ANYONE, INCLUDING FAMILY AND FRIENDS, SCREAM OR CURSE AT YOU?: NEVER

## 2024-08-08 SDOH — SOCIAL STABILITY: SOCIAL NETWORK: SUPPORT SYSTEMS: FRIENDS

## 2024-08-08 SDOH — ECONOMIC STABILITY: FOOD INSECURITY: WITHIN THE PAST 12 MONTHS, THE FOOD YOU BOUGHT JUST DIDN'T LAST AND YOU DIDN'T HAVE MONEY TO GET MORE.: NEVER TRUE

## 2024-08-08 SDOH — ECONOMIC STABILITY: HOUSING INSECURITY: WHAT IS YOUR LIVING SITUATION TODAY?: I HAVE A STEADY PLACE TO LIVE

## 2024-08-08 SDOH — ECONOMIC STABILITY: HOUSING INSECURITY: WHAT IS YOUR LIVING SITUATION TODAY?: LONG TERM CARE FACILITY

## 2024-08-08 SDOH — SOCIAL STABILITY: SOCIAL INSECURITY: HOW OFTEN DOES ANYONE, INCLUDING FAMILY AND FRIENDS, PHYSICALLY HURT YOU?: NEVER

## 2024-08-08 SDOH — SOCIAL STABILITY: SOCIAL INSECURITY: HOW OFTEN DOES ANYONE, INCLUDING FAMILY AND FRIENDS, THREATEN YOU WITH HARM?: NEVER

## 2024-08-08 SDOH — SOCIAL STABILITY: SOCIAL INSECURITY: HOW OFTEN DOES ANYONE, INCLUDING FAMILY AND FRIENDS, INSULT OR TALK DOWN TO YOU?: NEVER

## 2024-08-08 SDOH — ECONOMIC STABILITY: HOUSING INSECURITY: WHAT IS YOUR LIVING SITUATION TODAY?: OTHER FACILITY RESIDENTS

## 2024-08-08 SDOH — ECONOMIC STABILITY: GENERAL: WOULD YOU LIKE HELP WITH ANY OF THE FOLLOWING NEEDS?: I DON'T WANT HELP WITH ANY OF THESE

## 2024-08-08 SDOH — HEALTH STABILITY: GENERAL: BECAUSE OF A PHYSICAL, MENTAL, OR EMOTIONAL CONDITION, DO YOU HAVE DIFFICULTY DOING ERRANDS ALONE?: NO

## 2024-08-08 SDOH — HEALTH STABILITY: PHYSICAL HEALTH: DO YOU HAVE DIFFICULTY DRESSING OR BATHING?: NO

## 2024-08-08 SDOH — HEALTH STABILITY: PHYSICAL HEALTH: DO YOU HAVE SERIOUS DIFFICULTY WALKING OR CLIMBING STAIRS?: YES

## 2024-08-08 ASSESSMENT — PATIENT HEALTH QUESTIONNAIRE - PHQ9
CLINICAL INTERPRETATION OF PHQ2 SCORE: NO FURTHER SCREENING NEEDED
1. LITTLE INTEREST OR PLEASURE IN DOING THINGS: NOT AT ALL
2. FEELING DOWN, DEPRESSED OR HOPELESS: NOT AT ALL
IS PATIENT ABLE TO COMPLETE PHQ2 OR PHQ9: YES
SUM OF ALL RESPONSES TO PHQ9 QUESTIONS 1 AND 2: 0
SUM OF ALL RESPONSES TO PHQ9 QUESTIONS 1 AND 2: 0

## 2024-08-08 ASSESSMENT — LIFESTYLE VARIABLES
HOW OFTEN DO YOU HAVE 6 OR MORE DRINKS ON ONE OCCASION: NEVER
HOW MANY STANDARD DRINKS CONTAINING ALCOHOL DO YOU HAVE ON A TYPICAL DAY: 0,1 OR 2
AUDIT-C TOTAL SCORE: 0
HOW OFTEN DO YOU HAVE A DRINK CONTAINING ALCOHOL: NEVER
ALCOHOL_USE_STATUS: NO OR LOW RISK WITH VALIDATED TOOL

## 2024-08-08 ASSESSMENT — ORIENTATION MEMORY CONCENTRATION TEST (OMCT)
WHAT YEAR IS IT NOW (MUST BE EXACT): CORRECT
WHAT TIME IS IT (NO WATCH OR CLOCK): INCORRECT
COUNT BACKWARDS FROM 20 TO 1: CORRECT
SAY THE MONTHS IN REVERSE ORDER STARTING WITH LAST MONTH: 1 ERROR
REPEAT THE NAME AND ADDRESS I ASKED YOU TO REMEMBER: 1 ERROR
WHAT MONTH IS IT NOW: CORRECT
OMCT SCORE: 7

## 2024-08-08 ASSESSMENT — ACTIVITIES OF DAILY LIVING (ADL)
RECENT_DECLINE_ADL: NO
FEEDING: INDEPENDENT
BATHING: DEPENDENT
DRESSING: NEEDS ASSISTANCE
ADL_BEFORE_ADMISSION: NEEDS/REQUIRES ASSISTANCE
TOILETING: NEEDS ASSISTANCE
ADL_SCORE: 6

## 2024-08-08 ASSESSMENT — PAIN SCALES - GENERAL
PAINLEVEL_OUTOF10: 0
PAINLEVEL_OUTOF10: 3
PAINLEVEL_OUTOF10: 0
PAINLEVEL_OUTOF10: 8
PAINLEVEL_OUTOF10: 8

## 2024-08-08 ASSESSMENT — COLUMBIA-SUICIDE SEVERITY RATING SCALE - C-SSRS
2. HAVE YOU ACTUALLY HAD ANY THOUGHTS OF KILLING YOURSELF?: NO
1. WITHIN THE PAST MONTH, HAVE YOU WISHED YOU WERE DEAD OR WISHED YOU COULD GO TO SLEEP AND NOT WAKE UP?: NO
IS THE PATIENT ABLE TO COMPLETE C-SSRS: YES
6. HAVE YOU EVER DONE ANYTHING, STARTED TO DO ANYTHING, OR PREPARED TO DO ANYTHING TO END YOUR LIFE?: NO

## 2024-08-09 LAB
ANION GAP SERPL CALC-SCNC: 9 MMOL/L (ref 7–19)
BASOPHILS # BLD: 0.1 K/MCL (ref 0–0.3)
BASOPHILS NFR BLD: 1 %
BUN SERPL-MCNC: 10 MG/DL (ref 6–20)
BUN/CREAT SERPL: 19 (ref 7–25)
CALCIUM SERPL-MCNC: 8.2 MG/DL (ref 8.4–10.2)
CHLORIDE SERPL-SCNC: 109 MMOL/L (ref 97–110)
CO2 SERPL-SCNC: 25 MMOL/L (ref 21–32)
CREAT SERPL-MCNC: 0.53 MG/DL (ref 0.51–0.95)
DEPRECATED RDW RBC: 53.7 FL (ref 39–50)
EGFRCR SERPLBLD CKD-EPI 2021: >90 ML/MIN/{1.73_M2}
EOSINOPHIL # BLD: 0.2 K/MCL (ref 0–0.5)
EOSINOPHIL NFR BLD: 3 %
ERYTHROCYTE [DISTWIDTH] IN BLOOD: 13.7 % (ref 11–15)
FASTING DURATION TIME PATIENT: ABNORMAL H
GLUCOSE SERPL-MCNC: 91 MG/DL (ref 70–99)
HCT VFR BLD CALC: 46.8 % (ref 36–46.5)
HGB BLD-MCNC: 15.1 G/DL (ref 12–15.5)
IMM GRANULOCYTES # BLD AUTO: 0.1 K/MCL (ref 0–0.2)
IMM GRANULOCYTES # BLD: 1 %
LYMPHOCYTES # BLD: 1.6 K/MCL (ref 1–4)
LYMPHOCYTES NFR BLD: 20 %
MCH RBC QN AUTO: 34 PG (ref 26–34)
MCHC RBC AUTO-ENTMCNC: 32.3 G/DL (ref 32–36.5)
MCV RBC AUTO: 105.4 FL (ref 78–100)
MONOCYTES # BLD: 1.2 K/MCL (ref 0.3–0.9)
MONOCYTES NFR BLD: 15 %
NEUTROPHILS # BLD: 4.8 K/MCL (ref 1.8–7.7)
NEUTROPHILS NFR BLD: 60 %
NRBC BLD MANUAL-RTO: 0 /100 WBC
PLATELET # BLD AUTO: 211 K/MCL (ref 140–450)
POTASSIUM SERPL-SCNC: 3.1 MMOL/L (ref 3.4–5.1)
POTASSIUM SERPL-SCNC: 3.4 MMOL/L (ref 3.4–5.1)
POTASSIUM SERPL-SCNC: 3.7 MMOL/L (ref 3.4–5.1)
RBC # BLD: 4.44 MIL/MCL (ref 4–5.2)
SODIUM SERPL-SCNC: 140 MMOL/L (ref 135–145)
WBC # BLD: 7.8 K/MCL (ref 4.2–11)

## 2024-08-09 PROCEDURE — 97535 SELF CARE MNGMENT TRAINING: CPT

## 2024-08-09 PROCEDURE — 85025 COMPLETE CBC W/AUTO DIFF WBC: CPT | Performed by: INTERNAL MEDICINE

## 2024-08-09 PROCEDURE — 99233 SBSQ HOSP IP/OBS HIGH 50: CPT | Performed by: STUDENT IN AN ORGANIZED HEALTH CARE EDUCATION/TRAINING PROGRAM

## 2024-08-09 PROCEDURE — 10002801 HB RX 250 W/O HCPCS: Performed by: INTERNAL MEDICINE

## 2024-08-09 PROCEDURE — 97165 OT EVAL LOW COMPLEX 30 MIN: CPT

## 2024-08-09 PROCEDURE — 80048 BASIC METABOLIC PNL TOTAL CA: CPT | Performed by: INTERNAL MEDICINE

## 2024-08-09 PROCEDURE — 36415 COLL VENOUS BLD VENIPUNCTURE: CPT | Performed by: INTERNAL MEDICINE

## 2024-08-09 PROCEDURE — 10002803 HB RX 637: Performed by: INTERNAL MEDICINE

## 2024-08-09 PROCEDURE — 10000002 HB ROOM CHARGE MED SURG

## 2024-08-09 PROCEDURE — 84132 ASSAY OF SERUM POTASSIUM: CPT | Performed by: INTERNAL MEDICINE

## 2024-08-09 PROCEDURE — 10004651 HB RX, NO CHARGE ITEM: Performed by: INTERNAL MEDICINE

## 2024-08-09 PROCEDURE — 99233 SBSQ HOSP IP/OBS HIGH 50: CPT | Performed by: INTERNAL MEDICINE

## 2024-08-09 RX ORDER — POTASSIUM CHLORIDE 1500 MG/1
40 TABLET, EXTENDED RELEASE ORAL ONCE
Status: COMPLETED | OUTPATIENT
Start: 2024-08-09 | End: 2024-08-09

## 2024-08-09 RX ADMIN — ANASTROZOLE 1 MG: 1 TABLET, COATED ORAL at 10:08

## 2024-08-09 RX ADMIN — VENLAFAXINE HYDROCHLORIDE 75 MG: 75 CAPSULE, EXTENDED RELEASE ORAL at 10:08

## 2024-08-09 RX ADMIN — SODIUM CHLORIDE, PRESERVATIVE FREE 2 ML: 5 INJECTION INTRAVENOUS at 22:25

## 2024-08-09 RX ADMIN — ACETAMINOPHEN 650 MG: 325 TABLET ORAL at 02:57

## 2024-08-09 RX ADMIN — LACTULOSE 20 G: 10 SOLUTION ORAL at 16:52

## 2024-08-09 RX ADMIN — PANTOPRAZOLE SODIUM 40 MG: 40 TABLET, DELAYED RELEASE ORAL at 06:26

## 2024-08-09 RX ADMIN — PREDNISONE 5 MG: 10 TABLET ORAL at 10:07

## 2024-08-09 RX ADMIN — MIDODRINE HYDROCHLORIDE 5 MG: 5 TABLET ORAL at 06:25

## 2024-08-09 RX ADMIN — APIXABAN 5 MG: 5 TABLET, FILM COATED ORAL at 10:07

## 2024-08-09 RX ADMIN — TRAZODONE HYDROCHLORIDE 25 MG: 50 TABLET ORAL at 22:16

## 2024-08-09 RX ADMIN — POTASSIUM CHLORIDE 40 MEQ: 1500 TABLET, EXTENDED RELEASE ORAL at 11:48

## 2024-08-09 RX ADMIN — ARIPIPRAZOLE 2 MG: 2 TABLET ORAL at 10:07

## 2024-08-09 RX ADMIN — MIDODRINE HYDROCHLORIDE 5 MG: 5 TABLET ORAL at 16:52

## 2024-08-09 RX ADMIN — LACTULOSE 20 G: 10 SOLUTION ORAL at 11:47

## 2024-08-09 RX ADMIN — SODIUM CHLORIDE, PRESERVATIVE FREE 2 ML: 5 INJECTION INTRAVENOUS at 10:08

## 2024-08-09 RX ADMIN — LEVOTHYROXINE SODIUM 25 MCG: 0.05 TABLET ORAL at 06:25

## 2024-08-09 RX ADMIN — APIXABAN 5 MG: 5 TABLET, FILM COATED ORAL at 22:17

## 2024-08-09 RX ADMIN — LACTULOSE 20 G: 10 SOLUTION ORAL at 10:10

## 2024-08-09 RX ADMIN — POTASSIUM CHLORIDE 40 MEQ: 1500 TABLET, EXTENDED RELEASE ORAL at 20:14

## 2024-08-09 RX ADMIN — LACTULOSE 20 G: 10 SOLUTION ORAL at 22:21

## 2024-08-09 ASSESSMENT — PAIN SCALES - GENERAL
PAINLEVEL_OUTOF10: 1
PAINLEVEL_OUTOF10: 0
PAINLEVEL_OUTOF10: 1
PAINLEVEL_OUTOF10: 7
PAINLEVEL_OUTOF10: 1

## 2024-08-09 ASSESSMENT — ORIENTATION MEMORY CONCENTRATION TEST (OMCT)
WHAT TIME IS IT (NO WATCH OR CLOCK): CORRECT
SAY THE MONTHS IN REVERSE ORDER STARTING WITH LAST MONTH: CORRECT
COUNT BACKWARDS FROM 20 TO 1: CORRECT
REPEAT THE NAME AND ADDRESS I ASKED YOU TO REMEMBER: CORRECT
OMCT SCORE: 0
WHAT YEAR IS IT NOW (MUST BE EXACT): CORRECT
WHAT MONTH IS IT NOW: CORRECT

## 2024-08-09 ASSESSMENT — COGNITIVE AND FUNCTIONAL STATUS - GENERAL
DAILY_ACTIVITY_RAW_SCORE: 15
HELP NEEDED FOR TOILETING: A LOT
HELP NEEDED FOR PERSONAL GROOMING: A LITTLE
DAILY_ACTIVITY_CONVERTED_SCORE: 34.69
HELP NEEDED DRESSING REGULAR UPPER BODY CLOTHING: A LITTLE
HELP NEEDED FOR BATHING: A LOT
HELP NEEDED DRESSING REGULAR LOWER BODY CLOTHING: A LOT

## 2024-08-09 ASSESSMENT — PATIENT HEALTH QUESTIONNAIRE - PHQ9
CLINICAL INTERPRETATION OF PHQ2 SCORE: NO FURTHER SCREENING NEEDED
IS PATIENT ABLE TO COMPLETE PHQ2 OR PHQ9: YES
SUM OF ALL RESPONSES TO PHQ9 QUESTIONS 1 AND 2: 0

## 2024-08-09 ASSESSMENT — ACTIVITIES OF DAILY LIVING (ADL)
HOME_MANAGEMENT_TIME_ENTRY: 9
PRIOR_ADL: MODERATE ASSIST (MOD)

## 2024-08-10 VITALS
RESPIRATION RATE: 16 BRPM | TEMPERATURE: 97.5 F | HEART RATE: 88 BPM | OXYGEN SATURATION: 95 % | BODY MASS INDEX: 35.5 KG/M2 | HEIGHT: 62 IN | WEIGHT: 192.9 LBS | SYSTOLIC BLOOD PRESSURE: 165 MMHG | DIASTOLIC BLOOD PRESSURE: 88 MMHG

## 2024-08-10 PROCEDURE — 10002801 HB RX 250 W/O HCPCS: Performed by: INTERNAL MEDICINE

## 2024-08-10 PROCEDURE — 97161 PT EVAL LOW COMPLEX 20 MIN: CPT

## 2024-08-10 PROCEDURE — 97530 THERAPEUTIC ACTIVITIES: CPT

## 2024-08-10 PROCEDURE — 99233 SBSQ HOSP IP/OBS HIGH 50: CPT | Performed by: STUDENT IN AN ORGANIZED HEALTH CARE EDUCATION/TRAINING PROGRAM

## 2024-08-10 PROCEDURE — 10002803 HB RX 637: Performed by: INTERNAL MEDICINE

## 2024-08-10 PROCEDURE — 99232 SBSQ HOSP IP/OBS MODERATE 35: CPT | Performed by: INTERNAL MEDICINE

## 2024-08-10 PROCEDURE — 10006031 HB ROOM CHARGE TELEMETRY

## 2024-08-10 PROCEDURE — 10004651 HB RX, NO CHARGE ITEM: Performed by: INTERNAL MEDICINE

## 2024-08-10 RX ADMIN — APIXABAN 5 MG: 5 TABLET, FILM COATED ORAL at 20:54

## 2024-08-10 RX ADMIN — PANTOPRAZOLE SODIUM 40 MG: 40 TABLET, DELAYED RELEASE ORAL at 05:44

## 2024-08-10 RX ADMIN — TRAZODONE HYDROCHLORIDE 25 MG: 50 TABLET ORAL at 20:54

## 2024-08-10 RX ADMIN — LACTULOSE 20 G: 10 SOLUTION ORAL at 08:55

## 2024-08-10 RX ADMIN — ACETAMINOPHEN 650 MG: 325 TABLET ORAL at 20:54

## 2024-08-10 RX ADMIN — ACETAMINOPHEN 650 MG: 325 TABLET ORAL at 05:44

## 2024-08-10 RX ADMIN — LACTULOSE 20 G: 10 SOLUTION ORAL at 14:22

## 2024-08-10 RX ADMIN — APIXABAN 5 MG: 5 TABLET, FILM COATED ORAL at 08:55

## 2024-08-10 RX ADMIN — SODIUM CHLORIDE, PRESERVATIVE FREE 2 ML: 5 INJECTION INTRAVENOUS at 21:09

## 2024-08-10 RX ADMIN — VENLAFAXINE HYDROCHLORIDE 75 MG: 75 CAPSULE, EXTENDED RELEASE ORAL at 08:55

## 2024-08-10 RX ADMIN — LEVOTHYROXINE SODIUM 25 MCG: 0.05 TABLET ORAL at 05:44

## 2024-08-10 RX ADMIN — SODIUM CHLORIDE, PRESERVATIVE FREE 2 ML: 5 INJECTION INTRAVENOUS at 08:56

## 2024-08-10 RX ADMIN — LACTULOSE 20 G: 10 SOLUTION ORAL at 20:54

## 2024-08-10 RX ADMIN — ARIPIPRAZOLE 2 MG: 2 TABLET ORAL at 08:55

## 2024-08-10 RX ADMIN — PREDNISONE 5 MG: 10 TABLET ORAL at 08:55

## 2024-08-10 RX ADMIN — ANASTROZOLE 1 MG: 1 TABLET, COATED ORAL at 08:55

## 2024-08-10 ASSESSMENT — PAIN SCALES - WONG BAKER: WONGBAKER_NUMERICALRESPONSE: 0

## 2024-08-10 ASSESSMENT — PAIN SCALES - PAIN ASSESSMENT IN ADVANCED DEMENTIA (PAINAD)
CONSOLABILITY: NO NEED TO CONSOLE
BREATHING: NORMAL
TOTALSCORE: 0
BODYLANGUAGE: RELAXED
FACIALEXPRESSION: SMILING OR INEXPRESSIVE
TOTALSCORE: 0
BREATHING: NORMAL
FACIALEXPRESSION: SMILING OR INEXPRESSIVE
BODYLANGUAGE: RELAXED
CONSOLABILITY: NO NEED TO CONSOLE

## 2024-08-10 ASSESSMENT — PAIN SCALES - GENERAL
PAINLEVEL_OUTOF10: 0
PAINLEVEL_OUTOF10: 0
PAINLEVEL_OUTOF10: 4

## 2024-08-10 ASSESSMENT — COGNITIVE AND FUNCTIONAL STATUS - GENERAL
BASIC_MOBILITY_RAW_SCORE: 15
BASIC_MOBILITY_CONVERTED_SCORE: 36.97

## 2024-08-11 VITALS
HEIGHT: 62 IN | BODY MASS INDEX: 35.5 KG/M2 | OXYGEN SATURATION: 94 % | WEIGHT: 192.9 LBS | SYSTOLIC BLOOD PRESSURE: 111 MMHG | DIASTOLIC BLOOD PRESSURE: 75 MMHG | TEMPERATURE: 97.5 F | RESPIRATION RATE: 16 BRPM | HEART RATE: 84 BPM

## 2024-08-11 LAB
ANION GAP SERPL CALC-SCNC: 11 MMOL/L (ref 7–19)
BASOPHILS # BLD: 0.1 K/MCL (ref 0–0.3)
BASOPHILS NFR BLD: 1 %
BUN SERPL-MCNC: 9 MG/DL (ref 6–20)
BUN/CREAT SERPL: 16 (ref 7–25)
CALCIUM SERPL-MCNC: 8 MG/DL (ref 8.4–10.2)
CHLORIDE SERPL-SCNC: 109 MMOL/L (ref 97–110)
CO2 SERPL-SCNC: 21 MMOL/L (ref 21–32)
CREAT SERPL-MCNC: 0.56 MG/DL (ref 0.51–0.95)
DEPRECATED RDW RBC: 52.7 FL (ref 39–50)
EGFRCR SERPLBLD CKD-EPI 2021: >90 ML/MIN/{1.73_M2}
EOSINOPHIL # BLD: 0.2 K/MCL (ref 0–0.5)
EOSINOPHIL NFR BLD: 3 %
ERYTHROCYTE [DISTWIDTH] IN BLOOD: 13.6 % (ref 11–15)
FASTING DURATION TIME PATIENT: ABNORMAL H
GLUCOSE SERPL-MCNC: 92 MG/DL (ref 70–99)
HCT VFR BLD CALC: 48 % (ref 36–46.5)
HGB BLD-MCNC: 15.6 G/DL (ref 12–15.5)
IMM GRANULOCYTES # BLD AUTO: 0.1 K/MCL (ref 0–0.2)
IMM GRANULOCYTES # BLD: 1 %
LYMPHOCYTES # BLD: 1.9 K/MCL (ref 1–4)
LYMPHOCYTES NFR BLD: 27 %
MCH RBC QN AUTO: 34 PG (ref 26–34)
MCHC RBC AUTO-ENTMCNC: 32.5 G/DL (ref 32–36.5)
MCV RBC AUTO: 104.6 FL (ref 78–100)
MONOCYTES # BLD: 0.9 K/MCL (ref 0.3–0.9)
MONOCYTES NFR BLD: 13 %
NEUTROPHILS # BLD: 3.7 K/MCL (ref 1.8–7.7)
NEUTROPHILS NFR BLD: 55 %
NRBC BLD MANUAL-RTO: 0 /100 WBC
PLATELET # BLD AUTO: 235 K/MCL (ref 140–450)
POTASSIUM SERPL-SCNC: 3.8 MMOL/L (ref 3.4–5.1)
RBC # BLD: 4.59 MIL/MCL (ref 4–5.2)
SODIUM SERPL-SCNC: 137 MMOL/L (ref 135–145)
WBC # BLD: 6.8 K/MCL (ref 4.2–11)

## 2024-08-11 PROCEDURE — 36415 COLL VENOUS BLD VENIPUNCTURE: CPT | Performed by: INTERNAL MEDICINE

## 2024-08-11 PROCEDURE — 10004651 HB RX, NO CHARGE ITEM: Performed by: INTERNAL MEDICINE

## 2024-08-11 PROCEDURE — 10002803 HB RX 637: Performed by: INTERNAL MEDICINE

## 2024-08-11 PROCEDURE — 99232 SBSQ HOSP IP/OBS MODERATE 35: CPT | Performed by: STUDENT IN AN ORGANIZED HEALTH CARE EDUCATION/TRAINING PROGRAM

## 2024-08-11 PROCEDURE — 10002801 HB RX 250 W/O HCPCS: Performed by: INTERNAL MEDICINE

## 2024-08-11 PROCEDURE — 80048 BASIC METABOLIC PNL TOTAL CA: CPT | Performed by: INTERNAL MEDICINE

## 2024-08-11 PROCEDURE — 85025 COMPLETE CBC W/AUTO DIFF WBC: CPT | Performed by: INTERNAL MEDICINE

## 2024-08-11 RX ORDER — LACTULOSE 10 G/15ML
30 SOLUTION ORAL
Status: SHIPPED | COMMUNITY
Start: 2024-08-11

## 2024-08-11 RX ADMIN — PANTOPRAZOLE SODIUM 40 MG: 40 TABLET, DELAYED RELEASE ORAL at 06:09

## 2024-08-11 RX ADMIN — VENLAFAXINE HYDROCHLORIDE 75 MG: 75 CAPSULE, EXTENDED RELEASE ORAL at 08:56

## 2024-08-11 RX ADMIN — ARIPIPRAZOLE 2 MG: 2 TABLET ORAL at 08:56

## 2024-08-11 RX ADMIN — ACETAMINOPHEN 650 MG: 325 TABLET ORAL at 06:09

## 2024-08-11 RX ADMIN — MIDODRINE HYDROCHLORIDE 5 MG: 5 TABLET ORAL at 08:57

## 2024-08-11 RX ADMIN — LEVOTHYROXINE SODIUM 25 MCG: 0.05 TABLET ORAL at 06:09

## 2024-08-11 RX ADMIN — PREDNISONE 5 MG: 10 TABLET ORAL at 08:56

## 2024-08-11 RX ADMIN — LACTULOSE 20 G: 10 SOLUTION ORAL at 08:56

## 2024-08-11 RX ADMIN — APIXABAN 5 MG: 5 TABLET, FILM COATED ORAL at 08:56

## 2024-08-11 RX ADMIN — ANASTROZOLE 1 MG: 1 TABLET, COATED ORAL at 08:56

## 2024-08-11 ASSESSMENT — PAIN SCALES - GENERAL: PAINLEVEL_OUTOF10: 0

## 2024-08-22 ENCOUNTER — APPOINTMENT (OUTPATIENT)
Dept: CARDIOLOGY | Age: 79
End: 2024-08-22

## 2024-08-22 VITALS
HEART RATE: 104 BPM | SYSTOLIC BLOOD PRESSURE: 110 MMHG | HEIGHT: 62 IN | DIASTOLIC BLOOD PRESSURE: 69 MMHG | OXYGEN SATURATION: 94 % | BODY MASS INDEX: 34.48 KG/M2 | WEIGHT: 187.39 LBS

## 2024-08-22 DIAGNOSIS — I50.32 CHRONIC DIASTOLIC HEART FAILURE  (CMD): Chronic | ICD-10-CM

## 2024-08-22 DIAGNOSIS — R79.89 INCREASED AMMONIA LEVEL: ICD-10-CM

## 2024-08-22 DIAGNOSIS — I27.82 OTHER CHRONIC PULMONARY EMBOLISM WITHOUT ACUTE COR PULMONALE  (CMD): ICD-10-CM

## 2024-08-22 DIAGNOSIS — I49.9 IRREGULAR HEART RHYTHM: ICD-10-CM

## 2024-08-22 DIAGNOSIS — Z09 HOSPITAL DISCHARGE FOLLOW-UP: Primary | ICD-10-CM

## 2024-08-22 DIAGNOSIS — I95.9 HYPOTENSION, UNSPECIFIED HYPOTENSION TYPE: ICD-10-CM

## 2024-08-23 ENCOUNTER — TELEPHONE (OUTPATIENT)
Dept: FAMILY MEDICINE | Age: 79
End: 2024-08-23

## 2024-08-23 LAB
ALDOLASE SERPL-CCNC: 4.4 U/L
ATRIAL RATE (BPM): 105
P AXIS (DEGREES): 51
PR-INTERVAL (MSEC): 158
QRS-INTERVAL (MSEC): 68
QT-INTERVAL (MSEC): 376
QTC: 497
R AXIS (DEGREES): -73
REPORT TEXT: NORMAL
T AXIS (DEGREES): 48
VENTRICULAR RATE EKG/MIN (BPM): 105

## 2024-08-24 LAB
ANA PAT SER IF-IMP: ABNORMAL
ANA SER QL IF: POSITIVE
ANA TITR SER IF: ABNORMAL TITER

## 2024-08-26 ENCOUNTER — APPOINTMENT (OUTPATIENT)
Dept: FAMILY MEDICINE | Age: 79
End: 2024-08-26

## 2024-08-26 ENCOUNTER — APPOINTMENT (OUTPATIENT)
Dept: CARDIOLOGY | Age: 79
End: 2024-08-26

## 2024-08-26 VITALS
TEMPERATURE: 96.9 F | DIASTOLIC BLOOD PRESSURE: 74 MMHG | HEIGHT: 62 IN | BODY MASS INDEX: 34.41 KG/M2 | WEIGHT: 187 LBS | SYSTOLIC BLOOD PRESSURE: 98 MMHG | RESPIRATION RATE: 16 BRPM | HEART RATE: 116 BPM | OXYGEN SATURATION: 98 %

## 2024-08-26 DIAGNOSIS — E72.20 HYPERAMMONEMIA  (CMD): ICD-10-CM

## 2024-08-26 DIAGNOSIS — R76.8 ANA POSITIVE: ICD-10-CM

## 2024-08-26 DIAGNOSIS — Z85.3 PERSONAL HISTORY OF BREAST CANCER: ICD-10-CM

## 2024-08-26 DIAGNOSIS — R53.1 GENERALIZED WEAKNESS: ICD-10-CM

## 2024-08-26 DIAGNOSIS — E55.9 VITAMIN D DEFICIENCY: ICD-10-CM

## 2024-08-26 DIAGNOSIS — I95.9 HYPOTENSION, UNSPECIFIED HYPOTENSION TYPE: Primary | ICD-10-CM

## 2024-08-26 PROBLEM — E11.51 PERIPHERAL ANGIOPATHY DUE TO TYPE 2 DIABETES MELLITUS  (CMD): Status: ACTIVE | Noted: 2024-08-26

## 2024-08-26 ASSESSMENT — PAIN SCALES - GENERAL: PAINLEVEL: 0

## 2024-08-26 ASSESSMENT — PATIENT HEALTH QUESTIONNAIRE - PHQ9
2. FEELING DOWN, DEPRESSED OR HOPELESS: NOT AT ALL
1. LITTLE INTEREST OR PLEASURE IN DOING THINGS: NOT AT ALL
SUM OF ALL RESPONSES TO PHQ9 QUESTIONS 1 AND 2: 0
SUM OF ALL RESPONSES TO PHQ9 QUESTIONS 1 AND 2: 0
CLINICAL INTERPRETATION OF PHQ2 SCORE: NO FURTHER SCREENING NEEDED

## 2024-08-29 LAB
ATRIAL RATE (BPM): 105
P AXIS (DEGREES): 51
PR-INTERVAL (MSEC): 158
QRS-INTERVAL (MSEC): 68
QT-INTERVAL (MSEC): 376
QTC: 497
R AXIS (DEGREES): -73
REPORT TEXT: NORMAL
T AXIS (DEGREES): 48
VENTRICULAR RATE EKG/MIN (BPM): 105

## 2024-09-03 ENCOUNTER — TELEPHONE (OUTPATIENT)
Dept: HEMATOLOGY/ONCOLOGY | Age: 79
End: 2024-09-03

## 2024-09-03 DIAGNOSIS — M81.0 AGE-RELATED OSTEOPOROSIS WITHOUT CURRENT PATHOLOGICAL FRACTURE: Primary | ICD-10-CM

## 2024-09-05 ENCOUNTER — OFFICE VISIT (OUTPATIENT)
Dept: HEMATOLOGY/ONCOLOGY | Age: 79
End: 2024-09-05
Attending: STUDENT IN AN ORGANIZED HEALTH CARE EDUCATION/TRAINING PROGRAM

## 2024-09-05 ENCOUNTER — LAB SERVICES (OUTPATIENT)
Dept: LAB | Age: 79
End: 2024-09-05
Attending: STUDENT IN AN ORGANIZED HEALTH CARE EDUCATION/TRAINING PROGRAM

## 2024-09-05 VITALS
RESPIRATION RATE: 18 BRPM | HEART RATE: 90 BPM | WEIGHT: 186 LBS | HEIGHT: 62 IN | BODY MASS INDEX: 34.23 KG/M2 | TEMPERATURE: 98.1 F | SYSTOLIC BLOOD PRESSURE: 115 MMHG | DIASTOLIC BLOOD PRESSURE: 86 MMHG | OXYGEN SATURATION: 95 %

## 2024-09-05 DIAGNOSIS — D50.0 IRON DEFICIENCY ANEMIA DUE TO CHRONIC BLOOD LOSS: ICD-10-CM

## 2024-09-05 DIAGNOSIS — Z17.0 MALIGNANT NEOPLASM OF UPPER-OUTER QUADRANT OF RIGHT BREAST IN FEMALE, ESTROGEN RECEPTOR POSITIVE  (CMD): Primary | ICD-10-CM

## 2024-09-05 DIAGNOSIS — M80.00XD AGE-RELATED OSTEOPOROSIS WITH CURRENT PATHOLOGICAL FRACTURE WITH ROUTINE HEALING: ICD-10-CM

## 2024-09-05 DIAGNOSIS — M80.00XD AGE-RELATED OSTEOPOROSIS WITH CURRENT PATHOLOGICAL FRACTURE WITH ROUTINE HEALING, SUBSEQUENT ENCOUNTER: ICD-10-CM

## 2024-09-05 DIAGNOSIS — C50.411 MALIGNANT NEOPLASM OF UPPER-OUTER QUADRANT OF RIGHT BREAST IN FEMALE, ESTROGEN RECEPTOR POSITIVE  (CMD): Primary | ICD-10-CM

## 2024-09-05 DIAGNOSIS — M81.0 AGE-RELATED OSTEOPOROSIS WITHOUT CURRENT PATHOLOGICAL FRACTURE: ICD-10-CM

## 2024-09-05 PROBLEM — M80.00XA OSTEOPOROSIS WITH PATHOLOGICAL FRACTURE: Status: ACTIVE | Noted: 2021-07-21

## 2024-09-05 LAB
25(OH)D3+25(OH)D2 SERPL-MCNC: 37.5 NG/ML (ref 30–100)
AMMONIA PLAS-SCNC: 65 MCMOL/L
ANION GAP SERPL CALC-SCNC: 8 MMOL/L (ref 7–19)
BASOPHILS # BLD: 0.1 K/MCL (ref 0–0.3)
BASOPHILS NFR BLD: 1 %
BUN SERPL-MCNC: 13 MG/DL (ref 6–20)
BUN/CREAT SERPL: 17 (ref 7–25)
CALCIUM SERPL-MCNC: 8.6 MG/DL (ref 8.4–10.2)
CHLORIDE SERPL-SCNC: 110 MMOL/L (ref 97–110)
CO2 SERPL-SCNC: 25 MMOL/L (ref 21–32)
CREAT SERPL-MCNC: 0.76 MG/DL (ref 0.51–0.95)
DEPRECATED RDW RBC: 52.9 FL (ref 39–50)
EGFRCR SERPLBLD CKD-EPI 2021: 81 ML/MIN/{1.73_M2}
EOSINOPHIL # BLD: 0.2 K/MCL (ref 0–0.5)
EOSINOPHIL NFR BLD: 2 %
ERYTHROCYTE [DISTWIDTH] IN BLOOD: 13.6 % (ref 11–15)
FASTING DURATION TIME PATIENT: ABNORMAL H
FERRITIN SERPL-MCNC: 56 NG/ML (ref 8–252)
GLUCOSE SERPL-MCNC: 131 MG/DL (ref 70–99)
HCT VFR BLD CALC: 47.7 % (ref 36–46.5)
HGB BLD-MCNC: 15.3 G/DL (ref 12–15.5)
IMM GRANULOCYTES # BLD AUTO: 0.1 K/MCL (ref 0–0.2)
IMM GRANULOCYTES # BLD: 1 %
IRON SATN MFR SERPL: 30 % (ref 15–45)
IRON SERPL-MCNC: 90 MCG/DL (ref 50–170)
LYMPHOCYTES # BLD: 1.1 K/MCL (ref 1–4)
LYMPHOCYTES NFR BLD: 13 %
MCH RBC QN AUTO: 33.6 PG (ref 26–34)
MCHC RBC AUTO-ENTMCNC: 32.1 G/DL (ref 32–36.5)
MCV RBC AUTO: 104.8 FL (ref 78–100)
MONOCYTES # BLD: 1 K/MCL (ref 0.3–0.9)
MONOCYTES NFR BLD: 11 %
NEUTROPHILS # BLD: 6.3 K/MCL (ref 1.8–7.7)
NEUTROPHILS NFR BLD: 72 %
NRBC BLD MANUAL-RTO: 0 /100 WBC
PLATELET # BLD AUTO: 232 K/MCL (ref 140–450)
POTASSIUM SERPL-SCNC: 3.8 MMOL/L (ref 3.4–5.1)
RBC # BLD: 4.55 MIL/MCL (ref 4–5.2)
SODIUM SERPL-SCNC: 139 MMOL/L (ref 135–145)
TIBC SERPL-MCNC: 303 MCG/DL (ref 250–450)
WBC # BLD: 8.7 K/MCL (ref 4.2–11)

## 2024-09-05 PROCEDURE — 82140 ASSAY OF AMMONIA: CPT

## 2024-09-05 PROCEDURE — 83550 IRON BINDING TEST: CPT

## 2024-09-05 PROCEDURE — 85025 COMPLETE CBC W/AUTO DIFF WBC: CPT

## 2024-09-05 PROCEDURE — 82306 VITAMIN D 25 HYDROXY: CPT

## 2024-09-05 PROCEDURE — 82728 ASSAY OF FERRITIN: CPT

## 2024-09-05 PROCEDURE — 99215 OFFICE O/P EST HI 40 MIN: CPT | Performed by: STUDENT IN AN ORGANIZED HEALTH CARE EDUCATION/TRAINING PROGRAM

## 2024-09-05 PROCEDURE — 99211 OFF/OP EST MAY X REQ PHY/QHP: CPT

## 2024-09-05 PROCEDURE — 36415 COLL VENOUS BLD VENIPUNCTURE: CPT

## 2024-09-05 PROCEDURE — 80048 BASIC METABOLIC PNL TOTAL CA: CPT

## 2024-09-05 ASSESSMENT — PAIN SCALES - GENERAL: PAINLEVEL: 0

## 2024-09-07 ENCOUNTER — NURSE TRIAGE (OUTPATIENT)
Dept: TELEHEALTH | Age: 79
End: 2024-09-07

## 2024-09-11 ENCOUNTER — APPOINTMENT (OUTPATIENT)
Dept: CARDIOLOGY | Age: 79
End: 2024-09-11
Attending: INTERNAL MEDICINE

## 2024-09-11 ENCOUNTER — LAB REQUISITION (OUTPATIENT)
Dept: LAB | Facility: HOSPITAL | Age: 79
End: 2024-09-11
Payer: MEDICARE

## 2024-09-11 DIAGNOSIS — M62.81 MUSCLE WEAKNESS (GENERALIZED): ICD-10-CM

## 2024-09-11 LAB
ALBUMIN SERPL-MCNC: 3.6 G/DL (ref 3.2–4.8)
ALP LIVER SERPL-CCNC: 56 U/L
ALT SERPL-CCNC: 21 U/L
AST SERPL-CCNC: 23 U/L (ref ?–34)
BASOPHILS # BLD AUTO: 0.05 X10(3) UL (ref 0–0.2)
BASOPHILS NFR BLD AUTO: 0.8 %
BILIRUB DIRECT SERPL-MCNC: 0.1 MG/DL (ref ?–0.3)
BILIRUB SERPL-MCNC: 0.5 MG/DL (ref 0.2–1.1)
C3 SERPL-MCNC: 122.2 MG/DL (ref 90–170)
C4 SERPL-MCNC: 24.6 MG/DL (ref 12–36)
CK SERPL-CCNC: 79 U/L
CREAT BLD-MCNC: 0.7 MG/DL
CRP SERPL-MCNC: 0.6 MG/DL (ref ?–0.5)
EGFRCR SERPLBLD CKD-EPI 2021: 89 ML/MIN/1.73M2 (ref 60–?)
EOSINOPHIL # BLD AUTO: 0.29 X10(3) UL (ref 0–0.7)
EOSINOPHIL NFR BLD AUTO: 4.6 %
ERYTHROCYTE [DISTWIDTH] IN BLOOD BY AUTOMATED COUNT: 13.5 %
ERYTHROCYTE [SEDIMENTATION RATE] IN BLOOD: 55 MM/HR
HCT VFR BLD AUTO: 45 %
HGB BLD-MCNC: 15.3 G/DL
IMM GRANULOCYTES # BLD AUTO: 0.03 X10(3) UL (ref 0–1)
IMM GRANULOCYTES NFR BLD: 0.5 %
LYMPHOCYTES # BLD AUTO: 1.56 X10(3) UL (ref 1–4)
LYMPHOCYTES NFR BLD AUTO: 24.8 %
MCH RBC QN AUTO: 34.2 PG (ref 26–34)
MCHC RBC AUTO-ENTMCNC: 34 G/DL (ref 31–37)
MCV RBC AUTO: 100.4 FL
MONOCYTES # BLD AUTO: 0.7 X10(3) UL (ref 0.1–1)
MONOCYTES NFR BLD AUTO: 11.1 %
NEUTROPHILS # BLD AUTO: 3.67 X10 (3) UL (ref 1.5–7.7)
NEUTROPHILS # BLD AUTO: 3.67 X10(3) UL (ref 1.5–7.7)
NEUTROPHILS NFR BLD AUTO: 58.2 %
PLATELET # BLD AUTO: 242 10(3)UL (ref 150–450)
PROT SERPL-MCNC: 6.3 G/DL (ref 5.7–8.2)
RBC # BLD AUTO: 4.48 X10(6)UL
WBC # BLD AUTO: 6.3 X10(3) UL (ref 4–11)

## 2024-09-11 PROCEDURE — 82550 ASSAY OF CK (CPK): CPT | Performed by: INTERNAL MEDICINE

## 2024-09-11 PROCEDURE — 80076 HEPATIC FUNCTION PANEL: CPT | Performed by: INTERNAL MEDICINE

## 2024-09-11 PROCEDURE — 85025 COMPLETE CBC W/AUTO DIFF WBC: CPT | Performed by: INTERNAL MEDICINE

## 2024-09-11 PROCEDURE — 86160 COMPLEMENT ANTIGEN: CPT | Performed by: INTERNAL MEDICINE

## 2024-09-11 PROCEDURE — 82565 ASSAY OF CREATININE: CPT | Performed by: INTERNAL MEDICINE

## 2024-09-11 PROCEDURE — 85652 RBC SED RATE AUTOMATED: CPT | Performed by: INTERNAL MEDICINE

## 2024-09-11 PROCEDURE — 86140 C-REACTIVE PROTEIN: CPT | Performed by: INTERNAL MEDICINE

## 2024-09-16 ENCOUNTER — APPOINTMENT (OUTPATIENT)
Dept: HEMATOLOGY/ONCOLOGY | Age: 79
End: 2024-09-16
Attending: STUDENT IN AN ORGANIZED HEALTH CARE EDUCATION/TRAINING PROGRAM

## 2024-09-16 ENCOUNTER — APPOINTMENT (OUTPATIENT)
Dept: LAB | Age: 79
End: 2024-09-16
Attending: STUDENT IN AN ORGANIZED HEALTH CARE EDUCATION/TRAINING PROGRAM

## 2024-09-23 ENCOUNTER — APPOINTMENT (OUTPATIENT)
Dept: LAB | Age: 79
End: 2024-09-23
Attending: STUDENT IN AN ORGANIZED HEALTH CARE EDUCATION/TRAINING PROGRAM

## 2024-09-23 ENCOUNTER — APPOINTMENT (OUTPATIENT)
Dept: HEMATOLOGY/ONCOLOGY | Age: 79
End: 2024-09-23
Attending: STUDENT IN AN ORGANIZED HEALTH CARE EDUCATION/TRAINING PROGRAM

## 2024-09-24 ENCOUNTER — APPOINTMENT (OUTPATIENT)
Dept: CARDIOLOGY | Age: 79
End: 2024-09-24
Attending: INTERNAL MEDICINE

## 2024-10-31 ENCOUNTER — HOSPITAL ENCOUNTER (OUTPATIENT)
Age: 79
Discharge: HOME | End: 2024-10-31
Payer: MEDICARE

## 2024-10-31 ENCOUNTER — TELEPHONE (OUTPATIENT)
Dept: FAMILY MEDICINE | Age: 79
End: 2024-10-31

## 2024-10-31 VITALS
HEART RATE: 101 BPM | RESPIRATION RATE: 18 BRPM | DIASTOLIC BLOOD PRESSURE: 64 MMHG | TEMPERATURE: 98 F | SYSTOLIC BLOOD PRESSURE: 109 MMHG | OXYGEN SATURATION: 95 %

## 2024-10-31 DIAGNOSIS — N30.01 ACUTE CYSTITIS WITH HEMATURIA: Primary | ICD-10-CM

## 2024-10-31 LAB
GLUCOSE UR STRIP-MCNC: NEGATIVE MG/DL
KETONES UR STRIP-MCNC: 15 MG/DL
NITRITE UR QL STRIP: POSITIVE
PH UR STRIP: 5.5 [PH]
PROT UR STRIP-MCNC: 30 MG/DL
SP GR UR STRIP: 1.02
UROBILINOGEN UR STRIP-ACNC: 4 MG/DL

## 2024-10-31 PROCEDURE — 99204 OFFICE O/P NEW MOD 45 MIN: CPT | Performed by: PHYSICIAN ASSISTANT

## 2024-10-31 PROCEDURE — 81002 URINALYSIS NONAUTO W/O SCOPE: CPT | Performed by: PHYSICIAN ASSISTANT

## 2024-10-31 RX ORDER — CEPHALEXIN 500 MG/1
500 CAPSULE ORAL 2 TIMES DAILY
Qty: 14 CAPSULE | Refills: 0 | Status: SHIPPED | OUTPATIENT
Start: 2024-10-31 | End: 2024-11-07

## 2024-10-31 NOTE — ED PROVIDER NOTES
Patient Seen in: [facility]      History     Chief Complaint   Patient presents with    Urinary Symptoms     Stated Complaint: urine problem    Subjective:   HPI      Patient c/o hematuria and mild low back pain since yesterday.  Denies other UTI Sxs.  Denies abdominal/flank pain, f/c/n/v.  Denies any other complaints/concerns at this time.      Objective:     Past Medical History:    Depression    Esophageal reflux    Hemorrhage of large intestine due to diverticular disease              Past Surgical History:   Procedure Laterality Date    Colonoscopy  01/30/2021    Diverticulosis    Colonoscopy N/A 5/31/2022    Procedure: COLONOSCOPY;  Surgeon: Jeffry Fish MD;  Location:  ENDOSCOPY                Social History     Socioeconomic History    Marital status: Single   Tobacco Use    Smoking status: Never     Passive exposure: Never    Smokeless tobacco: Never   Vaping Use    Vaping status: Never Used   Substance and Sexual Activity    Alcohol use: Not Currently    Drug use: Never     Social Drivers of Health     Financial Resource Strain: Patient Declined (8/8/2024)    Received from [facility]    Financial Resource Strain     In the past year, have you or any family members you live with been unable to get any of the following when it was really needed? Check all that apply.: Patient declined   Food Insecurity: Low Risk  (8/8/2024)    Received from [facility]    Food Insecurity     Within the past 12 months, you worried that your food would run out before you got money to buy more.  : Never true     Within the past 12 months, the food you bought just didn't last and you didn't have money to get more. : Never true   Transportation Needs: Patient Declined (8/8/2024)    Received from [facility]    Transportation Needs     In the past 12 months, has lack of reliable transportation kept you from medical appointments, meetings, work or from getting things  needed for daily living? : Patient declined   Physical Activity: High Risk (7/12/2023)    Received from [facility]    Exercise Vital Sign     On average, how many days per week do you engage in moderate to strenuous exercise (like a brisk walk)?: 0 days     On average, how many minutes do you engage in exercise at this level?: 0 min   Social Connections: Low Risk  (8/8/2024)    Received from [facility]    Social Connections     How often do you see or talk to people that you care about and feel close to? (For example: talking to friends on the phone, visiting friends or family, going to [employer] or club meetings): 5 or more times a week   Housing Stability: Low Risk  (10/24/2022)    Received from [facility]    Housing Stability     Are you concerned about having a safe and reliable place to live?: No              Review of Systems    Positive for stated complaint: urine problem  Other systems are as noted in HPI.  Constitutional and vital signs reviewed.      All other systems reviewed and negative except as noted above.    Physical Exam     ED Triage Vitals [10/31/24 1526]   /64   Pulse 101   Resp 18   Temp 97.7 °F (36.5 °C)   Temp src Oral   SpO2 95 %   O2 Device None (Room air)       Current Vitals:   Vital Signs  BP: 109/64  Pulse: 101  Resp: 18  Temp: 97.7 °F (36.5 °C)  Temp src: Oral    Oxygen Therapy  SpO2: 95 %  O2 Device: None (Room air)        Physical Exam  Vitals and nursing note reviewed.   Constitutional:       Appearance: Normal appearance.   HENT:      Head: Normocephalic.   Eyes:      Conjunctiva/sclera: Conjunctivae normal.   Pulmonary:      Effort: Pulmonary effort is normal.   Abdominal:      Tenderness: There is no right CVA tenderness or left CVA tenderness.   Musculoskeletal:         General: Normal range of motion.   Skin:     General: Skin is warm and dry.   Neurological:      General: No focal deficit present.      Mental Status: She is  alert.   Psychiatric:         Mood and Affect: Mood normal.             ED Course     Labs Reviewed   [facility] POCT URINALYSIS DIPSTICK - Abnormal; Notable for the following components:       Result Value    Urine Color Janeth (*)     Urine Clarity Cloudy (*)     Protein urine 30 (*)     Ketone, Urine 15 (*)     Bilirubin, Urine Small (*)     Blood, Urine Large (*)     Nitrite Urine Positive (*)     Urobilinogen urine 4.0 (*)     Leukocyte esterase urine Small (*)     All other components within normal limits   URINE CULTURE, ROUTINE       ED Course as of 10/31/24 1829  ------------------------------------------------------------  Time: 10/31 1827  Comment: Nurse from patient's assisted living called regarding Keflex Rx with patient's allergy to PCN.  In review of medical records, it appears patient has taken Cephalosporins previously.  Offered that if the patient/family is concerned can switch Rx at this time although the risk is low.  Nurse did not request change to Rx at this time.              MDM      Differential diagnosis includes but is not limited to UTI, kidney stone, pyelo.    Patient well-appearing, non-toxic.  Discussed UTI and plan to treat with abx, culture pending.  I advised supportive care at home, f/u and provided return precautions.  Patient and family verbalized understanding/agreement of plan.          Medical Decision Making  Risk  Prescription drug management.        Disposition and Plan     Clinical Impression:  1. Acute cystitis with hematuria         Disposition:  Discharge  10/31/2024  4:23 pm    Follow-up:  Paras Reddy DO  [address]  [address]  [place] [address]  [phone]    In 3 days            Medications Prescribed:  Discharge Medication List as of 10/31/2024  4:28 PM        START taking these medications    Details   cephALEXin 500 MG Oral Cap Take 1 capsule (500 mg total) by mouth 2 (two) times daily for 7 days., Normal, Disp-14 capsule, R-0                  Supplementary Documentation:

## 2024-10-31 NOTE — DISCHARGE INSTRUCTIONS
Take all antibiotics as instructed.  We will notify you of pending culture results in 3-5 days.      Go to ER for new/worsening symptoms (ie abdominal pain, fevers, vomiting, etc)    While taking antibiotics it is recommended that you also take an over the counter probiotics.  If you'd like, you can have 2 servings of yogurt/Kefir daily instead.  Probiotics increase the good bacteria in your gut while the antibiotic fights the bad bacteria that is causing your infection.  The good bacteria in your gut act as part of your immune system.  Taking a probiotic while on antibiotics will decrease the chances of upset stomach and diarrhea, which are common side effects of antibiotics.

## 2024-11-06 ENCOUNTER — LAB REQUISITION (OUTPATIENT)
Dept: LAB | Facility: HOSPITAL | Age: 79
End: 2024-11-06
Payer: MEDICARE

## 2024-11-06 DIAGNOSIS — R76.8 OTHER SPECIFIED ABNORMAL IMMUNOLOGICAL FINDINGS IN SERUM: ICD-10-CM

## 2024-11-06 DIAGNOSIS — M62.81 MUSCLE WEAKNESS (GENERALIZED): ICD-10-CM

## 2024-11-06 LAB
ALBUMIN SERPL-MCNC: 3.4 G/DL (ref 3.2–4.8)
ALP LIVER SERPL-CCNC: 52 U/L
ALT SERPL-CCNC: 23 U/L
AST SERPL-CCNC: 24 U/L (ref ?–34)
BASOPHILS # BLD AUTO: 0.05 X10(3) UL (ref 0–0.2)
BASOPHILS NFR BLD AUTO: 0.9 %
BILIRUB DIRECT SERPL-MCNC: 0.2 MG/DL (ref ?–0.3)
BILIRUB SERPL-MCNC: 0.6 MG/DL (ref 0.2–1.1)
C3 SERPL-MCNC: 114.6 MG/DL (ref 90–170)
C4 SERPL-MCNC: 21 MG/DL (ref 12–36)
CK SERPL-CCNC: 82 U/L
CREAT BLD-MCNC: 0.65 MG/DL
CRP SERPL-MCNC: <0.4 MG/DL (ref ?–0.5)
EGFRCR SERPLBLD CKD-EPI 2021: 91 ML/MIN/1.73M2 (ref 60–?)
EOSINOPHIL # BLD AUTO: 0.15 X10(3) UL (ref 0–0.7)
EOSINOPHIL NFR BLD AUTO: 2.7 %
ERYTHROCYTE [DISTWIDTH] IN BLOOD BY AUTOMATED COUNT: 15.1 %
ERYTHROCYTE [SEDIMENTATION RATE] IN BLOOD: 28 MM/HR
HCT VFR BLD AUTO: 44.6 %
HGB BLD-MCNC: 15 G/DL
IMM GRANULOCYTES # BLD AUTO: 0.05 X10(3) UL (ref 0–1)
IMM GRANULOCYTES NFR BLD: 0.9 %
LYMPHOCYTES # BLD AUTO: 1.54 X10(3) UL (ref 1–4)
LYMPHOCYTES NFR BLD AUTO: 27.5 %
MCH RBC QN AUTO: 33.9 PG (ref 26–34)
MCHC RBC AUTO-ENTMCNC: 33.6 G/DL (ref 31–37)
MCV RBC AUTO: 100.7 FL
MONOCYTES # BLD AUTO: 0.63 X10(3) UL (ref 0.1–1)
MONOCYTES NFR BLD AUTO: 11.2 %
NEUTROPHILS # BLD AUTO: 3.19 X10 (3) UL (ref 1.5–7.7)
NEUTROPHILS # BLD AUTO: 3.19 X10(3) UL (ref 1.5–7.7)
NEUTROPHILS NFR BLD AUTO: 56.8 %
PLATELET # BLD AUTO: 222 10(3)UL (ref 150–450)
PROT SERPL-MCNC: 6 G/DL (ref 5.7–8.2)
RBC # BLD AUTO: 4.43 X10(6)UL
WBC # BLD AUTO: 5.6 X10(3) UL (ref 4–11)

## 2024-11-06 PROCEDURE — 85025 COMPLETE CBC W/AUTO DIFF WBC: CPT | Performed by: INTERNAL MEDICINE

## 2024-11-06 PROCEDURE — 85652 RBC SED RATE AUTOMATED: CPT | Performed by: INTERNAL MEDICINE

## 2024-11-06 PROCEDURE — 82550 ASSAY OF CK (CPK): CPT | Performed by: INTERNAL MEDICINE

## 2024-11-06 PROCEDURE — 86160 COMPLEMENT ANTIGEN: CPT | Performed by: INTERNAL MEDICINE

## 2024-11-06 PROCEDURE — 80076 HEPATIC FUNCTION PANEL: CPT | Performed by: INTERNAL MEDICINE

## 2024-11-06 PROCEDURE — 82565 ASSAY OF CREATININE: CPT | Performed by: INTERNAL MEDICINE

## 2024-11-06 PROCEDURE — 86140 C-REACTIVE PROTEIN: CPT | Performed by: INTERNAL MEDICINE

## 2024-11-18 ENCOUNTER — APPOINTMENT (OUTPATIENT)
Dept: CT IMAGING | Age: 79
End: 2024-11-18
Attending: STUDENT IN AN ORGANIZED HEALTH CARE EDUCATION/TRAINING PROGRAM

## 2024-11-19 ENCOUNTER — HOSPITAL ENCOUNTER (OUTPATIENT)
Dept: CT IMAGING | Age: 79
Discharge: HOME | End: 2024-11-19
Attending: STUDENT IN AN ORGANIZED HEALTH CARE EDUCATION/TRAINING PROGRAM

## 2024-11-19 DIAGNOSIS — C50.411 MALIGNANT NEOPLASM OF UPPER-OUTER QUADRANT OF RIGHT BREAST IN FEMALE, ESTROGEN RECEPTOR POSITIVE (CMD): ICD-10-CM

## 2024-11-19 DIAGNOSIS — Z17.0 MALIGNANT NEOPLASM OF UPPER-OUTER QUADRANT OF RIGHT BREAST IN FEMALE, ESTROGEN RECEPTOR POSITIVE (CMD): ICD-10-CM

## 2024-11-19 PROCEDURE — 77066 DX MAMMO INCL CAD BI: CPT

## 2024-12-03 ENCOUNTER — APPOINTMENT (OUTPATIENT)
Dept: CT IMAGING | Age: 79
End: 2024-12-03
Attending: STUDENT IN AN ORGANIZED HEALTH CARE EDUCATION/TRAINING PROGRAM

## 2024-12-04 ENCOUNTER — CLINICAL ABSTRACT (OUTPATIENT)
Dept: HEMATOLOGY/ONCOLOGY | Age: 79
End: 2024-12-04

## 2024-12-04 DIAGNOSIS — M80.00XA OSTEOPOROSIS WITH PATHOLOGICAL FRACTURE, INITIAL ENCOUNTER: Primary | ICD-10-CM

## 2024-12-04 RX ORDER — ALBUTEROL SULFATE 5 MG/ML
5 SOLUTION RESPIRATORY (INHALATION)
Status: CANCELLED | OUTPATIENT
Start: 2024-12-05

## 2024-12-04 RX ORDER — DIPHENHYDRAMINE HYDROCHLORIDE 50 MG/ML
50 INJECTION INTRAMUSCULAR; INTRAVENOUS
Status: CANCELLED
Start: 2024-12-05

## 2024-12-04 RX ORDER — ALBUTEROL SULFATE 90 UG/1
2 INHALANT RESPIRATORY (INHALATION)
Status: CANCELLED | OUTPATIENT
Start: 2024-12-05

## 2024-12-04 RX ORDER — FAMOTIDINE 10 MG/ML
20 INJECTION, SOLUTION INTRAVENOUS
Status: CANCELLED | OUTPATIENT
Start: 2024-12-05

## 2024-12-05 ENCOUNTER — LAB SERVICES (OUTPATIENT)
Dept: LAB | Age: 79
End: 2024-12-05
Attending: STUDENT IN AN ORGANIZED HEALTH CARE EDUCATION/TRAINING PROGRAM

## 2024-12-05 ENCOUNTER — OFFICE VISIT (OUTPATIENT)
Dept: HEMATOLOGY/ONCOLOGY | Age: 79
End: 2024-12-05
Attending: STUDENT IN AN ORGANIZED HEALTH CARE EDUCATION/TRAINING PROGRAM

## 2024-12-05 VITALS
SYSTOLIC BLOOD PRESSURE: 89 MMHG | TEMPERATURE: 97.3 F | OXYGEN SATURATION: 95 % | BODY MASS INDEX: 33.13 KG/M2 | RESPIRATION RATE: 16 BRPM | HEART RATE: 101 BPM | DIASTOLIC BLOOD PRESSURE: 63 MMHG | WEIGHT: 180 LBS | HEIGHT: 62 IN

## 2024-12-05 DIAGNOSIS — M80.00XA OSTEOPOROSIS WITH PATHOLOGICAL FRACTURE, INITIAL ENCOUNTER: ICD-10-CM

## 2024-12-05 DIAGNOSIS — D50.0 IRON DEFICIENCY ANEMIA DUE TO CHRONIC BLOOD LOSS: Primary | ICD-10-CM

## 2024-12-05 DIAGNOSIS — M80.00XA OSTEOPOROSIS WITH PATHOLOGICAL FRACTURE, INITIAL ENCOUNTER: Primary | ICD-10-CM

## 2024-12-05 DIAGNOSIS — D50.0 IRON DEFICIENCY ANEMIA DUE TO CHRONIC BLOOD LOSS: ICD-10-CM

## 2024-12-05 LAB
ALBUMIN SERPL-MCNC: 2.8 G/DL (ref 3.4–5)
ALBUMIN/GLOB SERPL: 0.7 {RATIO} (ref 1–2.4)
ALP SERPL-CCNC: 56 UNITS/L (ref 45–117)
ALT SERPL-CCNC: 31 UNITS/L
ANION GAP SERPL CALC-SCNC: 9 MMOL/L (ref 7–19)
AST SERPL-CCNC: 30 UNITS/L
BASOPHILS # BLD: 0.1 K/MCL (ref 0–0.3)
BASOPHILS NFR BLD: 1 %
BILIRUB SERPL-MCNC: 0.5 MG/DL (ref 0.2–1)
BUN SERPL-MCNC: 15 MG/DL (ref 6–20)
BUN/CREAT SERPL: 19 (ref 7–25)
CALCIUM SERPL-MCNC: 8.9 MG/DL (ref 8.4–10.2)
CHLORIDE SERPL-SCNC: 107 MMOL/L (ref 97–110)
CO2 SERPL-SCNC: 26 MMOL/L (ref 21–32)
CREAT SERPL-MCNC: 0.79 MG/DL (ref 0.51–0.95)
DEPRECATED RDW RBC: 57.5 FL (ref 39–50)
EGFRCR SERPLBLD CKD-EPI 2021: 77 ML/MIN/{1.73_M2}
EOSINOPHIL # BLD: 0.2 K/MCL (ref 0–0.5)
EOSINOPHIL NFR BLD: 2 %
ERYTHROCYTE [DISTWIDTH] IN BLOOD: 15.2 % (ref 11–15)
FASTING DURATION TIME PATIENT: ABNORMAL H
FERRITIN SERPL-MCNC: 61 NG/ML (ref 8–252)
GLOBULIN SER-MCNC: 4 G/DL (ref 2–4)
GLUCOSE SERPL-MCNC: 115 MG/DL (ref 70–99)
HCT VFR BLD CALC: 47.8 % (ref 36–46.5)
HGB BLD-MCNC: 15.9 G/DL (ref 12–15.5)
IMM GRANULOCYTES # BLD AUTO: 0.1 K/MCL (ref 0–0.2)
IMM GRANULOCYTES # BLD: 1 %
IRON SATN MFR SERPL: 36 % (ref 15–45)
IRON SERPL-MCNC: 111 MCG/DL (ref 50–170)
LYMPHOCYTES # BLD: 1.7 K/MCL (ref 1–4)
LYMPHOCYTES NFR BLD: 23 %
MCH RBC QN AUTO: 34 PG (ref 26–34)
MCHC RBC AUTO-ENTMCNC: 33.3 G/DL (ref 32–36.5)
MCV RBC AUTO: 102.4 FL (ref 78–100)
MONOCYTES # BLD: 0.9 K/MCL (ref 0.3–0.9)
MONOCYTES NFR BLD: 12 %
NEUTROPHILS # BLD: 4.6 K/MCL (ref 1.8–7.7)
NEUTROPHILS NFR BLD: 61 %
NRBC BLD MANUAL-RTO: 0 /100 WBC
PLATELET # BLD AUTO: 261 K/MCL (ref 140–450)
POTASSIUM SERPL-SCNC: 3.8 MMOL/L (ref 3.4–5.1)
PROT SERPL-MCNC: 6.8 G/DL (ref 6.4–8.2)
RBC # BLD: 4.67 MIL/MCL (ref 4–5.2)
SODIUM SERPL-SCNC: 138 MMOL/L (ref 135–145)
TIBC SERPL-MCNC: 312 MCG/DL (ref 250–450)
WBC # BLD: 7.5 K/MCL (ref 4.2–11)

## 2024-12-05 PROCEDURE — 10002800 HB RX 250 W HCPCS: Performed by: STUDENT IN AN ORGANIZED HEALTH CARE EDUCATION/TRAINING PROGRAM

## 2024-12-05 PROCEDURE — 85025 COMPLETE CBC W/AUTO DIFF WBC: CPT

## 2024-12-05 PROCEDURE — 99214 OFFICE O/P EST MOD 30 MIN: CPT | Performed by: STUDENT IN AN ORGANIZED HEALTH CARE EDUCATION/TRAINING PROGRAM

## 2024-12-05 PROCEDURE — 83540 ASSAY OF IRON: CPT

## 2024-12-05 PROCEDURE — 80053 COMPREHEN METABOLIC PANEL: CPT

## 2024-12-05 PROCEDURE — 36415 COLL VENOUS BLD VENIPUNCTURE: CPT

## 2024-12-05 PROCEDURE — 82728 ASSAY OF FERRITIN: CPT

## 2024-12-05 PROCEDURE — 96372 THER/PROPH/DIAG INJ SC/IM: CPT | Performed by: STUDENT IN AN ORGANIZED HEALTH CARE EDUCATION/TRAINING PROGRAM

## 2024-12-05 RX ADMIN — DENOSUMAB 60 MG: 60 INJECTION SUBCUTANEOUS at 14:21

## 2024-12-05 ASSESSMENT — PATIENT HEALTH QUESTIONNAIRE - PHQ9
1. LITTLE INTEREST OR PLEASURE IN DOING THINGS: NOT AT ALL
SUM OF ALL RESPONSES TO PHQ9 QUESTIONS 1 AND 2: 0
2. FEELING DOWN, DEPRESSED OR HOPELESS: NOT AT ALL
SUM OF ALL RESPONSES TO PHQ9 QUESTIONS 1 AND 2: 0
CLINICAL INTERPRETATION OF PHQ2 SCORE: NO FURTHER SCREENING NEEDED

## 2024-12-05 ASSESSMENT — PAIN SCALES - GENERAL
PAINLEVEL_OUTOF10: 0
PAINLEVEL: 0

## 2024-12-06 ENCOUNTER — TELEPHONE (OUTPATIENT)
Dept: HEMATOLOGY/ONCOLOGY | Age: 79
End: 2024-12-06

## 2024-12-09 ENCOUNTER — TELEPHONE (OUTPATIENT)
Dept: FAMILY MEDICINE | Age: 79
End: 2024-12-09

## 2024-12-18 ENCOUNTER — LAB REQUISITION (OUTPATIENT)
Dept: LAB | Facility: HOSPITAL | Age: 79
End: 2024-12-18
Payer: MEDICARE

## 2024-12-18 DIAGNOSIS — R53.83 OTHER FATIGUE: ICD-10-CM

## 2024-12-18 LAB
C3 SERPL-MCNC: 123 MG/DL (ref 90–170)
C4 SERPL-MCNC: 22.3 MG/DL (ref 12–36)
CORTIS SERPL-MCNC: 5.7 UG/DL
CRP SERPL-MCNC: <0.4 MG/DL (ref ?–0.5)
ERYTHROCYTE [SEDIMENTATION RATE] IN BLOOD: 36 MM/HR

## 2024-12-18 PROCEDURE — 86160 COMPLEMENT ANTIGEN: CPT | Performed by: INTERNAL MEDICINE

## 2024-12-18 PROCEDURE — 85652 RBC SED RATE AUTOMATED: CPT | Performed by: INTERNAL MEDICINE

## 2024-12-18 PROCEDURE — 82533 TOTAL CORTISOL: CPT | Performed by: INTERNAL MEDICINE

## 2024-12-18 PROCEDURE — 86140 C-REACTIVE PROTEIN: CPT | Performed by: INTERNAL MEDICINE

## 2024-12-24 ENCOUNTER — TELEPHONE (OUTPATIENT)
Dept: CARDIOLOGY | Age: 79
End: 2024-12-24

## 2024-12-24 ENCOUNTER — APPOINTMENT (OUTPATIENT)
Dept: CARDIOLOGY | Age: 79
End: 2024-12-24

## 2024-12-24 VITALS
SYSTOLIC BLOOD PRESSURE: 74 MMHG | WEIGHT: 185.3 LBS | HEIGHT: 62 IN | BODY MASS INDEX: 34.1 KG/M2 | DIASTOLIC BLOOD PRESSURE: 58 MMHG

## 2024-12-24 DIAGNOSIS — E78.5 HYPERLIPIDEMIA, UNSPECIFIED HYPERLIPIDEMIA TYPE: ICD-10-CM

## 2024-12-24 DIAGNOSIS — I50.32 CHRONIC DIASTOLIC HEART FAILURE  (CMD): Primary | Chronic | ICD-10-CM

## 2024-12-24 PROCEDURE — 99214 OFFICE O/P EST MOD 30 MIN: CPT | Performed by: INTERNAL MEDICINE

## 2024-12-30 ENCOUNTER — APPOINTMENT (OUTPATIENT)
Dept: CT IMAGING | Facility: HOSPITAL | Age: 79
End: 2024-12-30
Attending: EMERGENCY MEDICINE
Payer: MEDICARE

## 2024-12-30 ENCOUNTER — HOSPITAL ENCOUNTER (INPATIENT)
Facility: HOSPITAL | Age: 79
LOS: 2 days | Discharge: HOME | End: 2025-01-01
Attending: EMERGENCY MEDICINE | Admitting: INTERNAL MEDICINE
Payer: MEDICARE

## 2024-12-30 ENCOUNTER — HOSPITAL ENCOUNTER (INPATIENT)
Facility: HOSPITAL | Age: 79
LOS: 2 days | Discharge: NURSING, REHAB OR LONG-TERM CARE | End: 2025-01-01
Attending: EMERGENCY MEDICINE | Admitting: INTERNAL MEDICINE
Payer: MEDICARE

## 2024-12-30 DIAGNOSIS — R11.2 NAUSEA VOMITING AND DIARRHEA: ICD-10-CM

## 2024-12-30 DIAGNOSIS — R19.7 NAUSEA VOMITING AND DIARRHEA: ICD-10-CM

## 2024-12-30 DIAGNOSIS — K92.0 HEMATEMESIS WITH NAUSEA: Primary | ICD-10-CM

## 2024-12-30 LAB
ALBUMIN SERPL-MCNC: 3.7 G/DL (ref 3.2–4.8)
ALBUMIN/GLOB SERPL: 1.2 {RATIO} (ref 1–2)
ALP LIVER SERPL-CCNC: 53 U/L
ALT SERPL-CCNC: 25 U/L
AMMONIA PLAS-MCNC: <10 UMOL/L (ref 11–32)
ANION GAP SERPL CALC-SCNC: 10 MMOL/L (ref 0–18)
ANTIBODY SCREEN: NEGATIVE
APTT PPP: 23.4 SECONDS (ref 23–36)
AST SERPL-CCNC: 32 U/L (ref ?–34)
BASOPHILS # BLD AUTO: 0.03 X10(3) UL (ref 0–0.2)
BASOPHILS NFR BLD AUTO: 0.4 %
BILIRUB SERPL-MCNC: 0.7 MG/DL (ref 0.2–1.1)
BUN BLD-MCNC: 16 MG/DL (ref 9–23)
CALCIUM BLD-MCNC: 9 MG/DL (ref 8.7–10.4)
CHLORIDE SERPL-SCNC: 104 MMOL/L (ref 98–112)
CO2 SERPL-SCNC: 24 MMOL/L (ref 21–32)
CREAT BLD-MCNC: 1.12 MG/DL
DEPRECATED HBV CORE AB SER IA-ACNC: 64 NG/ML
EGFRCR SERPLBLD CKD-EPI 2021: 51 ML/MIN/1.73M2 (ref 60–?)
EOSINOPHIL # BLD AUTO: 0.12 X10(3) UL (ref 0–0.7)
EOSINOPHIL NFR BLD AUTO: 1.4 %
ERYTHROCYTE [DISTWIDTH] IN BLOOD BY AUTOMATED COUNT: 14.8 %
GLOBULIN PLAS-MCNC: 3.1 G/DL (ref 2–3.5)
GLUCOSE BLD-MCNC: 149 MG/DL (ref 70–99)
HCT VFR BLD AUTO: 47.2 %
HGB BLD-MCNC: 13 G/DL
HGB BLD-MCNC: 14.2 G/DL
HGB BLD-MCNC: 15.6 G/DL
IMM GRANULOCYTES # BLD AUTO: 0.11 X10(3) UL (ref 0–1)
IMM GRANULOCYTES NFR BLD: 1.3 %
INR BLD: 1.18 (ref 0.8–1.2)
LIPASE SERPL-CCNC: 28 U/L (ref 12–53)
LYMPHOCYTES # BLD AUTO: 0.79 X10(3) UL (ref 1–4)
LYMPHOCYTES NFR BLD AUTO: 9.5 %
MCH RBC QN AUTO: 34 PG (ref 26–34)
MCHC RBC AUTO-ENTMCNC: 33.1 G/DL (ref 31–37)
MCV RBC AUTO: 102.8 FL
MONOCYTES # BLD AUTO: 0.28 X10(3) UL (ref 0.1–1)
MONOCYTES NFR BLD AUTO: 3.4 %
NEUTROPHILS # BLD AUTO: 6.96 X10 (3) UL (ref 1.5–7.7)
NEUTROPHILS # BLD AUTO: 6.96 X10(3) UL (ref 1.5–7.7)
NEUTROPHILS NFR BLD AUTO: 84 %
OSMOLALITY SERPL CALC.SUM OF ELEC: 290 MOSM/KG (ref 275–295)
PLATELET # BLD AUTO: 264 10(3)UL (ref 150–450)
POTASSIUM SERPL-SCNC: 4 MMOL/L (ref 3.5–5.1)
PROT SERPL-MCNC: 6.8 G/DL (ref 5.7–8.2)
PROTHROMBIN TIME: 15.1 SECONDS (ref 11.6–14.8)
RBC # BLD AUTO: 4.59 X10(6)UL
RH BLOOD TYPE: POSITIVE
SODIUM SERPL-SCNC: 138 MMOL/L (ref 136–145)
T4 FREE SERPL-MCNC: 1.1 NG/DL (ref 0.8–1.7)
TSI SER-ACNC: 8.51 UIU/ML (ref 0.55–4.78)
WBC # BLD AUTO: 8.3 X10(3) UL (ref 4–11)

## 2024-12-30 PROCEDURE — 82140 ASSAY OF AMMONIA: CPT | Performed by: EMERGENCY MEDICINE

## 2024-12-30 PROCEDURE — 86901 BLOOD TYPING SEROLOGIC RH(D): CPT | Performed by: EMERGENCY MEDICINE

## 2024-12-30 PROCEDURE — 84439 ASSAY OF FREE THYROXINE: CPT | Performed by: INTERNAL MEDICINE

## 2024-12-30 PROCEDURE — 82728 ASSAY OF FERRITIN: CPT | Performed by: INTERNAL MEDICINE

## 2024-12-30 PROCEDURE — 80053 COMPREHEN METABOLIC PANEL: CPT | Performed by: EMERGENCY MEDICINE

## 2024-12-30 PROCEDURE — 86900 BLOOD TYPING SEROLOGIC ABO: CPT | Performed by: EMERGENCY MEDICINE

## 2024-12-30 PROCEDURE — 99285 EMERGENCY DEPT VISIT HI MDM: CPT

## 2024-12-30 PROCEDURE — 84443 ASSAY THYROID STIM HORMONE: CPT | Performed by: INTERNAL MEDICINE

## 2024-12-30 PROCEDURE — 83690 ASSAY OF LIPASE: CPT | Performed by: EMERGENCY MEDICINE

## 2024-12-30 PROCEDURE — 74176 CT ABD & PELVIS W/O CONTRAST: CPT | Performed by: EMERGENCY MEDICINE

## 2024-12-30 PROCEDURE — 99291 CRITICAL CARE FIRST HOUR: CPT

## 2024-12-30 PROCEDURE — 96374 THER/PROPH/DIAG INJ IV PUSH: CPT

## 2024-12-30 PROCEDURE — 96375 TX/PRO/DX INJ NEW DRUG ADDON: CPT

## 2024-12-30 PROCEDURE — 85610 PROTHROMBIN TIME: CPT | Performed by: EMERGENCY MEDICINE

## 2024-12-30 PROCEDURE — 86850 RBC ANTIBODY SCREEN: CPT | Performed by: EMERGENCY MEDICINE

## 2024-12-30 PROCEDURE — 96361 HYDRATE IV INFUSION ADD-ON: CPT

## 2024-12-30 PROCEDURE — 85025 COMPLETE CBC W/AUTO DIFF WBC: CPT | Performed by: EMERGENCY MEDICINE

## 2024-12-30 PROCEDURE — 85730 THROMBOPLASTIN TIME PARTIAL: CPT | Performed by: EMERGENCY MEDICINE

## 2024-12-30 PROCEDURE — 85018 HEMOGLOBIN: CPT | Performed by: INTERNAL MEDICINE

## 2024-12-30 RX ORDER — ARIPIPRAZOLE 5 MG/1
5 TABLET ORAL NIGHTLY
Status: DISCONTINUED | OUTPATIENT
Start: 2024-12-30 | End: 2025-01-01

## 2024-12-30 RX ORDER — ONDANSETRON 2 MG/ML
4 INJECTION INTRAMUSCULAR; INTRAVENOUS ONCE
Status: COMPLETED | OUTPATIENT
Start: 2024-12-30 | End: 2024-12-30

## 2024-12-30 RX ORDER — LACTULOSE 10 G/15ML
10 SOLUTION ORAL 2 TIMES DAILY
Status: ON HOLD | COMMUNITY
End: 2025-01-01

## 2024-12-30 RX ORDER — VENLAFAXINE HYDROCHLORIDE 75 MG/1
150 CAPSULE, EXTENDED RELEASE ORAL DAILY
Status: DISCONTINUED | OUTPATIENT
Start: 2024-12-30 | End: 2025-01-01

## 2024-12-30 RX ORDER — PANTOPRAZOLE SODIUM 40 MG/1
40 TABLET, DELAYED RELEASE ORAL
COMMUNITY

## 2024-12-30 RX ORDER — LEVOTHYROXINE SODIUM 25 UG/1
25 TABLET ORAL
Status: DISCONTINUED | OUTPATIENT
Start: 2024-12-31 | End: 2025-01-01

## 2024-12-30 RX ORDER — ONDANSETRON 2 MG/ML
4 INJECTION INTRAMUSCULAR; INTRAVENOUS EVERY 6 HOURS PRN
Status: DISCONTINUED | OUTPATIENT
Start: 2024-12-30 | End: 2025-01-01

## 2024-12-30 RX ORDER — ARIPIPRAZOLE 5 MG/1
5 TABLET ORAL NIGHTLY
COMMUNITY

## 2024-12-30 RX ORDER — ACETAMINOPHEN 160 MG
2000 TABLET,DISINTEGRATING ORAL DAILY
Status: ON HOLD | COMMUNITY
End: 2024-12-30

## 2024-12-30 RX ORDER — PREDNISONE 20 MG/1
20 TABLET ORAL DAILY
Status: ON HOLD | COMMUNITY
End: 2024-12-30

## 2024-12-30 RX ORDER — METOCLOPRAMIDE HYDROCHLORIDE 5 MG/ML
5 INJECTION INTRAMUSCULAR; INTRAVENOUS EVERY 8 HOURS PRN
Status: DISCONTINUED | OUTPATIENT
Start: 2024-12-30 | End: 2025-01-01

## 2024-12-30 RX ORDER — LEVOTHYROXINE SODIUM 25 UG/1
25 TABLET ORAL
COMMUNITY

## 2024-12-30 RX ORDER — PANTOPRAZOLE SODIUM 40 MG/1
40 TABLET, DELAYED RELEASE ORAL
Status: DISCONTINUED | OUTPATIENT
Start: 2024-12-30 | End: 2024-12-31

## 2024-12-30 RX ORDER — SODIUM CHLORIDE 9 MG/ML
INJECTION, SOLUTION INTRAVENOUS CONTINUOUS
Status: ACTIVE | OUTPATIENT
Start: 2024-12-30 | End: 2024-12-30

## 2024-12-30 RX ORDER — ACETAMINOPHEN 500 MG
500 TABLET ORAL EVERY 4 HOURS PRN
Status: DISCONTINUED | OUTPATIENT
Start: 2024-12-30 | End: 2025-01-01

## 2024-12-30 RX ORDER — LACTULOSE 10 G/15ML
10 SOLUTION ORAL 2 TIMES DAILY
Status: DISCONTINUED | OUTPATIENT
Start: 2024-12-30 | End: 2024-12-31

## 2024-12-30 NOTE — H&P
[facility] Hospitalist History and Physical      PCP: Paras Reddy DO    History of Present Illness: This is the story of Ms. Edith Cruz who is a 78 y/o F w/ Pmhx of Prior DVT/PE, R breast Ca s/p lumpectomy, dCHF, Chronic Hypotension on Midodrine, Hx of Elevated Ammonia w/o diagnosis of Cirrhosis followed by GI as OP, MDD, Chronic Debility who presents to the hospital w/ chief complaints of hemoptysis, N/V.  History is limited,  the patient defers majority of the history to the family member who is at bedside.  Family tells me that over the past 3 to 4 years she has been aggressively declining, is unable to ambulate, has no motivation to eat or drink or do any activities at the assisted living facility.  The patient is unable to quantify how much hemoptysis she had and she is unable to tell me how many episodes she had either.  Mild abdominal pain noted, no fevers, no  hematochezia, melena.:  ED vitals soft pressures  Labs:   Creatinine of 1.12  Hemoglobin 15.6  MCV of 102.8 CT abdomen pelvis with moderate size hiatal hernia, uncomplicated diverticulosis, possible uterine fibroid, no other evidence of acute intra-abdominal pathology.  GI was consulted and patient was admitted to the hospital for observation.  On evaluation patient has no acute complaints, is somnolent but arousable and has had no further episodes of hemoptysis.    Past Medical History:    Depression    Esophageal reflux    Hemorrhage of large intestine due to diverticular disease    Idiopathic hypotension    Inflammatory liver disease    Nausea vomiting and diarrhea    Vitamin B12 deficiency anemia      Past Surgical History:   Procedure Laterality Date    Colonoscopy  01/30/2021    Diverticulosis    Colonoscopy N/A 5/31/2022    Procedure: COLONOSCOPY;  Surgeon: Jeffry Fish MD;  Location:  ENDOSCOPY        No current outpatient medications on file.       Social History     Tobacco Use    Smoking status: Never     Passive  exposure: Never    Smokeless tobacco: Never   Substance Use Topics    Alcohol use: Not Currently        OBJECTIVE:  /65 (BP Location: Left arm)   Pulse 101   Temp 98.2 °F (36.8 °C) (Oral)   Resp 20   Ht 5' 2\" (1.575 m)   Wt 138 lb (62.6 kg)   SpO2 95%   BMI 25.24 kg/m²   Gen: No acute distress, alert and oriented x3, ill-appearing  Pulm: Lungs clear bilaterally, normal respiratory effort  CV: Heart with regular rate and rhythm  Abd: Abdomen soft, nontender, non-distended  Skin: various areas of ecchymoses noted, old  Extremities: no edema noted  Neuro:  no focal deficits      Data Review:    Pertinent Labs and Imaging independently reviewed  Comments:  Noted    Assessment/Plan:   Outpatient records or previous hospital records reviewed.   Prasad Hospitalist to continue to follow patient while in house    A/P: 80 y/o F w/ Pmhx of Prior DVT/PE, R breast Ca s/p lumpectomy, dCHF, Chronic Hypotension on Midodrine, Hx of Elevated Ammonia w/o diagnosis of Cirrhosis followed by GI as OP, MDD, Chronic Debility who presents for hemoptysis    Hemoptysis  ? Etiology  HgB stable  CTAP: negative  Plan:  - GI consulted, no plans for endoscopy at this time  - H/H q12  - PPI BID  - NS at 100cc/hr until 1800 today  - Advance diet as tolerated  - Will observe in the hospital for one more day to ensure no further episodes of n/v/hemoptysis    Hx of Hyperammonia  Normal ammonia here  No Hx of Cirrhosis/Liver disease  Followed by OP GI  Plan:  - Continue PTA Lactulose    HypoT  - Check TSH  - Continue Synthroid    Generalized Weakness  Chronic Debility  Plan:  - PT/OT evaluations  - AM cortisol ordered    R Breast Ca  - Noted    dCHF  - Euvolemic    A total of 38 minutes taken with patient and coordinating care.  Greater than 50% face to face encounter.      Joshua Velez D.O.  [facility] Hospitalist     **Certification      PHYSICIAN Certification of Need for Inpatient Hospitalization - Initial  Certification    Patient will require inpatient services that will reasonably be expected to span two midnight's based on the clinical documentation in H+P.   Based on patients current state of illness, I anticipate that, after discharge, patient will require TBD.

## 2024-12-30 NOTE — ED QUICK NOTES
Orders for admission, patient is aware of plan and ready to go upstairs. Any questions, please call ED RN Rhiannon at extension [phone].     Patient Covid vaccination status: Fully vaccinated     COVID Test Ordered in ED: None    COVID Suspicion at Admission: N/A    Running Infusions:      Mental Status/LOC at time of transport: A&O x4    Other pertinent information:   CIWA score: N/A   NIH score:  N/A

## 2024-12-30 NOTE — ED INITIAL ASSESSMENT (HPI)
BIBEMS for nausea and vomiting. Vomited x2 PTA, second time with blood. Pt states \"took a medication and it made her nauseous.\" Denies abd pain at this time.

## 2024-12-30 NOTE — ED QUICK NOTES
Assumed patient care. Patient voices no needs at this time. Call light placed in reach. Side rails up x 2. Vitals rechecked. Patient aware of admission to hospital. Awaiting transport to room.

## 2024-12-30 NOTE — CONSULTS
[facility]    Rachel Cruz Patient Status:  Inpatient    3/3/1945 MRN [id number]   Location [facility] 4NW-A Attending Joshua Velez DO   Hosp Day # 0 PCP DO Manas Crowdulce Cruz is a 79 year old female for hematemesis consult.  Pt. with symptoms NV diarrhea 1 day.  Reports small amount of blood states resolved.    No melena, abd pain, constipation, rectal bleeding or melena. Hx HE high ammonia several years including several hospitalizations. Hx GI bleed in the past. No history of hepatitis, jaundice,. CT US normal liver. Labs normal, platelets normal, no varices on EGD.EGD  gastritis gastric polyps.     Lab Results   Component Value Date    WBC 8.3 2024    HGB 15.6 2024    HCT 47.2 2024    .0 2024    CREATSERUM 1.12 2024    BUN 16 2024     2024    K 4.0 2024     2024    CO2 24.0 2024     2024    CA 9.0 2024    ALB 3.7 2024    ALKPHO 53 2024    BILT 0.7 2024    TP 6.8 2024    AST 32 2024    ALT 25 2024    PTT 23.4 2024    INR 1.18 2024    LIP 28 2024         Allergies: Allergies[1]   No current outpatient medications on file.      Past Medical History:    Depression    Esophageal reflux    Hemorrhage of large intestine due to diverticular disease    Idiopathic hypotension    Inflammatory liver disease    Nausea vomiting and diarrhea    Vitamin B12 deficiency anemia      Past Surgical History:   Procedure Laterality Date    Colonoscopy  2021    Diverticulosis    Colonoscopy N/A 2022    Procedure: COLONOSCOPY;  Surgeon: Jeffry Fish MD;  Location:  ENDOSCOPY      Social History     Socioeconomic History    Marital status: Single   Tobacco Use    Smoking status: Never     Passive exposure: Never    Smokeless tobacco: Never   Vaping Use    Vaping status: Never Used   Substance and Sexual Activity    Alcohol use: Not  Currently    Drug use: Never     Social Drivers of Health     Financial Resource Strain: Patient Declined (8/8/2024)    Received from [facility]    Financial Resource Strain     In the past year, have you or any family members you live with been unable to get any of the following when it was really needed? Check all that apply.: Patient declined   Food Insecurity: No Food Insecurity (12/30/2024)    Food Insecurity     Food Insecurity: Never true   Transportation Needs: No Transportation Needs (12/30/2024)    Transportation Needs     Lack of Transportation: No   Physical Activity: High Risk (7/12/2023)    Received from [facility]    Exercise Vital Sign     On average, how many days per week do you engage in moderate to strenuous exercise (like a brisk walk)?: 0 days     On average, how many minutes do you engage in exercise at this level?: 0 min   Social Connections: Low Risk  (8/8/2024)    Received from [facility]    Social Connections     How often do you see or talk to people that you care about and feel close to? (For example: talking to friends on the phone, visiting friends or family, going to [employer] or club meetings): 5 or more times a week   Housing Stability: Low Risk  (12/30/2024)    Housing Stability     Housing Instability: No     Occupation:    FAMILY HX: GI HX: None, no hx of colon cancer  No family history on file.     REVIEW OF SYSTEMS:   GEN: feels tired weak no F/C, no wt loss  SKIN: no skin lesions  HEENT: no jaundice   LUNGS: no SOB   CV: no chest pain GI: no dysphagia, N/V  : no hematuria    MSK: no joint pain    ENDOCR: no  thyroid history  EXAM:   /64   Pulse 98   Temp 97.7 °F (36.5 °C) (Temporal)   Resp 21   Ht 5' 2\" (1.575 m)   Wt 138 lb (62.6 kg)   SpO2 98%   BMI 25.24 kg/m²  - Body mass index is 25.24 kg/m². GEN: well developed, well nourished, NAD  HEENT: non-icteric   LUNGS: CTA  CARDIO: RRR  GI: good BS's,no masses, HSM or tenderness  RECTAL: Exam not done. EXTREM.: no edema NEURO: A + O    ASSESSMENT AND PLAN:   Rachel Cruz is a 79 year old female  with hematemesis.  Notes 1 day NV diarrhea at NH.  Reports small amount of blood can't quantify. States resolved. No melena.  Hb 15.6.  Hx HE recurrent.  EGD 2022 gastritis small gastric polyps.  Hx HE no evidence of cirrhosis portal HTN.  May have MW tear gastritis other. No evidence of sig bleed     Cont PPI  Clears today.  Follow labs.  No role for endoscopy unless signs sig bleed develop.    Total time spent on patient care:  40 minutes    cc: Dr. Collins ref. provider found    The patient indicates understanding of these issues and agrees to the plan.    Lj Lund MD  12/30/2024  8:18 AM         [1]   Allergies  Allergen Reactions    Opioid Analgesics OTHER (SEE COMMENTS), NAUSEA AND VOMITING and NAUSEA ONLY    Codeine OTHER (SEE COMMENTS)     Upset stomach    Other OTHER (SEE COMMENTS)    Penicillins OTHER (SEE COMMENTS)

## 2024-12-30 NOTE — ED PROVIDER NOTES
Patient Seen in: [facility] Emergency Department      History     Chief Complaint   Patient presents with    Nausea/Vomiting/Diarrhea     Stated Complaint: N&V    Subjective:   HPI       79-year-old female began feeling unwell this afternoon, experiencing diarrhea and vomiting. She reported vomiting a small amount of blood. She also mentioned having a little bit of belly pain. She has not identified any specific food that might have caused her stomach upset, although she did eat in the last few days. Her diarrhea has not been bloody or black. She previously had a CAT scan but could not recall when it was done.  Patient does have a history of an upper GI bleed and also hemorrhage of large intestine due to diverticular disease    Objective:     Past Medical History:    Depression    Esophageal reflux    Hemorrhage of large intestine due to diverticular disease              Past Surgical History:   Procedure Laterality Date    Colonoscopy  01/30/2021    Diverticulosis    Colonoscopy N/A 5/31/2022    Procedure: COLONOSCOPY;  Surgeon: Jeffry Fish MD;  Location:  ENDOSCOPY                Social History     Socioeconomic History    Marital status: Single   Tobacco Use    Smoking status: Never     Passive exposure: Never    Smokeless tobacco: Never   Vaping Use    Vaping status: Never Used   Substance and Sexual Activity    Alcohol use: Not Currently    Drug use: Never     Social Drivers of Health     Financial Resource Strain: Patient Declined (8/8/2024)    Received from [facility]    Financial Resource Strain     In the past year, have you or any family members you live with been unable to get any of the following when it was really needed? Check all that apply.: Patient declined   Food Insecurity: Low Risk  (8/8/2024)    Received from [facility]    Food Insecurity     Within the past 12 months, you worried that your food would run out before you got money to buy more.  : Never true      Within the past 12 months, the food you bought just didn't last and you didn't have money to get more. : Never true   Transportation Needs: Patient Declined (8/8/2024)    Received from [facility]    Transportation Needs     In the past 12 months, has lack of reliable transportation kept you from medical appointments, meetings, work or from getting things needed for daily living? : Patient declined   Physical Activity: High Risk (7/12/2023)    Received from [facility]    Exercise Vital Sign     On average, how many days per week do you engage in moderate to strenuous exercise (like a brisk walk)?: 0 days     On average, how many minutes do you engage in exercise at this level?: 0 min   Social Connections: Low Risk  (8/8/2024)    Received from [facility]    Social Connections     How often do you see or talk to people that you care about and feel close to? (For example: talking to friends on the phone, visiting friends or family, going to Jainism or club meetings): 5 or more times a week   Housing Stability: Low Risk  (10/24/2022)    Received from [facility]    Housing Stability     Are you concerned about having a safe and reliable place to live?: No                  Physical Exam     ED Triage Vitals   BP 12/30/24 0307 106/71   Pulse 12/30/24 0307 103   Resp 12/30/24 0307 18   Temp 12/30/24 0307 97.5 °F (36.4 °C)   Temp src 12/30/24 0307 Temporal   SpO2 12/30/24 0449 96 %   O2 Device 12/30/24 0449 None (Room air)       Current Vitals:   Vital Signs  BP: 124/82  Pulse: 102  Resp: 20  Temp: 97.5 °F (36.4 °C)  Temp src: Temporal    Oxygen Therapy  SpO2: 96 %  O2 Device: None (Room air)        Physical Exam    Vital signs reviewed  General appearance: Patient is alert and does appear ill-appearing there is some dried blood around the mouth  HEENT: Pupils equal react to light extraocular muscles intact no scleral icterus, mucous membranes are moist, there is no  erythema or exudate in the posterior pharynx  Neck: Supple no JVD no lymphadenopathy no meningismus no carotid bruit  CV: Regular rate and rhythm no murmur rub  Respiratory: Clear to auscultation bilaterally no crackles no wheezes no accessory muscle use  Abdomen: Patient has midepigastric and left upper quadrant tenderness on exam no rebound no guarding  no hepatosplenomegaly bowel sounds are present , no pulsatile mass rectal exam heme-negative  Extremities: No clubbing cyanosis or edema 2+ distal pulses.  Neuro: Cranial nerves II through XII intact with no gross focal sensory or motor abnormality.      ED Course     Labs Reviewed   COMP METABOLIC PANEL (14) - Abnormal; Notable for the following components:       Result Value    Glucose 149 (*)     Creatinine 1.12 (*)     eGFR-Cr 51 (*)     Alkaline Phosphatase 53 (*)     All other components within normal limits   CBC WITH DIFFERENTIAL WITH PLATELET - Abnormal; Notable for the following components:    .8 (*)     Lymphocyte Absolute 0.79 (*)     All other components within normal limits   PROTHROMBIN TIME (PT) - Abnormal; Notable for the following components:    PT 15.1 (*)     All other components within normal limits   LIPASE - Normal   PTT, ACTIVATED - Normal   AMMONIA, PLASMA   TYPE AND SCREEN    Narrative:     The following orders were created for panel order Type and screen.  Procedure                               Abnormality         Status                     ---------                               -----------         ------                     ABORH (Blood Type)[[id number]]                               Final result               Antibody Screen[[id number]]                                  Final result                 Please view results for these tests on the individual orders.   ABORH (BLOOD TYPE)   ANTIBODY SCREEN            Patient was evaluated had a CBC chemistry coags and a lipase.  Hemoglobin was normal.  Creatinine was 1.12.  Patient had a  CAT scan done but due to vomiting blood in her history I do feel will need to be admitted for further workup     CT abdomen and pelvis without contrast    COMPARISON: None.    IMPRESSION:  No acute abnormality in the abdomen or pelvis.    No appendicitis.  No diverticulitis.  No acute biliary pathology.  No obstructive urolithiasis.  No SBO.  Moderate size hiatal hernia with partially intrathoracic stomach.    Uterine calcifications noted, may indicate underlying fibroid changes.    Discussed case with the duly hospitalist GI will be consulted in the morning.  Patient was ready to go up to the floor and ammonia was still not back we will need to follow that she had no further vomiting blood here.  Will give her a dose of Protonix    Patient was seen immediately upon arrival due to a high probability of sudden, clinically significant, or life threatening deterioration of the patient's clinical status.  The patient required my time at the bedside performing direct personal management, the absence of which would likely result in sudden, clinically significant or life threatening deterioration of  clinical condition.   The patient required my constant attention. Time was spent ordering, reviewing, and interpreting ancillary testing and imaging. The patient was frequently reevaluated, and I made frequent checks of  vital signs and monitor. Nursing notes were reviewed.     Critical care time was[60 minutes], and exclusive of procedures.  MDM      Differential diagnosis reflecting the complexity of care include: Hematemesis, gastroenteritis, peptic ulcer disease, colitis    Comorbidities that add complexity to management include: Diverticular bleed, questionable upper GI bleed esophageal reflux    External chart review was done and was noted: GI note from 12 4 was reviewed and patient apparently also has high levels of ammonia in the past.  Will order ammonia level.      Discussions of management was done with: Hospitalist  was consulted and GI will be consort of the morning    My independent interpretation of studies of: CT scan with contrast was ordered however patient refusing contrast and plain CT showed hiatal hernia with partially intrathoracic stomach but no other findings      Shared decision making was done by myself and the hospitalist.  Patient will be admitted for further workup        Admission disposition: 12/30/2024  6:06 AM           Medical Decision Making      Disposition and Plan     Clinical Impression:  1. Hematemesis with nausea    2. Nausea vomiting and diarrhea         Disposition:  Admit  12/30/2024  6:06 am    Follow-up:  No follow-up provider specified.        Medications Prescribed:  Current Discharge Medication List              Supplementary Documentation:         Hospital Problems       Present on Admission  Date Reviewed: 10/31/2024            ICD-10-CM Noted POA    * (Principal) Hematemesis with nausea K92.0 12/30/2024 Unknown

## 2024-12-31 LAB
AMMONIA PLAS-MCNC: 51 UMOL/L (ref 11–32)
ANION GAP SERPL CALC-SCNC: 9 MMOL/L (ref 0–18)
BUN BLD-MCNC: 22 MG/DL (ref 9–23)
CALCIUM BLD-MCNC: 7.3 MG/DL (ref 8.7–10.4)
CHLORIDE SERPL-SCNC: 107 MMOL/L (ref 98–112)
CO2 SERPL-SCNC: 23 MMOL/L (ref 21–32)
CORTIS SERPL-MCNC: 17 UG/DL
CREAT BLD-MCNC: 0.81 MG/DL
EGFRCR SERPLBLD CKD-EPI 2021: 75 ML/MIN/1.73M2 (ref 60–?)
FOLATE SERPL-MCNC: 9 NG/ML (ref 5.4–?)
GLUCOSE BLD-MCNC: 93 MG/DL (ref 70–99)
HGB BLD-MCNC: 12.9 G/DL
IRON SATN MFR SERPL: 15 %
IRON SERPL-MCNC: 37 UG/DL
OSMOLALITY SERPL CALC.SUM OF ELEC: 291 MOSM/KG (ref 275–295)
POTASSIUM SERPL-SCNC: 3.6 MMOL/L (ref 3.5–5.1)
SODIUM SERPL-SCNC: 139 MMOL/L (ref 136–145)
TOTAL IRON BINDING CAPACITY: 246 UG/DL (ref 250–425)
TRANSFERRIN SERPL-MCNC: 181 MG/DL (ref 250–380)
VIT B12 SERPL-MCNC: 246 PG/ML (ref 211–911)

## 2024-12-31 PROCEDURE — 80048 BASIC METABOLIC PNL TOTAL CA: CPT | Performed by: HOSPITALIST

## 2024-12-31 PROCEDURE — 82140 ASSAY OF AMMONIA: CPT | Performed by: INTERNAL MEDICINE

## 2024-12-31 PROCEDURE — 83550 IRON BINDING TEST: CPT | Performed by: INTERNAL MEDICINE

## 2024-12-31 PROCEDURE — 82607 VITAMIN B-12: CPT | Performed by: INTERNAL MEDICINE

## 2024-12-31 PROCEDURE — 82746 ASSAY OF FOLIC ACID SERUM: CPT | Performed by: INTERNAL MEDICINE

## 2024-12-31 PROCEDURE — 83540 ASSAY OF IRON: CPT | Performed by: INTERNAL MEDICINE

## 2024-12-31 PROCEDURE — 85018 HEMOGLOBIN: CPT | Performed by: INTERNAL MEDICINE

## 2024-12-31 PROCEDURE — 82533 TOTAL CORTISOL: CPT | Performed by: INTERNAL MEDICINE

## 2024-12-31 RX ORDER — MIDODRINE HYDROCHLORIDE 5 MG/1
5 TABLET ORAL 3 TIMES DAILY
Status: DISCONTINUED | OUTPATIENT
Start: 2024-12-31 | End: 2025-01-01

## 2024-12-31 RX ORDER — ENOXAPARIN SODIUM 100 MG/ML
40 INJECTION SUBCUTANEOUS DAILY
Status: DISCONTINUED | OUTPATIENT
Start: 2024-12-31 | End: 2025-01-01

## 2024-12-31 RX ORDER — PANTOPRAZOLE SODIUM 40 MG/1
40 TABLET, DELAYED RELEASE ORAL
Status: DISCONTINUED | OUTPATIENT
Start: 2025-01-01 | End: 2025-01-01

## 2024-12-31 RX ORDER — LACTULOSE 10 G/15ML
20 SOLUTION ORAL 3 TIMES DAILY
Status: DISCONTINUED | OUTPATIENT
Start: 2024-12-31 | End: 2025-01-01

## 2024-12-31 NOTE — DISCHARGE SUMMARY
[facility] Internal Medicine Hospitalist Discharge Summary     Patient ID:  Rachel Cruz  79 year old  3/3/1945    Admit date: 12/30/2024    Discharge date: 12/31/24    Attending Physician: Joshua Velez DO     Primary Care Physician: Paras Reddy DO     Discharge Diagnoses: Hemoptysis (self-resolved), Generalized Weakness, Chronic Debility, Deconditioning    Discharge Condition: stable    Disposition:  Home    Important Follow Ups:  - PCP: 1-2 weeks  - Consults: GI as recommended    Hospital Course:      A/P: 78 y/o F w/ Pmhx of Prior DVT/PE, R breast Ca s/p lumpectomy, dCHF, Chronic Hypotension on Midodrine, Hx of Elevated Ammonia w/o diagnosis of Cirrhosis followed by GI as OP, MDD, Chronic Debility who presents for hemoptysis     Hemoptysis  ? Etiology  HgB stable  CTAP: negative  Plan:  - GI consulted, no plans for endoscopy at this time, cleared for discharge home  - PPI  - Advance diet as tolerated     Hx of Hyperammonia  Normal ammonia here  No Hx of Cirrhosis/Liver disease  Followed by OP GI  Plan:  - Continue PTA Lactulose     HypoT  TSH: 8.513, T4 1.1  - Continue Synthroid, outpatient follow up     Generalized Weakness  Chronic Debility  AM cortisol: 17, r/o adrenal insufficiency  B12 normal  Folate normal  Iron Panel w/ borderline AGUILAR  Plan:  - PT/OT evaluations, recommending   - Discharge on po iron every other day     R Breast Ca  - Noted     dCHF  - Euvolemic    Consults: IP CONSULT TO HOSPITALIST  IP CONSULT TO GASTROENTEROLOGY    Operative Procedures: Procedure(s) (LRB):  ESOPHAGOGASTRODUODENOSCOPY (EGD) (N/A)       Patient instructions:      I as the attending physician reconciled the current and discharge medications on day of discharge.     Current Discharge Medication List        CONTINUE these medications which have NOT CHANGED    Details   pantoprazole 40 MG Oral Tab EC Take 1 tablet (40 mg total) by mouth every morning before  breakfast.      levothyroxine 25 MCG Oral Tab Take 1 tablet (25 mcg total) by mouth before breakfast.      lactulose 10 GM/15ML Oral Solution Take 15 mL (10 g total) by mouth in the morning and 15 mL (10 g total) before bedtime.      ARIPiprazole 5 MG Oral Tab Take 1 tablet (5 mg total) by mouth at bedtime.      Acetaminophen 325 MG Oral Cap Take 650 mg by mouth every 6 (six) hours as needed.      midodrine 5 MG Oral Tab Take 1.5 tablets (7.5 mg total) by mouth 3 (three) times daily. Twice a day      venlafaxine  MG Oral Capsule SR 24 Hr Take 1 capsule (150 mg total) by mouth daily.      polyethylene glycol, PEG 3350, 17 g Oral Powd Pack Take 17 g by mouth daily as needed (takes sometimes).           STOP taking these medications       famotidine 20 MG Oral Tab              Activity: activity as tolerated  Diet: regular diet  Wound Care: as directed  Code Status: No Order      Exam on day of discharge:     Vitals:    12/31/24 1220   BP: 109/70   Pulse: 79   Resp: 20   Temp: 98.5 °F (36.9 °C)       Physical Exam:   General: no acute distress  Heart: RRR  Lungs: CTAB  Abd: Soft, NT, ND  Neuro: no focal deficits      Total time coordinating care for discharge: Greater than 30 minutes    LUCAS Murrell [facility] Hospitalist

## 2024-12-31 NOTE — PLAN OF CARE
Alert to self, place, confused but easily reoriented. Denies nausea/ vomiting. Tolerated clear liquid well. No rectal bleeding observed. No stool overnight. Incontinent of urine, kept clean and dry.   Problem: GASTROINTESTINAL - ADULT  Goal: Minimal or absence of nausea and vomiting  Description: INTERVENTIONS:  - Maintain adequate hydration with IV or PO as ordered and tolerated  - Nasogastric tube to low intermittent suction as ordered  - Evaluate effectiveness of ordered antiemetic medications  - Provide nonpharmacologic comfort measures as appropriate  - Advance diet as tolerated, if ordered  - Obtain nutritional consult as needed  - Evaluate fluid balance  Outcome: Progressing

## 2024-12-31 NOTE — PROGRESS NOTES
[facility]   part of [facility] Hospitalist Progress Note     Rachel Cruz Patient Status:  Inpatient    3/3/1945 MRN [id number]   Location [facility] 4NW-A Attending Joshua Velez,    Hosp Day # 1 PCP Paras Reddy,      Assessment & Plan:    A/P: 80 y/o F w/ Pmhx of Prior DVT/PE, R breast Ca s/p lumpectomy, dCHF, Chronic Hypotension on Midodrine, Hx of Elevated Ammonia w/o diagnosis of Cirrhosis followed by GI as OP, MDD, Chronic Debility who presents for hemoptysis     Hemoptysis - resolved  ? Etiology  HgB stable  CTAP: negative  Plan:  - GI consulted, no plans for endoscopy at this time, cleared for discharge home  - PPI  - Advance diet as tolerated     Chronic Encephalopathy  Hx of Hyperammonia  Normal ammonia here  No Hx of Cirrhosis/Liver disease  Followed by OP GI  Ammonia on admission, < 10  Plan:  - Continue PTA Lactulose, dose corrected and frequency increased  - Family requesting ammonia check  - PT/OT evaluations pending     HypoT  TSH: 8.513, T4 1.1  - Continue Synthroid, outpatient follow up     Generalized Weakness  Chronic Debility  AM cortisol: 17, r/o adrenal insufficiency  B12 normal  Folate normal  Iron Panel w/ borderline AGUILAR  Plan:  - PT/OT evaluations, recommending   - Discharge on po iron every other day     R Breast Ca  - Noted     dCHF  - Euvolemic    DVT Ppx: Lovenox    Subjective:     Patient seen and examined this morning at bedside. Awake, does not appear to be in pain, no further hemoptysis noted overnight. Awaiting PT/OT evaluations.    Vital signs:  Temp:  [98.5 °F (36.9 °C)-99.3 °F (37.4 °C)] 98.5 °F (36.9 °C)  Pulse:  [74-84] 79  Resp:  [20] 20  BP: (103-120)/(49-70) 109/70  SpO2:  [91 %-95 %] 91 %    Physical Exam:    General: No acute distress.   Respiratory: Clear to auscultation bilaterally. No wheezes.  Cardiovascular: S1, S2. Regular rate and rhythm  Abdomen: Soft, nontender, nondistended.  Positive bowel sounds. No  rebound or guarding.  Extremities: No edema.  Neuro:  Grossly non focal, no motor deficits.      Diagnostic Data:    Pertinent Labs and Imaging independently reviewed  Comments:  Iron Panel: AGUILAR  Cortisol 17  HgB 12.9    Joshua Velez D.O.  [facility] Hospitalist

## 2024-12-31 NOTE — PROGRESS NOTES
[facility]  Progress Note    Rachel Cruz Patient Status:  Inpatient    3/3/1945 MRN [id number]   Location [facility] 4NW-A Attending Joshua Velez DO   Hosp Day # 1 PCP Paras Reddy DO     Subjective:  Rachel Cruz is a(n) 79 year old female.Pt feels much better no NV diarrhea. No abd pain  ROS: No Fc    Objective:  Blood pressure 109/70, pulse 79, temperature 98.5 °F (36.9 °C), temperature source Oral, resp. rate 20, height 5' 2\" (1.575 m), weight 138 lb (62.6 kg), SpO2 91%.  Physical Exam:  GEN: well developed, well nourished, NAD   GI: good BS's,no masses, HSM or tenderness RECTAL: Exam not done. EXTREM.: no edema NEURO: A + O      Labs:   Lab Results   Component Value Date    HGB 12.9 2024    CREATSERUM 0.81 2024    BUN 22 2024     2024    K 3.6 2024     2024    CO2 23.0 2024    GLU 93 2024    CA 7.3 2024    B12 246 2024           Assessment:  Patient Active Problem List   Diagnosis    Severe episode of recurrent major depressive disorder, with psychotic features (HCC)    Age-related osteoporosis with current pathological fracture with routine healing    Hypotension    Pure hypercholesterolemia    Subclinical hypothyroidism    Brain atrophy (HCC)    PVD (peripheral vascular disease) (HCC)    Upper GI bleed    Altered mental status, unspecified altered mental status type    Major depressive disorder, recurrent, moderate (HCC)    Anemia    Hyperglycemia    Azotemia    Gastrointestinal hemorrhage    Gastrointestinal hemorrhage, unspecified gastrointestinal hemorrhage type    Hepatic encephalopathy (HCC)    Hematemesis with nausea    Nausea vomiting and diarrhea        Rachel Cruz is a(n) 79 year old female. Pt recent NV diarrhea.  Reported hematemesis. Feels much better. Hb stable.  Likely viral enteritis.      Plan:    1.  Cont current management  2.  No further recs    Total time spent on patient care:  15  minutes    Lj Lund MD  12/31/2024  1:05 PM

## 2024-12-31 NOTE — PROGRESS NOTES
NURSING ADMISSION NOTE      Patient admitted via Ambulance  Oriented to room.  Safety precautions initiated.  Bed in low position.  Call light in reach.    Patient alert and oriented x2. 2L of oxygen. Admission navigator completed. Medications given per MAR. Safety precautions in place. Call light within reach.

## 2025-01-01 VITALS
SYSTOLIC BLOOD PRESSURE: 131 MMHG | OXYGEN SATURATION: 98 % | RESPIRATION RATE: 18 BRPM | WEIGHT: 138 LBS | TEMPERATURE: 99 F | HEART RATE: 70 BPM | BODY MASS INDEX: 25.4 KG/M2 | HEIGHT: 62 IN | DIASTOLIC BLOOD PRESSURE: 67 MMHG

## 2025-01-01 PROBLEM — K92.0 HEMATEMESIS WITH NAUSEA: Status: RESOLVED | Noted: 2024-12-30 | Resolved: 2025-01-01

## 2025-01-01 PROBLEM — R19.7 NAUSEA VOMITING AND DIARRHEA: Status: RESOLVED | Noted: 2024-12-30 | Resolved: 2025-01-01

## 2025-01-01 PROBLEM — R11.2 NAUSEA VOMITING AND DIARRHEA: Status: RESOLVED | Noted: 2024-12-30 | Resolved: 2025-01-01

## 2025-01-01 PROCEDURE — 97116 GAIT TRAINING THERAPY: CPT

## 2025-01-01 PROCEDURE — 97161 PT EVAL LOW COMPLEX 20 MIN: CPT

## 2025-01-01 RX ORDER — LACTULOSE 10 G/15ML
20 SOLUTION ORAL 3 TIMES DAILY
Qty: 2700 ML | Refills: 0 | Status: SHIPPED | OUTPATIENT
Start: 2025-01-01 | End: 2025-01-31

## 2025-01-01 NOTE — DISCHARGE PLANNING
NURSING DISCHARGE NOTE    Discharged Nursing home via Wheelchair.  Accompanied by Family member  Belongings Taken by patient/family.    Patient is alert and oriented x2, forgetful at times. Medications given per MAR. Patient with poor appetite. Encouraged PO intake. Answered discharge questions with POA. Notified facility of patients return. Patient left with belongings and discharge paperwork.

## 2025-01-01 NOTE — PLAN OF CARE
Patient is alert and oriented x2, forgetful at times. Medications given per MAR. PT to work with patient. Patient with poor appetite. Safety precautions in place. Call light within reach.      Problem: GASTROINTESTINAL - ADULT  Goal: Minimal or absence of nausea and vomiting  Description: INTERVENTIONS:  - Maintain adequate hydration with IV or PO as ordered and tolerated  - Nasogastric tube to low intermittent suction as ordered  - Evaluate effectiveness of ordered antiemetic medications  - Provide nonpharmacologic comfort measures as appropriate  - Advance diet as tolerated, if ordered  - Obtain nutritional consult as needed  - Evaluate fluid balance  Outcome: Progressing  Goal: Maintains or returns to baseline bowel function  Description: INTERVENTIONS:  - Assess bowel function  - Maintain adequate hydration with IV or PO as ordered and tolerated  - Evaluate effectiveness of GI medications  - Encourage mobilization and activity  - Obtain nutritional consult as needed  - Establish a toileting routine/schedule  - Consider collaborating with pharmacy to review patient's medication profile  Outcome: Progressing  Goal: Maintains adequate nutritional intake (undernourished)  Description: INTERVENTIONS:  - Monitor percentage of each meal consumed  - Identify factors contributing to decreased intake, treat as appropriate  - Assist with meals as needed  - Monitor I&O, WT and lab values  - Obtain nutritional consult as needed  - Optimize oral hygiene and moisture  - Encourage food from home; allow for food preferences  - Enhance eating environment  Outcome: Progressing

## 2025-01-01 NOTE — PHYSICAL THERAPY NOTE
PHYSICAL THERAPY EVALUATION - INPATIENT     Room Number: 416/416-A  Evaluation Date: 1/1/2025  Type of Evaluation: Initial  Physician Order: PT Eval and Treat    Presenting Problem: nausea, vomitting with blood  Co-Morbidities : Upper GI bleed, DVT, PE, R breast Ca with lumpectomy, CHF, chronic hypotension, cirrhosis, PE, high ammonia  Reason for Therapy: Mobility Dysfunction and Discharge Planning    PHYSICAL THERAPY ASSESSMENT   Patient is a 79 year old female admitted 12/30/2024 for nausea, vomitting.  Prior to admission, patient's baseline is assisted gait with the RW.  Patient is currently functioning below baseline with bed mobility, transfers, gait, and standing prolonged periods.  Patient is requiring contact guard assist and moderate assist as a result of the following impairments: decreased functional strength, decreased endurance/aerobic capacity, impaired standing balance, and decreased muscular endurance requiring cga for transfers and gait, mod assist with bed mobility  Physical Therapy will continue to follow for duration of hospitalization.    Patient will benefit from continued skilled PT Services at discharge to promote prior level of function.  Anticipate patient will return home with home health PT.    PLAN DURING HOSPITALIZATION  Nursing Mobility Recommendation : 1 Assist  PT Device Recommendation: Rolling walker  PT Treatment Plan: Bed mobility;Patient education;Gait training;Range of motion;Strengthening;Transfer training  Rehab Potential : Good  Frequency (Obs): 3-5x/week     CURRENT GOALS    Goal #1 Patient is able to demonstrate supine - sit EOB @ level: minimum assistance     Goal #2 Patient is able to demonstrate transfers Sit to/from Stand at assistance level: SBA with the RW     Goal #3 Patient is able to ambulate 50 feet with assist device: walker - rolling at assistance level: cga     Goal #4    Goal #5    Goal #6    Goal Comments: Goals established on 1/1/2025      PHYSICAL THERAPY  MEDICAL/SOCIAL HISTORY  History related to current admission: Patient is a 79 year old female admitted on 2024 from [facility] for nausea, vomitting.  Pt diagnosed with hematemesis with nausea.     CT abdomen/pelvis:  1. Moderate size hiatal hernia.   2. Uncomplicated diverticulosis of the descending and sigmoid colon.   3. Suggestion of uterine fibroid disease.   4. The preliminary report was reviewed.     HOME SITUATION  Type of Home: Assisted living facility ([facility])  Home Layout: One level                     Lives With: Staff 24 hours    Drives: No          Prior Level of [place]: Patient reports staff assists with transfers and gait with the RW to the dining room and reports she does not ambulate much, assistance given to bed mobility/shower/dressing/ADL/medication.  Patient reports she ambulates to the dining room but gets back in bed if in the room outside of meals.     SUBJECTIVE  \"I can be better\"    OBJECTIVE  Precautions: Bed/chair alarm  Fall Risk: Standard fall risk    WEIGHT BEARING RESTRICTION     PAIN ASSESSMENT  Ratin          COGNITION  Overall Cognitive Status:  WFL - within functional limits    RANGE OF MOTION AND STRENGTH ASSESSMENT  Upper extremity ROM and strength are within functional limits R UE, L shoulder LOM.  Trigger fingers L 3rd-5th fingers, R 4th finger    Lower extremity ROM is within functional limits     Lower extremity strength is within functional limits     BALANCE  Static Sitting: Fair +  Dynamic Sitting: Fair  Static Standing: Fair - (with RW)  Dynamic Standing: Fair - (with RW)    ADDITIONAL TESTS                                    ACTIVITY TOLERANCE                         O2 WALK       NEUROLOGICAL FINDINGS                        AM-PAC '6-Clicks' INPATIENT SHORT FORM - BASIC MOBILITY  How much difficulty does the patient currently have...  Patient Difficulty: Turning over in bed (including adjusting bedclothes, sheets and  blankets)?: A Little   Patient Difficulty: Sitting down on and standing up from a chair with arms (e.g., wheelchair, bedside commode, etc.): A Little   Patient Difficulty: Moving from lying on back to sitting on the side of the bed?: A Little   How much help from another person does the patient currently need...   Help from Another: Moving to and from a bed to a chair (including a wheelchair)?: A Little   Help from Another: Need to walk in hospital room?: A Little   Help from Another: Climbing 3-5 steps with a railing?: A Lot     AM-PAC Score:  Raw Score: 17   Approx Degree of Impairment: 50.57%   Standardized Score (AM-PAC Scale): 42.13   CMS Modifier (G-Code): CK    FUNCTIONAL ABILITY STATUS  Gait Assessment   Functional Mobility/Gait Assessment  Gait Assistance: Contact guard assist  Distance (ft): 2  Assistive Device: Rolling walker  Pattern:  (Decr ayana/step length/heel-toe pattern)    Skilled Therapy Provided     Bed Mobility:  Rolling: cga with HOB elevated, encouraged use of bedrail  Supine to sit: mod assist with HOB elevated   Sit to supine: NT     Transfer Mobility:  Sit to stand: cga with the RW   Stand to sit: cga   Gait = cga with the RW    Therapist's Comments:  RN approved session, patient is in L sidely sleeping and only required minimal cues to wake up.  Patient performed bed mobility, transfers and gait with the RW as above with cues provided to improve ease.      Exercise/Education Provided:  Discussed role of PT, goals for the session, POC.  Patient was educated on LAQ and ankle pumps and performed 5x, encouraged patient to do exs as able.  Patient was also encouraged to sit up in the chair as able and educated to call staff for any mobility to decrease fall risk, patient was asking PCT to return to bed after being up for only 10 mins but patient was kept sitting up at time of this note.      Patient End of Session: Up in chair;Needs met;Call light within reach;RN aware of  session/findings;All patient questions and concerns addressed;Alarm set;Hospital anti-slip socks      Patient Evaluation Complexity Level:  History Moderate - 1 or 2 personal factors and/or co-morbidities   Examination of body systems Moderate - addressing a total of 3 or more elements   Clinical Presentation  Moderate - Evolving   Clinical Decision Making Low Complexity       PT Session Time: 30 minutes  Gait Trainin minutes  Therapeutic Activity: 8 minutes  Neuromuscular Re-education:  minutes  Therapeutic Exercise: 5 minutes

## 2025-01-01 NOTE — PLAN OF CARE
Patient alert and oriented, 1.5 liters oxygen, medications per MAR, VSS, IV site red-removed, incontinent of bladder, output adequate, denies pain.

## 2025-01-03 ENCOUNTER — TELEPHONE (OUTPATIENT)
Dept: FAMILY MEDICINE | Age: 80
End: 2025-01-03

## 2025-01-06 ENCOUNTER — TELEPHONE (OUTPATIENT)
Dept: INTERNAL MEDICINE | Age: 80
End: 2025-01-06

## 2025-01-06 ENCOUNTER — TELEPHONE (OUTPATIENT)
Dept: FAMILY MEDICINE | Age: 80
End: 2025-01-06

## 2025-01-07 ENCOUNTER — APPOINTMENT (OUTPATIENT)
Dept: CARDIOLOGY | Age: 80
End: 2025-01-07
Attending: INTERNAL MEDICINE

## 2025-01-07 ENCOUNTER — TELEPHONE (OUTPATIENT)
Dept: CARDIOLOGY | Age: 80
End: 2025-01-07

## 2025-01-08 ENCOUNTER — TELEPHONE (OUTPATIENT)
Dept: HEMATOLOGY/ONCOLOGY | Age: 80
End: 2025-01-08

## 2025-01-13 ENCOUNTER — OFFICE VISIT (OUTPATIENT)
Dept: HEMATOLOGY/ONCOLOGY | Age: 80
End: 2025-01-13
Attending: STUDENT IN AN ORGANIZED HEALTH CARE EDUCATION/TRAINING PROGRAM

## 2025-01-13 ENCOUNTER — TELEPHONE (OUTPATIENT)
Dept: HEMATOLOGY/ONCOLOGY | Age: 80
End: 2025-01-13

## 2025-01-13 ENCOUNTER — TELEPHONE (OUTPATIENT)
Dept: FAMILY MEDICINE | Age: 80
End: 2025-01-13

## 2025-01-13 ENCOUNTER — APPOINTMENT (OUTPATIENT)
Dept: FAMILY MEDICINE | Age: 80
End: 2025-01-13

## 2025-01-13 VITALS
WEIGHT: 181.2 LBS | DIASTOLIC BLOOD PRESSURE: 65 MMHG | HEIGHT: 62 IN | BODY MASS INDEX: 33.34 KG/M2 | TEMPERATURE: 99.1 F | OXYGEN SATURATION: 93 % | HEART RATE: 69 BPM | RESPIRATION RATE: 17 BRPM | SYSTOLIC BLOOD PRESSURE: 100 MMHG

## 2025-01-13 DIAGNOSIS — I26.94 MULTIPLE SUBSEGMENTAL PULMONARY EMBOLI WITHOUT ACUTE COR PULMONALE (CMD): ICD-10-CM

## 2025-01-13 DIAGNOSIS — D50.0 IRON DEFICIENCY ANEMIA DUE TO CHRONIC BLOOD LOSS: Primary | ICD-10-CM

## 2025-01-13 DIAGNOSIS — G93.40 ACUTE ENCEPHALOPATHY: Primary | ICD-10-CM

## 2025-01-13 PROCEDURE — 99214 OFFICE O/P EST MOD 30 MIN: CPT | Performed by: STUDENT IN AN ORGANIZED HEALTH CARE EDUCATION/TRAINING PROGRAM

## 2025-01-13 PROCEDURE — 99211 OFF/OP EST MAY X REQ PHY/QHP: CPT

## 2025-01-13 ASSESSMENT — PAIN SCALES - GENERAL: PAINLEVEL: 0

## 2025-01-13 ASSESSMENT — PATIENT HEALTH QUESTIONNAIRE - PHQ9
CLINICAL INTERPRETATION OF PHQ2 SCORE: NO FURTHER SCREENING NEEDED
SUM OF ALL RESPONSES TO PHQ9 QUESTIONS 1 AND 2: 0
SUM OF ALL RESPONSES TO PHQ9 QUESTIONS 1 AND 2: 0
1. LITTLE INTEREST OR PLEASURE IN DOING THINGS: NOT AT ALL
2. FEELING DOWN, DEPRESSED OR HOPELESS: NOT AT ALL

## 2025-01-14 ENCOUNTER — APPOINTMENT (OUTPATIENT)
Dept: FAMILY MEDICINE | Age: 80
End: 2025-01-14

## 2025-01-14 ENCOUNTER — TELEPHONE (OUTPATIENT)
Dept: CARDIOLOGY | Age: 80
End: 2025-01-14

## 2025-01-14 RX ORDER — MIDODRINE HYDROCHLORIDE 5 MG/1
5 TABLET ORAL
Qty: 180 TABLET | Refills: 3 | Status: SHIPPED | OUTPATIENT
Start: 2025-01-14 | End: 2026-01-14

## 2025-01-14 RX ORDER — MIDODRINE HYDROCHLORIDE 5 MG/1
5 TABLET ORAL
Qty: 180 TABLET | Refills: 3 | Status: SHIPPED | OUTPATIENT
Start: 2025-01-14 | End: 2025-01-14 | Stop reason: SDUPTHER

## 2025-01-14 ASSESSMENT — PATIENT HEALTH QUESTIONNAIRE - PHQ9
SUM OF ALL RESPONSES TO PHQ9 QUESTIONS 1 AND 2: 0
CLINICAL INTERPRETATION OF PHQ2 SCORE: NO FURTHER SCREENING NEEDED
1. LITTLE INTEREST OR PLEASURE IN DOING THINGS: NOT AT ALL
2. FEELING DOWN, DEPRESSED OR HOPELESS: NOT AT ALL
SUM OF ALL RESPONSES TO PHQ9 QUESTIONS 1 AND 2: 0

## 2025-01-20 ENCOUNTER — TELEPHONE (OUTPATIENT)
Dept: FAMILY MEDICINE | Age: 80
End: 2025-01-20

## 2025-01-20 ENCOUNTER — E-ADVICE (OUTPATIENT)
Dept: HEMATOLOGY/ONCOLOGY | Age: 80
End: 2025-01-20

## 2025-01-20 ENCOUNTER — CLINICAL DOCUMENTATION (OUTPATIENT)
Dept: CARDIOLOGY | Age: 80
End: 2025-01-20

## 2025-01-20 ENCOUNTER — ANCILLARY PROCEDURE (OUTPATIENT)
Dept: CARDIOLOGY | Age: 80
End: 2025-01-20
Attending: INTERNAL MEDICINE

## 2025-01-20 ENCOUNTER — APPOINTMENT (OUTPATIENT)
Dept: CARDIOLOGY | Age: 80
End: 2025-01-20
Attending: INTERNAL MEDICINE

## 2025-01-20 VITALS — HEIGHT: 62 IN | BODY MASS INDEX: 33.31 KG/M2 | WEIGHT: 181 LBS

## 2025-01-20 DIAGNOSIS — I50.32 CHRONIC DIASTOLIC HEART FAILURE  (CMD): Chronic | ICD-10-CM

## 2025-01-20 LAB
HEART RATE RESERVE PREDICTED: 28.67 BPM
LV EF: 73 %
RESTING HR ACHIEVED: 86 BPM
STRESS BASELINE BP: NORMAL MMHG
STRESS PEAK HR: 102 BPM
STRESS PERCENT HR: 71 %
STRESS POST PEAK BP: NORMAL MMHG
STRESS TARGET HR: 143 BPM

## 2025-01-20 PROCEDURE — A9502 TC99M TETROFOSMIN: HCPCS | Performed by: SPECIALIST

## 2025-01-20 PROCEDURE — 93015 CV STRESS TEST SUPVJ I&R: CPT | Performed by: SPECIALIST

## 2025-01-20 PROCEDURE — 78452 HT MUSCLE IMAGE SPECT MULT: CPT | Performed by: SPECIALIST

## 2025-01-20 RX ORDER — REGADENOSON 0.08 MG/ML
0.4 INJECTION, SOLUTION INTRAVENOUS ONCE
Status: COMPLETED | OUTPATIENT
Start: 2025-01-20 | End: 2025-01-20

## 2025-01-20 RX ADMIN — REGADENOSON 0.4 MG: 0.08 INJECTION, SOLUTION INTRAVENOUS at 13:30

## 2025-01-20 ASSESSMENT — EXERCISE STRESS TEST
STAGE_CATEGORIES: RECOVERY 2
PEAK_BP: 118/74
PEAK_HR: 90
STAGE_CATEGORIES: 1
PEAK_RPP: 11868
PEAK_BP: 138/80
PEAK_RPP: 9900
PEAK_RPP: 11800
STAGE_CATEGORIES: RESTING
PEAK_RPP: 10974
PEAK_BP: 110/60
STOPPAGE_REASON: PROTOCOL COMPLETE
PEAK_HR: 102
PEAK_BP: 110/60
PEAK_HR: 86
STAGE_CATEGORIES: RECOVERY 1
PEAK_HR: 93
STAGE_CATEGORIES: RECOVERY 0
PEAK_BP: 118/70
PEAK_RPP: 11220
PEAK_HR: 100

## 2025-01-21 ENCOUNTER — APPOINTMENT (OUTPATIENT)
Dept: FAMILY MEDICINE | Age: 80
End: 2025-01-21

## 2025-01-21 ENCOUNTER — TELEPHONE (OUTPATIENT)
Dept: FAMILY MEDICINE | Age: 80
End: 2025-01-21

## 2025-01-21 VITALS
SYSTOLIC BLOOD PRESSURE: 108 MMHG | RESPIRATION RATE: 16 BRPM | TEMPERATURE: 97.9 F | DIASTOLIC BLOOD PRESSURE: 72 MMHG | OXYGEN SATURATION: 94 % | HEART RATE: 110 BPM | BODY MASS INDEX: 33.11 KG/M2 | HEIGHT: 62 IN

## 2025-01-21 DIAGNOSIS — I50.32 CHRONIC DIASTOLIC HEART FAILURE  (CMD): ICD-10-CM

## 2025-01-21 DIAGNOSIS — Z17.0 MALIGNANT NEOPLASM OF UPPER-OUTER QUADRANT OF RIGHT BREAST IN FEMALE, ESTROGEN RECEPTOR POSITIVE (CMD): ICD-10-CM

## 2025-01-21 DIAGNOSIS — I26.99 PE (PULMONARY THROMBOEMBOLISM)  (CMD): ICD-10-CM

## 2025-01-21 DIAGNOSIS — C50.411 MALIGNANT NEOPLASM OF UPPER-OUTER QUADRANT OF RIGHT BREAST IN FEMALE, ESTROGEN RECEPTOR POSITIVE (CMD): ICD-10-CM

## 2025-01-21 DIAGNOSIS — E86.1 HYPOTENSION DUE TO HYPOVOLEMIA: ICD-10-CM

## 2025-01-21 DIAGNOSIS — R53.1 GENERALIZED WEAKNESS: ICD-10-CM

## 2025-01-21 DIAGNOSIS — Z00.00 MEDICARE ANNUAL WELLNESS VISIT, SUBSEQUENT: Primary | ICD-10-CM

## 2025-01-21 DIAGNOSIS — E72.20 HYPERAMMONEMIA  (CMD): ICD-10-CM

## 2025-01-21 PROCEDURE — G0438 PPPS, INITIAL VISIT: HCPCS | Performed by: FAMILY MEDICINE

## 2025-01-21 PROCEDURE — 99214 OFFICE O/P EST MOD 30 MIN: CPT | Performed by: FAMILY MEDICINE

## 2025-01-21 ASSESSMENT — ACTIVITIES OF DAILY LIVING (ADL)
ADL_SCORE: 6
NEEDS ASSISTANCE WITH FOOD: OTHER (COMMENT)
STIL DRIVING: NO
EATING: NEEDS ASSISTANCE
DRESSING: NEEDS ASSISTANCE
PREPARING MEALS: NEEDS ASSISTANCE
NEEDS_ASSIST: YES
ADL_BEFORE_ADMISSION: NEEDS/REQUIRES ASSISTANCE
MANAGING FINANCES: NEEDS ASSISTANCE
BATHING: NEEDS ASSISTANCE
GROCERY SHOPPING: NEEDS ASSISTANCE
TOILETING: NEEDS ASSISTANCE
CONTINENCE: NEEDS ASSISTANCE
PILL BOX USED: NO
RECENT_DECLINE_ADL: NO
TRANSFERRING: NEEDS ASSISTANCE
MOBILITY_ASSIST_DEVICES: WHEELCHAIR;FOUR WHEEL WALKER
USING TELEPHONE: NEEDS ASSISTANCE
USING TRANSPORTATION: NEEDS ASSISTANCE
TAKING MEDICATION: NEEDS ASSISTANCE
DOING HOUSEWORK: NEEDS ASSISTANCE
SENSORY_SUPPORT_DEVICES: EYEGLASSES;CONTACTS
ADL_SHORT_OF_BREATH: NO
FEEDING: NEEDS ASSISTANCE

## 2025-01-21 ASSESSMENT — MINI COG
TOTAL SCORE: 3
PATIENT WAS GIVEN REPEAT BACK WORDS FROM VERSION: 1 - BANANA SUNRISE CHAIR
PATIENT ABLE TO REPEAT THE 3 WORDS GIVEN PREVIOUSLY?: WAS ABLE TO REPEAT BACK 3 WORDS CORRECTLY
PATIENT ABLE TO FILL IN THE CLOCK FACE WITH 10 MINUTES PAST 11 O'CLOCK?: NO, CLOCK IS NOT CORRECT

## 2025-01-21 ASSESSMENT — PATIENT HEALTH QUESTIONNAIRE - PHQ9
2. FEELING DOWN, DEPRESSED OR HOPELESS: NOT AT ALL
CLINICAL INTERPRETATION OF PHQ2 SCORE: NO FURTHER SCREENING NEEDED
SUM OF ALL RESPONSES TO PHQ9 QUESTIONS 1 AND 2: 0
1. LITTLE INTEREST OR PLEASURE IN DOING THINGS: NOT AT ALL
SUM OF ALL RESPONSES TO PHQ9 QUESTIONS 1 AND 2: 0

## 2025-01-21 ASSESSMENT — PAIN SCALES - GENERAL: PAINLEVEL: 0

## 2025-01-21 ASSESSMENT — GERIATRIC MINI NUTRITIONAL ASSESSMENT (MNA)
A HAS FOOD INTAKE DECLINED OVER THE PAST 3 MONTHS DUE TO LOSS OF APPETITE, DIGESTIVE PROBLEMS, CHEWING OR SWALLOWING DIFFICULTIES?: NO DECREASE IN FOOD INTAKE
B WEIGHT LOSS DURING THE LAST 3 MONTHS: NO WEIGHT LOSS
C GENERAL MOBILITY: GOES OUT
E NEUROPSYCHOLOGICAL PROBLEMS: NO PSYCHOLOGICAL PROBLEMS

## 2025-01-21 ASSESSMENT — COGNITIVE AND FUNCTIONAL STATUS - GENERAL: TRAIL MAKING TEST: PATIENT COMPLETES TRAIL MAKING TEST PROPERLY.

## 2025-01-22 ENCOUNTER — EXTERNAL LAB (OUTPATIENT)
Dept: HEALTH INFORMATION MANAGEMENT | Facility: OTHER | Age: 80
End: 2025-01-22

## 2025-01-22 ENCOUNTER — LAB REQUISITION (OUTPATIENT)
Dept: LAB | Facility: HOSPITAL | Age: 80
End: 2025-01-22
Payer: MEDICARE

## 2025-01-22 ENCOUNTER — E-ADVICE (OUTPATIENT)
Dept: CARDIOLOGY | Age: 80
End: 2025-01-22

## 2025-01-22 DIAGNOSIS — E61.1 IRON DEFICIENCY: ICD-10-CM

## 2025-01-22 DIAGNOSIS — D50.9 IRON DEFICIENCY ANEMIA, UNSPECIFIED: ICD-10-CM

## 2025-01-22 LAB
AMMONIA PLAS-MCNC: 65 UMOL/L (ref 11–32)
AMMONIA PLAS-SCNC: 65 UMOL/L (ref 11–32)
BASOPHILS # BLD AUTO: 0.07 X10(3) UL (ref 0–0.2)
BASOPHILS # BLD: 0.07 X10(3) UL (ref 0–0.2)
BASOPHILS NFR BLD AUTO: 1.1 %
BASOPHILS NFR BLD: 1.1 %
DEPRECATED HBV CORE AB SER IA-ACNC: 105 NG/ML
EOSINOPHIL # BLD AUTO: 0.32 X10(3) UL (ref 0–0.7)
EOSINOPHIL # BLD: 0.32 X10(3) UL (ref 0–0.7)
EOSINOPHIL NFR BLD AUTO: 5.2 %
EOSINOPHIL NFR BLD: 5.2 %
ERYTHROCYTE [DISTWIDTH] IN BLOOD BY AUTOMATED COUNT: 14 %
ERYTHROCYTE [DISTWIDTH] IN BLOOD: 14 %
EST. AVERAGE GLUCOSE BLD GHB EST-MCNC: 111 MG/DL (ref 68–126)
EST. AVERAGE GLUCOSE BLD GHB EST-MCNC: 111 MG/DL (ref 68–126)
FERRITIN SERPL-MCNC: 105 NG/ML (ref 50–306)
HBA1C MFR BLD: 5.5 %
HBA1C MFR BLD: 5.5 % (ref ?–5.7)
HCT VFR BLD AUTO: 41.9 %
HCT VFR BLD CALC: 41.9 % (ref 35–48)
HGB BLD-MCNC: 14.2 G/DL
HGB BLD-MCNC: 14.2 G/DL (ref 12–16)
IMM GRANULOCYTES # BLD AUTO: 0.08 X10(3) UL (ref 0–1)
IMM GRANULOCYTES # BLD: 0.08 X10(3) UL (ref 0–1)
IMM GRANULOCYTES NFR BLD: 1.3 %
IMM GRANULOCYTES NFR BLD: 1.3 %
IRON SATN MFR SERPL: 35 %
IRON SATN MFR SERPL: 35 % (ref 15–50)
IRON SERPL-MCNC: 88 UG/DL
IRON SERPL-MCNC: 88 UG/DL (ref 50–170)
LYMPHOCYTES # BLD AUTO: 1.7 X10(3) UL (ref 1–4)
LYMPHOCYTES # BLD: 1.7 X10(3) UL (ref 1–4)
LYMPHOCYTES NFR BLD AUTO: 27.9 %
LYMPHOCYTES NFR BLD: 27.9 %
MCH RBC QN AUTO: 34 PG (ref 26–34)
MCH RBC QN AUTO: 34 PG (ref 26–34)
MCHC RBC AUTO-ENTMCNC: 33.9 G/DL (ref 31–37)
MCHC RBC AUTO-ENTMCNC: 33.9 G/DL (ref 31–37)
MCV RBC AUTO: 100.2 FL
MCV RBC AUTO: 100.2 FL (ref 80–100)
MONOCYTES # BLD AUTO: 0.88 X10(3) UL (ref 0.1–1)
MONOCYTES # BLD: 0.88 X10(3) UL (ref 0.1–1)
MONOCYTES NFR BLD AUTO: 14.4 %
MONOCYTES NFR BLD: 14.4 %
NEUTROPHILS # BLD AUTO: 3.05 X10 (3) UL (ref 1.5–7.7)
NEUTROPHILS # BLD AUTO: 3.05 X10(3) UL (ref 1.5–7.7)
NEUTROPHILS # BLD: 3.05 X10(3) UL (ref 1.5–7.7)
NEUTROPHILS NFR BLD AUTO: 50.1 %
NEUTROPHILS NFR BLD: 50.1 %
PLATELET # BLD AUTO: 281 10(3)UL (ref 150–450)
PLATELET # BLD: 281 10(3)UL (ref 150–450)
RBC # BLD AUTO: 4.18 X10(6)UL
RBC # BLD: 4.18 X10(6)UL (ref 3.8–5.3)
TIBC SERPL-MCNC: 248 UG/DL (ref 250–425)
TOTAL IRON BINDING CAPACITY: 248 UG/DL (ref 250–425)
TRANSFERRIN SERPL-MCNC: 190 MG/DL (ref 250–380)
TRANSFERRIN SERPL-MCNC: 190 MG/DL (ref 250–380)
TSH SERPL-ACNC: 3.42 UIU/ML (ref 0.55–4.78)
TSI SER-ACNC: 3.42 UIU/ML (ref 0.55–4.78)
WBC # BLD AUTO: 6.1 X10(3) UL (ref 4–11)
WBC # BLD: 6.1 X10(3) UL (ref 4–11)

## 2025-01-22 PROCEDURE — 83540 ASSAY OF IRON: CPT | Performed by: STUDENT IN AN ORGANIZED HEALTH CARE EDUCATION/TRAINING PROGRAM

## 2025-01-22 PROCEDURE — 84443 ASSAY THYROID STIM HORMONE: CPT | Performed by: STUDENT IN AN ORGANIZED HEALTH CARE EDUCATION/TRAINING PROGRAM

## 2025-01-22 PROCEDURE — 85025 COMPLETE CBC W/AUTO DIFF WBC: CPT | Performed by: STUDENT IN AN ORGANIZED HEALTH CARE EDUCATION/TRAINING PROGRAM

## 2025-01-22 PROCEDURE — 82728 ASSAY OF FERRITIN: CPT | Performed by: STUDENT IN AN ORGANIZED HEALTH CARE EDUCATION/TRAINING PROGRAM

## 2025-01-22 PROCEDURE — 83550 IRON BINDING TEST: CPT | Performed by: STUDENT IN AN ORGANIZED HEALTH CARE EDUCATION/TRAINING PROGRAM

## 2025-01-22 PROCEDURE — 82140 ASSAY OF AMMONIA: CPT | Performed by: STUDENT IN AN ORGANIZED HEALTH CARE EDUCATION/TRAINING PROGRAM

## 2025-01-22 PROCEDURE — 83036 HEMOGLOBIN GLYCOSYLATED A1C: CPT | Performed by: STUDENT IN AN ORGANIZED HEALTH CARE EDUCATION/TRAINING PROGRAM

## 2025-01-23 ENCOUNTER — TELEPHONE (OUTPATIENT)
Dept: HEMATOLOGY/ONCOLOGY | Age: 80
End: 2025-01-23

## 2025-02-03 ENCOUNTER — TELEPHONE (OUTPATIENT)
Dept: FAMILY MEDICINE | Age: 80
End: 2025-02-03

## 2025-03-12 ENCOUNTER — CASE MANAGEMENT (OUTPATIENT)
Dept: CARE COORDINATION | Age: 80
End: 2025-03-12

## 2025-03-18 ENCOUNTER — CASE MANAGEMENT (OUTPATIENT)
Dept: CARE COORDINATION | Age: 80
End: 2025-03-18

## 2025-03-24 ENCOUNTER — E-ADVICE (OUTPATIENT)
Dept: CARDIOLOGY | Age: 80
End: 2025-03-24

## 2025-03-26 ENCOUNTER — CASE MANAGEMENT (OUTPATIENT)
Dept: CARE COORDINATION | Age: 80
End: 2025-03-26

## 2025-04-02 ENCOUNTER — LAB REQUISITION (OUTPATIENT)
Dept: LAB | Facility: HOSPITAL | Age: 80
End: 2025-04-02
Payer: MEDICARE

## 2025-04-02 DIAGNOSIS — R76.8 OTHER SPECIFIED ABNORMAL IMMUNOLOGICAL FINDINGS IN SERUM: ICD-10-CM

## 2025-04-02 LAB
ALBUMIN SERPL-MCNC: 3.6 G/DL (ref 3.2–4.8)
ALP LIVER SERPL-CCNC: 59 U/L
ALT SERPL-CCNC: 25 U/L
AST SERPL-CCNC: 26 U/L (ref ?–34)
BASOPHILS # BLD AUTO: 0.07 X10(3) UL (ref 0–0.2)
BASOPHILS NFR BLD AUTO: 1.2 %
BILIRUB DIRECT SERPL-MCNC: 0.2 MG/DL (ref ?–0.3)
BILIRUB SERPL-MCNC: 0.7 MG/DL (ref 0.2–1.1)
C3 SERPL-MCNC: 135.4 MG/DL (ref 90–170)
C4 SERPL-MCNC: 22.5 MG/DL (ref 12–36)
CREAT BLD-MCNC: 0.73 MG/DL
CRP SERPL-MCNC: <0.4 MG/DL (ref ?–0.5)
EGFRCR SERPLBLD CKD-EPI 2021: 84 ML/MIN/1.73M2 (ref 60–?)
EOSINOPHIL # BLD AUTO: 0.17 X10(3) UL (ref 0–0.7)
EOSINOPHIL NFR BLD AUTO: 3 %
ERYTHROCYTE [DISTWIDTH] IN BLOOD BY AUTOMATED COUNT: 14.6 %
ERYTHROCYTE [SEDIMENTATION RATE] IN BLOOD: 53 MM/HR
HCT VFR BLD AUTO: 47.4 %
HGB BLD-MCNC: 15.6 G/DL
IMM GRANULOCYTES # BLD AUTO: 0.04 X10(3) UL (ref 0–1)
IMM GRANULOCYTES NFR BLD: 0.7 %
LYMPHOCYTES # BLD AUTO: 1.36 X10(3) UL (ref 1–4)
LYMPHOCYTES NFR BLD AUTO: 23.9 %
MCH RBC QN AUTO: 33.5 PG (ref 26–34)
MCHC RBC AUTO-ENTMCNC: 32.9 G/DL (ref 31–37)
MCV RBC AUTO: 101.7 FL
MONOCYTES # BLD AUTO: 0.62 X10(3) UL (ref 0.1–1)
MONOCYTES NFR BLD AUTO: 10.9 %
NEUTROPHILS # BLD AUTO: 3.43 X10 (3) UL (ref 1.5–7.7)
NEUTROPHILS # BLD AUTO: 3.43 X10(3) UL (ref 1.5–7.7)
NEUTROPHILS NFR BLD AUTO: 60.3 %
PLATELET # BLD AUTO: 223 10(3)UL (ref 150–450)
PROT SERPL-MCNC: 6.5 G/DL (ref 5.7–8.2)
RBC # BLD AUTO: 4.66 X10(6)UL
WBC # BLD AUTO: 5.7 X10(3) UL (ref 4–11)

## 2025-04-02 PROCEDURE — 86038 ANTINUCLEAR ANTIBODIES: CPT | Performed by: INTERNAL MEDICINE

## 2025-04-02 PROCEDURE — 85025 COMPLETE CBC W/AUTO DIFF WBC: CPT | Performed by: INTERNAL MEDICINE

## 2025-04-02 PROCEDURE — 86140 C-REACTIVE PROTEIN: CPT | Performed by: INTERNAL MEDICINE

## 2025-04-02 PROCEDURE — 86039 ANTINUCLEAR ANTIBODIES (ANA): CPT | Performed by: INTERNAL MEDICINE

## 2025-04-02 PROCEDURE — 85652 RBC SED RATE AUTOMATED: CPT | Performed by: INTERNAL MEDICINE

## 2025-04-02 PROCEDURE — 80076 HEPATIC FUNCTION PANEL: CPT | Performed by: INTERNAL MEDICINE

## 2025-04-02 PROCEDURE — 86160 COMPLEMENT ANTIGEN: CPT | Performed by: INTERNAL MEDICINE

## 2025-04-02 PROCEDURE — 82565 ASSAY OF CREATININE: CPT | Performed by: INTERNAL MEDICINE

## 2025-04-04 LAB
ANA NUCLEOLAR TITR SER IF: 640 {TITER}
NUCLEAR IGG TITR SER IF: POSITIVE {TITER}

## 2025-04-18 ENCOUNTER — HOSPITAL ENCOUNTER (OUTPATIENT)
Dept: GENERAL RADIOLOGY | Facility: HOSPITAL | Age: 80
Discharge: HOME | End: 2025-04-18
Attending: Other
Payer: MEDICARE

## 2025-04-18 DIAGNOSIS — R41.9 NEUROCOGNITIVE DISORDER: ICD-10-CM

## 2025-04-18 DIAGNOSIS — R13.10 DYSPHAGIA, UNSPECIFIED TYPE: ICD-10-CM

## 2025-04-18 PROCEDURE — 92611 MOTION FLUOROSCOPY/SWALLOW: CPT

## 2025-04-18 PROCEDURE — 74230 X-RAY XM SWLNG FUNCJ C+: CPT | Performed by: OTHER

## 2025-04-18 NOTE — PROGRESS NOTES
ADULT VIDEOFLUOROSCOPIC SWALLOWING STUDY    Admission Date: 4/18/2025  Evaluation Date: 04/18/25  Radiologist: Dr. Joseph    RECOMMENDATIONS   Diet Recommendations - Solids: Regular  Diet Recommendations - Liquids: Thin Liquids    Further Follow-up:           Aspiration Precautions: Upright position, Slow rate, Small bites, Small sips  Medication Administration Recommendations: No restrictions  Treatment Plan/Recommendations: No further inpatient SLP service warranted    HISTORY   Background/Objective Information:  Patient denied any difficulty with swallowing. She did note some difficulty chewing beef. Sister present and supportive. Noted patient with some regurgitation of liquids if she drinks a lot of liquid then lays down. Sister also endorsed history of hiatal hernia. They denied any recent or recurrent pneumonia. Patient drinks from both straws and cups per sister report.     Problem List  Active Problems:    * No active hospital problems. *      Past Medical History  Past Medical History[1]    Current Diet Consistency: Regular, Thin liquids           Precautions: Aspiration, Reflux  Imaging results:   CT Abdomen + Pelvis from 12/30/24 revealed:  CONCLUSION:    1. Moderate size hiatal hernia.   2. Uncomplicated diverticulosis of the descending and sigmoid colon.   3. Suggestion of uterine fibroid disease.   4. The preliminary report was reviewed.         LOCATION:  [facility]         Dictated by (CST): Cher Rodríguez DO on 12/30/2024 at 8:53 AM       Finalized by (CST): Cher Rodríguez DO on 12/30/2024 at 8:57 AM       Reason for Referral:  (dysphagia)      Family/Patient Goals:  To avoid prandial aspiration     ASSESSMENT   DYSPHAGIA ASSESSMENT  Test completed in conjunction with Radiologist.  Patient Positioned: Upright, Midline, Standard chair.  Patient Viewed: Lateral.   .  Consistencies Presented: Thin liquids, Hard solid to assess oropharyngeal swallow function and assess for compensatory strategies to improve  safe swallow function.    THIN LIQUIDS  Method of Presentation: Cup, Straw (self presented)  Oral Phase of Swallow (VFSS - Thin Liquids): Impaired  Bolus Retrieval (VFSS - Thin Liquids): Intact  Bilabial Seal (VFSS - Thin Liquids): Intact  Bolus Formation (VFSS - Thin Liquids): Impaired  Bolus Propulsion (VFSS - Thin Liquids): Impaired  Triggered at: Pyriform sinuses  Premature Spillage to: Pyriform sinuses  Residue Severity, Location: Trace, Base of tongue  Laryngeal Penetration: During the swallow, Transient (observed x1 only, second swallow thin by straw)  Tracheal Aspiration: None  Cough/Throat Clear Response: No              HARD SOLID  Oral Phase of Swallow (VFSS - Hard Solid): Within Functional Limits  Triggered at: Valleculae  Residue Severity, Location: None  Laryngeal Penetration: None  Tracheal Aspiration: None  Penetration Aspiration Scale Score: Score 2: Material enters the airway, remains above the vocal folds, and is ejected from the airway       Overall Impression: Patient presents with grossly functional oropharyngeal swallow. There was an incidental episode of laryngeal penetration with thin by straw as patient was observed to take consecutive swallows of liquids. Primary swallow was WFL though second drink was noted to spill to level of pyriform sinuses in an uncontrolled manner. Contrast from pyriform sinuses was then displaced anteriorly when swallow was initiated and entered laryngeal vestibule but was spontaneously cleared with completion of the swallow. All other trials of thin by cup, straw and solid were swallowed without incident. There was no significant impairment with base of tongue retraction, epiglottic inversion, anterior/superior movement of hyoid, laryngeal vestibular closure or UES relaxation.     Discussed above with the patient and sister. Encouraged to continue to observe po tolerance and consider avoiding straws in the future if signs of intolerance are noted with swallowing  liquids via straw. Encouraged to consider reassessment via VFSS should patient exhibit any increase in dysphagia symptoms or recurrent respiratory infections. Both verbalized understanding and agreement              PLAN: Follow up with referring physician    EDUCATION/INSTRUCTION  Reviewed results and recommendations with patient/family/caregiver.  Agreement/Understanding verbalized and all questions answered to their apparent satisfaction.    INTERDISCIPLINARY COMMUNICATION  Reviewed results with Radiologist; agreement verbalized.    Patient Experiencing Pain: No                FOLLOW UP  Treatment Plan/Recommendations: No further inpatient SLP service warranted       Thank you for your referral.   If you have any questions, please contact Luis Antonio Pastor MS [facility]  Spectra [phone]           [1]   Past Medical History:   Depression    Esophageal reflux    Hemorrhage of large intestine due to diverticular disease    Idiopathic hypotension    Inflammatory liver disease    Nausea vomiting and diarrhea    Vitamin B12 deficiency anemia

## 2025-05-14 ENCOUNTER — E-ADVICE (OUTPATIENT)
Dept: ADMINISTRATIVE | Age: 80
End: 2025-05-14

## 2025-06-05 ENCOUNTER — APPOINTMENT (OUTPATIENT)
Dept: HEMATOLOGY/ONCOLOGY | Age: 80
End: 2025-06-05
Attending: STUDENT IN AN ORGANIZED HEALTH CARE EDUCATION/TRAINING PROGRAM

## 2025-06-05 ENCOUNTER — CLINICAL ABSTRACT (OUTPATIENT)
Dept: HEMATOLOGY/ONCOLOGY | Age: 80
End: 2025-06-05

## 2025-06-05 ENCOUNTER — APPOINTMENT (OUTPATIENT)
Dept: LAB | Age: 80
End: 2025-06-05
Attending: STUDENT IN AN ORGANIZED HEALTH CARE EDUCATION/TRAINING PROGRAM

## 2025-06-05 DIAGNOSIS — M80.00XA OSTEOPOROSIS WITH PATHOLOGICAL FRACTURE, INITIAL ENCOUNTER: Primary | ICD-10-CM

## 2025-06-05 RX ORDER — FAMOTIDINE 10 MG/ML
20 INJECTION, SOLUTION INTRAVENOUS
Status: CANCELLED | OUTPATIENT
Start: 2025-06-06

## 2025-06-05 RX ORDER — DIPHENHYDRAMINE HYDROCHLORIDE 50 MG/ML
50 INJECTION, SOLUTION INTRAMUSCULAR; INTRAVENOUS
Status: CANCELLED
Start: 2025-06-06

## 2025-06-05 RX ORDER — ALBUTEROL SULFATE 90 UG/1
2 INHALANT RESPIRATORY (INHALATION)
Status: CANCELLED | OUTPATIENT
Start: 2025-06-06

## 2025-06-05 RX ORDER — ALBUTEROL SULFATE 5 MG/ML
5 SOLUTION RESPIRATORY (INHALATION)
Status: CANCELLED | OUTPATIENT
Start: 2025-06-06

## 2025-06-06 ENCOUNTER — LAB SERVICES (OUTPATIENT)
Dept: LAB | Age: 80
End: 2025-06-06
Attending: STUDENT IN AN ORGANIZED HEALTH CARE EDUCATION/TRAINING PROGRAM

## 2025-06-06 ENCOUNTER — NURSE ONLY (OUTPATIENT)
Dept: HEMATOLOGY/ONCOLOGY | Age: 80
End: 2025-06-06
Attending: STUDENT IN AN ORGANIZED HEALTH CARE EDUCATION/TRAINING PROGRAM

## 2025-06-06 ENCOUNTER — HOSPITAL ENCOUNTER (OUTPATIENT)
Dept: INFUSION THERAPY | Age: 80
Discharge: STILL ADMITTED OR ADMINISTRATIVE | End: 2025-06-06

## 2025-06-06 ENCOUNTER — TELEPHONE (OUTPATIENT)
Dept: HEMATOLOGY/ONCOLOGY | Age: 80
End: 2025-06-06

## 2025-06-06 VITALS — DIASTOLIC BLOOD PRESSURE: 68 MMHG | SYSTOLIC BLOOD PRESSURE: 99 MMHG

## 2025-06-06 VITALS
BODY MASS INDEX: 33.31 KG/M2 | HEART RATE: 100 BPM | WEIGHT: 181 LBS | TEMPERATURE: 97.2 F | OXYGEN SATURATION: 94 % | RESPIRATION RATE: 16 BRPM | HEIGHT: 62 IN

## 2025-06-06 VITALS — SYSTOLIC BLOOD PRESSURE: 90 MMHG | DIASTOLIC BLOOD PRESSURE: 50 MMHG

## 2025-06-06 DIAGNOSIS — D50.0 IRON DEFICIENCY ANEMIA DUE TO CHRONIC BLOOD LOSS: ICD-10-CM

## 2025-06-06 DIAGNOSIS — G93.40 ENCEPHALOPATHY, UNSPECIFIED TYPE: Primary | ICD-10-CM

## 2025-06-06 DIAGNOSIS — T59.891A ENCEPHALOPATHY DUE TO AMMONIA: Primary | ICD-10-CM

## 2025-06-06 DIAGNOSIS — C50.411 MALIGNANT NEOPLASM OF UPPER-OUTER QUADRANT OF RIGHT BREAST IN FEMALE, ESTROGEN RECEPTOR POSITIVE (CMD): ICD-10-CM

## 2025-06-06 DIAGNOSIS — M80.00XA OSTEOPOROSIS WITH PATHOLOGICAL FRACTURE, INITIAL ENCOUNTER: ICD-10-CM

## 2025-06-06 DIAGNOSIS — G92.8 ENCEPHALOPATHY DUE TO AMMONIA: Primary | ICD-10-CM

## 2025-06-06 DIAGNOSIS — M80.00XA AGE-RELATED OSTEOPOROSIS WITH CURRENT PATHOLOGICAL FRACTURE, INITIAL ENCOUNTER: ICD-10-CM

## 2025-06-06 DIAGNOSIS — Z17.0 MALIGNANT NEOPLASM OF UPPER-OUTER QUADRANT OF RIGHT BREAST IN FEMALE, ESTROGEN RECEPTOR POSITIVE (CMD): ICD-10-CM

## 2025-06-06 DIAGNOSIS — M80.00XA OSTEOPOROSIS WITH PATHOLOGICAL FRACTURE, INITIAL ENCOUNTER: Primary | ICD-10-CM

## 2025-06-06 DIAGNOSIS — G93.40 ENCEPHALOPATHY, UNSPECIFIED TYPE: ICD-10-CM

## 2025-06-06 DIAGNOSIS — I95.9 HYPOTENSION, UNSPECIFIED HYPOTENSION TYPE: Primary | ICD-10-CM

## 2025-06-06 LAB
ALBUMIN SERPL-MCNC: 2.6 G/DL (ref 3.4–5)
ALBUMIN/GLOB SERPL: 0.7 {RATIO} (ref 1–2.4)
ALP SERPL-CCNC: 66 UNITS/L (ref 45–117)
ALT SERPL-CCNC: 30 UNITS/L
AMMONIA PLAS-SCNC: 86 MCMOL/L
ANION GAP SERPL CALC-SCNC: 12 MMOL/L (ref 7–19)
AST SERPL-CCNC: 23 UNITS/L
BASOPHILS # BLD: 0.1 K/MCL (ref 0–0.3)
BASOPHILS NFR BLD: 1 %
BILIRUB SERPL-MCNC: 0.5 MG/DL (ref 0.2–1)
BUN SERPL-MCNC: 15 MG/DL (ref 6–20)
BUN/CREAT SERPL: 17 (ref 7–25)
CALCIUM SERPL-MCNC: 9.2 MG/DL (ref 8.4–10.2)
CHLORIDE SERPL-SCNC: 105 MMOL/L (ref 97–110)
CO2 SERPL-SCNC: 25 MMOL/L (ref 21–32)
CREAT SERPL-MCNC: 0.88 MG/DL (ref 0.51–0.95)
DEPRECATED RDW RBC: 55.9 FL (ref 39–50)
EGFRCR SERPLBLD CKD-EPI 2021: 67 ML/MIN/{1.73_M2}
EOSINOPHIL # BLD: 0.2 K/MCL (ref 0–0.5)
EOSINOPHIL NFR BLD: 2 %
ERYTHROCYTE [DISTWIDTH] IN BLOOD: 14.8 % (ref 11–15)
FASTING DURATION TIME PATIENT: ABNORMAL H
FERRITIN SERPL-MCNC: 35 NG/ML (ref 8–252)
GLOBULIN SER-MCNC: 3.8 G/DL (ref 2–4)
GLUCOSE SERPL-MCNC: 173 MG/DL (ref 70–99)
HCT VFR BLD CALC: 45.2 % (ref 36–46.5)
HGB BLD-MCNC: 14.8 G/DL (ref 12–15.5)
IMM GRANULOCYTES # BLD AUTO: 0.1 K/MCL (ref 0–0.2)
IMM GRANULOCYTES # BLD: 1 %
IRON SATN MFR SERPL: 37 % (ref 15–45)
IRON SERPL-MCNC: 114 MCG/DL (ref 50–170)
LYMPHOCYTES # BLD: 1.7 K/MCL (ref 1–4)
LYMPHOCYTES NFR BLD: 24 %
MCH RBC QN AUTO: 33.7 PG (ref 26–34)
MCHC RBC AUTO-ENTMCNC: 32.7 G/DL (ref 32–36.5)
MCV RBC AUTO: 103 FL (ref 78–100)
MONOCYTES # BLD: 0.7 K/MCL (ref 0.3–0.9)
MONOCYTES NFR BLD: 10 %
NEUTROPHILS # BLD: 4.4 K/MCL (ref 1.8–7.7)
NEUTROPHILS NFR BLD: 62 %
NRBC BLD MANUAL-RTO: 0 /100 WBC
PLATELET # BLD AUTO: 231 K/MCL (ref 140–450)
POTASSIUM SERPL-SCNC: 3.6 MMOL/L (ref 3.4–5.1)
PROT SERPL-MCNC: 6.4 G/DL (ref 6.4–8.2)
RAINBOW EXTRA TUBES HOLD SPECIMEN: NORMAL
RAINBOW EXTRA TUBES HOLD SPECIMEN: NORMAL
RBC # BLD: 4.39 MIL/MCL (ref 4–5.2)
SODIUM SERPL-SCNC: 138 MMOL/L (ref 135–145)
TIBC SERPL-MCNC: 312 MCG/DL (ref 250–450)
WBC # BLD: 7 K/MCL (ref 4.2–11)

## 2025-06-06 PROCEDURE — 80053 COMPREHEN METABOLIC PANEL: CPT

## 2025-06-06 PROCEDURE — 99214 OFFICE O/P EST MOD 30 MIN: CPT | Performed by: STUDENT IN AN ORGANIZED HEALTH CARE EDUCATION/TRAINING PROGRAM

## 2025-06-06 PROCEDURE — 82140 ASSAY OF AMMONIA: CPT

## 2025-06-06 PROCEDURE — 96372 THER/PROPH/DIAG INJ SC/IM: CPT | Performed by: STUDENT IN AN ORGANIZED HEALTH CARE EDUCATION/TRAINING PROGRAM

## 2025-06-06 PROCEDURE — 96360 HYDRATION IV INFUSION INIT: CPT

## 2025-06-06 PROCEDURE — 85025 COMPLETE CBC W/AUTO DIFF WBC: CPT

## 2025-06-06 PROCEDURE — 82728 ASSAY OF FERRITIN: CPT

## 2025-06-06 PROCEDURE — 10002800 HB RX 250 W HCPCS: Performed by: STUDENT IN AN ORGANIZED HEALTH CARE EDUCATION/TRAINING PROGRAM

## 2025-06-06 PROCEDURE — 36415 COLL VENOUS BLD VENIPUNCTURE: CPT

## 2025-06-06 PROCEDURE — 10002807 HB RX 258: Performed by: STUDENT IN AN ORGANIZED HEALTH CARE EDUCATION/TRAINING PROGRAM

## 2025-06-06 PROCEDURE — 83540 ASSAY OF IRON: CPT

## 2025-06-06 RX ORDER — VENLAFAXINE HYDROCHLORIDE 37.5 MG/1
CAPSULE, EXTENDED RELEASE ORAL
COMMUNITY
Start: 2025-05-22

## 2025-06-06 RX ORDER — SODIUM CHLORIDE 9 MG/ML
INJECTION, SOLUTION INTRAVENOUS ONCE
OUTPATIENT
Start: 2025-06-06 | End: 2025-06-06

## 2025-06-06 RX ORDER — SODIUM CHLORIDE 9 MG/ML
INJECTION, SOLUTION INTRAVENOUS ONCE
Status: COMPLETED | OUTPATIENT
Start: 2025-06-06 | End: 2025-06-06

## 2025-06-06 RX ADMIN — DENOSUMAB 60 MG: 60 INJECTION SUBCUTANEOUS at 14:42

## 2025-06-06 RX ADMIN — SODIUM CHLORIDE 1000 ML: 9 INJECTION, SOLUTION INTRAVENOUS at 14:32

## 2025-06-06 ASSESSMENT — PAIN SCALES - GENERAL
PAINLEVEL_OUTOF10: 0

## 2025-06-06 ASSESSMENT — PATIENT HEALTH QUESTIONNAIRE - PHQ9: SUM OF ALL RESPONSES TO PHQ9 QUESTIONS 1 AND 2: 0

## 2025-06-24 ENCOUNTER — E-ADVICE (OUTPATIENT)
Dept: HEMATOLOGY/ONCOLOGY | Age: 80
End: 2025-06-24

## 2025-06-26 DIAGNOSIS — D50.0 IRON DEFICIENCY ANEMIA DUE TO CHRONIC BLOOD LOSS: Primary | ICD-10-CM

## 2025-06-27 ENCOUNTER — TELEPHONE (OUTPATIENT)
Dept: HEMATOLOGY/ONCOLOGY | Age: 80
End: 2025-06-27

## 2025-07-04 ENCOUNTER — HOSPITAL ENCOUNTER (OUTPATIENT)
Age: 80
Discharge: HOME | End: 2025-07-04
Payer: MEDICARE

## 2025-07-04 VITALS
RESPIRATION RATE: 20 BRPM | SYSTOLIC BLOOD PRESSURE: 91 MMHG | DIASTOLIC BLOOD PRESSURE: 58 MMHG | HEART RATE: 90 BPM | OXYGEN SATURATION: 96 % | TEMPERATURE: 98 F

## 2025-07-04 DIAGNOSIS — H10.9 BACTERIAL CONJUNCTIVITIS OF LEFT EYE: Primary | ICD-10-CM

## 2025-07-04 RX ORDER — OFLOXACIN 3 MG/ML
2 SOLUTION/ DROPS OPHTHALMIC 4 TIMES DAILY
Qty: 10 ML | Refills: 0 | Status: SHIPPED | OUTPATIENT
Start: 2025-07-04

## 2025-07-04 NOTE — ED PROVIDER NOTES
Patient Seen in: [facility]        History  Chief Complaint   Patient presents with    Eye Visual Problem     Stated Complaint: red eyes, oozing, watery    Subjective:   81 yo female presents with complaint of left red eye, and mucoid drainage that began 1 day ago. Pt is accompanied to visit by her sister. Pt lives in assisted living facility.   Sister endorses that pt is frequently rubbing her eyes.   No hx of glaucoma. No eye trauma. Does not wear contacts.     Pt denies fever, chills, ear pain, nasal congestion, headache, eye pain, visual changes, dizziness, nausea, vomiting.     The history is provided by the patient and a relative (pt's sister).         Objective:     Past Medical History:    Breast cancer, right (HCC)    Chronic diastolic heart failure (HCC)    Depression    Esophageal reflux    Hemorrhage of large intestine due to diverticular disease    Hypotension, unspecified    Idiopathic hypotension    Inflammatory liver disease    Nausea vomiting and diarrhea    Vitamin B12 deficiency anemia              Past Surgical History:   Procedure Laterality Date    Colonoscopy  01/30/2021    Diverticulosis    Colonoscopy N/A 5/31/2022    Procedure: COLONOSCOPY;  Surgeon: Jeffry Fish MD;  Location:  ENDOSCOPY                Social History     Socioeconomic History    Marital status: Single   Tobacco Use    Smoking status: Never     Passive exposure: Never    Smokeless tobacco: Never   Vaping Use    Vaping status: Never Used   Substance and Sexual Activity    Alcohol use: Not Currently    Drug use: Never     Social Drivers of Health     Food Insecurity: No Food Insecurity (12/30/2024)    Food Insecurity     Food Insecurity: Never true   Transportation Needs: No Transportation Needs (12/30/2024)    Transportation Needs     Lack of Transportation: No   Housing Stability: Low Risk  (12/30/2024)    Housing Stability     Housing Instability: No              Review of Systems    Constitutional:  Negative for chills and fever.   HENT:  Negative for congestion and ear pain.    Eyes:  Positive for discharge, redness and itching. Negative for photophobia and visual disturbance.   Gastrointestinal:  Negative for nausea and vomiting.   Neurological:  Negative for dizziness, light-headedness and headaches.       Positive for stated complaint: red eyes, oozing, watery  Other systems are as noted in HPI.  Constitutional and vital signs reviewed.      All other systems reviewed and negative except as noted above.        Physical Exam    ED Triage Vitals [07/04/25 1157]   BP (!) 76/54   Pulse 100   Resp 22   Temp 98 °F (36.7 °C)   Temp src Oral   SpO2 96 %   O2 Device None (Room air)       Current Vitals:   Vital Signs  BP: 91/58  Pulse: 90  Resp: 20  Temp: 98 °F (36.7 °C)  Temp src: Oral    Oxygen Therapy  SpO2: 96 %  O2 Device: None (Room air)      Right Eye Chart Acuity: 20/100, Uncorrected  Left Eye Chart Acuity: 20/200, Uncorrected      Physical Exam  Vitals and nursing note reviewed.   Constitutional:       General: She is not in acute distress.     Appearance: Normal appearance. She is not ill-appearing, toxic-appearing or diaphoretic.   HENT:      Head: Normocephalic.      Right Ear: Tympanic membrane normal.      Left Ear: Tympanic membrane normal.      Nose: Nose normal. No congestion or rhinorrhea.      Mouth/Throat:      Mouth: Mucous membranes are moist.      Pharynx: Oropharynx is clear.   Eyes:      General: Lids are normal. Lids are everted, no foreign bodies appreciated. No scleral icterus.        Right eye: No discharge.         Left eye: Discharge (mucoid) present.     Extraocular Movements: Extraocular movements intact.      Right eye: Normal extraocular motion.      Left eye: Normal extraocular motion (no pain with EOM).      Conjunctiva/sclera:      Left eye: Left conjunctiva is injected. No chemosis or hemorrhage.     Pupils: Pupils are equal, round, and reactive to light.    Cardiovascular:      Rate and Rhythm: Normal rate and regular rhythm.   Pulmonary:      Effort: Pulmonary effort is normal.      Breath sounds: Normal breath sounds.   Musculoskeletal:      Cervical back: Normal range of motion and neck supple.   Skin:     General: Skin is warm and dry.      Findings: No rash.   Neurological:      Mental Status: She is alert.   Psychiatric:         Mood and Affect: Mood normal.             ED Course  Labs Reviewed - No data to display        MDM       Medical Decision Making  79 yo female presents with complaint of left red eye, and mucoid drainage that began 1 day ago. Pt is accompanied to visit by her sister. Pt lives in assisted living facility.   Diff dx considered include viral vs bacterial conjunctivitis vs corneal abrasion. No eye trauma to suggest abrasion, less likely allergic as pt denies recent URI or allergy symptoms and is unilateral with mucoid drainage.  On exam, conjunctiva is injected with mucoid drainage consistent with bacterial conjunctivitis.   Will treat with ofloxacin drops, wash hands frequently.   Warm washcloth for eyelash matting.  Follow-up with Ophthalmology if not improving as anticipated in the 2-3 days.  Pt also with low BP in clinic. Pt and her sister emphasize this is normal for her and that she takes Midodrine TID. Chart review for past BP during other healthcare visits reveals similar readings.  Pt denies fever, chills, weakness, syncope, dizziness, chest pain, nausea, vomiting.   ED precautions discussed for any eye pain, swelling, visual changes or other concerning symptoms.   Patient and sister understand and are agreeable to plan.       Amount and/or Complexity of Data Reviewed  Independent Historian: friend     Details: Sister    External Data Reviewed: labs and notes.    Risk  Prescription drug management.        Disposition and Plan     Clinical Impression:  1. Bacterial conjunctivitis of left eye         Disposition:  Discharge  7/4/2025  12:40 pm    Follow-up:  Paras Reddy DO  [address]  [address]  [place] [address]  [phone]      As needed          Medications Prescribed:  Discharge Medication List as of 7/4/2025 12:41 PM        START taking these medications    Details   ofloxacin 0.3 % Ophthalmic Solution Place 2 drops into the left eye 4 (four) times daily., Normal, Disp-10 mL, R-0                   Supplementary Documentation:

## 2025-07-04 NOTE — DISCHARGE INSTRUCTIONS
We recommend the following for your condition:  -  Please use Ofloxacin drops: 2 drops to left eye 4 x a day for 7 days.   - Wash your hands frequently.  - Practice good hygiene, wash around eye area with gentle soap and water as needed.  - If symptoms do not improve within 2 days follow-up with ophthalmologist, .  - If symptoms worsen or if you experience eye pain or visual changes please go to ED.

## 2025-07-09 ENCOUNTER — LAB REQUISITION (OUTPATIENT)
Dept: LAB | Facility: HOSPITAL | Age: 80
End: 2025-07-09
Payer: MEDICARE

## 2025-07-09 DIAGNOSIS — R76.8 OTHER SPECIFIED ABNORMAL IMMUNOLOGICAL FINDINGS IN SERUM: ICD-10-CM

## 2025-07-09 LAB
CK SERPL-CCNC: 98 U/L (ref 34–145)
CORTIS SERPL-MCNC: 9.5 UG/DL
CRP SERPL-MCNC: <0.5 MG/DL (ref ?–0.5)
ERYTHROCYTE [SEDIMENTATION RATE] IN BLOOD: 40 MM/HR (ref 0–30)

## 2025-07-09 PROCEDURE — 86140 C-REACTIVE PROTEIN: CPT | Performed by: INTERNAL MEDICINE

## 2025-07-09 PROCEDURE — 82533 TOTAL CORTISOL: CPT | Performed by: INTERNAL MEDICINE

## 2025-07-09 PROCEDURE — 85652 RBC SED RATE AUTOMATED: CPT | Performed by: INTERNAL MEDICINE

## 2025-07-09 PROCEDURE — 82550 ASSAY OF CK (CPK): CPT | Performed by: INTERNAL MEDICINE

## 2025-07-09 PROCEDURE — 86225 DNA ANTIBODY NATIVE: CPT | Performed by: INTERNAL MEDICINE

## 2025-07-09 PROCEDURE — 86038 ANTINUCLEAR ANTIBODIES: CPT | Performed by: INTERNAL MEDICINE

## 2025-07-11 LAB
DSDNA IGG SERPL IA-ACNC: 1.5 IU/ML (ref ?–10)
ENA AB SER QL IA: 0.2 UG/L (ref ?–0.7)
ENA AB SER QL IA: NEGATIVE

## 2025-07-28 ENCOUNTER — TELEPHONE (OUTPATIENT)
Age: 80
End: 2025-07-28

## 2025-07-29 ENCOUNTER — ADMINISTRATIVE DOCUMENTATION (OUTPATIENT)
Dept: POST ACUTE CARE | Age: 80
End: 2025-07-29

## 2025-07-29 PROBLEM — R45.851 SUICIDAL IDEATION: Status: RESOLVED | Noted: 2023-05-04 | Resolved: 2025-07-29

## 2025-07-29 PROBLEM — S09.90XA CLOSED HEAD INJURY, INITIAL ENCOUNTER: Status: RESOLVED | Noted: 2024-08-07 | Resolved: 2025-07-29

## 2025-07-29 PROBLEM — Z51.5 HOSPICE CARE PATIENT: Status: RESOLVED | Noted: 2023-03-26 | Resolved: 2025-07-29

## 2025-07-29 PROBLEM — I10 PRIMARY HYPERTENSION: Status: ACTIVE | Noted: 2022-06-06

## 2025-07-29 PROBLEM — R79.89 AZOTEMIA: Status: RESOLVED | Noted: 2022-05-27 | Resolved: 2025-07-29

## 2025-07-29 PROBLEM — K29.00 ACUTE SUPERFICIAL GASTRITIS WITHOUT HEMORRHAGE: Status: RESOLVED | Noted: 2023-04-08 | Resolved: 2025-07-29

## 2025-07-29 PROBLEM — K76.82 HEPATIC ENCEPHALOPATHY (CMD): Status: RESOLVED | Noted: 2023-04-08 | Resolved: 2025-07-29

## 2025-07-29 PROBLEM — I73.9 PERIPHERAL VASCULAR DISEASE (CMD): Status: ACTIVE | Noted: 2022-02-27

## 2025-07-29 PROBLEM — M77.8 CAPSULITIS OF RIGHT FOOT: Status: RESOLVED | Noted: 2025-07-29 | Resolved: 2025-07-29

## 2025-07-29 PROBLEM — K92.2 UPPER GI BLEED: Status: RESOLVED | Noted: 2022-05-06 | Resolved: 2025-07-29

## 2025-07-29 PROBLEM — C50.911 RECURRENT BREAST CANCER, RIGHT  (CMD): Status: ACTIVE | Noted: 2022-11-07

## 2025-07-29 PROBLEM — C50.411 MALIGNANT NEOPLASM OF UPPER-OUTER QUADRANT OF RIGHT BREAST IN FEMALE, ESTROGEN RECEPTOR POSITIVE (CMD): Status: RESOLVED | Noted: 2022-11-07 | Resolved: 2025-07-29

## 2025-07-29 PROBLEM — F33.3 SEVERE RECURRENT MAJOR DEPRESSIVE DISORDER WITH PSYCHOSIS  (CMD): Status: RESOLVED | Noted: 2022-05-17 | Resolved: 2025-07-29

## 2025-07-29 PROBLEM — E66.09 CLASS 1 OBESITY DUE TO EXCESS CALORIES WITH SERIOUS COMORBIDITY AND BODY MASS INDEX (BMI) OF 33.0 TO 33.9 IN ADULT: Status: ACTIVE | Noted: 2025-07-29

## 2025-07-29 PROBLEM — K57.90 DIVERTICULAR DISEASE: Status: ACTIVE | Noted: 2022-05-17

## 2025-07-29 PROBLEM — K62.5 RECTAL BLEEDING: Status: RESOLVED | Noted: 2023-03-27 | Resolved: 2025-07-29

## 2025-07-29 PROBLEM — T14.8XXA WOUND OF SKIN: Status: RESOLVED | Noted: 2023-01-11 | Resolved: 2025-07-29

## 2025-07-29 PROBLEM — M84.374A STRESS FRACTURE OF RIGHT FOOT: Status: RESOLVED | Noted: 2025-07-29 | Resolved: 2025-07-29

## 2025-07-29 PROBLEM — N39.0 UTI (URINARY TRACT INFECTION): Status: RESOLVED | Noted: 2023-01-19 | Resolved: 2025-07-29

## 2025-07-29 PROBLEM — E66.811 CLASS 1 OBESITY DUE TO EXCESS CALORIES WITH SERIOUS COMORBIDITY AND BODY MASS INDEX (BMI) OF 33.0 TO 33.9 IN ADULT: Status: ACTIVE | Noted: 2025-07-29

## 2025-07-29 PROBLEM — Z78.0 ASYMPTOMATIC MENOPAUSAL STATE: Status: ACTIVE | Noted: 2025-07-29

## 2025-07-29 PROBLEM — K92.1 BLOOD IN STOOL: Status: RESOLVED | Noted: 2023-01-19 | Resolved: 2025-07-29

## 2025-07-29 PROBLEM — F33.0 MILD EPISODE OF RECURRENT MAJOR DEPRESSIVE DISORDER (CMD): Status: ACTIVE | Noted: 2025-07-29

## 2025-07-29 PROBLEM — M19.011 ARTHROPATHY OF RIGHT SHOULDER: Status: ACTIVE | Noted: 2025-07-29

## 2025-07-29 PROBLEM — K57.92 DIVERTICULITIS: Status: RESOLVED | Noted: 2023-01-19 | Resolved: 2025-07-29

## 2025-07-29 PROBLEM — R55 SYNCOPE AND COLLAPSE: Status: RESOLVED | Noted: 2022-05-17 | Resolved: 2025-07-29

## 2025-07-29 PROBLEM — S82.431A: Status: RESOLVED | Noted: 2025-07-29 | Resolved: 2025-07-29

## 2025-07-29 PROBLEM — E11.51 PERIPHERAL ANGIOPATHY DUE TO TYPE 2 DIABETES MELLITUS  (CMD): Status: RESOLVED | Noted: 2024-08-26 | Resolved: 2025-07-29

## 2025-07-29 PROBLEM — E11.51 PERIPHERAL ANGIOPATHY DUE TO TYPE 2 DIABETES MELLITUS  (CMD): Status: ACTIVE | Noted: 2025-07-29

## 2025-07-29 PROBLEM — Z17.0 MALIGNANT NEOPLASM OF UPPER-OUTER QUADRANT OF RIGHT BREAST IN FEMALE, ESTROGEN RECEPTOR POSITIVE (CMD): Status: RESOLVED | Noted: 2022-11-07 | Resolved: 2025-07-29

## 2025-07-29 PROBLEM — Z87.81 HISTORY OF VERTEBRAL COMPRESSION FRACTURE: Status: ACTIVE | Noted: 2022-04-27

## 2025-07-29 PROBLEM — F33.3 SEVERE EPISODE OF RECURRENT MAJOR DEPRESSIVE DISORDER, WITH PSYCHOTIC FEATURES  (CMD): Status: ACTIVE | Noted: 2025-07-29

## 2025-07-29 PROBLEM — R56.9 SEIZURE  (CMD): Status: ACTIVE | Noted: 2025-07-29

## 2025-07-29 PROBLEM — M19.90 OSTEOARTHROSIS: Status: ACTIVE | Noted: 2025-07-29

## 2025-07-29 PROBLEM — R54 FRAILTY: Status: ACTIVE | Noted: 2025-07-29

## 2025-07-29 PROBLEM — F33.3 SEVERE EPISODE OF RECURRENT MAJOR DEPRESSIVE DISORDER, WITH PSYCHOTIC FEATURES  (CMD): Status: RESOLVED | Noted: 2025-07-29 | Resolved: 2025-07-29

## 2025-07-29 PROBLEM — G93.40 ACUTE ENCEPHALOPATHY: Status: RESOLVED | Noted: 2023-01-19 | Resolved: 2025-07-29

## 2025-07-29 PROBLEM — I95.1 ORTHOSTATIC HYPOTENSION: Status: ACTIVE | Noted: 2020-11-10

## 2025-07-29 PROBLEM — F41.9 ANXIETY DISORDER: Status: ACTIVE | Noted: 2024-05-20

## 2025-08-01 ENCOUNTER — TELEPHONE (OUTPATIENT)
Dept: POST ACUTE CARE | Age: 80
End: 2025-08-01

## 2025-08-05 ENCOUNTER — TELEPHONE (OUTPATIENT)
Dept: FAMILY MEDICINE | Age: 80
End: 2025-08-05

## 2025-08-05 ENCOUNTER — APPOINTMENT (OUTPATIENT)
Dept: POST ACUTE CARE | Age: 80
End: 2025-08-05

## 2025-08-05 ENCOUNTER — TELEPHONE (OUTPATIENT)
Age: 80
End: 2025-08-05

## 2025-08-05 VITALS
HEIGHT: 62 IN | TEMPERATURE: 97.6 F | DIASTOLIC BLOOD PRESSURE: 60 MMHG | HEART RATE: 98 BPM | BODY MASS INDEX: 33.31 KG/M2 | OXYGEN SATURATION: 98 % | SYSTOLIC BLOOD PRESSURE: 98 MMHG | WEIGHT: 181 LBS

## 2025-08-05 DIAGNOSIS — I95.89 CHRONIC HYPOTENSION: ICD-10-CM

## 2025-08-05 DIAGNOSIS — F33.3 SEVERE EPISODE OF RECURRENT MAJOR DEPRESSIVE DISORDER, WITH PSYCHOTIC FEATURES  (CMD): ICD-10-CM

## 2025-08-05 DIAGNOSIS — E66.811 CLASS 1 OBESITY DUE TO EXCESS CALORIES WITH SERIOUS COMORBIDITY AND BODY MASS INDEX (BMI) OF 33.0 TO 33.9 IN ADULT: ICD-10-CM

## 2025-08-05 DIAGNOSIS — R54 FRAILTY: ICD-10-CM

## 2025-08-05 DIAGNOSIS — E66.09 CLASS 1 OBESITY DUE TO EXCESS CALORIES WITH SERIOUS COMORBIDITY AND BODY MASS INDEX (BMI) OF 33.0 TO 33.9 IN ADULT: ICD-10-CM

## 2025-08-05 DIAGNOSIS — C50.911 RECURRENT BREAST CANCER, RIGHT  (CMD): ICD-10-CM

## 2025-08-05 DIAGNOSIS — I50.32 CHRONIC DIASTOLIC HEART FAILURE  (CMD): Primary | Chronic | ICD-10-CM

## 2025-08-05 DIAGNOSIS — E72.20 HYPERAMMONEMIA  (CMD): ICD-10-CM

## 2025-08-05 ASSESSMENT — ENCOUNTER SYMPTOMS
DIZZINESS: 0
ABDOMINAL PAIN: 0
BACK PAIN: 1
LIGHT-HEADEDNESS: 0
EYES NEGATIVE: 1
FATIGUE: 1
GASTROINTESTINAL NEGATIVE: 1
ENDOCRINE NEGATIVE: 1
PSYCHIATRIC NEGATIVE: 1
WEAKNESS: 1
ALLERGIC/IMMUNOLOGIC NEGATIVE: 1
RESPIRATORY NEGATIVE: 1
ABDOMINAL DISTENTION: 0

## 2025-08-05 ASSESSMENT — PAIN SCALES - GENERAL: PAINLEVEL_OUTOF10: 8

## 2025-08-05 ASSESSMENT — FRAILTY ASSESSMENTS
FRAILTY SCALE TOTAL SCORE: 4
DO YOU HAVE ANY OF THESE ILLNESSES: HYPERTENSION, DIABETES, CANCER (OTHER THAN A MINOR SKIN CANCER), CHRONIC LUNG DISEASE, HEART ATTACK, CONGESTIVE HEART FAILURE, ANGINA, ASTHMA, ARTHRITIS, STROKE, AND KIDNEY DISEASE: YES
DO YOU HAVE DIFFICULTY CLIMBING A FLIGHT OF STAIRS: YES
DO YOU HAVE DIFFICULTY WALKING ONE BLOCK: YES
HAVE YOU LOST MORE THAN 5 PERCENT OF YOUR WEIGHT IN THE PAST YEAR: NO

## 2025-08-05 ASSESSMENT — PATIENT HEALTH QUESTIONNAIRE - PHQ9: SUM OF ALL RESPONSES TO PHQ9 QUESTIONS 1 AND 2: 0

## 2025-08-06 ENCOUNTER — TELEPHONE (OUTPATIENT)
Dept: CARDIOLOGY | Age: 80
End: 2025-08-06

## 2025-08-06 DIAGNOSIS — I50.32 CHRONIC DIASTOLIC HEART FAILURE  (CMD): Primary | ICD-10-CM

## 2025-08-06 DIAGNOSIS — I27.82 OTHER CHRONIC PULMONARY EMBOLISM WITHOUT ACUTE COR PULMONALE (CMD): ICD-10-CM

## 2025-08-06 DIAGNOSIS — I95.9 HYPOTENSION, UNSPECIFIED HYPOTENSION TYPE: ICD-10-CM

## 2025-08-06 DIAGNOSIS — E78.5 HYPERLIPIDEMIA, UNSPECIFIED HYPERLIPIDEMIA TYPE: ICD-10-CM

## 2025-08-06 SDOH — ECONOMIC STABILITY: FOOD INSECURITY: WITHIN THE PAST 12 MONTHS, THE FOOD YOU BOUGHT JUST DIDN'T LAST AND YOU DIDN'T HAVE MONEY TO GET MORE.: NEVER TRUE

## 2025-08-06 SDOH — ECONOMIC STABILITY: HOUSING INSECURITY: DO YOU HAVE PROBLEMS WITH ANY OF THE FOLLOWING?: NONE OF THE ABOVE

## 2025-08-06 SDOH — ECONOMIC STABILITY: HOUSING INSECURITY: WHAT IS YOUR LIVING SITUATION TODAY?: I HAVE A STEADY PLACE TO LIVE

## 2025-08-06 ASSESSMENT — SOCIAL DETERMINANTS OF HEALTH (SDOH): IN THE PAST 12 MONTHS, HAS THE ELECTRIC, GAS, OIL, OR WATER COMPANY THREATENED TO SHUT OFF SERVICE IN YOUR HOME?: NO

## 2025-08-08 ENCOUNTER — TELEPHONE (OUTPATIENT)
Dept: FAMILY MEDICINE | Age: 80
End: 2025-08-08

## 2025-08-08 ENCOUNTER — OFFICE VISIT (OUTPATIENT)
Dept: FAMILY MEDICINE | Age: 80
End: 2025-08-08

## 2025-08-08 VITALS
HEART RATE: 107 BPM | RESPIRATION RATE: 18 BRPM | HEIGHT: 62 IN | SYSTOLIC BLOOD PRESSURE: 92 MMHG | DIASTOLIC BLOOD PRESSURE: 70 MMHG | BODY MASS INDEX: 33.13 KG/M2 | WEIGHT: 180 LBS | OXYGEN SATURATION: 92 % | TEMPERATURE: 98.1 F

## 2025-08-08 DIAGNOSIS — M19.90 OSTEOARTHRITIS, UNSPECIFIED OSTEOARTHRITIS TYPE, UNSPECIFIED SITE: ICD-10-CM

## 2025-08-08 DIAGNOSIS — F33.3 SEVERE EPISODE OF RECURRENT MAJOR DEPRESSIVE DISORDER, WITH PSYCHOTIC FEATURES  (CMD): ICD-10-CM

## 2025-08-08 DIAGNOSIS — M81.0 AGE-RELATED OSTEOPOROSIS WITHOUT CURRENT PATHOLOGICAL FRACTURE: ICD-10-CM

## 2025-08-08 DIAGNOSIS — C50.911 RECURRENT BREAST CANCER, RIGHT  (CMD): ICD-10-CM

## 2025-08-08 DIAGNOSIS — R53.1 GENERALIZED WEAKNESS: ICD-10-CM

## 2025-08-08 DIAGNOSIS — I26.99 RECURRENT PULMONARY EMBOLISM  (CMD): ICD-10-CM

## 2025-08-08 DIAGNOSIS — K57.90 DIVERTICULAR DISEASE: ICD-10-CM

## 2025-08-08 DIAGNOSIS — I95.1 ORTHOSTATIC HYPOTENSION: ICD-10-CM

## 2025-08-08 DIAGNOSIS — R53.82 CHRONIC FATIGUE: ICD-10-CM

## 2025-08-08 DIAGNOSIS — E72.20 HYPERAMMONEMIA  (CMD): Primary | ICD-10-CM

## 2025-08-08 PROBLEM — B02.9 HERPES ZOSTER WITHOUT COMPLICATION: Status: RESOLVED | Noted: 2024-05-25 | Resolved: 2025-08-08

## 2025-08-08 PROBLEM — D50.0 IRON DEFICIENCY ANEMIA DUE TO CHRONIC BLOOD LOSS: Status: RESOLVED | Noted: 2023-05-23 | Resolved: 2025-08-08

## 2025-08-08 ASSESSMENT — PATIENT HEALTH QUESTIONNAIRE - PHQ9
CLINICAL INTERPRETATION OF PHQ2 SCORE: NO FURTHER SCREENING NEEDED
1. LITTLE INTEREST OR PLEASURE IN DOING THINGS: NOT AT ALL
SUM OF ALL RESPONSES TO PHQ9 QUESTIONS 1 AND 2: 0
SUM OF ALL RESPONSES TO PHQ9 QUESTIONS 1 AND 2: 0
2. FEELING DOWN, DEPRESSED OR HOPELESS: NOT AT ALL

## 2025-08-08 ASSESSMENT — ENCOUNTER SYMPTOMS
SPEECH DIFFICULTY: 0
VOMITING: 0
DIZZINESS: 1
ABDOMINAL PAIN: 0
SHORTNESS OF BREATH: 0
WEAKNESS: 1
CHILLS: 0
COUGH: 0
FATIGUE: 1
TREMORS: 0
NAUSEA: 1
FEVER: 0

## 2025-08-08 ASSESSMENT — PAIN SCALES - GENERAL: PAINLEVEL_OUTOF10: 8

## 2025-08-09 ENCOUNTER — EXTERNAL LAB (OUTPATIENT)
Dept: HEALTH INFORMATION MANAGEMENT | Facility: OTHER | Age: 80
End: 2025-08-09

## 2025-08-09 ENCOUNTER — LAB REQUISITION (OUTPATIENT)
Dept: LAB | Facility: HOSPITAL | Age: 80
End: 2025-08-09

## 2025-08-09 DIAGNOSIS — R30.0 DYSURIA: ICD-10-CM

## 2025-08-09 LAB
APPEARANCE UR: ABNORMAL
BACTERIA #/AREA URNS HPF: ABNORMAL /HPF
BILIRUB UR QL STRIP.AUTO: NEGATIVE
BILIRUB UR QL: NEGATIVE
COLOR UR AUTO: YELLOW
COLOR UR: YELLOW
GLUCOSE UR STRIP.AUTO-MCNC: NORMAL MG/DL
GLUCOSE UR-MCNC: NORMAL MG/DL
HGB UR QL: NEGATIVE
HYALINE CASTS #/AREA URNS AUTO: PRESENT /LPF
HYALINE CASTS #/AREA URNS LPF: PRESENT /LPF
KETONES UR STRIP.AUTO-MCNC: NEGATIVE MG/DL
KETONES UR-MCNC: NEGATIVE MG/DL
LAB RESULT: NORMAL
LEUKOCYTE ESTERASE UR QL STRIP.AUTO: 75
LEUKOCYTE ESTERASE UR QL STRIP: 75
NITRITE UR QL STRIP.AUTO: NEGATIVE
NITRITE UR QL: NEGATIVE
PH UR STRIP.AUTO: 6.5 (ref 5–8)
PH UR: 6.5 [PH] (ref 5–8)
PROT UR QL: 20 MG/DL
PROT UR STRIP.AUTO-MCNC: 20 MG/DL
RBC #/AREA URNS HPF: ABNORMAL /HPF (ref 0–2)
RBC UR QL AUTO: NEGATIVE
SP GR UR STRIP.AUTO: 1.03 (ref 1–1.03)
SP GR UR: 1.03 (ref 1–1.03)
SQUAMOUS #/AREA URNS HPF: ABNORMAL /HPF
TRANS CELLS #/AREA URNS HPF: ABNORMAL /HPF
UROBILINOGEN UR QL: 2 MG/DL
UROBILINOGEN UR STRIP.AUTO-MCNC: 2 MG/DL
WBC #/AREA URNS HPF: ABNORMAL /HPF (ref 0–5)
YEAST URNS QL MICRO: ABNORMAL /HPF

## 2025-08-09 PROCEDURE — 87086 URINE CULTURE/COLONY COUNT: CPT | Performed by: INTERNAL MEDICINE

## 2025-08-09 PROCEDURE — 81001 URINALYSIS AUTO W/SCOPE: CPT | Performed by: INTERNAL MEDICINE

## 2025-08-13 ENCOUNTER — LAB REQUISITION (OUTPATIENT)
Dept: LAB | Facility: HOSPITAL | Age: 80
End: 2025-08-13

## 2025-08-13 DIAGNOSIS — I50.22 CHRONIC SYSTOLIC (CONGESTIVE) HEART FAILURE (HCC): ICD-10-CM

## 2025-08-13 LAB
ALBUMIN SERPL-MCNC: 3.5 G/DL (ref 3.2–4.8)
ALBUMIN/GLOB SERPL: 1.3 (ref 1–2)
ALP LIVER SERPL-CCNC: 55 U/L (ref 55–142)
ALT SERPL-CCNC: 34 U/L (ref 10–49)
AMMONIA PLAS-MCNC: <10 UMOL/L (ref 11–32)
ANION GAP SERPL CALC-SCNC: 13 MMOL/L (ref 0–18)
AST SERPL-CCNC: 36 U/L (ref ?–34)
BASOPHILS # BLD AUTO: 0.04 X10(3) UL (ref 0–0.2)
BASOPHILS NFR BLD AUTO: 0.8 %
BILIRUB SERPL-MCNC: 0.6 MG/DL (ref 0.2–1.1)
BUN BLD-MCNC: 9 MG/DL (ref 9–23)
CALCIUM BLD-MCNC: 8.8 MG/DL (ref 8.7–10.6)
CHLORIDE SERPL-SCNC: 102 MMOL/L (ref 98–112)
CHOLEST SERPL-MCNC: 274 MG/DL (ref ?–200)
CO2 SERPL-SCNC: 28 MMOL/L (ref 21–32)
CREAT BLD-MCNC: 0.75 MG/DL (ref 0.55–1.02)
EGFRCR SERPLBLD CKD-EPI 2021: 81 ML/MIN/1.73M2 (ref 60–?)
EOSINOPHIL # BLD AUTO: 0.18 X10(3) UL (ref 0–0.7)
EOSINOPHIL NFR BLD AUTO: 3.4 %
ERYTHROCYTE [DISTWIDTH] IN BLOOD BY AUTOMATED COUNT: 14.6 %
FASTING PATIENT LIPID ANSWER: YES
FASTING STATUS PATIENT QL REPORTED: YES
GLOBULIN PLAS-MCNC: 2.8 G/DL (ref 2–3.5)
GLUCOSE BLD-MCNC: 76 MG/DL (ref 70–99)
HCT VFR BLD AUTO: 46.8 % (ref 35–48)
HDLC SERPL-MCNC: 71 MG/DL (ref 40–59)
HGB BLD-MCNC: 15.7 G/DL (ref 12–16)
IMM GRANULOCYTES # BLD AUTO: 0.02 X10(3) UL (ref 0–1)
IMM GRANULOCYTES NFR BLD: 0.4 %
LDLC SERPL CALC-MCNC: 178 MG/DL (ref ?–100)
LYMPHOCYTES # BLD AUTO: 1.51 X10(3) UL (ref 1–4)
LYMPHOCYTES NFR BLD AUTO: 28.5 %
MCH RBC QN AUTO: 34.5 PG (ref 26–34)
MCHC RBC AUTO-ENTMCNC: 33.5 G/DL (ref 31–37)
MCV RBC AUTO: 102.9 FL (ref 80–100)
MONOCYTES # BLD AUTO: 0.58 X10(3) UL (ref 0.1–1)
MONOCYTES NFR BLD AUTO: 11 %
NEUTROPHILS # BLD AUTO: 2.96 X10 (3) UL (ref 1.5–7.7)
NEUTROPHILS # BLD AUTO: 2.96 X10(3) UL (ref 1.5–7.7)
NEUTROPHILS NFR BLD AUTO: 55.9 %
NONHDLC SERPL-MCNC: 203 MG/DL (ref ?–130)
OSMOLALITY SERPL CALC.SUM OF ELEC: 293 MOSM/KG (ref 275–295)
PLATELET # BLD AUTO: 188 10(3)UL (ref 150–450)
POTASSIUM SERPL-SCNC: 3.9 MMOL/L (ref 3.5–5.1)
PROT SERPL-MCNC: 6.3 G/DL (ref 5.7–8.2)
RBC # BLD AUTO: 4.55 X10(6)UL (ref 3.8–5.3)
SODIUM SERPL-SCNC: 143 MMOL/L (ref 136–145)
TRIGL SERPL-MCNC: 140 MG/DL (ref 30–149)
VLDLC SERPL CALC-MCNC: 28 MG/DL (ref 0–30)
WBC # BLD AUTO: 5.3 X10(3) UL (ref 4–11)

## 2025-08-13 PROCEDURE — 82140 ASSAY OF AMMONIA: CPT | Performed by: INTERNAL MEDICINE

## 2025-08-13 PROCEDURE — 84425 ASSAY OF VITAMIN B-1: CPT | Performed by: INTERNAL MEDICINE

## 2025-08-13 PROCEDURE — 80061 LIPID PANEL: CPT | Performed by: INTERNAL MEDICINE

## 2025-08-13 PROCEDURE — 80053 COMPREHEN METABOLIC PANEL: CPT | Performed by: INTERNAL MEDICINE

## 2025-08-13 PROCEDURE — 85025 COMPLETE CBC W/AUTO DIFF WBC: CPT | Performed by: INTERNAL MEDICINE

## 2025-08-18 LAB — VITAMIN B1 WHOLE BLD: 76.4 NMOL/L

## 2025-08-20 ENCOUNTER — RESULTS FOLLOW-UP (OUTPATIENT)
Dept: CARDIOLOGY | Age: 80
End: 2025-08-20

## 2025-09-08 ENCOUNTER — APPOINTMENT (OUTPATIENT)
Dept: CARDIOLOGY | Age: 80
End: 2025-09-08
Attending: INTERNAL MEDICINE

## 2025-09-08 ENCOUNTER — APPOINTMENT (OUTPATIENT)
Dept: FAMILY MEDICINE | Age: 80
End: 2025-09-08

## 2025-09-09 ENCOUNTER — APPOINTMENT (OUTPATIENT)
Dept: FAMILY MEDICINE | Age: 80
End: 2025-09-09

## (undated) DEVICE — FORCEP RADIAL JAW 4

## (undated) DEVICE — Device: Brand: DEFENDO AIR/WATER/SUCTION AND BIOPSY VALVE

## (undated) DEVICE — GLOVE SURG 6.5 DRMPRN ULTRA LF DARK GRN PF SMTH BEAD CUFF

## (undated) DEVICE — Device

## (undated) DEVICE — 1200CC GUARDIAN II: Brand: GUARDIAN

## (undated) DEVICE — GOWN SURG LG L3 NONREINFORCE SET IN SLV STRL LF DISP BLUE

## (undated) DEVICE — ENDOSCOPY PACK UPPER: Brand: MEDLINE INDUSTRIES, INC.

## (undated) DEVICE — GLOVE SURG 6.5 PREMIERPRO LF NATURAL PF TXTR SMTH STRL

## (undated) DEVICE — HANDPIECE SCT MDVC YNKR BLBS TIP CLR STRL LF DISP

## (undated) DEVICE — FILTERLINE NASAL ADULT O2/CO2

## (undated) DEVICE — GLOVE SURG 7 PREMIERPRO LF NATURAL PF TXTR SMTH STRL

## (undated) DEVICE — SPONGE GAUZE 6.75X6IN CTN ABS STRL LF BLK2

## (undated) DEVICE — BANDAGE CMPR MTRX 5YDX6IN MED ELASTIC HOOK LOOP CLSR POLY

## (undated) DEVICE — GLOVE SURG 7 PROTEXIS LF BLUE PF SMTH BEAD CUFF INTLK STRL

## (undated) DEVICE — ENDOSCOPY PACK - LOWER: Brand: MEDLINE INDUSTRIES, INC.

## (undated) DEVICE — WATER STRL 500ML PLASTIC POUR BTL LF

## (undated) DEVICE — 3M™ RED DOT™ MONITORING ELECTRODE WITH FOAM TAPE AND STICKY GEL, 50/BAG, 20/CASE, 72/PLT 2570: Brand: RED DOT™

## (undated) DEVICE — NEEDLE HPO 25GA 1.5IN REG WALL REG BVL LL HUB DEHP-FR STRL

## (undated) DEVICE — ADHESIVE PREMIERPRO EXOFIN 1ML HVISC TUBE SOFT FLXB APL

## (undated) DEVICE — FORCEP BIOPSY RJ4 LG CAP W/ND

## (undated) DEVICE — CLEANER ESURG TIP 5X5CM DEVON LG ADH BACK CAUT PLSHR RADOPQ

## (undated) DEVICE — SYRINGE 10ML CNTRL CONC TIP PYROGEN FREE DEHP-FR STRL MED LF

## (undated) DEVICE — SHEATH SCOUT

## (undated) DEVICE — SUTURE MONOCRYL MTPS 4-0 PS2 18IN MONO ABS UNDYED

## (undated) DEVICE — SUTURE VICRYL 3-0 SH 27IN BRAID COAT ABS UNDYED J416H

## (undated) DEVICE — SUTURE PRMHND 2-0 PS 18IN SILK BRAID NABSB BLK 1588H

## (undated) DEVICE — SYRINGE 30ML CONC TIP GRAD N-PYRG DEHP-FR STRL MED LF DISP

## (undated) DEVICE — CAPSULE ENDO PILL CAM SB3

## (undated) DEVICE — GLOVE SURG 6 PROTEXIS LF CRM PF BEAD CUFF STRL PLISPRN 11.5

## (undated) DEVICE — NEEDLE HPO 16GA 1.5IN REG WALL REG BVL LL HUB DEHP-FR STRL

## (undated) DEVICE — GLOVE SURG 7.5 PREMIERPRO LF NATURAL PF TXTR SMTH STRL

## (undated) DEVICE — GOWN SURG SM MED STD L3 NONREINFORCE SET IN SLV STRL LF DISP

## (undated) NOTE — LETTER
[address] [facility] FOR BLOOD OR BLOOD COMPONENTS      In the course of your treatment, it may become necessary to administer a transfusion of blood or blood components. This form provides basic information concerning this procedure and, if signed by you, authorizes its performance by qualified medical personnel. DESCRIPTION OF PROCEDURE:  Blood is introduced into one of your veins, commonly in the arm, using a sterilized disposable needle. The amount of blood transfused, and whether the transfusion will be of blood or blood components is a judgment the physician will make based on your particular needs. RISKS:  The transfusion is a common procedure of low risk. MINOR AND TEMPORARY REACTIONS ARE NOT UNCOMMON, including a slight bruise, swelling or local reaction in the area where the needle pierces your skin, or a non-serious reaction to the transfused material itself, including headache, fever or a mild skin reaction, such as rash. Serious reactions are possible, though very unlikely and include severe allergic reaction (shock)  and destruction (hemolysis) of transfused blood cells. Infectious diseases which are known to be transmitted by blood transfusion include CERTAIN TYPES OF VIRAL HEPATITIS, a viral infection of the liver, HUMAN IMMUNODEFICIENCY VIRUS (HIV-1,2) infection, a viral infection known to cause ACQUIRED IMMUNODEFICIENCY SYNDROME (AIDS) AS WELL AS CERTAIN OTHER BACTERIAL, VIRAL AND PARASITIC DISEASES. While a minimal risk of acquiring an infectious disease from transfused blood exists, in accordance with Federal and State law all due care has been taken in donor selection and testing to avoid transmission of disease. ALTERNATIVES:  If loss of blood poses serious threats in the course of your treatment, THERE IS NO EFFECTIVE ALTERNATIVE TO BLOOD TRANSFUSION.  However, if you have any further questions on this matter, your physician will fully explain the alternatives to you if it has not already been done. I,Rachel Cruz, have read/had read to me the above. I understand the matters bearing on the decision whether or not to authorize a transfusion of blood or blood components. I have no questions which have not been answered to my full satisfaction.  I hereby consent to such transfusion as  my physician may deem necessary or advisable in the course of my treatment.        _______________   __________________________________________________  Date     Signature of Patient, Parent or Legal Guardian      ([place])      __________________________________________  Witness to Signature (title or relationship to patient)    Patient Name: Domingo Olszewski     : 3/3/1945                 Printed: 2022     Medical Record #: [id number]                    Page 1 of 1

## (undated) NOTE — LETTER
[facility] [facility] FOR BLOOD OR BLOOD COMPONENTS      In the course of your treatment, it may become necessary to administer a transfusion of blood or blood components. This form provides basic information concerning this procedure and, if signed by you, authorizes its performance by qualified medical personnel. DESCRIPTION OF PROCEDURE:  Blood is introduced into one of your veins, commonly in the arm, using a sterilized disposable needle. The amount of blood transfused, and whether the transfusion will be of blood or blood components is a judgment the physician will make based on your particular needs. RISKS:  The transfusion is a common procedure of low risk. MINOR AND TEMPORARY REACTIONS ARE NOT UNCOMMON, including a slight bruise, swelling or local reaction in the area where the needle pierces your skin, or a non-serious reaction to the transfused material itself, including headache, fever or a mild skin reaction, such as rash. Serious reactions are possible, though very unlikely and include severe allergic reaction (shock)  and destruction (hemolysis) of transfused blood cells. Infectious diseases which are known to be transmitted by blood transfusion include CERTAIN TYPES OF VIRAL HEPATITIS, a viral infection of the liver, HUMAN IMMUNODEFICIENCY VIRUS (HIV-1,2) infection, a viral infection known to cause ACQUIRED IMMUNODEFICIENCY SYNDROME (AIDS) AS WELL AS CERTAIN OTHER BACTERIAL, VIRAL AND PARASITIC DISEASES. While a minimal risk of acquiring an infectious disease from transfused blood exists, in accordance with Federal and State law all due care has been taken in donor selection and testing to avoid transmission of disease. ALTERNATIVES:  If loss of blood poses serious threats in the course of your treatment, THERE IS NO EFFECTIVE ALTERNATIVE TO BLOOD TRANSFUSION.  However, if you have any further questions on this matter, your physician will fully explain the alternatives to you if it has not already been done. I,Rachel Cruz, have read/had read to me the above. I understand the matters bearing on the decision whether or not to authorize a transfusion of blood or blood components. I have no questions which have not been answered to my full satisfaction.  I hereby consent to such transfusion as  my physician may deem necessary or advisable in the course of my treatment.        _______________   __________________________________________________  Date     Signature of Patient, Parent or Legal Guardian      ([place] [facility])      __________________________________________  Witness to Signature (title or relationship to patient)    Patient Name: Sandie Padgett     : 3/3/1945                 Printed: 2022     Medical Record #: [id number]                    Page 1 of

## (undated) NOTE — LETTER
Iva Mccurdy 182 295 [facility] S, [address] [place] [address]  Authorization for Surgical Operation and Procedure   Date:___________                                                                                            Time:__________  1. I hereby Yamileth Weber MD, my physician and his/her assistants (if applicable), which may include medical students, residents, and/or fellows, to perform the following surgical operation/ procedure and administer such anesthesia as may be determined necessary by my physician:  Operation/Procedure name (s) ESOPHAGOGASTRODUODENOSCOPY (EGD)  on [facility]   2. I recognize that during the surgical operation/procedure, unforeseen conditions may necessitate additional or different procedures than those listed above. I, therefore, further authorize and request that the above-named surgeon, assistants, or designees perform such procedures as are, in their judgment, necessary and desirable. 3.   My surgeon/physician has discussed prior to my surgery the potential benefits, risks and side effects of this procedure; the likelihood of achieving goals; and potential problems that might occur during recuperation. They also discussed reasonable alternatives to the procedure, including risks, benefits, and side effects related to the alternatives and risks related to not receiving this procedure. I have had all my questions answered and I acknowledge that no guarantee has been made as to the result that may be obtained. 4.   Should the need arise during my operation or immediate post-operative period, I also consent to the administration of blood and/or blood products. Further, I understand that despite careful testing and screening of blood or blood products by collecting agencies, I may still be subject to ill effects as a result of receiving a blood transfusion and/or blood products.   The following are some, but not all, of the potential risks that can occur: fever and allergic reactions, hemolytic reactions, transmission of diseases such as Hepatitis, AIDS and Cytomegalovirus (CMV) and fluid overload. In the event that I wish to have an autologous transfusion of my own blood, or a directed donor transfusion. I will discuss this with my physician. 5.   I authorize the use of any specimen, organs, tissues, body parts or foreign objects that may be removed from my body during the operation/procedure for diagnosis, research or teaching purposes and their subsequent disposal by hospital authorities. I also authorize the release of specimen test results and/or written reports to my treating physician on the hospital medical staff or other referring or consulting physicians involved in my care, at the discretion of the Pathologist or my treating physician. 6.   I consent to the photographing or videotaping of the operations or procedures to be performed, including appropriate portions of my body for medical, scientific, or educational purposes, provided my identity is not revealed by the pictures or by descriptive texts accompanying them. If the procedure has been photographed/videotaped, the surgeon will obtain the original picture, image, videotape or CD. The hospital will not be responsible for storage, release or maintenance of the picture, image, tape or CD.    7.   I consent to the presence of a  or observers in the operating room as deemed necessary by my physician or their designees. 8.   I recognize that in the event my procedure results in extended X-Ray/fluoroscopy time, I may develop a skin reaction. 9. If I have a Do Not Attempt Resuscitation (DNAR) order in place, that status will be suspended while in the operating room, procedural suite, and during the recovery period unless otherwise explicitly stated by me (or a person authorized to consent on my behalf).  The surgeon or my attending physician will determine when the applicable recovery period ends for purposes of reinstating the DNAR order. 10. Patients having a sterilization procedure: I understand that if the procedure is successful the results will be permanent and it will therefore be impossible for me to inseminate, conceive, or bear children. I also understand that the procedure is intended to result in sterility, although the result has not been guaranteed. 11. I acknowledge that my physician has explained sedation/analgesia administration to me including the risk and benefits I consent to the administration of sedation/analgesia as may be necessary or desirable in the judgment of my physician.     I CERTIFY THAT I HAVE READ AND FULLY UNDERSTAND THE ABOVE CONSENT TO OPERATION and/or OTHER PROCEDURE.      _________________________________________  __________________________________  Signature of Patient     Signature of Responsible Person         ___________________________________         Printed Name of Responsible Person           _________________________________                 Relationship to Patient  _________________________________________  ______________________________  Signature of Witness          Date  Time          Patient Name: Luis Alberto Lopez     : 3/3/1945                 Printed: May 6, 2022     Medical Record #: [id number]                                            Page 1 of 1

## (undated) NOTE — LETTER
[facility] [place]      In the course of your treatment, it may become necessary to administer a transfusion of blood or blood components. This form provides basic information concerning this procedure and, if signed by you, authorizes its performance by qualified medical personnel. DESCRIPTION OF PROCEDURE:  Blood is introduced into one of your veins, commonly in the arm, using a sterilized disposable needle. The amount of blood transfused, and whether the transfusion will be of blood or blood components is a judgment the physician will make based on your particular needs. RISKS:  The transfusion is a common procedure of low risk. MINOR AND TEMPORARY REACTIONS ARE NOT UNCOMMON, including a slight bruise, swelling or local reaction in the area where the needle pierces your skin, or a non-serious reaction to the transfused material itself, including headache, fever or a mild skin reaction, such as rash. Serious reactions are possible, though very unlikely and include severe allergic reaction (shock)  and destruction (hemolysis) of transfused blood cells. Infectious diseases which are known to be transmitted by blood transfusion include CERTAIN TYPES OF VIRAL HEPATITIS, a viral infection of the liver, HUMAN IMMUNODEFICIENCY VIRUS (HIV-1,2) infection, a viral infection known to cause ACQUIRED IMMUNODEFICIENCY SYNDROME (AIDS) AS WELL AS CERTAIN OTHER BACTERIAL, VIRAL AND PARASITIC DISEASES. While a minimal risk of acquiring an infectious disease from transfused blood exists, in accordance with Federal and State law all due care has been taken in donor selection and testing to avoid transmission of disease. ALTERNATIVES:  If loss of blood poses serious threats in the course of your treatment, THERE IS NO EFFECTIVE ALTERNATIVE TO BLOOD TRANSFUSION.  However, if you have any further questions on this matter, your physician will fully explain the alternatives to you if it has not already been done. I,Rachel Cruz, have read/had read to me the above. I understand the matters bearing on the decision whether or not to authorize a transfusion of blood or blood components. I have no questions which have not been answered to my full satisfaction.  I hereby consent to such transfusion as  my physician may deem necessary or advisable in the course of my treatment.        _______________   __________________________________________________  Date     Signature of Patient, Parent or Legal Guardian      ([place] [facility])      __________________________________________  Witness to Signature (title or relationship to patient)    Patient Name: Karen Pederson     : 3/3/1945                 Printed: May 31, 2022     Medical Record #: [id number]                    Page 1 of 1

## (undated) NOTE — LETTER
04/04/2022        Itzel Kwan. [address]      Dear Abril Peralta,      The nasal swab test for the the COVID-19 virus was negative. You may proceed with your planned procedure.     Thank you,    Davida Carr MD  [address] [place] [address]   Gastroenterology  [phone]

## (undated) NOTE — LETTER
3949 [facility] [place] FOR BLOOD OR BLOOD COMPONENTS      In the course of your treatment, it may become necessary to administer a transfusion of blood or blood components. This form provides basic information concerning this procedure and, if signed by you, authorizes its performance by qualified medical personnel. DESCRIPTION OF PROCEDURE:  Blood is introduced into one of your veins, commonly in the arm, using a sterilized disposable needle. The amount of blood transfused, and whether the transfusion will be of blood or blood components is a judgment the physician will make based on your particular needs. RISKS:  The transfusion is a common procedure of low risk. MINOR AND TEMPORARY REACTIONS ARE NOT UNCOMMON, including a slight bruise, swelling or local reaction in the area where the needle pierces your skin, or a non-serious reaction to the transfused material itself, including headache, fever or a mild skin reaction, such as rash. Serious reactions are possible, though very unlikely and include severe allergic reaction (shock)  and destruction (hemolysis) of transfused blood cells. Infectious diseases which are known to be transmitted by blood transfusion include CERTAIN TYPES OF VIRAL HEPATITIS, a viral infection of the liver, HUMAN IMMUNODEFICIENCY VIRUS (HIV-1,2) infection, a viral infection known to cause ACQUIRED IMMUNODEFICIENCY SYNDROME (AIDS) AS WELL AS CERTAIN OTHER BACTERIAL, VIRAL AND PARASITIC DISEASES. While a minimal risk of acquiring an infectious disease from transfused blood exists, in accordance with Federal and State law all due care has been taken in donor selection and testing to avoid transmission of disease. ALTERNATIVES:  If loss of blood poses serious threats in the course of your treatment, THERE IS NO EFFECTIVE ALTERNATIVE TO BLOOD TRANSFUSION.  However, if you have any further questions on this matter, your physician will fully explain the alternatives to you if it has not already been done. I,Rachel Cruz, have read/had read to me the above. I understand the matters bearing on the decision whether or not to authorize a transfusion of blood or blood components. I have no questions which have not been answered to my full satisfaction.  I hereby consent to such transfusion as  my physician may deem necessary or advisable in the course of my treatment.        _______________   __________________________________________________  Date     Signature of Patient, Parent or Legal Guardian      ([place] [facility])      __________________________________________  Witness to Signature (title or relationship to patient)    Patient Name: Yanni Olmos     : 3/3/1945                 Printed: 2022     Medical Record #: [id number]                    Page 1 of 1

## (undated) NOTE — LETTER
Iva Mccrudy 182 [address] [place] [address]  Pilar, [address] [place] [address]  Authorization for Surgical Operation and Procedure   Date: June 2, 2022                                                                                          Time:__________  1. I hereby Lila Ta MD, my physician and his/her assistants (if applicable), which may include medical students, residents, and/or fellows, to perform the following surgical operation/ procedure and administer such anesthesia as may be determined necessary by my physician:  Operation/Procedure name (s) CAPSULE ENDOSCOPY  on Atiya Page   2. I recognize that during the surgical operation/procedure, unforeseen conditions may necessitate additional or different procedures than those listed above. I, therefore, further authorize and request that the above-named surgeon, assistants, or designees perform such procedures as are, in their judgment, necessary and desirable. 3.   My surgeon/physician has discussed prior to my surgery the potential benefits, risks and side effects of this procedure; the likelihood of achieving goals; and potential problems that might occur during recuperation. They also discussed reasonable alternatives to the procedure, including risks, benefits, and side effects related to the alternatives and risks related to not receiving this procedure. I have had all my questions answered and I acknowledge that no guarantee has been made as to the result that may be obtained. 4.   Should the need arise during my operation or immediate post-operative period, I also consent to the administration of blood and/or blood products. Further, I understand that despite careful testing and screening of blood or blood products by collecting agencies, I may still be subject to ill effects as a result of receiving a blood transfusion and/or blood products.   The following are some, but not all, of the potential risks that can occur: fever and allergic reactions, hemolytic reactions, transmission of diseases such as Hepatitis, AIDS and Cytomegalovirus (CMV) and fluid overload. In the event that I wish to have an autologous transfusion of my own blood, or a directed donor transfusion. I will discuss this with my physician. 5.   I authorize the use of any specimen, organs, tissues, body parts or foreign objects that may be removed from my body during the operation/procedure for diagnosis, research or teaching purposes and their subsequent disposal by hospital authorities. I also authorize the release of specimen test results and/or written reports to my treating physician on the hospital medical staff or other referring or consulting physicians involved in my care, at the discretion of the Pathologist or my treating physician. 6.   I consent to the photographing or videotaping of the operations or procedures to be performed, including appropriate portions of my body for medical, scientific, or educational purposes, provided my identity is not revealed by the pictures or by descriptive texts accompanying them. If the procedure has been photographed/videotaped, the surgeon will obtain the original picture, image, videotape or CD. The hospital will not be responsible for storage, release or maintenance of the picture, image, tape or CD.    7.   I consent to the presence of a  or observers in the operating room as deemed necessary by my physician or their designees. 8.   I recognize that in the event my procedure results in extended X-Ray/fluoroscopy time, I may develop a skin reaction. 9. If I have a Do Not Attempt Resuscitation (DNAR) order in place, that status will be suspended while in the operating room, procedural suite, and during the recovery period unless otherwise explicitly stated by me (or a person authorized to consent on my behalf).  The surgeon or my attending physician will determine when the applicable recovery period ends for purposes of reinstating the DNAR order. 10. Patients having a sterilization procedure: I understand that if the procedure is successful the results will be permanent and it will therefore be impossible for me to inseminate, conceive, or bear children. I also understand that the procedure is intended to result in sterility, although the result has not been guaranteed. 11. I acknowledge that my physician has explained sedation/analgesia administration to me including the risk and benefits I consent to the administration of sedation/analgesia as may be necessary or desirable in the judgment of my physician.     I CERTIFY THAT I HAVE READ AND FULLY UNDERSTAND THE ABOVE CONSENT TO OPERATION and/or OTHER PROCEDURE.      _________________________________________  __________________________________  Signature of Patient     Signature of Responsible Person         ___________________________________         Printed Name of Responsible Person           _________________________________                 Relationship to Patient  _________________________________________  ______________________________  Signature of Witness          Date  Time          Patient Name: Luis Alberto Lopez     : 3/3/1945                 Printed: 2022     Medical Record #: [id number]                                            Page 1 of 1

## (undated) NOTE — LETTER
Iva Mccurdy 182 295 [facility] S, [address] [facility]  Authorization for Surgical Operation and Procedure   Date:___________                                                                                            Time:__________  1. I hereby Deep Gonzáles MD, my physician and his/her assistants (if applicable), which may include medical students, residents, and/or fellows, to perform the following surgical operation/ procedure and administer such anesthesia as may be determined necessary by my physician:  Operation/Procedure name (s) CAPSULE ENDOSCOPY  on Quinn Mitchell   2. I recognize that during the surgical operation/procedure, unforeseen conditions may necessitate additional or different procedures than those listed above. I, therefore, further authorize and request that the above-named surgeon, assistants, or designees perform such procedures as are, in their judgment, necessary and desirable. 3.   My surgeon/physician has discussed prior to my surgery the potential benefits, risks and side effects of this procedure; the likelihood of achieving goals; and potential problems that might occur during recuperation. They also discussed reasonable alternatives to the procedure, including risks, benefits, and side effects related to the alternatives and risks related to not receiving this procedure. I have had all my questions answered and I acknowledge that no guarantee has been made as to the result that may be obtained. 4.   Should the need arise during my operation or immediate post-operative period, I also consent to the administration of blood and/or blood products. Further, I understand that despite careful testing and screening of blood or blood products by collecting agencies, I may still be subject to ill effects as a result of receiving a blood transfusion and/or blood products.   The following are some, but not all, of the potential risks that can occur: fever and allergic reactions, hemolytic reactions, transmission of diseases such as Hepatitis, AIDS and Cytomegalovirus (CMV) and fluid overload. In the event that I wish to have an autologous transfusion of my own blood, or a directed donor transfusion. I will discuss this with my physician. 5.   I authorize the use of any specimen, organs, tissues, body parts or foreign objects that may be removed from my body during the operation/procedure for diagnosis, research or teaching purposes and their subsequent disposal by hospital authorities. I also authorize the release of specimen test results and/or written reports to my treating physician on the hospital medical staff or other referring or consulting physicians involved in my care, at the discretion of the Pathologist or my treating physician. 6.   I consent to the photographing or videotaping of the operations or procedures to be performed, including appropriate portions of my body for medical, scientific, or educational purposes, provided my identity is not revealed by the pictures or by descriptive texts accompanying them. If the procedure has been photographed/videotaped, the surgeon will obtain the original picture, image, videotape or CD. The hospital will not be responsible for storage, release or maintenance of the picture, image, tape or CD.    7.   I consent to the presence of a  or observers in the operating room as deemed necessary by my physician or their designees. 8.   I recognize that in the event my procedure results in extended X-Ray/fluoroscopy time, I may develop a skin reaction. 9. If I have a Do Not Attempt Resuscitation (DNAR) order in place, that status will be suspended while in the operating room, procedural suite, and during the recovery period unless otherwise explicitly stated by me (or a person authorized to consent on my behalf).  The surgeon or my attending physician will determine when the applicable recovery period ends for purposes of reinstating the DNAR order. 10. Patients having a sterilization procedure: I understand that if the procedure is successful the results will be permanent and it will therefore be impossible for me to inseminate, conceive, or bear children. I also understand that the procedure is intended to result in sterility, although the result has not been guaranteed. 11. I acknowledge that my physician has explained sedation/analgesia administration to me including the risk and benefits I consent to the administration of sedation/analgesia as may be necessary or desirable in the judgment of my physician.     I CERTIFY THAT I HAVE READ AND FULLY UNDERSTAND THE ABOVE CONSENT TO OPERATION and/or OTHER PROCEDURE.      _________________________________________  __________________________________  Signature of Patient     Signature of Responsible Person         ___________________________________         Printed Name of Responsible Person           _________________________________                 Relationship to Patient  _________________________________________  ______________________________  Signature of Witness          Date  Time          Patient Name: Alex Issa     : 3/3/1945                 Printed: 2022     Medical Record #: [id number]                                            Page 1 of 1

## (undated) NOTE — IP AVS SNAPSHOT
[address]            (For Outpatient Use Only) Initial Admit Date: 2022   Inpt/Obs Admit Date: Inpt: 22 / Obs: N/A   Discharge Date:    [place] Cornea:  [de-identified]   MRN: [de-identified]   CSN: [id number]   CEID: [id number]        ENCOUNTER  Patient Class: Inpatient Admitting Provider: Aida Velez MD Unit: [place] [address] Service: Cardiac Telemetry Attending Provider: Debbie Plascencia MD   Bed: 1776-S   Visit Type:   Referring Physician: No ref. provider found Billing Flag:    Admit Diagnosis: Gastrointestinal hemorrhage, unspecified gastrointestinal hemorrhage type [K92.2]      PATIENT  Legal Name:   Merlyn Saunders   Legal Sex: Female  Gender ID:              Pref Name:   Davis Small PCP:  Bobbi Calderno MD Home: [phone]   Address:  [address] : 3/3/1945 (77 yrs) Mobile: [phone]         City/State/Zip: [facility] [address] Marital: Single Language: [address] [place] [address] Drive: [place] SSN4: [id number] Sikhism: Wishes Privacy     Race: White Ethnicity: Non  Or [address] CONTACT   Name Relationship Legal Guardian? Home Phone Work Phone Mobile Phone   1. LIANET SOLIS  2.  Irma Blakely Sister  Sister          [phone]     GUARANTOR  Guarantor: Beti Vega : 3/3/1945 Home Phone: [phone]   Address: [address] Sex: Female Work Phone:    City/State/Zip: [place]   Rel. to Patient: Self Guarantor ID: [id number]   GUARANTOR EMPLOYER   Employer:  Status: RETIRED     COVERAGE  PRIMARY INSURANCE   Payor: MEDICARE Plan: MEDICARE PART A&B   Group Number:  Insurance Type: [facility] [address]55 Name: Orlin Luz Maria : 1945   Subscriber ID: [id number] Pt Rel to Subscriber: Self   SECONDARY INSURANCE   Payor: [place] P[place] Plan: [facility]   Group Number: [id number] Insurance Type: [address] Name: Orlin Luz Maria : 3/3/1945   Subscriber ID: [id number] Pt Rel to Subscriber: SELF   TERTIARY INSURANCE   Payor:  Plan:    Group Number:  Insurance Type:    Subscriber Name:  Subscriber :    Subscriber ID:  Pt Rel to Subscriber:    Hospital Account Financial Class: Medicare    2022